# Patient Record
Sex: MALE | Race: WHITE | NOT HISPANIC OR LATINO | Employment: OTHER | URBAN - NONMETROPOLITAN AREA
[De-identification: names, ages, dates, MRNs, and addresses within clinical notes are randomized per-mention and may not be internally consistent; named-entity substitution may affect disease eponyms.]

---

## 2017-01-04 ENCOUNTER — HOSPITAL ENCOUNTER (OUTPATIENT)
Dept: PHYSICAL THERAPY | Facility: OTHER | Age: 82
Setting detail: THERAPIES SERIES
End: 2017-01-04

## 2017-03-14 ENCOUNTER — COMMUNICATION - GICH (OUTPATIENT)
Dept: INTERNAL MEDICINE | Facility: OTHER | Age: 82
End: 2017-03-14

## 2017-03-14 ENCOUNTER — AMBULATORY - GICH (OUTPATIENT)
Dept: SCHEDULING | Facility: OTHER | Age: 82
End: 2017-03-14

## 2017-04-20 ENCOUNTER — COMMUNICATION - GICH (OUTPATIENT)
Dept: INTERNAL MEDICINE | Facility: OTHER | Age: 82
End: 2017-04-20

## 2017-04-20 DIAGNOSIS — M62.838 OTHER MUSCLE SPASM: ICD-10-CM

## 2017-04-28 ENCOUNTER — COMMUNICATION - GICH (OUTPATIENT)
Dept: INTERNAL MEDICINE | Facility: OTHER | Age: 82
End: 2017-04-28

## 2017-04-28 DIAGNOSIS — E78.2 MIXED HYPERLIPIDEMIA: ICD-10-CM

## 2017-04-28 DIAGNOSIS — E11.9 TYPE 2 DIABETES MELLITUS WITHOUT COMPLICATIONS (H): ICD-10-CM

## 2017-05-11 ENCOUNTER — OFFICE VISIT - GICH (OUTPATIENT)
Dept: UROLOGY | Facility: OTHER | Age: 82
End: 2017-05-11

## 2017-05-11 ENCOUNTER — HISTORY (OUTPATIENT)
Dept: UROLOGY | Facility: OTHER | Age: 82
End: 2017-05-11

## 2017-05-11 DIAGNOSIS — R35.1 NOCTURIA: ICD-10-CM

## 2017-05-24 ENCOUNTER — COMMUNICATION - GICH (OUTPATIENT)
Dept: INTERNAL MEDICINE | Facility: OTHER | Age: 82
End: 2017-05-24

## 2017-05-24 DIAGNOSIS — M62.830 MUSCLE SPASM OF BACK: ICD-10-CM

## 2017-06-02 ENCOUNTER — OFFICE VISIT - GICH (OUTPATIENT)
Dept: INTERNAL MEDICINE | Facility: OTHER | Age: 82
End: 2017-06-02

## 2017-06-02 ENCOUNTER — HISTORY (OUTPATIENT)
Dept: INTERNAL MEDICINE | Facility: OTHER | Age: 82
End: 2017-06-02

## 2017-06-02 ENCOUNTER — HOSPITAL ENCOUNTER (OUTPATIENT)
Dept: RADIOLOGY | Facility: OTHER | Age: 82
End: 2017-06-02
Attending: INTERNAL MEDICINE

## 2017-06-02 DIAGNOSIS — E78.2 MIXED HYPERLIPIDEMIA: ICD-10-CM

## 2017-06-02 DIAGNOSIS — I73.00 RAYNAUD'S SYNDROME WITHOUT GANGRENE: ICD-10-CM

## 2017-06-02 DIAGNOSIS — M77.9 ENTHESOPATHY: ICD-10-CM

## 2017-06-02 DIAGNOSIS — E11.9 TYPE 2 DIABETES MELLITUS WITHOUT COMPLICATIONS (H): ICD-10-CM

## 2017-06-02 DIAGNOSIS — M62.830 MUSCLE SPASM OF BACK: ICD-10-CM

## 2017-06-02 DIAGNOSIS — I49.1 ATRIAL PREMATURE DEPOLARIZATION: ICD-10-CM

## 2017-06-02 LAB
A/G RATIO - HISTORICAL: 1.5 (ref 1–2)
ALBUMIN SERPL-MCNC: 4.1 G/DL (ref 3.5–5.7)
ALP SERPL-CCNC: 62 IU/L (ref 34–104)
ALT (SGPT) - HISTORICAL: 14 IU/L (ref 7–52)
ANION GAP - HISTORICAL: 8 (ref 5–18)
AST SERPL-CCNC: 14 IU/L (ref 13–39)
BILIRUB SERPL-MCNC: 1.1 MG/DL (ref 0.3–1)
BUN SERPL-MCNC: 22 MG/DL (ref 7–25)
BUN/CREAT RATIO - HISTORICAL: 22
CALCIUM SERPL-MCNC: 9.1 MG/DL (ref 8.6–10.3)
CHLORIDE SERPLBLD-SCNC: 103 MMOL/L (ref 98–107)
CHOL/HDL RATIO - HISTORICAL: 4.22
CHOLESTEROL TOTAL: 135 MG/DL
CO2 SERPL-SCNC: 22 MMOL/L (ref 21–31)
CREAT SERPL-MCNC: 0.98 MG/DL (ref 0.7–1.3)
ERYTHROCYTE [DISTWIDTH] IN BLOOD BY AUTOMATED COUNT: 14 % (ref 11.5–15.5)
ESTIMATED AVERAGE GLUCOSE: 140 MG/DL
GFR IF NOT AFRICAN AMERICAN - HISTORICAL: >60 ML/MIN/1.73M2
GLOBULIN - HISTORICAL: 2.7 G/DL (ref 2–3.7)
GLUCOSE SERPL-MCNC: 207 MG/DL (ref 70–105)
HCT VFR BLD AUTO: 46.7 % (ref 37–53)
HDLC SERPL-MCNC: 32 MG/DL (ref 23–92)
HEMOGLOBIN A1C MONITORING (POCT) - HISTORICAL: 6.5 % (ref 4–6.2)
HEMOGLOBIN: 15.6 G/DL (ref 13.5–17.5)
LDLC SERPL CALC-MCNC: 84 MG/DL
MCH RBC QN AUTO: 28.3 PG (ref 26–34)
MCHC RBC AUTO-ENTMCNC: 33.4 G/DL (ref 32–36)
MCV RBC AUTO: 85 FL (ref 80–100)
NON-HDL CHOLESTEROL - HISTORICAL: 103 MG/DL
PATIENT STATUS - HISTORICAL: NORMAL
PLATELET # BLD AUTO: 142 THOU/CU MM (ref 140–440)
PMV BLD: 9.6 FL (ref 6.5–11)
POTASSIUM SERPL-SCNC: 4.3 MMOL/L (ref 3.5–5.1)
PROT SERPL-MCNC: 6.8 G/DL (ref 6.4–8.9)
RED BLOOD COUNT - HISTORICAL: 5.51 MIL/CU MM (ref 4.3–5.9)
SODIUM SERPL-SCNC: 133 MMOL/L (ref 133–143)
TRIGL SERPL-MCNC: 93 MG/DL
WHITE BLOOD COUNT - HISTORICAL: 6.5 THOU/CU MM (ref 4.5–11)

## 2017-06-02 ASSESSMENT — PATIENT HEALTH QUESTIONNAIRE - PHQ9: SUM OF ALL RESPONSES TO PHQ QUESTIONS 1-9: 0

## 2017-06-07 ENCOUNTER — COMMUNICATION - GICH (OUTPATIENT)
Dept: INTERNAL MEDICINE | Facility: OTHER | Age: 82
End: 2017-06-07

## 2017-06-07 DIAGNOSIS — M51.379 OTHER INTERVERTEBRAL DISC DEGENERATION, LUMBOSACRAL REGION: ICD-10-CM

## 2017-06-08 ENCOUNTER — COMMUNICATION - GICH (OUTPATIENT)
Dept: INTERNAL MEDICINE | Facility: OTHER | Age: 82
End: 2017-06-08

## 2017-06-20 ENCOUNTER — HOSPITAL ENCOUNTER (OUTPATIENT)
Dept: RADIOLOGY | Facility: OTHER | Age: 82
End: 2017-06-20
Attending: INTERNAL MEDICINE

## 2017-06-20 DIAGNOSIS — M51.379 OTHER INTERVERTEBRAL DISC DEGENERATION, LUMBOSACRAL REGION: ICD-10-CM

## 2017-07-16 ENCOUNTER — COMMUNICATION - GICH (OUTPATIENT)
Dept: INTERNAL MEDICINE | Facility: OTHER | Age: 82
End: 2017-07-16

## 2017-07-16 DIAGNOSIS — M54.50 LOW BACK PAIN: ICD-10-CM

## 2017-09-07 ENCOUNTER — COMMUNICATION - GICH (OUTPATIENT)
Dept: INTERNAL MEDICINE | Facility: OTHER | Age: 82
End: 2017-09-07

## 2017-09-07 DIAGNOSIS — I73.00 RAYNAUD'S SYNDROME WITHOUT GANGRENE: ICD-10-CM

## 2017-09-08 ENCOUNTER — OFFICE VISIT - GICH (OUTPATIENT)
Dept: INTERNAL MEDICINE | Facility: OTHER | Age: 82
End: 2017-09-08

## 2017-09-08 ENCOUNTER — HISTORY (OUTPATIENT)
Dept: INTERNAL MEDICINE | Facility: OTHER | Age: 82
End: 2017-09-08

## 2017-09-08 DIAGNOSIS — E11.9 TYPE 2 DIABETES MELLITUS WITHOUT COMPLICATIONS (H): ICD-10-CM

## 2017-09-08 DIAGNOSIS — S22.32XA CLOSED FRACTURE OF RIB OF LEFT SIDE: ICD-10-CM

## 2017-09-08 DIAGNOSIS — M89.9 DISORDER OF BONE: ICD-10-CM

## 2017-09-08 DIAGNOSIS — L84 CORNS AND CALLOSITIES: ICD-10-CM

## 2017-09-08 DIAGNOSIS — I73.00 RAYNAUD'S SYNDROME WITHOUT GANGRENE: ICD-10-CM

## 2017-09-08 DIAGNOSIS — M21.961 ACQUIRED DEFORMITY OF RIGHT LOWER LEG: ICD-10-CM

## 2017-09-08 LAB
ESTIMATED AVERAGE GLUCOSE: 148 MG/DL
HEMOGLOBIN A1C MONITORING (POCT) - HISTORICAL: 6.8 % (ref 4–6.2)

## 2017-09-09 ENCOUNTER — COMMUNICATION - GICH (OUTPATIENT)
Dept: INTERNAL MEDICINE | Facility: OTHER | Age: 82
End: 2017-09-09

## 2017-09-09 DIAGNOSIS — E11.9 TYPE 2 DIABETES MELLITUS WITHOUT COMPLICATIONS (H): ICD-10-CM

## 2017-09-11 ENCOUNTER — COMMUNICATION - GICH (OUTPATIENT)
Dept: INTERNAL MEDICINE | Facility: OTHER | Age: 82
End: 2017-09-11

## 2017-09-11 DIAGNOSIS — K59.00 CONSTIPATION: ICD-10-CM

## 2017-09-13 ENCOUNTER — COMMUNICATION - GICH (OUTPATIENT)
Dept: INTERNAL MEDICINE | Facility: OTHER | Age: 82
End: 2017-09-13

## 2017-09-19 ENCOUNTER — HOSPITAL ENCOUNTER (OUTPATIENT)
Dept: RADIOLOGY | Facility: OTHER | Age: 82
End: 2017-09-19
Attending: INTERNAL MEDICINE

## 2017-09-19 DIAGNOSIS — M89.9 DISORDER OF BONE: ICD-10-CM

## 2017-09-19 DIAGNOSIS — M21.961 ACQUIRED DEFORMITY OF RIGHT LOWER LEG: ICD-10-CM

## 2017-09-20 ENCOUNTER — AMBULATORY - GICH (OUTPATIENT)
Dept: SCHEDULING | Facility: OTHER | Age: 82
End: 2017-09-20

## 2017-09-27 ENCOUNTER — AMBULATORY - GICH (OUTPATIENT)
Dept: INTERNAL MEDICINE | Facility: OTHER | Age: 82
End: 2017-09-27

## 2017-09-30 ENCOUNTER — AMBULATORY - GICH (OUTPATIENT)
Dept: SCHEDULING | Facility: OTHER | Age: 82
End: 2017-09-30

## 2017-10-04 ENCOUNTER — COMMUNICATION - GICH (OUTPATIENT)
Dept: INTERNAL MEDICINE | Facility: OTHER | Age: 82
End: 2017-10-04

## 2017-10-09 ENCOUNTER — COMMUNICATION - GICH (OUTPATIENT)
Dept: INTERNAL MEDICINE | Facility: OTHER | Age: 82
End: 2017-10-09

## 2017-10-13 ENCOUNTER — COMMUNICATION - GICH (OUTPATIENT)
Dept: INTERNAL MEDICINE | Facility: OTHER | Age: 82
End: 2017-10-13

## 2017-10-14 ENCOUNTER — AMBULATORY - GICH (OUTPATIENT)
Dept: SCHEDULING | Facility: OTHER | Age: 82
End: 2017-10-14

## 2017-11-02 ENCOUNTER — AMBULATORY - GICH (OUTPATIENT)
Dept: SCHEDULING | Facility: OTHER | Age: 82
End: 2017-11-02

## 2017-11-08 ENCOUNTER — COMMUNICATION - GICH (OUTPATIENT)
Dept: INTERNAL MEDICINE | Facility: OTHER | Age: 82
End: 2017-11-08

## 2017-12-08 ENCOUNTER — AMBULATORY - GICH (OUTPATIENT)
Dept: LAB | Facility: OTHER | Age: 82
End: 2017-12-08

## 2017-12-08 ENCOUNTER — HISTORY (OUTPATIENT)
Dept: INTERNAL MEDICINE | Facility: OTHER | Age: 82
End: 2017-12-08

## 2017-12-08 ENCOUNTER — OFFICE VISIT - GICH (OUTPATIENT)
Dept: INTERNAL MEDICINE | Facility: OTHER | Age: 82
End: 2017-12-08

## 2017-12-08 DIAGNOSIS — E78.2 MIXED HYPERLIPIDEMIA: ICD-10-CM

## 2017-12-08 DIAGNOSIS — E11.9 TYPE 2 DIABETES MELLITUS WITHOUT COMPLICATIONS (H): ICD-10-CM

## 2017-12-08 DIAGNOSIS — R00.1 BRADYCARDIA: ICD-10-CM

## 2017-12-08 DIAGNOSIS — I49.1 ATRIAL PREMATURE DEPOLARIZATION: ICD-10-CM

## 2017-12-08 LAB
A/G RATIO - HISTORICAL: 1.7 (ref 1–2)
ALBUMIN SERPL-MCNC: 4 G/DL (ref 3.5–5.7)
ALP SERPL-CCNC: 61 IU/L (ref 34–104)
ALT (SGPT) - HISTORICAL: 12 IU/L (ref 7–52)
ANION GAP - HISTORICAL: 9 (ref 5–18)
AST SERPL-CCNC: 14 IU/L (ref 13–39)
BILIRUB SERPL-MCNC: 1.2 MG/DL (ref 0.3–1)
BUN SERPL-MCNC: 22 MG/DL (ref 7–25)
BUN/CREAT RATIO - HISTORICAL: 24
CALCIUM SERPL-MCNC: 8.6 MG/DL (ref 8.6–10.3)
CHLORIDE SERPLBLD-SCNC: 104 MMOL/L (ref 98–107)
CHOL/HDL RATIO - HISTORICAL: 3.91
CHOLESTEROL TOTAL: 129 MG/DL
CO2 SERPL-SCNC: 25 MMOL/L (ref 21–31)
CREAT SERPL-MCNC: 0.93 MG/DL (ref 0.7–1.3)
ERYTHROCYTE [DISTWIDTH] IN BLOOD BY AUTOMATED COUNT: 13.7 % (ref 11.5–15.5)
ESTIMATED AVERAGE GLUCOSE: 148 MG/DL
GFR IF NOT AFRICAN AMERICAN - HISTORICAL: >60 ML/MIN/1.73M2
GLOBULIN - HISTORICAL: 2.4 G/DL (ref 2–3.7)
GLUCOSE SERPL-MCNC: 137 MG/DL (ref 70–105)
HCT VFR BLD AUTO: 46.2 % (ref 37–53)
HDLC SERPL-MCNC: 33 MG/DL (ref 23–92)
HEMOGLOBIN A1C MONITORING (POCT) - HISTORICAL: 6.8 % (ref 4–6.2)
HEMOGLOBIN: 16 G/DL (ref 13.5–17.5)
LDLC SERPL CALC-MCNC: 84 MG/DL
MCH RBC QN AUTO: 29.4 PG (ref 26–34)
MCHC RBC AUTO-ENTMCNC: 34.6 G/DL (ref 32–36)
MCV RBC AUTO: 85 FL (ref 80–100)
NON-HDL CHOLESTEROL - HISTORICAL: 96 MG/DL
PLATELET # BLD AUTO: 128 THOU/CU MM (ref 140–440)
PMV BLD: 10.2 FL (ref 6.5–11)
POTASSIUM SERPL-SCNC: 4.2 MMOL/L (ref 3.5–5.1)
PROT SERPL-MCNC: 6.4 G/DL (ref 6.4–8.9)
PROVIDER ORDERDED STATUS - HISTORICAL: NORMAL
RED BLOOD COUNT - HISTORICAL: 5.44 MIL/CU MM (ref 4.3–5.9)
SODIUM SERPL-SCNC: 138 MMOL/L (ref 133–143)
TRIGL SERPL-MCNC: 62 MG/DL
WHITE BLOOD COUNT - HISTORICAL: 6 THOU/CU MM (ref 4.5–11)

## 2017-12-26 ENCOUNTER — COMMUNICATION - GICH (OUTPATIENT)
Dept: INTERNAL MEDICINE | Facility: OTHER | Age: 82
End: 2017-12-26

## 2017-12-26 DIAGNOSIS — E11.9 TYPE 2 DIABETES MELLITUS WITHOUT COMPLICATIONS (H): ICD-10-CM

## 2017-12-27 NOTE — PROGRESS NOTES
Patient Information     Patient Name MRN Sex Steven Yao [id number] Male 1935      Progress Notes by Karen Sal at 2017  8:33 AM     Author:  Karen Sal Service:  (none) Author Type:  (none)     Filed:  2017  8:33 AM Date of Service:  2017  8:33 AM Status:  Signed     :  Karen Sal            [place] Protocol    A. Pre-procedure verification complete yes  1-relevant information / documentation available, reviewed and properly matched to the patient; 2-consent accurate and complete, 3-equipment and supplies available    B. Site marking complete Yes  Site marked if not in continuous attendance with patient    C. TIME OUT completed yes  Time Out was conducted just prior to starting procedure to verify the eight required elements: 1-patient identity, 2-consent accurate and complete, 3-position, 4-correct side/site marked (if applicable), 5-procedure, 6-relevant images / results properly labeled and displayed (if applicable), 7-antibiotics / irrigation fluids (if applicable), 8-safety precautions.

## 2017-12-27 NOTE — PROGRESS NOTES
Patient Information     Patient Name MRN Steven Serrano [id number] Male 1935      Progress Notes by Corrina Mitchell R.T. (ARRT) at 2017  4:00 PM     Author:  Corrina Mitchell R.T. (ARRT) Service:  (none) Author Type:  [employer] - Registered Radiologic Technologist     Filed:  2017  4:00 PM Date of Service:  2017  4:00 PM Status:  Signed     :  Corrina Mitchell R.T. (ARRT) ([facility] - Registered Radiologic Technologist)            RECOVERY TIME  You may experience numbness and/or relief of your pain for up to 4-6 hours after the injection.  Your usual symptoms may return the night of the procedure and may possible be more severe than usual a day or two following.  Please keep track of your pain over the next several days and report how long the relief lasts to the doctor who referred you for this procedure.    The beneficial effects of the steroids usually require 2 to 3 days to take effect, buy may take as long as 5 to 7 days.  If there is no change in the pain, then investigation can be focused on other possible sources of your pain.  In either case, the information is useful to the doctor who referred you for this procedure.    POSSIBLE SIDE EFFECTS  Facial flushing (redness), occasional low grade fevers of 99.5F or less, hiccups, insomnia, headaches, increased heart rate, abdominal cramping, and/or a bloating feeling are side effects of the steroid medications and will go away 3 to 4 days after the injection.    Diabetic Patients  The steroids you have received may significantly increase your blood sugar levels.  Monitor your blood sugar level closely (4-6 times per day) for a period of 4 days or until your blood sugar level normalizes.  If your blood sugar level elevates significantly or you experience confusion, dizziness, sweating, please notify our primary physician and make him/her aware that you have received steroids.

## 2017-12-27 NOTE — PROGRESS NOTES
Patient Information     Patient Name MRN Sex Steven Yao [id number] Male 1935      Progress Notes by Corrina Mitchell R.T. ([facility] at 2017  4:00 PM     Author:  Corrina Mitchell R.T. ([facility]) Service:  (none) Author Type:  [facility] - Registered Radiologic Technologist     Filed:  2017  4:00 PM Date of Service:  2017  4:00 PM Status:  Signed     :  Corrina Mitchell R.T. ([facility]) ([facility] - Registered Radiologic Technologist)            [place] Protocol    A. Pre-procedure verification complete yes  1-relevant information / documentation available, reviewed and properly matched to the patient; 2-consent accurate and complete, 3-equipment and supplies available    B. Site marking complete Yes  Site marked if not in continuous attendance with patient    C. TIME OUT completed yes  Time Out was conducted just prior to starting procedure to verify the eight required elements: 1-patient identity, 2-consent accurate and complete, 3-position, 4-correct side/site marked (if applicable), 5-procedure, 6-relevant images / results properly labeled and displayed (if applicable), 7-antibiotics / irrigation fluids (if applicable), 8-safety precautions.

## 2017-12-28 NOTE — PROGRESS NOTES
Patient Information     Patient Name MRN Sex Steven Yao [id number] Male 1935      Progress Notes by Nawaf Montanez MD at 2017  8:20 AM     Author:  Nawaf Montanez MD Service:  (none) Author Type:  Physician     Filed:  2017  9:06 AM Encounter Date:  2017 Status:  Signed     :  Nawaf Montanez MD (Physician)            Nursing Notes:   Lilian Renner  2017  8:40 AM  Signed  Previous A1C is at goal of <8  Urine microalbumin:creatine:   Foot exam never had one  Eye exam 17  Patient is not a current smoker  Patient is  on a daily aspirin  Blood pressure today of   128/70 at the goal of <140/90 for diabetics. Lilian Umairon LPN......................2017 8:22 AM    Steven Dennison presents to clinic today for:   Chief Complaint     Patient presents with       Diabetes      Diabetic check up     HPI: Mr. Dennison is a 82 y.o. male who presents today for evaluation of above.     (I73.00) Raynaud's phenomenon without gangrene  (primary encounter diagnosis)  (E11.9) Controlled type 2 diabetes mellitus without complication, without long-term current use of insulin (HC)  (L84) Pre-ulcerative corn or callous - right 1st metatarsal area  (M21.961) Foot deformity, acquired, right bunion  (M89.9) Disorder of bone   (S22.32XA) Closed fracture of one rib of left side, initial encounter     Raynaud's, mostly affecting his hands.  Has been doing quite well with amlodipine 5 mg daily.  Needs refills today.    Diabetes, currently controlled.  Taking, in addition of glipizide and metformin.  Needs refills of lancets today.  He is checking his blood sugars twice daily.  Recheck A1c today.    + Diabetic foot exam performed today.    Has very minimal sensation loss of the right foot especially over the first metatarsal where he has a preoperative callus.  Pulses are intact.  Has significant varicosities of the left foot.  + Right foot bunion noted.    Foot deformity, right foot has about  it.  Also has pre-ulcerative callus over the first metatarsal.  Left fifth toe has some deformity as well.    Patient was holding a board - using a dilcia/planer -- and states he heard a loud pop and then had left rib pain.  Reports some worsening pain if he coughs or sneezes.  Has not really had any shortness of breath issues with this.  Managing pain conservatively with over-the-counter treatments, conservative measures.  Limiting painful activity.  Wife states he has had a rib fracture in the past.  Concerned about bone density with a spontaneous fracture he did not have any trauma with this.    - DEXA scan ordered.    Mr. Dennison's Body mass index is 27.36 kg/(m^2). This is out of the normal range for a 82 y.o. Normal range for ages 18+ is between 18.5 and 24.9. To lose weight we reviewed risks and benefits of appropriate options such as diet, exercise, and medications. Patient's strategy will be  none; patient is not ready to act   BP Readings from Last 1 Encounters:09/08/17 : 128/70  Mr. Koehler blood pressure is out of the normal range for adults. Per JNC-8 guidelines normal adult blood pressure is < 120/80, pre-hypertensive is between 120/80 and 139/89, and hypertension is 140/90 or greater. Risks of hypertension were discussed. Patient's strategy will be reduced salt intake    Functional Capacity: > 4 METS.   Reports that he can climb a flight of stairs without any chest pain/heaviness or shortness of breath.   Patient reports no current symptoms of fevers, chills, nausea/vomiting.   No cough. No shortness of breath.   No change in bowel/bladder habits. No melena, hematochezia. No Hematuria.   No palpitations.  No orthopnea/paroxysmal nocturnal dyspnea   No vision or hearing issues.   No significant mood issues   No bruising.     LORENZO:  No flowsheet data found.    PHQ9:  PHQ Depression Screening 6/2/2017 9/8/2017   Date of PHQ exam (doc flow) 6/2/2017 9/8/2017   1. Lack of interest/pleasure 0 - Not at  all 0 - Not at all   2. Feeling down/depressed 0 - Not at all 0 - Not at all   PHQ-2 TOTAL SCORE 0 0   3. Trouble sleeping 0 - Not at all -   4. Decreased energy 0 - Not at all -   5. Appetite change 0 - Not at all -   6. Feelings of failure 0 - Not at all -   7. Trouble concentrating 0 - Not at all -   8. Activity level 0 - Not at all -   9. Hurting yourself 0 - Not at all -   PHQ-9 TOTAL SCORE 0 -   PHQ-9 Severity Level none -   Functional Impairment not difficult at all -   Some recent data might be hidden          I have personally reviewed the past medical history, past surgical history, medications, allergies, family and social history as listed below, on 9/8/2017.    Patient Active Problem List       Diagnosis  Date Noted     Pre-ulcerative corn or callous - right 1st metatarsal area  09/08/2017     Acquired deformity of right foot  09/08/2017     Rotator cuff tendinitis  12/29/2016     Eczema  12/14/2016     Trigger point with back pain  06/21/2016     Preop examination - Reverse Shoulder Replacement - Dr. Mayo 7/19/2016 06/21/2016     Deformity of right foot  03/31/2016     Bunion of right foot  03/31/2016     Biceps tendonitis on right  03/31/2016     Acute pain of right shoulder  03/31/2016     Sleep difficulties  03/31/2016     Chronic left shoulder pain  03/31/2016     Counseling regarding advanced directives and goals of care  08/07/2015     DNR (do not resuscitate) - Treat Acute Reversible illness  08/07/2015     DNI (do not intubate) - BIPAP okay  08/07/2015     Nocturia -- Was 3-4x nightly --> improved to 1-2x nightly as of 12/2016 05/01/2015     Bradycardia  05/01/2015     Intermittent lightheadedness  05/01/2015     Lumbar muscle pain  01/08/2015     Trigger point with back pain  08/01/2014     Pain in right acromioclavicular joint  07/16/2014     Subacromial bursitis - Bilateral Shoulders  07/16/2014     AAA (abdominal aortic aneurysm) - 2.6 cm - noted on 2/2014 CT scan  06/18/2014      Erectile dysfunction  05/27/2014     Raynaud's phenomenon  01/30/2014     Premature atrial contractions  01/30/2014     Impingement syndrome of right shoulder  01/30/2014     Diabetes mellitus type 2, controlled (HC)  11/19/2013     Skin lesion of cheek  11/19/2013     Hyperlipidemia  06/03/2013     EPISTAXIS  11/22/2011     DISC DISEASE, LUMBAR       recurring          RENAL CALCULUS       disease          DEGENERATIVE JOINT DISEASE, LEFT KNEE       Past Medical History:     Diagnosis  Date     Diabetes (HC)     Controlled with diet      Episode of dizziness     lightheaded with borderline troponin, possible coronary disease      Past Surgical History:      Procedure  Laterality Date     APPENDECTOMY      Appendectomy at age 6 due to appendicitis       ARTHROPLASTY  2003    left total knee       CERVICAL RIB RESECTION  1983     COLONOSCOPY SCREENING  2000     COLONOSCOPY SCREENING  2011    adenomatous polyps, one with high grade dysplasia , next due 2014       CYSTOSCOPY W/ URETEROSCOPY W/ LITHOTRIPSY  02/21/14    Dr. Gagnon - [facility]       HERNIA REPAIR      right inguinal       HERNIA REPAIR      x 4       KNEE ARTHROSCOPY      left knee       RIB RESECTION  1984    left first/rib resection       Current Outpatient Prescriptions       Medication  Sig Dispense Refill     amLODIPine (NORVASC) 5 mg tablet Take 1 tablet by mouth once daily. -- For Raynauds 180 tablet 3     Apple Cider Vinegar 300 mg tablet Take 3 tablets by mouth 2 times daily.       aspirin 325 mg tablet Take 1 tablet by mouth once daily.       blood-glucose meter Dispense item covered by pt ins. 250.02 NIDDM type II, uncontrolled - Test 2 times/day. Reason: Unstable diabetes       Cholecalciferol, Vitamin D3, (VITAMIN D-3) 5,000 unit Tab Take 1 tablet by mouth once weekly        Cinnamon Bark (CINNAMON) 500 mg capsule Take  by mouth once daily.       cyclobenzaprine (FLEXERIL) 10 mg tablet Take 1 tablet by mouth 3 times daily if needed. - no driving  after use 90 tablet 11     docosahexanoic acid 450 mg cap Take  by mouth. 900-450 mg tablet daily       Garlic 1,000 mg cap Take  by mouth once daily.       glipiZIDE (GLUCOTROL) 5 mg tablet Take 1 tablet by mouth 2 times daily before meals. 180 tablet 3     lecithin 1,200 mg cap Take  by mouth once daily.       medication order composer Flaxseed oil 1400 mg capsule daily       melatonin 10 mg tab Take  by mouth at bedtime.       metFORMIN (GLUCOPHAGE) 500 mg tablet Take 1 tablet by mouth 2 times daily with meals.       metoprolol tartrate (LOPRESSOR) 25 mg tablet Take 1 tablet by mouth 2 times daily. 180 tablet 3     ONETOUCH ULTRA TEST strip Use for testing two times daily E11.9 1 box 11     pravastatin (PRAVACHOL) 40 mg tablet TAKE 1 TABLET BY MOUTH AT BEDTIME FOR CHOLESTEROL OR DIABETES 90 tablet 3     PRODIGY TWIST TOP LANCET 28 gauge misc As directed. Use for testing 2 times daily E11.9 100 Each 4     tamsulosin (FLOMAX) 0.4 mg capsule Take 1 capsule by mouth once daily in the evening. 90 capsule 3     triamcinolone (ARISTOCORT; KENALOG) 0.1 % cream Apply twice daily as needed 454 g 0     Allergies      Allergen   Reactions     Amoxicillin  Rash     Cephalexin  *Unknown - Pt Doesn't Remember     Pt does not remember       Clindamycin  *Unknown - Pt Doesn't Remember     Pt does not remember      Hydromorphone Hcl  Bradycardia     Family History       Problem   Relation Age of Onset     Other  Father       at 74 of natural causes       Heart Disease  Mother 69      of MI       Diabetes  Mother      Stroke  Mother      Cancer  Brother      Good Health  Sister      Good Health  Sister      Family Status     Relation  Status     Father  at age 74     of natural causes      Mother  at age 69     of MI & diabetes      Brother      Sister Alive     Sister Alive     Social History     Social History        Marital status:       Spouse name: N/A     Number of children:  N/A  "    Years of education:  N/A     Social History Main Topics         Smoking status:  Former Smoker      Packs/day: 1.00      Years: 10.00      Types: Cigarettes      Quit date: 1/1/1975      Smokeless tobacco:  Never Used      Alcohol use  3.0 oz/week     5 Standard drinks or equivalent per week      Drug use:  No      Sexual activity:  Not on file      Other Topics  Concern     Not on file      Social History Narrative     , lives with wife who has parkinson's disease since 2007 and requires care. She is totally dependent. She needs assistance with all ADL's He has some respite care workers.              Pertinent ROS was performed and was negative as noted in HPI above.     EXAM:   Vitals:     09/08/17 0826   BP: 128/70   Pulse: 64   Temp: 97.4  F (36.3  C)   TempSrc: Tympanic   Weight: 84.6 kg (186 lb 9.6 oz)     BP Readings from Last 3 Encounters:    09/08/17 128/70   06/02/17 120/66   05/11/17 110/64     Wt Readings from Last 3 Encounters:    09/08/17 84.6 kg (186 lb 9.6 oz)   06/02/17 85.8 kg (189 lb 2 oz)   05/11/17 85.7 kg (189 lb)     Estimated body mass index is 27.36 kg/(m^2) as calculated from the following:    Height as of 6/2/17: 1.759 m (5' 9.25\").    Weight as of this encounter: 84.6 kg (186 lb 9.6 oz).     EXAM:  Constitutional: Pleasant, alert, appropriate appearance for age. No acute distress  ENT: Normocephalic, Atraumatic, Thyroid without nodules or tenderness   Nose/Mouth: Oral pharynx without erythema or exudates, Nose is patent bilaterally, no rhinorrhea and Dental hygeine adequate   Eyes:  Extraocular muscles intact, Sclera non-icteric, Conjunctiva without erythema  Lymphatic Exam: Non-palpable nodes in neck, clavicular regions  Pulmonary: Lungs are clear to auscultation bilaterally, without wheezes or crackles  Cardiovascular Exam: regular rate and rhythm, no pedal edema -- left foot/lower leg with varicosities.  Gastrointestinal Exam: Soft, non-tender, non-distended, positive bowel " sounds   Integument: + right foot -- pre-ulcerative callus noted of her first metatarsal.  Neurologic Exam: CN 3-12 grossly intact     + Diabetic foot exam performed today.    Has very minimal sensation loss of the right foot especially over the first metatarsal where he has a preoperative callus.  Pulses are intact.  Has significant varicosities of the left foot.  + Right foot bunion noted.    Musculoskeletal Exam: Left rib pain - possibly 5th and 6th - very tender to palpation. Bilateral feet - has foot deformity.  Gait and station appear grossly normal  Psychiatric Exam: Awake and Alert, Affect and mood appropriate  Speech is fluent, Thought process is normal    INVESTIGATIONS:  Results for orders placed or performed in visit on 06/02/17      CBC W PLT NO DIFF      Result  Value Ref Range    WHITE BLOOD COUNT         6.5 4.5 - 11.0 thou/cu mm    RED BLOOD COUNT           5.51 4.30 - 5.90 mil/cu mm    HEMOGLOBIN                15.6 13.5 - 17.5 g/dL    HEMATOCRIT                46.7 37.0 - 53.0 %    MCV                       85 80 - 100 fL    MCH                       28.3 26.0 - 34.0 pg    MCHC                      33.4 32.0 - 36.0 g/dL    RDW                       14.0 11.5 - 15.5 %    PLATELET COUNT            142 140 - 440 thou/cu mm    MPV                       9.6 6.5 - 11.0 fL   COMP METABOLIC PANEL      Result  Value Ref Range    SODIUM 133 133 - 143 mmol/L    POTASSIUM 4.3 3.5 - 5.1 mmol/L    CHLORIDE 103 98 - 107 mmol/L    CO2,TOTAL 22 21 - 31 mmol/L    ANION GAP 8 5 - 18                    GLUCOSE 207 (H) 70 - 105 mg/dL    CALCIUM 9.1 8.6 - 10.3 mg/dL    BUN 22 7 - 25 mg/dL    CREATININE 0.98 0.70 - 1.30 mg/dL    BUN/CREAT RATIO           22                    GFR if African American >60 >60 ml/min/1.73m2    GFR if not African American >60 >60 ml/min/1.73m2    ALBUMIN 4.1 3.5 - 5.7 g/dL    PROTEIN,TOTAL 6.8 6.4 - 8.9 g/dL    GLOBULIN                  2.7 2.0 - 3.7 g/dL    A/G RATIO 1.5 1.0 - 2.0                     BILIRUBIN,TOTAL 1.1 (H) 0.3 - 1.0 mg/dL    ALK PHOSPHATASE 62 34 - 104 IU/L    ALT (SGPT) 14 7 - 52 IU/L    AST (SGOT) 14 13 - 39 IU/L   HEMOGLOBIN A1C MONITORING (POCT)      Result  Value Ref Range    HEMOGLOBIN A1C MONITORING (POCT) 6.5 (H) 4.0 - 6.2 %    ESTIMATED AVERAGE GLUCOSE  140 mg/dL   LIPID PANEL      Result  Value Ref Range    CHOLESTEROL,TOTAL 135 <200 mg/dL    TRIGLYCERIDES 93 <150 mg/dL    HDL CHOLESTEROL 32 23 - 92 mg/dL    NON-HDL CHOLESTEROL 103 <145 mg/dl    CHOL/HDL RATIO            4.22 <4.50                    LDL CHOLESTEROL 84 <100 mg/dL    PATIENT STATUS            NOT GIVEN                       ASSESSMENT AND PLAN:  Steven was seen today for diabetes.    Diagnoses and all orders for this visit:    Raynaud's phenomenon without gangrene  -     amLODIPine (NORVASC) 5 mg tablet; Take 1 tablet by mouth once daily. -- For Raynauds    Controlled type 2 diabetes mellitus without complication, without long-termcurrent use of insulin (HC)  -     glipiZIDE (GLUCOTROL) 5 mg tablet; Take 1 tablet by mouth 2 times daily before meals.  -     PRODIGY TWIST TOP LANCET 28 gauge misc; As directed. Use for testing 2 times daily E11.9  -     HEMOGLOBIN A1C MONITORING (POCT)    Pre-ulcerative corn or callous - right 1st metatarsal area    Foot deformity, acquired, right bunion  -     XR DXA BONE DENSITY 2 SITES AXIAL; Future    Disorder of bone   -     XR DXA BONE DENSITY 2 SITES AXIAL; Future    Closed fracture of one rib of left side, initial encounter      lab results and schedule of future lab studies reviewed with patient, reviewed diet, exercise and weight control, recommended sodium restriction, cardiovascular risk and specific lipid/LDL goals reviewed, specific diabetic recommendations low cholesterol diet, weight control and daily exercise discussed, home glucose monitoring emphasized, foot care discussed and Podiatry visits discussed, annual eye examinations at Ophthalmology discussed,  glycohemoglobin and other lab monitoring discussed and long term diabetic complications discussed, use of aspirin to prevent MI and TIA's discussed    -- Expected clinical course discussed   -- Medications and their side effects discussed    Steven is also recommended to eat a heart-healthy diet, do regular aerobic exercises, maintain a desirable body weight, and avoid tobacco products. These recommendations are from the American Heart Association (AHA) which stresses the importance of lifestyle changes to lower cardiovascular disease risk.     Return in about 3 months (around 12/8/2017) for -- labs in 91+ days with Diabetes clinic appointment with Dr. Montanez 1-2 days later..    Patient Instructions     Blood pressures are looking very good.     Glad amlodipine has helped your raynauds hand symptoms.     -- Medications refilled.     Labs today.     Recommend use of Diabetic shoes.... Foot deformity and pre-ulcerative callous formation noted today.       Bone density scan ordered  - they will call with date/time of appointment.        Return for Diabetes labs and clinic follow-up appointment every 3 to 4 months.  --- (Go for about 91 to 100 days)    Schedule lab only appointment --- A few days AFTER: 12/07/17     Schedule clinic appointment with Dr. Montanez -- Same day as labs, or 1-2 days later.     Insurance companies are now grading you and I on your blood sugar control -- Goal is to get your A1c down to 7.9% or lower and NO Smoking!    -- Medicare and most insurance companies, will only cover Hemoglobin A1c labs to be rechecked every 91+ days.      HEMOGLOBIN A1C MONITORING (POCT)    Date Value   06/02/2017 6.5 % (H)   11/29/2012 7.7 % NGSP (H)        Next follow-up appointment with Dr. Montanez should be scheduled:  -- Approximately a few days AFTER: 12/07/17      Nawaf Montanez MD

## 2017-12-28 NOTE — TELEPHONE ENCOUNTER
Patient Information     Patient Name MRN Steven Serarno [id number] Male 1935      Telephone Encounter by Zari Lino RN at 2017 10:56 AM     Author:  Zari Lino RN Service:  (none) Author Type:  NURS- Registered Nurse     Filed:  2017 10:57 AM Encounter Date:  2017 Status:  Signed     :  Zari Lino RN (NURS- Registered Nurse)            Glipizide refilled on 17 #180 x 3 refills to [facility].  Unable to complete prescription refill per RN Medication Refill Policy.................... ZARI LINO RN ....................  2017   10:57 AM

## 2017-12-28 NOTE — TELEPHONE ENCOUNTER
Patient Information     Patient Name MRN Steven Serrano [id number] Male 1935      Telephone Encounter by Carmen Sevilla at 2017  8:20 AM     Author:  Carmen Sevilla Service:  (none) Author Type:  (none)     Filed:  2017  8:20 AM Encounter Date:  2017 Status:  Signed     :  Carmen Sevilla            [employer] questions were answered and sent to insurance company on 17.  Carmen Sevilla ....................  2017   8:20 AM

## 2017-12-28 NOTE — PROGRESS NOTES
Patient Information     Patient Name MRSteven Reyes [id number] Male 1935      Progress Notes by Nawaf Montanez MD at 2017 10:40 AM     Author:  Nawaf Montanez MD  Service:  (none) Author Type:  Physician     Filed:  2017 11:31 AM  Encounter Date:  2017 Status:  Addendum     :  Nawaf Montanez MD (Physician)        Related Notes: Original Note by Nawaf Montanez MD (Physician) filed at 2017  4:24 PM            Nursing Notes:   Keyla Last  2017 11:18 AM  Signed  Patient presents to the clinic for medication management, last eye exam was 2017.    Keyla Last LPN        2017 10:51 AM    Steven Dennison presents to clinic today for:   Chief Complaint    Patient presents with      Medication Management     HPI: Mr. Dennison is a 82 y.o. male who presents today for evaluation of above.     (E11.9) Controlled type 2 diabetes mellitus without complication, without long-term current use of insulin (HC)  (primary encounter diagnosis)  (I49.1) Premature atrial contractions  (E78.2) Mixed hyperlipidemia  (I73.00) Raynaud's phenomenon without gangrene  (M77.9) Enthesopathy, unspecified     Diabetes, currently well controlled.  Taking metformin.  Reports his blood sugars in the morning of been quite high however.  Turns out he has been waking up at night and having a snack in the middle of the night.  His wife is out of no where this.  Patient had reduced his metformin dosing from 3 times daily down to 2 times daily.  He needs a number of medication refills today.  2 for lab work today.    Premature atrial contractions, essentially resolved with his metoprolol.  Needs refills today.    Hyperlipidemia, tolerating pravastatin well.  Recheck labs.    Unspecified enthesopathy, also noted to have Trigger point with back pain and lumbar paraSpinal muscle spasms on exam -- some are quite severe at this time.    - There is an opening today with interventional  radiology for trigger point injections.  He was able to get scheduled after orders were placed.    Raynaud's phenomenon, improved with amlodipine/Norvasc.  Needs refills today.  Has been tolerating well.    Mr. Koehler Body mass index is 27.73 kg/(m^2). This is out of the normal range for a 82 y.o. Normal range for ages 18+ is between 18.5 and 24.9. To lose weight we reviewed risks and benefits of appropriate options such as diet, exercise, and medications. Patient's strategy will be  self-directed nutrition plan and self-directed exercise program   BP Readings from Last 1 Encounters:06/02/17 : 120/66  Mr. Koehler blood pressure is out of the normal range for adults. Per JNC-8 guidelines normal adult blood pressure is < 120/80, pre-hypertensive is between 120/80 and 139/89, and hypertension is 140/90 or greater. Risks of hypertension were discussed. Patient's strategy will be weight loss, increased activity and reduced salt intake    Functional Capacity: Normally > 4 METS. + limited due to back pain currently. Dropped him to floor a few times. Took flexeril this morning.  Reports back pain is still quite severe.  Reports that he can climb a flight of stairs without any chest pain/heaviness or shortness of breath.   Patient reports no current symptoms of fevers, chills, nausea/vomiting.   No cough. No shortness of breath.   No change in bowel/bladder habits. No melena, hematochezia. No Hematuria.   No rashes. No palpitations.  No orthopnea/paroxysmal nocturnal dyspnea   No vision or hearing issues.   No significant mood issues   No bruising.     LORENZO:  No flowsheet data found.    PHQ9:  PHQ Depression Screening 12/14/2016 6/2/2017   Date of PHQ exam (doc flow) 12/14/2016 6/2/2017   1. Lack of interest/pleasure 0 - Not at all 0 - Not at all   2. Feeling down/depressed 0 - Not at all 0 - Not at all   PHQ-2 TOTAL SCORE 0 0   3. Trouble sleeping 0 - Not at all 0 - Not at all   4. Decreased energy 0 - Not at all 0 -  Not at all   5. Appetite change 0 - Not at all 0 - Not at all   6. Feelings of failure 0 - Not at all 0 - Not at all   7. Trouble concentrating 0 - Not at all 0 - Not at all   8. Activity level 0 - Not at all 0 - Not at all   9. Hurting yourself 0 - Not at all 0 - Not at all   PHQ-9 TOTAL SCORE 0 0   PHQ-9 Severity Level none none   Functional Impairment not difficult at all not difficult at all        I have personally reviewed the past medical history, past surgical history, medications, allergies, family and social history as listed below, on 6/2/2017.    Patient Active Problem List       Diagnosis  Date Noted     Rotator cuff tendinitis  12/29/2016     Eczema  12/14/2016     Trigger point with back pain  06/21/2016     Preop examination - Reverse Shoulder Replacement - Dr. Mayo 7/19/2016 06/21/2016     Deformity of right foot  03/31/2016     Bunion of right foot  03/31/2016     Biceps tendonitis on right  03/31/2016     Acute pain of right shoulder  03/31/2016     Sleep difficulties  03/31/2016     Chronic left shoulder pain  03/31/2016     Counseling regarding advanced directives and goals of care  08/07/2015     DNR (do not resuscitate) - Treat Acute Reversible illness  08/07/2015     DNI (do not intubate) - BIPAP okay  08/07/2015     Nocturia -- Was 3-4x nightly --> improved to 1-2x nightly as of 12/2016 05/01/2015     Bradycardia  05/01/2015     Intermittent lightheadedness  05/01/2015     Lumbar muscle pain  01/08/2015     Trigger point with back pain  08/01/2014     Pain in right acromioclavicular joint  07/16/2014     Subacromial bursitis - Bilateral Shoulders  07/16/2014     AAA (abdominal aortic aneurysm) - 2.6 cm - noted on 2/2014 CT scan  06/18/2014     Erectile dysfunction  05/27/2014     Raynaud's phenomenon  01/30/2014     Premature atrial contractions  01/30/2014     Impingement syndrome of right shoulder  01/30/2014     Diabetes mellitus type 2, controlled (HC)  11/19/2013     Skin lesion  of cheek  11/19/2013     Hyperlipidemia  06/03/2013     EPISTAXIS  11/22/2011     DISC DISEASE, LUMBAR       recurring          RENAL CALCULUS       disease          DEGENERATIVE JOINT DISEASE, LEFT KNEE       Past Medical History:     Diagnosis  Date     Diabetes (HC)     Controlled with diet      Episode of dizziness     lightheaded with borderline troponin, possible coronary disease      Past Surgical History:      Procedure  Laterality Date     APPENDECTOMY      Appendectomy at age 6 due to appendicitis       ARTHROPLASTY  2003    left total knee       CERVICAL RIB RESECTION  1983     COLONOSCOPY SCREENING  2000     COLONOSCOPY SCREENING  2011    adenomatous polyps, one with high grade dysplasia , next due 2014       CYSTOSCOPY W/ URETEROSCOPY W/ LITHOTRIPSY  02/21/14    Dr. Gagnon - [facility]       HERNIA REPAIR      right inguinal       HERNIA REPAIR      x 4       KNEE ARTHROSCOPY      left knee       RIB RESECTION  1984    left first/rib resection       Current Outpatient Prescriptions       Medication  Sig Dispense Refill     amLODIPine (NORVASC) 5 mg tablet Take 1 tablet by mouth once daily. -- For Raynauds 180 tablet 3     Apple Cider Vinegar 300 mg tablet Take 3 tablets by mouth 2 times daily.       aspirin 325 mg tablet Take 1 tablet by mouth once daily.       blood-glucose meter Dispense item covered by pt ins. 250.02 NIDDM type II, uncontrolled - Test 2 times/day. Reason: Unstable diabetes       Cholecalciferol, Vitamin D3, (VITAMIN D-3) 5,000 unit Tab Take 1 tablet by mouth once weekly        Cinnamon Bark (CINNAMON) 500 mg capsule Take  by mouth once daily.       cyclobenzaprine (FLEXERIL) 10 mg tablet Take 1 tablet by mouth 3 times daily if needed. - no driving after use 90 tablet 11     docosahexanoic acid 450 mg cap Take  by mouth. 900-450 mg tablet daily       Garlic 1,000 mg cap Take  by mouth once daily.       glipiZIDE (GLUCOTROL) 5 mg tablet Take 1 tablet by mouth 2 times daily before meals. 180  tablet 3     lecithin 1,200 mg cap Take  by mouth once daily.       medication order composer Flaxseed oil 1400 mg capsule daily       melatonin 10 mg tab Take  by mouth at bedtime.       metFORMIN (GLUCOPHAGE) 500 mg tablet Take 1 tablet by mouth 2 times daily with meals.       metoprolol tartrate (LOPRESSOR) 25 mg tablet Take 1 tablet by mouth 2 times daily. 180 tablet 3     nitroglycerin (NITROSTAT) 0.4 mg sublingual tablet Place 0.4 mg under the tongue every 5 minutes if needed.       ONETOUCH ULTRA TEST strip Use for testing two times daily E11.9 1 box 11     pravastatin (PRAVACHOL) 40 mg tablet TAKE 1 TABLET BY MOUTH AT BEDTIME FOR CHOLESTEROL OR DIABETES 90 tablet 3     PRODIGY TWIST TOP LANCET 28 gauge misc As directed. Use for testing 2 times daily E11.9       tamsulosin (FLOMAX) 0.4 mg capsule Take 1 capsule by mouth once daily in the evening. 90 capsule 3     triamcinolone (ARISTOCORT; KENALOG) 0.1 % cream Apply twice daily as needed 454 g 0     Allergies      Allergen   Reactions     Amoxicillin  Rash     Cephalexin  *Unknown - Pt Doesn't Remember     Pt does not remember       Clindamycin  *Unknown - Pt Doesn't Remember     Pt does not remember      Hydromorphone Hcl  Bradycardia     Family History       Problem   Relation Age of Onset     Other  Father       at 74 of natural causes       Heart Disease  Mother 69      of MI       Diabetes  Mother      Stroke  Mother      Cancer  Brother      Good Health  Sister      Good Health  Sister      Family Status     Relation  Status     Father  at age 74     of natural causes      Mother  at age 69     of MI & diabetes      Brother      Sister Alive     Sister Alive     Social History     Social History        Marital status:       Spouse name: N/A     Number of children:  N/A     Years of education:  N/A     Social History Main Topics         Smoking status:  Former Smoker      Packs/day: 1.00      Years: 10.00   "    Types: Cigarettes      Quit date: 1/1/1975      Smokeless tobacco:  Never Used      Alcohol use  3.0 oz/week     5 Standard drinks or equivalent per week      Drug use:  No      Sexual activity:  Not on file      Other Topics  Concern     Not on file      Social History Narrative     , lives with wife who has parkinson's disease since 2007 and requires care. She is totally dependent. She needs assistance with all ADL's He has some respite care workers.                Pertinent ROS was performed and was negative as noted in HPI above.     EXAM:   Vitals:     06/02/17 1100   BP: 120/66   Pulse: 68   Temp: 97.3  F (36.3  C)   TempSrc: Tympanic   Weight: 85.8 kg (189 lb 2 oz)   Height: 1.759 m (5' 9.25\")     BP Readings from Last 3 Encounters:    06/02/17 120/66   05/11/17 110/64   12/29/16 118/60     Wt Readings from Last 3 Encounters:    06/02/17 85.8 kg (189 lb 2 oz)   05/11/17 85.7 kg (189 lb)   12/14/16 87.7 kg (193 lb 6 oz)     Estimated body mass index is 27.73 kg/(m^2) as calculated from the following:    Height as of this encounter: 1.759 m (5' 9.25\").    Weight as of this encounter: 85.8 kg (189 lb 2 oz).     EXAM:  Constitutional: uncomfortable with pain, well groomed / good hygiene, casual dress  Lymphatic Exam: Non-palpable nodes in neck, clavicular regions  Pulmonary: Lungs are clear to auscultation bilaterally, without wheezes or crackles  Cardiovascular Exam: regular rate and rhythm, no pedal edema, no murmur, click, rub or gallop appreciated  Gastrointestinal Exam: Soft, non-tender, non-distended, positive bowel sounds  Integument: No abnormal rashes, sores, or ulcerations noted  Neurologic Exam: CN 3-12 grossly intact   Musculoskeletal Exam: + Moves slowly, due to back pain. + trigger points to palpation.   Gait and station appear grossly normal  Psychiatric Exam: Awake and Alert, Affect and mood appropriate  Speech is fluent, Thought process is normal    INVESTIGATIONS:  Results for orders " placed or performed in visit on 06/02/17      CBC W PLT NO DIFF      Result  Value Ref Range    WHITE BLOOD COUNT         6.5 4.5 - 11.0 thou/cu mm    RED BLOOD COUNT           5.51 4.30 - 5.90 mil/cu mm    HEMOGLOBIN                15.6 13.5 - 17.5 g/dL    HEMATOCRIT                46.7 37.0 - 53.0 %    MCV                       85 80 - 100 fL    MCH                       28.3 26.0 - 34.0 pg    MCHC                      33.4 32.0 - 36.0 g/dL    RDW                       14.0 11.5 - 15.5 %    PLATELET COUNT            142 140 - 440 thou/cu mm    MPV                       9.6 6.5 - 11.0 fL   COMP METABOLIC PANEL      Result  Value Ref Range    SODIUM 133 133 - 143 mmol/L    POTASSIUM 4.3 3.5 - 5.1 mmol/L    CHLORIDE 103 98 - 107 mmol/L    CO2,TOTAL 22 21 - 31 mmol/L    ANION GAP 8 5 - 18                    GLUCOSE 207 (H) 70 - 105 mg/dL    CALCIUM 9.1 8.6 - 10.3 mg/dL    BUN 22 7 - 25 mg/dL    CREATININE 0.98 0.70 - 1.30 mg/dL    BUN/CREAT RATIO           22                    GFR if African American >60 >60 ml/min/1.73m2    GFR if not African American >60 >60 ml/min/1.73m2    ALBUMIN 4.1 3.5 - 5.7 g/dL    PROTEIN,TOTAL 6.8 6.4 - 8.9 g/dL    GLOBULIN                  2.7 2.0 - 3.7 g/dL    A/G RATIO 1.5 1.0 - 2.0                    BILIRUBIN,TOTAL 1.1 (H) 0.3 - 1.0 mg/dL    ALK PHOSPHATASE 62 34 - 104 IU/L    ALT (SGPT) 14 7 - 52 IU/L    AST (SGOT) 14 13 - 39 IU/L   HEMOGLOBIN A1C MONITORING (POCT)      Result  Value Ref Range    HEMOGLOBIN A1C MONITORING (POCT) 6.5 (H) 4.0 - 6.2 %    ESTIMATED AVERAGE GLUCOSE  140 mg/dL   LIPID PANEL      Result  Value Ref Range    CHOLESTEROL,TOTAL 135 <200 mg/dL    TRIGLYCERIDES 93 <150 mg/dL    HDL CHOLESTEROL 32 23 - 92 mg/dL    NON-HDL CHOLESTEROL 103 <145 mg/dl    CHOL/HDL RATIO            4.22 <4.50                    LDL CHOLESTEROL 84 <100 mg/dL    PATIENT STATUS            NOT GIVEN                       ASSESSMENT AND PLAN:  Steven was seen today for medication  management.    Diagnoses and all orders for this visit:    Controlled type 2 diabetes mellitus without complication, without long-term current use of insulin (HC)  -     pravastatin (PRAVACHOL) 40 mg tablet; TAKE 1 TABLET BY MOUTH AT BEDTIME FOR CHOLESTEROL OR DIABETES  -     CBC W PLT NO DIFF  -     COMP METABOLIC PANEL  -     HEMOGLOBIN A1C MONITORING (POCT)  -     ONETOUCH ULTRA TEST strip; Use for testing two times daily E11.9    Premature atrial contractions  -     metoprolol tartrate (LOPRESSOR) 25 mg tablet; Take 1 tablet by mouth 2 times daily.    Mixed hyperlipidemia  -     pravastatin (PRAVACHOL) 40 mg tablet; TAKE 1 TABLET BY MOUTH AT BEDTIME FOR CHOLESTEROL OR DIABETES  -     LIPID PANEL    Raynaud's phenomenon without gangrene  -     amLODIPine (NORVASC) 5 mg tablet; Take 1 tablet by mouth once daily. -- For Raynauds    Enthesopathy, unspecified  -     cyclobenzaprine (FLEXERIL) 10 mg tablet; Take 1 tablet by mouth 3 times daily if needed. - no driving after use  -     XR INJ TRIGGER POINTS MINIMUM 3; Future    lab results and schedule of future lab studies reviewed with patient, reviewed diet, exercise and weight control, recommended sodium restriction, cardiovascular risk and specific lipid/LDL goals reviewed, specific diabetic recommendations low cholesterol diet, weight control and daily exercise discussed, home glucose monitoring emphasized, foot care discussed and Podiatry visits discussed, annual eye examinations at Ophthalmology discussed, glycohemoglobin and other lab monitoring discussed and long term diabetic complications discussed, use of aspirin to prevent MI and TIA's discussed    -- Expected clinical course discussed   -- Medications and their side effects discussed    The ASCVD Risk score (Sheridan EVELIA Jr, et al., 2013) failed to calculate for the following reasons:    The 2013 ASCVD risk score is only valid for ages 40 to 79    Steven is also recommended to eat a heart-healthy diet, do  regular aerobic exercises, maintain a desirable body weight, and avoid tobacco products. These recommendations are from the American Heart Association (AHA) which stresses the importance of lifestyle changes to lower cardiovascular disease risk.     Return in about 3 months (around 9/2/2017) for -- labs in 91+ days with Diabetes clinic appointment with Dr. Montanez 1-2 days later..    Patient Instructions     Labs today.   Medications refilled.     Continue flexeril. Refill printed. No driving after use.     Return for Diabetes labs and clinic follow-up appointment every 3 to 4 months.  --- (Go for about 91 to 100 days)    Schedule lab only appointment --- A few days AFTER: 08/31/17     Schedule clinic appointment with Dr. Montanez -- Same day as labs, or 1-2 days later.     Insurance companies are now grading you and I on your blood sugar control -- Goal is to get your A1c down to 7.9% or lower and NO Smoking!    -- Medicare and most insurance companies, will only cover Hemoglobin A1c labs to be rechecked every 91+ days.      HEMOGLOBIN A1C MONITORING (POCT)    Date Value   12/14/2016 6.9 % (H)   11/29/2012 7.7 % NGSP (H)        Next follow-up appointment with Dr. Montanez should be scheduled:  -- Approximately a few days AFTER: 08/31/17      Nawaf Montanez MD

## 2017-12-28 NOTE — PROGRESS NOTES
Patient Information     Patient Name MRN Sex Steven Yao [id number] Male 1935      Progress Notes by Corrina Mitchell R.T. ([facility]) at 2017  4:00 PM     Author:  [facility] Service:  (none) Author Type:  [facility] - Registered Radiologic Technologist     Filed:  2017  4:00 PM Date of Service:  2017  4:00 PM Status:  Signed     :  Corrina Mitchell R.T. ([facility]) ([facility] - Registered Radiologic Technologist)            Falls Risk Criteria:    Age 65 and older or under age 4        Sensory deficits    Poor vision    Use of ambulatory aides    Impaired judgment    Unable to walk independently    Meets High Risk criteria for falls:  Yes             1.  Do you have dizziness or vertigo?    no                    2.  Do you need help standing or walking?   no                 3.  Have you fallen within the last 6 months?    no           4.  Has the patient been fasting?      no       If any risks are marked Yes, the following interventions are utilized:    Do not leave patient unattended     Assist patient in the dressing room and bathroom    Have ambulatory aides available throughout procedure    Involve patient s family if available

## 2017-12-28 NOTE — TELEPHONE ENCOUNTER
Patient Information     Patient Name MRN tSeven Serrano [id number] Male 1935      Telephone Encounter by Zari Lino RN at 2017  2:18 PM     Author:  Zari Lino RN Service:  (none) Author Type:  NURS- Registered Nurse     Filed:  2017  2:19 PM Encounter Date:  2017 Status:  Signed     :  Zari Lino RN (NURS- Registered Nurse)            Flexeril refilled on 17 #90 x 11 refills to [facility].  ZARI LINO, [place]NA ....................  2017   2:19 PM

## 2017-12-28 NOTE — PROGRESS NOTES
Patient Information     Patient Name MRN Sex Steven Yao [id number] Male 1935      Progress Notes by Karen Sal at 2017  8:33 AM     Author:  Karen Sal Service:  (none) Author Type:  (none)     Filed:  2017  8:33 AM Date of Service:  2017  8:33 AM Status:  Signed     :  Karen Sal            Falls Risk Criteria:    Age 65 and older or under age 4        Sensory deficits    Poor vision    Use of ambulatory aides    Impaired judgment    Unable to walk independently    Meets High Risk criteria for falls:  Yes             1.  Do you have dizziness or vertigo?    no                    2.  Do you need help standing or walking?   no                 3.  Have you fallen within the last 6 months?    no           4.  Has the patient been fasting?      no       If any risks are marked Yes, the following interventions are utilized:    Do not leave patient unattended     Assist patient in the dressing room and bathroom    Have ambulatory aides available throughout procedure    Involve patient s family if available

## 2017-12-28 NOTE — TELEPHONE ENCOUNTER
Patient Information     Patient Name MRN Sex Steven Yao [id number] Male 1935      Telephone Encounter by Keyla Last at 2017 10:42 AM     Author:  Keyla Last Service:  (none) Author Type:  (none)     Filed:  2017 10:43 AM Encounter Date:  2017 Status:  Signed     :  Keyla Last            New Diabetic form completed and awaiting MD signature at this time.      Keyla Last LPN        2017 10:43 AM

## 2017-12-28 NOTE — TELEPHONE ENCOUNTER
Patient Information     Patient Name MRN Sex Steven Yao [id number] Male 1935      Telephone Encounter by Keyla Last at 10/9/2017 10:04 AM     Author:  Keyla Last Service:  (none) Author Type:  (none)     Filed:  10/9/2017 10:05 AM Encounter Date:  10/9/2017 Status:  Signed     :  Keyla Last            Diabetic form completed and awaiting MD signature at this time.      Keyla Last LPN        10/9/2017 10:05 AM

## 2017-12-28 NOTE — PATIENT INSTRUCTIONS
Patient Information     Patient Name MRN Steven Serrano [id number] Male 1935      Patient Instructions by Nawaf Montanez MD at 2017  8:20 AM     Author:  Nawaf Montanez MD  Service:  (none) Author Type:  Physician     Filed:  2017  8:55 AM  Encounter Date:  2017 Status:  Addendum     :  Nawaf Montanez MD (Physician)        Related Notes: Original Note by Nawaf Montanez MD (Physician) filed at 2017  8:51 AM            Blood pressures are looking very good.     Glad amlodipine has helped your raynauds hand symptoms.     -- Medications refilled.     Labs today.     Recommend use of Diabetic shoes.... Foot deformity and pre-ulcerative callous formation noted today.       Bone density scan ordered  - they will call with date/time of appointment.        Return for Diabetes labs and clinic follow-up appointment every 3 to 4 months.  --- (Go for about 91 to 100 days)    Schedule lab only appointment --- A few days AFTER: 17     Schedule clinic appointment with Dr. Montanez -- Same day as labs, or 1-2 days later.     Insurance companies are now grading you and I on your blood sugar control -- Goal is to get your A1c down to 7.9% or lower and NO Smoking!    -- Medicare and most insurance companies, will only cover Hemoglobin A1c labs to be rechecked every 91+ days.      HEMOGLOBIN A1C MONITORING (POCT)    Date Value   2017 6.5 % (H)   2012 7.7 % NGSP (H)        Next follow-up appointment with Dr. Montanez should be scheduled:  -- Approximately a few days AFTER: 17

## 2017-12-28 NOTE — ADDENDUM NOTE
Patient Information     Patient Name MRN Steven Serrano [id number] Male 1935      Addendum Note by Nawaf Mooney MD at 2017 11:32 AM     Author:  Nawaf Mooney MD Service:  (none) Author Type:  Physician     Filed:  2017 11:32 AM Encounter Date:  2017 Status:  Signed     :  Nawaf Mooney MD (Physician)       Addended by: NAWAF MOONEY on: 2017 11:32 AM        Modules accepted: Orders

## 2017-12-28 NOTE — PROGRESS NOTES
Patient Information     Patient Name MRN Steven Serrano [id number] Male 1935      Progress Notes by Karen Sal at 2017  8:33 AM     Author:  Karen Sal Service:  (none) Author Type:  (none)     Filed:  2017  8:33 AM Date of Service:  2017  8:33 AM Status:  Signed     :  Karen Sal            RECOVERY TIME  Some numbness may be present 2-4 hours after the injection, which could impair your normal driving, reflexes.  You will need someone to drive you home from your exam due to the effects of certain medications.    You may experience numbness and/or relief of your pain for up to 4-6 hours after the injection.  Your usual symptoms may return the night of the procedure and may possible be more severe than usual a day or two following.  Please keep track of your pain over the next several days and report how long the relief lasts to the doctor who referred you for this procedure.    The beneficial effects of the steroids usually require 2 to 3 days to take effect, buy may take as long as 5 to 7 days.  If there is no change in the pain, then investigation can be focused on other possible sources of your pain.  In either case, the information is useful to the doctor who referred you for this procedure.    POSSIBLE SIDE EFFECTS  Facial flushing (redness), occasional low grade fevers of 99.5F or less, hiccups, insomnia, headaches, increased heart rate, abdominal cramping, and/or a bloating feeling are side effects of the steroid medications and will go away 3 to 4 days after the injection.    Diabetic Patients  The steroids you have received may significantly increase your blood sugar levels.  Monitor your blood sugar level closely (4-6 times per day) for a period of 4 days or until your blood sugar level normalizes.  If your blood sugar level elevates significantly or you experience confusion, dizziness, sweating, please notify our primary physician and make him/her aware  that you have received steroids.

## 2017-12-28 NOTE — TELEPHONE ENCOUNTER
"Patient Information     Patient Name MRN Steven Serrano [id number] Male 1935      Telephone Encounter by Connor Smith at 2017  3:36 PM     Author:  Connor Smith Service:  (none) Author Type:  (none)     Filed:  2017  3:43 PM Encounter Date:  2017 Status:  Signed     :  Connor Smith            Patient states that he had his back injection 2 days ago at [facility]. At that time his pain was above his belt line. Now since the injection it is below his belt line. Pain is a level:10.  At times\" it almost drops\" him to the ground.Sitting does make it some better but it's still a level 10 +. More painful with walking.  [facility] recommended contacting his provider to see if he should have another injection.  Connor Smith LPN ....................  2017   3:43 PM          "

## 2017-12-28 NOTE — TELEPHONE ENCOUNTER
Patient Information     Patient Name MRN Steven Serrano [id number] Male 1935      Telephone Encounter by Keyla Last at 10/13/2017  9:03 AM     Author:  Keyla Last Service:  (none) Author Type:  (none)     Filed:  10/13/2017  9:03 AM Encounter Date:  10/13/2017 Status:  Signed     :  Keyla Last            Completed diabetic form completed and re-faxed back to [facility].      Keyla Last LPN        10/13/2017 9:03 AM

## 2017-12-28 NOTE — TELEPHONE ENCOUNTER
Patient Information     Patient Name MRN Steven Serrano [id number] Male 1935      Telephone Encounter by Keyla Last at 6/15/2017 10:28 AM     Author:  Keyla Last Service:  (none) Author Type:  (none)     Filed:  6/15/2017 10:28 AM Encounter Date:  2017 Status:  Signed     :  Keyla Last            See previous telephone note.    Keyla Last LPN        6/15/2017 10:28 AM

## 2017-12-28 NOTE — TELEPHONE ENCOUNTER
Patient Information     Patient Name MRN Steven Serrano [id number] Male 1935      Telephone Encounter by Zari Lino RN at 2017  4:06 PM     Author:  Zari Lino RN Service:  (none) Author Type:  NURS- Registered Nurse     Filed:  2017  4:13 PM Encounter Date:  2017 Status:  Signed     :  Zari Lino RN (NURS- Registered Nurse)            SENNA-S 8.6-50 mg tablet  TAKE TWO TABLETS BY MOUTH TWICE DAILY       Disp: 40 tablet Refills: 0    Class: eRx Start: 2017    To be filled at: [facility] [address] - [facility] [address] - [phone]: [phone]     Senna not noted on current medication list.  Patient states has not started taking yet but that a stool softener was suggested at last appointment by Dr. Montanez.    Will route to Nawaf Montanez MD for review and consideration of refill.    Unable to complete prescription refill per RN Medication Refill Policy.................... ZARI LINO RN ....................  2017   4:12 PM

## 2017-12-28 NOTE — PROGRESS NOTES
Patient Information     Patient Name MRN Sex Steven Yao [id number] Male 1935      Progress Notes by Lu Kendrick R.T. (ARRT) at 2017 10:10 AM     Author:  Lu Kendrick R.T. (ARRT) Service:  (none) Author Type:  (none)     Filed:  2017 10:11 AM Date of Service:  2017 10:10 AM Status:  Signed     :  Lu Kendrick R.T. (CARLOST) ([facility] - Registered Radiologic Technologist)            Falls Risk Criteria:    Age 65 and older or under age 4        Sensory deficits    Poor vision    Use of ambulatory aides    Impaired judgment    Unable to walk independently    Meets High Risk criteria for falls:  Yes               1.  Do you have dizziness or vertigo?    no                    2.  Do you need help standing or walking?   no                 3.  Have you fallen within the last 6 months?    no           4.  Has the patient been fasting?      no       If any risks are marked Yes, the following interventions are utilized:    Do not leave patient unattended     Assist patient in the dressing room and bathroom    Have ambulatory aides available throughout procedure    Involve patient s family if available

## 2017-12-28 NOTE — TELEPHONE ENCOUNTER
Patient Information     Patient Name MRN Steven Serrano [id number] Male 1935      Telephone Encounter by Nawaf Montanez MD at 2017  4:55 PM     Author:  Nawaf Montanez MD Service:  (none) Author Type:  Physician     Filed:  2017  4:55 PM Encounter Date:  2017 Status:  Signed     :  Nawaf Montanez MD (Physician)            Different back injection was ordered.  Please have [facility] contact patient.    Nawaf Montanez MD

## 2017-12-28 NOTE — TELEPHONE ENCOUNTER
Patient Information     Patient Name MRN Steven Serrano [id number] Male 1935      Telephone Encounter by Keyla Last at 10/4/2017  9:35 AM     Author:  Keyla Last Service:  (none) Author Type:  (none)     Filed:  10/4/2017  9:35 AM Encounter Date:  10/4/2017 Status:  Signed     :  Keyla Last            Diabetic from completed and awaiting MD signature at this time.      Keyla Last LPN        10/4/2017 9:35 AM

## 2017-12-28 NOTE — TELEPHONE ENCOUNTER
Patient Information     Patient Name MRN Steven Serrano [id number] Male 1935      Telephone Encounter by Keyla Last at 2017 10:19 AM     Author:  Keyla Last Service:  (none) Author Type:  (none)     Filed:  2017 10:19 AM Encounter Date:  2017 Status:  Signed     :  Keyla Last            Patient notified of providers note.      Keyla Last LPN        2017 10:19 AM

## 2017-12-29 NOTE — PATIENT INSTRUCTIONS
Patient Information     Patient Name MRN Steven Serrano [id number] Male 1935      Patient Instructions by Nawaf Montanez MD at 2017 10:40 AM     Author:  Nawaf Montanez MD  Service:  (none) Author Type:  Physician     Filed:  2017 11:26 AM  Encounter Date:  2017 Status:  Addendum     :  Nawaf Montanez MD (Physician)        Related Notes: Original Note by Nawaf Montanez MD (Physician) filed at 2017 11:21 AM            Labs today.   Medications refilled.     Continue flexeril. Refill printed. No driving after use.     Return for Diabetes labs and clinic follow-up appointment every 3 to 4 months.  --- (Go for about 91 to 100 days)    Schedule lab only appointment --- A few days AFTER: 17     Schedule clinic appointment with Dr. Montanez -- Same day as labs, or 1-2 days later.     Insurance companies are now grading you and I on your blood sugar control -- Goal is to get your A1c down to 7.9% or lower and NO Smoking!    -- Medicare and most insurance companies, will only cover Hemoglobin A1c labs to be rechecked every 91+ days.      HEMOGLOBIN A1C MONITORING (POCT)    Date Value   2016 6.9 % (H)   2012 7.7 % NGSP (H)        Next follow-up appointment with Dr. Montanez should be scheduled:  -- Approximately a few days AFTER: 17

## 2017-12-30 NOTE — NURSING NOTE
Patient Information     Patient Name MRN Sex Steven Yao [id number] Male 1935      Nursing Note by Lilian Renner at 2017  8:20 AM     Author:  Lilian Renner Service:  (none) Author Type:  (none)     Filed:  2017  8:40 AM Encounter Date:  2017 Status:  Signed     :  Lilian Renner            Previous A1C is at goal of <8  Urine microalbumin:creatine:   Foot exam never had one  Eye exam 17  Patient is not a current smoker  Patient is  on a daily aspirin  Blood pressure today of   128/70 at the goal of <140/90 for diabetics. Lilian Renner LPN......................2017 8:22 AM

## 2017-12-30 NOTE — NURSING NOTE
Patient Information     Patient Name MRN Steven Serrano [id number] Male 1935      Nursing Note by Keyla Last at 2017 10:40 AM     Author:  Keyla Last Service:  (none) Author Type:  (none)     Filed:  2017 11:18 AM Encounter Date:  2017 Status:  Signed     :  Keyla Last            Patient presents to the clinic for medication management, last eye exam was 2017.    Keyla Last LPN        2017 10:51 AM

## 2018-01-02 NOTE — PROGRESS NOTES
Patient Information     Patient Name MRN Sex Steven Yao [id number] Male 1935      Progress Notes by Twin De Los Santos, PT at 2017  4:54 PM     Author:  Twin De Los Santos PT Service:  (none) Author Type:  PT- Physical Therapist     Filed:  2017  9:48 AM Date of Service:  2017  4:54 PM Status:  Signed     :  Twin De Los Santos PT (PT- Physical Therapist)            [facility]  Outpatient PT - Progress/D/C/Daily Note      Date of Service: 2017   Visit 3 of 10  PSFS completed: 2016, 2016, 16, , 17    Patient Name: Steven Dennison   YOB: 1935   Referring MD/Provider: Yobani  Diagnosis:   POST- OP SHOULDER LEFT RSA; DATE OF SURGERY 2016; S/P LT REVERSE TSA    Treatment Diagnosis: decreased ROM and strength s/p RTSA  Insurance:  Medicare: G Codes/C Modifiers at Initial Assessment:    and BCBS  Start of Care Date: 2016   Certification Dates: From: 2016   Re-Cert Due: 16  Re-Cert: 2016 through 17    Subjective      Pain Ratin = Mild Pain, (Bothersome, Annoying, Irritating, Nagging) and  2 = Mild Pain, (Bothersome, Annoying, Irritating, Nagging) / Location:  Left lateral shoulder   Other: Surgery date 16  Buttocks/hip still sore from fall a couple weeks ago.  Shoulder is doing well.  Surgeon happy with progress.      Patient Specific Functional and Pain Scales (PSFS):               Clinician Instructions: Complete after the history and before the exam.                Initial Assessment: We want to know what 3 activities in your life you are unable to perform, or are having the most difficulty performing, as a result of your chief problem. Please list and score at least 3 activities that you are unable to perform, or having the most difficulty performing, because of your chief problem.    Patient Specific Activity Scoring Scheme (score one number for each activity):    Activity     Score (0-10)  0= Unable to perform activity  10= Able to perform activity at same level as before injury or problem      1. Don/doff clothing    8/10      2. Wood working    10/10      3. sleep    10/10      4.     /10      5.     /10      Totals:     23/30 = 77 % ability which relates to 23% impairment                  Patient verbally states that they understand that the information they have provided above is current and complete to the best of their knowledge.                 G codes and Modifier taken from patient completing the PSFS:    Initial Primary G Code and Modifier:                Per the Patient's intake and/or assessment the Primary G Code is: Body Position .              The Patient's Impairment, Limitation or Restriction Modifier would be best described as: CJ - 20% - 40% Impairment.    Goal Primary G Code and Modifier:                The Patient's G Code Goal would be: Body Position               The Patient's Impairment, Limitation or Restriction Modifier goal would be best described as: CI - 1% - 20% Impairment.     The Patient's status upon Discharge is Body Position     The Patient's Impairment, Limitation or Restriction Modifier would be best described as CJ - 20% - 40% Impairment.       Objective    Today's Intervention:      NuStep 5 min.  scaption elevation with assist at fatigue  Review of HEP to maintain with Tband, DB's and supine/reclined elevation    Home Exercise Program:  Exercises     Standing Scapular Retraction - 10 reps - 3 hold - 1x daily      Standing Isometric Shoulder Internal Rotation at Doorway - 10 reps - 3 hold - 1x daily      Standing Isometric Shoulder External Rotation with Doorway - 10 reps - 2-3 sets - 3 hold - 1x daily - 3x weekly      Standing Isometric Shoulder Flexion with Doorway - 10 reps - 2-3 sets - 3 hold - 1x daily - 3x weekly      Standing Isometric Shoulder Abduction with Doorway - 10 reps - 2-3 sets - 3 hold - 1x daily - 3x weekly       Standing Isometric Shoulder Extension with Doorway - 10 reps - 2-3 sets - 3 hold - 1x daily - 3x weekly      Flexion-Extension Shoulder Pendulum with Table Support - 10 reps - 1x daily      Seated Shoulder Flexion Towel Slide at Table Top - 10 reps - 1x daily      Seated Shoulder Flexion AAROM with Pulley Behind - 10 reps - 3 hold - 1x daily    9/9/2016   Exercises  Supine Shoulder Flexion with Dowel - 10 reps - 1x daily  Supine Shoulder Press with Dowel - 10 reps  Supine Shoulder Alphabet - 1-2 reps - 1x daily  Standing Shoulder Abduction AAROM with Dowel - 10 reps - 1x daily  Standing Single Arm Low Shoulder Row with Anchored Resistance - 20 reps  Standing Bent Over Single Arm Scapular Row with Table Support - 20 reps - 1x daily  Standing Bicep Curls Supinated with Dumbbells - 20 reps - 1x daily  Standing Elbow Extension with Anchored Resistance at Wall - 20 reps - 1x daily    9/14/2016 supine flexion.  Rail slide flexion, supine chest press     Reclined position elevation, Tband IR/ER, Rows, scaption elevation    Assessment    Therapist Assessment:    Understand D/C plan and HEP to maintain              Goals:  Patient goal:  Hunt and do wood work shop activities with no limits in 12 weeks.    Functional Short Term Goals (4 weeks):      Patient will have flexion ROM to 125, 9/23 met PROM, not active 12/19/2016 - AROM 100 degrees   Patient will have no limits with don/doff clothing. 11/9/2016 still difficult with tuck in and long sleeve cuffs. 12/19/2016 - some difficulty reaching behind him to put arms in jacket/shirt sleeves   Patient is to have improved sleep tolerance to bed1/4/17, now able to do all sleep positions.  Patient will have improved strength to AAROM to 90 11/9/2016 met    Functional Long Term Goals (12 weeks):      Patient will be independent in their Home Exercise Program without exacerbation of symptoms.  Patient will have no pain with wood work tasks. 11/9/2016 near met, does nearly all he  needs.   Patient will tolerate holding rifle up to hunt 11/9/2016 was able to but difficult.  Patient will complete all house/yard chores with no limits. 12/19/2016 - no problems   Patient will tolerate dancing positions with wife. 1/4/17 does well but fatigues.  Patient is to self-manage symptoms and return to prior level of function in 8 weeks.      Response to Intervention:  tired    Plan    Treatment Plan / Targeted Outcomes:     Frequency:   16 visits, 16 additional visits for  2 months.- 11/9/2016     Planned Interventions:    Home Exercise Program development  Therapeutic Exercise (ROM & Strengthening)  Therapeutic Activities  Manual Therapy  Neuromuscular Re-education  Ultrasound  Electrical Stimulation  Phonophoresis with Ketoprofen  Iontophoresis with Dexamethasone  Warm/Cold Pack  Vasopneumatic Device    Plan: D/C    Student or PTA has been instructed in and demonstrates skills necessary to carry out above stated treatment plan: Yes    Thank you for your referral to [facility].  Please call with any questions, concerns or comments.  [phone]

## 2018-01-03 NOTE — TELEPHONE ENCOUNTER
Patient Information     Patient Name MRN Steven Serrano [id number] Male 1935      Telephone Encounter by Keyla Last at 3/14/2017 12:21 PM     Author:  Keyla Last Service:  (none) Author Type:  (none)     Filed:  3/14/2017 12:22 PM Encounter Date:  3/14/2017 Status:  Signed     :  Keyla Last            Diabetic form completed and awaiting MD signature at this time.    Keyla Last LPN        3/14/2017 12:21 PM

## 2018-01-03 NOTE — DISCHARGE SUMMARY
Patient Information     Patient Name MRN Steven Serrano [id number] Male 1935      Discharge Summaries by Twin De Los Santos, PT at 3/10/2017 12:19 PM     Author:  Twin De Los Santos PT Service:  (none) Author Type:  PT- Physical Therapist     Filed:  3/10/2017 12:19 PM Date of Service:  3/10/2017 12:19 PM Status:  Signed     :  Twin De Los Santos PT (PT- Physical Therapist)            PT D/C Note    Patient had last PT visit on 17, please refer to that date/note for more information.    Twin De Los Santos, PT ....................  3/10/2017

## 2018-01-04 NOTE — TELEPHONE ENCOUNTER
Patient Information     Patient Name MRN Steven Serrano [id number] Male 1935      Telephone Encounter by Keyla Last at 2017  3:05 PM     Author:  Keyla Last Service:  (none) Author Type:  (none)     Filed:  2017  3:06 PM Encounter Date:  2017 Status:  Signed     :  Keyla Last            Patient indicates that he would like to change prescriptions at this time.      Keyla Last LPN        2017 3:06 PM

## 2018-01-04 NOTE — TELEPHONE ENCOUNTER
Patient Information     Patient Name MRN Steven Serrano [id number] Male 1935      Telephone Encounter by Aracely Justin RN at 2017 10:44 AM     Author:  Aracely Justin RN Service:  (none) Author Type:  NURS- Registered Nurse     Filed:  2017 10:49 AM Encounter Date:  2017 Status:  Signed     :  Aracely Justin RN (NURS- Registered Nurse)            Statins    Office visit in the past 12 months.    Last visit with KATHERINE MOONEY was on: 2016 in [facility]  Next visit with KATHERINE MOONEY is on: No future appointment listed with this provider  Next visit with Internal Medicine is on: No future appointment listed in this department    Lab testing requirements:  Lipids annually.  Repeat lipids 6-8 weeks after dosage or drug change.    Last Lipids:  Chol: 162    2016  T    2016  HDL:   31    2016  LDL:  66    2016  LDL DIRECT:  No results found in past 5 years    .    Concommitant use of fibrates and statins-If it is an addition to the medication list, review note and/or discuss with provider.  If already on medication list, refill.  Patient is due for follow up, letter sent.  Max refills 12 months from last office visit.    Prescription refilled per RN Medication Refill Policy.................... ARACELY JUSTIN RN ....................  2017   10:44 AM

## 2018-01-04 NOTE — TELEPHONE ENCOUNTER
Patient Information     Patient Name MRN Steven Serrano [id number] Male 1935      Telephone Encounter by Keyla Last at 2017 11:45 AM     Author:  Keyla Last Service:  (none) Author Type:  (none)     Filed:  2017 11:50 AM Encounter Date:  2017 Status:  Signed     :  Keyla Last            PA for Cyclobenzaprine received from [facility].  Pharmacist recommends Tizanidine or Zanaflex as an alternative medication.    Keyla Last LPN        2017 11:50 AM

## 2018-01-04 NOTE — TELEPHONE ENCOUNTER
Patient Information     Patient Name MRN Steven Serrano [id number] Male 1935      Telephone Encounter by Nawaf Montanez MD at 2017 12:12 PM     Author:  Nawaf Montanez MD Service:  (none) Author Type:  Physician     Filed:  2017 12:13 PM Encounter Date:  2017 Status:  Signed     :  Nawaf Montanez MD (Physician)            Please advise patient, if he would like to continue with Flexeril / cyclobenzaprine, looks like he may have to pay for this out of pocket.    If he is okay switching, we can change the prescription.    Nawaf Montanez MD

## 2018-01-05 NOTE — NURSING NOTE
Patient Information     Patient Name MRN Sex Steven Yao [id number] Male 1935      Nursing Note by Ana Hodge at 2017 11:30 AM     Author:  Ana Hodge Service:  (none) Author Type:  (none)     Filed:  2017 11:36 AM Encounter Date:  2017 Status:  Signed     :  Ana Hodge            Patient presents to the clinic for a 1 year follow up with [facility].  Ana VALDEZ CMA 2017    Review of Systems:    Weight loss:    No     Recent fever/chills:  No   Night sweats:   No  Current skin rash:  No   Recent hair loss:  No  Heat intolerance:  No   Cold intolerance:  No  Chest pain:   No   Palpitations:   No  Shortness of breath:  No   Wheezing:   No  Constipation:    No   Diarrhea:   No   Nausea:   No   Vomiting:   No   Kidney/side pain:  No   Back pain:   No  Frequent headaches:  No   Dizziness:     No  Leg swelling:   No   Calf pain:    No    Ana VALDEZ CMA 2017    Post-Void Residual  Verbal order read back from Carlyle Gagnon MD to perform a post-void residual bladder scan.  A post-void residual was measured by ultrasonic bladder scanner.  86 mL  Ana VALDEZ CMA 2017

## 2018-01-05 NOTE — TELEPHONE ENCOUNTER
Patient Information     Patient Name MRN Steven Serrano [id number] Male 1935      Telephone Encounter by Ana Hodge at 2017 11:35 AM     Author:  Ana Hodge Service:  (none) Author Type:  (none)     Filed:  2017 11:39 AM Encounter Date:  2017 Status:  Signed     :  Ana Hodge            Patient is requesting referral for back injection. He states he got an injection a year ago here at [facility] and says he needs one again. He reports 2 muscle spasms today and fell because of one.   Ana VALDEZ, NAVIN.......2017..11:39 AM

## 2018-01-05 NOTE — PROGRESS NOTES
Patient Information     Patient Name MRN Sex Steven Yao [id number] Male 1935      Progress Notes by Carlyle Gagnon MD at 2017 11:30 AM     Author:  Carlyle Gagnon MD Service:  (none) Author Type:  Physician     Filed:  2017 11:40 AM Encounter Date:  2017 Status:  Signed     :  Carlyle Gagnon MD (Physician)            Type of Visit  Established    Chief Complaint  BPH  History of kidney stones    HPI  Mr. Dennison is a 81 y.o. male with h/o kidney stones and BPH.  He has been on Flomax for 2 years.  He is doing well with Flomax.  No side effects such as lightheadedness.  Nocturia previously 4x now 1-2x per night.    Also history of stones.  No flank pain in last year or hematuria.  He has had stones treated once 3 years ago.      Review of Systems  I reviewed the ROS the patient today.    Nursing Notes:   Ana Hodge  2017 11:36 AM  Signed  Patient presents to the clinic for a 1 year follow up with PVR.  Ana VALDEZ CMA 2017    Review of Systems:    Weight loss:    No     Recent fever/chills:  No   Night sweats:   No  Current skin rash:  No   Recent hair loss:  No  Heat intolerance:  No   Cold intolerance:  No  Chest pain:   No   Palpitations:   No  Shortness of breath:  No   Wheezing:   No  Constipation:    No   Diarrhea:   No   Nausea:   No   Vomiting:   No   Kidney/side pain:  No   Back pain:   No  Frequent headaches:  No   Dizziness:     No  Leg swelling:   No   Calf pain:    No    Ana VALDEZ CMA 2017    Post-Void Residual  Verbal order read back from Carlyle Gagnon MD to perform a post-void residual bladder scan.  A post-void residual was measured by ultrasonic bladder scanner.  86 mL  Ana VALDEZ CMA 2017            Family History  Family History       Problem   Relation Age of Onset     Heart Disease  Mother 69      of MI       Diabetes  Mother      Other  Father       at 74 of natural causes       Good Health  Brother      Good Health  Sister       Good Health  Sister      Physical Exam  Vitals:     05/11/17 1127   BP: 110/64   Pulse: 66   Weight: 85.7 kg (189 lb)   Constitutional: NAD, WDWN.  Cardiovascular: Regular rate.  Pulmonary/Chest: Respirations are even and non-labored bilaterally.  Abdominal: Soft. No distension, tenderness, masses or guarding. No CVA tenderness.  Extremities: CONNIE x 4, Warm. No clubbing.  No cyanosis.    Skin: Pink, warm and dry.  No rashes noted.  Genitourinary: nonpalpable bladder    Labs  Results for GERMANIA EDMONDSON (MRN [id number]) as of 5/19/2016 11:00   5/1/2015 10:37   PSA TOTAL (DIAGNOSTIC) 1.950     Radiographic Studies  7/22/2014  CT a/p  IMPRESSION: At the time of the prior exam 2/20/2014 there was an obstructing up to 7 mm in diameter right mid-ureteral calculus present. This calculus is no longer seen and there is no evidence for hydronephrosis involving either kidney. The 3 mm in diameter right mid-renal calculus has not significantly changed, remaining nonobstructive. No new abnormalities are identified.    Post-Void Residual  A post-void residual was measured by ultrasonic bladder scanner.  86 mL  45 mL (previously recorded)    Assessment & Plan  Mr. Edmondson is a 81 y.o. male with h/o kidney stones and BPH.  Empties well previously.  Continue Flomax.  Follow up in 1 year with a PVR.

## 2018-01-22 ENCOUNTER — COMMUNICATION - GICH (OUTPATIENT)
Dept: INTERNAL MEDICINE | Facility: OTHER | Age: 83
End: 2018-01-22

## 2018-01-22 DIAGNOSIS — E11.9 TYPE 2 DIABETES MELLITUS WITHOUT COMPLICATIONS (H): ICD-10-CM

## 2018-01-24 ENCOUNTER — DOCUMENTATION ONLY (OUTPATIENT)
Dept: FAMILY MEDICINE | Facility: OTHER | Age: 83
End: 2018-01-24

## 2018-01-24 PROBLEM — L84 PRE-ULCERATIVE CORN OR CALLOUS: Status: ACTIVE | Noted: 2017-09-08

## 2018-01-24 PROBLEM — M21.961 ACQUIRED DEFORMITY OF RIGHT FOOT: Status: ACTIVE | Noted: 2017-09-08

## 2018-01-24 PROBLEM — M51.379 DEGENERATION OF LUMBAR OR LUMBOSACRAL INTERVERTEBRAL DISC: Status: ACTIVE | Noted: 2018-01-24

## 2018-01-24 PROBLEM — N20.0 RENAL CALCULUS: Status: ACTIVE | Noted: 2018-01-24

## 2018-01-24 PROBLEM — E11.9 CONTROLLED TYPE 2 DIABETES MELLITUS WITHOUT COMPLICATION, WITHOUT LONG-TERM CURRENT USE OF INSULIN (H): Status: ACTIVE | Noted: 2017-12-08

## 2018-01-24 PROBLEM — M85.80 OSTEOPENIA: Status: ACTIVE | Noted: 2017-09-27

## 2018-01-24 RX ORDER — AMPICILLIN TRIHYDRATE 250 MG
CAPSULE ORAL DAILY
COMMUNITY
End: 2022-10-26

## 2018-01-24 RX ORDER — TAMSULOSIN HYDROCHLORIDE 0.4 MG/1
0.4 CAPSULE ORAL EVERY EVENING
COMMUNITY
Start: 2017-05-11 | End: 2018-05-11

## 2018-01-24 RX ORDER — ASPIRIN 325 MG
1 TABLET ORAL DAILY
COMMUNITY
End: 2019-05-04

## 2018-01-24 RX ORDER — MAGNESIUM OXIDE 420 MG/1
1 TABLET ORAL DAILY
COMMUNITY
End: 2022-10-26

## 2018-01-24 RX ORDER — VITAMIN B COMPLEX
1 TABLET ORAL
COMMUNITY
Start: 2017-12-08 | End: 2022-10-26

## 2018-01-24 RX ORDER — CYCLOBENZAPRINE HCL 10 MG
10 TABLET ORAL 3 TIMES DAILY PRN
COMMUNITY
Start: 2017-06-02 | End: 2018-06-13

## 2018-01-24 RX ORDER — INSULIN PUMP SYRINGE, 3 ML
EACH MISCELLANEOUS
COMMUNITY
End: 2023-05-24

## 2018-01-24 RX ORDER — PHENOL 1.4 %
AEROSOL, SPRAY (ML) MUCOUS MEMBRANE AT BEDTIME
COMMUNITY
End: 2022-03-15

## 2018-01-24 RX ORDER — CIDER VINEGAR 300 MG
900 TABLET ORAL 2 TIMES DAILY
COMMUNITY
End: 2022-10-26

## 2018-01-24 RX ORDER — DIAPER,BRIEF,ADULT, DISPOSABLE
EACH MISCELLANEOUS
COMMUNITY
End: 2022-03-15

## 2018-01-24 RX ORDER — AMLODIPINE BESYLATE 5 MG/1
5 TABLET ORAL DAILY
COMMUNITY
Start: 2017-09-08 | End: 2018-03-12

## 2018-01-24 RX ORDER — AMOXICILLIN 250 MG
2 CAPSULE ORAL 2 TIMES DAILY
COMMUNITY
Start: 2017-09-12 | End: 2020-07-09

## 2018-01-24 RX ORDER — TRIAMCINOLONE ACETONIDE 1 MG/G
CREAM TOPICAL
COMMUNITY
Start: 2016-12-14 | End: 2020-07-09

## 2018-01-24 RX ORDER — GLIPIZIDE 5 MG/1
5 TABLET ORAL
COMMUNITY
Start: 2017-09-08 | End: 2018-07-12

## 2018-01-24 RX ORDER — PRAVASTATIN SODIUM 40 MG
1 TABLET ORAL AT BEDTIME
COMMUNITY
Start: 2017-06-02 | End: 2018-07-12

## 2018-01-24 RX ORDER — METOPROLOL TARTRATE 25 MG/1
12.5 TABLET, FILM COATED ORAL 2 TIMES DAILY
COMMUNITY
Start: 2017-12-08 | End: 2018-07-17

## 2018-01-24 RX ORDER — LANCETS 28 GAUGE
EACH MISCELLANEOUS
COMMUNITY
Start: 2017-09-08 | End: 2018-06-14

## 2018-01-26 VITALS
HEART RATE: 66 BPM | BODY MASS INDEX: 27.9 KG/M2 | WEIGHT: 189 LBS | SYSTOLIC BLOOD PRESSURE: 110 MMHG | DIASTOLIC BLOOD PRESSURE: 64 MMHG

## 2018-01-26 VITALS
BODY MASS INDEX: 27.36 KG/M2 | HEART RATE: 64 BPM | SYSTOLIC BLOOD PRESSURE: 128 MMHG | WEIGHT: 186.6 LBS | DIASTOLIC BLOOD PRESSURE: 70 MMHG | TEMPERATURE: 97.4 F

## 2018-01-26 VITALS
BODY MASS INDEX: 28.01 KG/M2 | WEIGHT: 189.13 LBS | TEMPERATURE: 97.3 F | SYSTOLIC BLOOD PRESSURE: 120 MMHG | DIASTOLIC BLOOD PRESSURE: 66 MMHG | HEIGHT: 69 IN | HEART RATE: 68 BPM

## 2018-02-04 ASSESSMENT — PATIENT HEALTH QUESTIONNAIRE - PHQ9: SUM OF ALL RESPONSES TO PHQ QUESTIONS 1-9: 0

## 2018-02-09 VITALS
BODY MASS INDEX: 27.18 KG/M2 | TEMPERATURE: 96.2 F | HEART RATE: 56 BPM | DIASTOLIC BLOOD PRESSURE: 62 MMHG | WEIGHT: 185.4 LBS | SYSTOLIC BLOOD PRESSURE: 110 MMHG

## 2018-02-12 NOTE — PROGRESS NOTES
Patient Information     Patient Name MRSteven Reyes [id number] Male 1935      Progress Notes by Nawaf Montanez MD at 2017 10:00 AM     Author:  Nawaf Montanez MD Service:  (none) Author Type:  Physician     Filed:  2017 12:31 PM Encounter Date:  2017 Status:  Signed     :  Nawaf Montanez MD (Physician)            Nursing Notes:   Lilian Renner  2017 10:19 AM  Signed  Patient is here today for a Diabetes check up.  Previous A1C is at goal of <8  HEMOGLOBIN A1C MONITORING (POCT)    Date Value   2017 6.8 % (H)   2012 7.7 % NGSP (H)     Urine microalbumin:creatine:   Foot exam less than a year ago  Eye exam a few months ago    Patient is not a current smoker  Patient is on a daily aspirin  Patient is on a Statin.  Blood pressure today of  110/62 is at the goal of <139/89 for diabetics.    Lilian Renner LPN..............2017 9:58 AM    Steven Dennison presents to clinic today for:   Chief Complaint     Patient presents with       Diabetes      Diabetes check     HPI: Mr. Dennison is a 82 y.o. male who presents today for evaluation of above.     (I49.1) Premature atrial contractions  (primary encounter diagnosis)  (E11.9) Controlled type 2 diabetes mellitus without complication, without long-term current use of insulin (HC)  (E78.2) Mixed hyperlipidemia  (R00.1) Bradycardia     Premature atrial contractions, states does not notice a more.  He's been taking his metoprolol 1 tablet in the morning not in the evening.  We talked about trying to take half of a pill in the morning and half in the evening.  He's been a look at this.  Wife notes that he has a bit of fatigue during the day after his metoprolol.  Bradycardia noted once again today.  He denies any major side effects of this.  No presyncopal spells.  Advised that she likes to keep one full tablet the morning not at night that will be fine as well but her attempt to check on  this.    Diabetes, currently well controlled.  Labs collected today.  A1c is stable at 6.8%.  doing well with metformin and glipizide.  Advised to monitor his carbohydrate intake.  HEMOGLOBIN A1C MONITORING (POCT)    Date Value   2017 6.8 % (H)   2012 7.7 % NGSP (H)     hyperlipidemia, currently controlled pravastatin.  We discussed dose increase, he would like to continue with 40 mg daily.  Last Lipids:  Chol: 2017 129   62  HDL: 2017 33   LDL: 2017 84    bradycardia, currently asymptomatic.  Discussed metoprolol dose changes.    Mr. Dennison's Body mass index is 27.18 kg/(m^2). This is out of the normal range for a 82 y.o. Normal range for ages 18+ is between 18.5 and 24.9. To lose weight we reviewed risks and benefits of appropriate options such as diet, exercise, and medications. Patient's strategy will be  self-directed nutrition plan and self-directed exercise program   BP Readings from Last 1 Encounters:17 : 110/62  Mr. Diazs blood pressure is within the normal range for adults. Per JNC-8 guidelines normal adult blood pressure is < 120/80, pre-hypertensive is between 120/80 and 139/89, and hypertension is 140/90 or greater.    Functional Capacity: > 4 METS.   Reports that he can climb a flight of stairs without any chest pain/heaviness or shortness of breath.   Patient reports no current symptoms of fevers, chills, nausea/vomiting.   No cough. No shortness of breath.   No change in bowel/bladder habits. No melena, hematochezia. No Hematuria.   No rashes. Rarely noted palpitations.  No orthopnea/paroxysmal nocturnal dyspnea   No vision or hearing issues.   No significant mood issues   No bruising.     LORENZO:  No flowsheet data found.    PHQ9:  PHQ Depression Screening 2017   Date of PHQ exam (doc flow) 2017   1. Lack of interest/pleasure 0 - Not at all 0 - Not at all   2. Feeling down/depressed 0 - Not at all 0 - Not at all   PHQ-2  TOTAL SCORE 0 0   3. Trouble sleeping - -   4. Decreased energy - -   5. Appetite change - -   6. Feelings of failure - -   7. Trouble concentrating - -   8. Activity level - -   9. Hurting yourself - -   PHQ-9 TOTAL SCORE - -   PHQ-9 Severity Level - -   Functional Impairment - -   Some recent data might be hidden          I have personally reviewed the past medical history, past surgical history, medications, allergies, family and social history as listed below, on 12/8/2017.    Patient Active Problem List       Diagnosis  Date Noted     Controlled type 2 diabetes mellitus without complication, without long-term current use of insulin (HC)  12/08/2017     Osteopenia - DEXA scan 9/2017 09/27/2017     Pre-ulcerative corn or callous - right 1st metatarsal area  09/08/2017     Acquired deformity of right foot  09/08/2017     Rotator cuff tendinitis  12/29/2016     Eczema  12/14/2016     Trigger point with back pain  06/21/2016     Preop examination - Reverse Shoulder Replacement - Dr. Mayo 7/19/2016 06/21/2016     Deformity of right foot  03/31/2016     Bunion of right foot  03/31/2016     Biceps tendonitis on right  03/31/2016     Acute pain of right shoulder  03/31/2016     Sleep difficulties  03/31/2016     Chronic left shoulder pain  03/31/2016     Counseling regarding advanced directives and goals of care  08/07/2015     DNR (do not resuscitate) - Treat Acute Reversible illness  08/07/2015     DNI (do not intubate) - BIPAP okay  08/07/2015     Nocturia -- Was 3-4x nightly --> improved to 1-2x nightly as of 12/2016 05/01/2015     Bradycardia  05/01/2015     Intermittent lightheadedness  05/01/2015     Lumbar muscle pain  01/08/2015     Trigger point with back pain  08/01/2014     Pain in right acromioclavicular joint  07/16/2014     Subacromial bursitis - Bilateral Shoulders  07/16/2014     AAA (abdominal aortic aneurysm) - 2.6 cm - noted on 2/2014 CT scan  06/18/2014     Erectile dysfunction  05/27/2014      Raynaud's phenomenon  01/30/2014     Premature atrial contractions  01/30/2014     Impingement syndrome of right shoulder  01/30/2014     Skin lesion of cheek  11/19/2013     Hyperlipidemia  06/03/2013     EPISTAXIS  11/22/2011     DISC DISEASE, LUMBAR       recurring          RENAL CALCULUS       disease          DEGENERATIVE JOINT DISEASE, LEFT KNEE       Past Medical History:     Diagnosis  Date     Diabetes (HC)     Controlled with diet      Episode of dizziness     lightheaded with borderline troponin, possible coronary disease      Past Surgical History:      Procedure  Laterality Date     APPENDECTOMY      Appendectomy at age 6 due to appendicitis       ARTHROPLASTY  2003    left total knee       CERVICAL RIB RESECTION  1983     COLONOSCOPY SCREENING  2000     COLONOSCOPY SCREENING  2011    adenomatous polyps, one with high grade dysplasia , next due 2014       CYSTOSCOPY W/ URETEROSCOPY W/ LITHOTRIPSY  02/21/14    Dr. Gagnon - [facility]       HERNIA REPAIR      right inguinal       HERNIA REPAIR      x 4       KNEE ARTHROSCOPY      left knee       RIB RESECTION  1984    left first/rib resection       Current Outpatient Prescriptions       Medication  Sig Dispense Refill     amLODIPine (NORVASC) 5 mg tablet Take 1 tablet by mouth once daily. -- For Raynauds 180 tablet 3     Apple Cider Vinegar 300 mg tablet Take 3 tablets by mouth 2 times daily.       aspirin 325 mg tablet Take 1 tablet by mouth once daily.       blood-glucose meter Dispense item covered by pt ins. 250.02 NIDDM type II, uncontrolled - Test 2 times/day. Reason: Unstable diabetes       Cholecalciferol, Vitamin D3, (VITAMIN D-3) 5,000 unit Tab Take 1 tablet by mouth once weekly        Cinnamon Bark (CINNAMON) 500 mg capsule Take  by mouth once daily.       cyclobenzaprine (FLEXERIL) 10 mg tablet Take 1 tablet by mouth 3 times daily if needed. - no driving after use 90 tablet 11     docosahexanoic acid 450 mg cap Take  by mouth. 900-450 mg tablet  daily       Garlic 1,000 mg cap Take  by mouth once daily.       glipiZIDE (GLUCOTROL) 5 mg tablet Take 1 tablet by mouth 2 times daily before meals. 180 tablet 3     lecithin 1,200 mg cap Take  by mouth once daily.       Magnesium Oxide 420 mg tablet TAKE ONE TABLET BY MOUTH DAILY       medication order composer Flaxseed oil 1400 mg capsule daily       melatonin 10 mg tab Take  by mouth at bedtime.       metFORMIN (GLUCOPHAGE) 500 mg tablet Take 1 tablet by mouth 2 times daily with meals.       metoprolol tartrate (LOPRESSOR) 25 mg tablet Take 0.5 tablets by mouth 2 times daily. -- dose reduction 2017 90 tablet 3     ONETOUCH ULTRA TEST strip Use for testing two times daily E11.9 1 box 11     pravastatin (PRAVACHOL) 40 mg tablet TAKE 1 TABLET BY MOUTH AT BEDTIME FOR CHOLESTEROL OR DIABETES 90 tablet 3     PRODIGY TWIST TOP LANCET 28 gauge misc As directed. Use for testing 2 times daily E11.9 100 Each 4     sennosides-docusate, 8.6-50 mg, (SENNA-S) 8.6-50 mg tablet Take 2 tablet twice daily as needed for constipation prevention 60 tablet 11     tamsulosin (FLOMAX) 0.4 mg capsule Take 1 capsule by mouth once daily in the evening. 90 capsule 3     triamcinolone (ARISTOCORT; KENALOG) 0.1 % cream Apply twice daily as needed 454 g 0     vitamin B complex (B-COMPLEX) tablet Take 1 tablet by mouth once daily. -- for Low B12 / B-Vitamins -- about 3 days weekly 100 tablet 3     Allergies      Allergen   Reactions     Amoxicillin  Rash     Cephalexin  *Unknown - Pt Doesn't Remember     Pt does not remember       Clindamycin  *Unknown - Pt Doesn't Remember     Pt does not remember      Hydromorphone Hcl  Bradycardia     Family History       Problem   Relation Age of Onset     Other  Father       at 74 of natural causes       Heart Disease  Mother 69      of MI       Diabetes  Mother      Stroke  Mother      Cancer  Brother      Good Health  Sister      Good Health  Sister      Family Status     Relation  Status  "    Father  at age 74     of natural causes      Mother  at age 69     of MI & diabetes      Brother      Sister Alive     Sister Alive     Social History     Social History        Marital status:       Spouse name: N/A     Number of children:  N/A     Years of education:  N/A     Social History Main Topics         Smoking status:  Former Smoker      Packs/day: 1.00      Years: 10.00      Types: Cigarettes      Quit date: 1975      Smokeless tobacco:  Never Used      Alcohol use  3.0 oz/week     5 Standard drinks or equivalent per week      Drug use:  No      Sexual activity:  Not on file      Other Topics  Concern     Not on file      Social History Narrative     , lives with wife who has parkinson's disease since  and requires care. She is totally dependent. She needs assistance with all ADL's He has some respite care workers.              Pertinent ROS was performed and was negative as noted in HPI above.     EXAM:   Vitals:     17 1007   BP: 110/62   Pulse: 56   Temp: 96.2  F (35.7  C)   TempSrc: Tympanic   Weight: 84.1 kg (185 lb 6.4 oz)     BP Readings from Last 3 Encounters:    17 110/62   17 128/70   17 120/66     Wt Readings from Last 3 Encounters:    17 84.1 kg (185 lb 6.4 oz)   17 84.6 kg (186 lb 9.6 oz)   17 85.8 kg (189 lb 2 oz)     Estimated body mass index is 27.18 kg/(m^2) as calculated from the following:    Height as of 17: 1.759 m (5' 9.25\").    Weight as of this encounter: 84.1 kg (185 lb 6.4 oz).     EXAM:  Constitutional: well groomed / good hygiene, casual dress  ENT: Normocephalic, Atraumatic, Thyroid without nodules or tenderness   Nose/Mouth: Oral pharynx without erythema or exudates, Nose is patent bilaterally, no rhinorrhea and Dental hygeine adequate   Eyes:  Extraocular muscles intact, Sclera non-icteric, Conjunctiva without erythema  Lymphatic Exam: Non-palpable nodes in neck, clavicular " regions  Pulmonary: Lungs are clear to auscultation bilaterally, without wheezes or crackles  Cardiovascular Exam: regular rate and rhythm, trace pedal edema  Gastrointestinal Exam: Soft, non-tender, non-distended, positive bowel sounds  Integument: No abnormal rashes, sores, or ulcerations noted  Neurologic Exam: CN 3-12 grossly intact   Musculoskeletal Exam: Moves upper and lower extremities symmetrically, No focal weakness  Gait and station appear grossly normal  Psychiatric Exam: Awake and Alert, Affect and mood appropriate  Speech is fluent, Thought process is normal    INVESTIGATIONS:  -- see below.     ASSESSMENT AND PLAN:  Steven was seen today for diabetes.    Diagnoses and all orders for this visit:    Premature atrial contractions    Controlled type 2 diabetes mellitus without complication, without long-term current use of insulin (HC)    Mixed hyperlipidemia    Bradycardia      reviewed diet, exercise and weight control, recommended sodium restriction, cardiovascular risk and specific lipid/LDL goals reviewed, specific diabetic recommendations low cholesterol diet, weight control and daily exercise discussed, home glucose monitoring emphasized, foot care discussed and Podiatry visits discussed, annual eye examinations at Ophthalmology discussed, glycohemoglobin and other lab monitoring discussed and long term diabetic complications discussed, use of aspirin to prevent MI and TIA's discussed    -- Expected clinical course discussed   -- Medications and their side effects discussed    Steven is also recommended to eat a heart-healthy diet, do regular aerobic exercises, maintain a desirable body weight, and avoid tobacco products. These recommendations are from the American Heart Association (AHA) which stresses the importance of lifestyle changes to lower cardiovascular disease risk.    Return in about 3 months (around 3/8/2018) for -- labs in 91+ days with Diabetes clinic appointment with Dr. Montanez 1-2 days  later..    Patient Instructions     Labs are very good....     The current medical regimen appears effective;  continue present plan and medications.     Last Lipids:  Chol: 2017 135   93  HDL: 2017 32   LDL: 2017 84    Results for orders placed or performed in visit on 17      CBC W PLT NO DIFF      Result  Value Ref Range    WHITE BLOOD COUNT         6.0 4.5 - 11.0 thou/cu mm    RED BLOOD COUNT           5.44 4.30 - 5.90 mil/cu mm    HEMOGLOBIN                16.0 13.5 - 17.5 g/dL    HEMATOCRIT                46.2 37.0 - 53.0 %    MCV                       85 80 - 100 fL    MCH                       29.4 26.0 - 34.0 pg    MCHC                      34.6 32.0 - 36.0 g/dL    RDW                       13.7 11.5 - 15.5 %    PLATELET COUNT            128 (L) 140 - 440 thou/cu mm    MPV                       10.2 6.5 - 11.0 fL   COMP METABOLIC PANEL      Result  Value Ref Range    SODIUM 138 133 - 143 mmol/L    POTASSIUM 4.2 3.5 - 5.1 mmol/L    CHLORIDE 104 98 - 107 mmol/L    CO2,TOTAL 25 21 - 31 mmol/L    ANION GAP 9 5 - 18                    GLUCOSE 137 (H) 70 - 105 mg/dL    CALCIUM 8.6 8.6 - 10.3 mg/dL    BUN 22 7 - 25 mg/dL    CREATININE 0.93 0.70 - 1.30 mg/dL    BUN/CREAT RATIO           24                    GFR if African American >60 >60 ml/min/1.73m2    GFR if not African American >60 >60 ml/min/1.73m2    ALBUMIN 4.0 3.5 - 5.7 g/dL    PROTEIN,TOTAL 6.4 6.4 - 8.9 g/dL    GLOBULIN                  2.4 2.0 - 3.7 g/dL    A/G RATIO 1.7 1.0 - 2.0                    BILIRUBIN,TOTAL 1.2 (H) 0.3 - 1.0 mg/dL    ALK PHOSPHATASE 61 34 - 104 IU/L    ALT (SGPT) 12 7 - 52 IU/L    AST (SGOT) 14 13 - 39 IU/L   HEMOGLOBIN A1C MONITORING (POCT)      Result  Value Ref Range    HEMOGLOBIN A1C MONITORING (POCT) 6.8 (H) 4.0 - 6.2 %    ESTIMATED AVERAGE GLUCOSE  148 mg/dL        Return for Diabetes labs and clinic follow-up appointment every 3 to 4 months.  --- (Go for about 91 to 100 days)    Schedule lab  only appointment --- A few days AFTER: 03/08/18     Schedule clinic appointment with Dr. Montanez -- Same day as labs, or 1-2 days later.     Insurance companies are now grading you and I on your blood sugar control -- Goal is to get your A1c down to 7.9% or lower and NO Smoking!    -- Medicare and most insurance companies, will only cover Hemoglobin A1c labs to be rechecked every 91+ days.      HEMOGLOBIN A1C MONITORING (POCT)    Date Value   12/08/2017 6.8 % (H)   11/29/2012 7.7 % NGSP (H)        Next follow-up appointment with Dr. Montanez should be scheduled:  -- Approximately a few days AFTER: 03/08/18      1. Premature atrial contractions  - metoprolol tartrate (LOPRESSOR) 25 mg tablet; Take 0.5 tablets by mouth 2 times daily. -- dose reduction 12/8/2017  Dispense: 90 tablet; Refill: 3    2. Controlled type 2 diabetes mellitus without complication, without long-term current use of insulin (HC)  - vitamin B complex (B-COMPLEX) tablet; Take 1 tablet by mouth once daily. -- for Low B12 / B-Vitamins -- about 3 days weekly  Dispense: 100 tablet; Refill: 3    3. Mixed hyperlipidemia  4. Bradycardia  -- reduce your metoprolol dose to half of a tablet morning and evening.    Nawaf Montanez MD

## 2018-02-12 NOTE — TELEPHONE ENCOUNTER
Patient Information     Patient Name MRN Sex Steven Yao [id number] Male 1935      Telephone Encounter by Zari Arango RN at 2017  9:17 AM     Author:  Zari Arango RN Service:  (none) Author Type:  NURS- Registered Nurse     Filed:  2017  9:20 AM Encounter Date:  2017 Status:  Signed     :  Zari Arango RN (NURS- Registered Nurse)            metFORMIN (GLUCOPHAGE) 500 mg tablet  TAKE 1-2 TABLETS BY MOUTH TWICE DAILY WITH MEALS(DEPENDING ON SIZE OF MEALS)  Disp: 360 tablet Refills: 0    Class: eRx Start: 2017    For: Controlled type 2 diabetes mellitus without complication, without long-term current use of insulin (HC)  Originally ordered: 2 years ago by Katherine Montanez MD  Last refill:2017  To be filled at: [facility] [address] [place] [address] AT SEC of [facility] [address] [facility] [phone]    Will route to Dr. Montanez for review current medication list states one tablet twice daily and noted as historical     Last visit with KATHERINE MONTANEZ was on: 2017 in [facility]  PCP:  Katherine Montanez MD     Unable to complete prescription refill per RN Medication Refill Policy.................... ZARI ARANGO RN ....................  2017   9:19 AM

## 2018-02-12 NOTE — NURSING NOTE
Patient Information     Patient Name MRN Sex Steven Yao [id number] Male 1935      Nursing Note by Lilian Renner at 2017 10:00 AM     Author:  Lilian Renner Service:  (none) Author Type:  (none)     Filed:  2017 10:19 AM Encounter Date:  2017 Status:  Signed     :  Lilian Renner            Patient is here today for a Diabetes check up.  Previous A1C is at goal of <8  HEMOGLOBIN A1C MONITORING (POCT)    Date Value   2017 6.8 % (H)   2012 7.7 % NGSP (H)     Urine microalbumin:creatine:   Foot exam less than a year ago  Eye exam a few months ago    Patient is not a current smoker  Patient is on a daily aspirin  Patient is on a Statin.  Blood pressure today of  110/62 is at the goal of <139/89 for diabetics.    Lilian Renner LPN..............2017 9:58 AM

## 2018-02-13 NOTE — TELEPHONE ENCOUNTER
Patient Information     Patient Name MRN Sex Steven Yao [id number] Male 1935      Telephone Encounter by Sandra Mayfield RN at 2018  9:38 AM     Author:  Sandra Mayfield RN Service:  (none) Author Type:  NURS- Registered Nurse     Filed:  2018  9:41 AM Encounter Date:  2018 Status:  Signed     :  Sandra Mayfield RN (NURS- Registered Nurse)            [facility] pharmacy and pt request a refill Rx for Onetouch Ultra Test Strips.    Diabetic Supplies    Office visit in the past 12 months.    Last visit with KATHERINE MOONEY was on: 2017 in [facility]  Next visit with KATHERINE MOONEY is on: No future appointment listed with this provider  Next visit with Internal Medicine is on: No future appointment listed in this department    Max refill for 12 months from last office visit.  Always add ICD-9 code.    Needs not be listed on Med List to fill.  Need new RX every 12 months or if exceeds the limitations set by Medicare, then every 6 months.  Also when testing frequency is changed is there a need to obtain a new order.  A refill request does not need to be approved by the ordering physician-a beneficiary or their caregiver may request refills.  Physicians are not required to fill out additional forms such as home testing results for suppliers or provide additional documentation unless the supplier is audited and the  is requesting such documentation.      Prescription refilled per RN Medication Refill Policy.................... Sandra Mayfield RN ....................  2018   9:40 AM

## 2018-03-05 ENCOUNTER — TELEPHONE (OUTPATIENT)
Dept: LAB | Facility: OTHER | Age: 83
End: 2018-03-05

## 2018-03-05 NOTE — TELEPHONE ENCOUNTER
Contacted the patient and he reports they are in [place] and will not be attending this lab appointment.  KELIN GARCIA LPN 3/5/2018 2:23 PM

## 2018-03-12 ENCOUNTER — TELEPHONE (OUTPATIENT)
Dept: INTERNAL MEDICINE | Facility: OTHER | Age: 83
End: 2018-03-12

## 2018-03-12 DIAGNOSIS — I73.00 RAYNAUD'S PHENOMENON WITHOUT GANGRENE: Primary | ICD-10-CM

## 2018-03-12 RX ORDER — NIFEDIPINE 30 MG/1
30 TABLET, EXTENDED RELEASE ORAL DAILY
Qty: 90 TABLET | Refills: 3 | Status: SHIPPED | OUTPATIENT
Start: 2018-03-12 | End: 2018-04-12

## 2018-03-12 NOTE — TELEPHONE ENCOUNTER
"Received fax from [employer] in 2008 [facility] [address] [place], [place] phone [phone] Fax: [phone] for Drug change for Amlodipine Besylate 5 mg    Note from pharmacy\": Drug not covered by paitent plan. The preferred alternative is Nifedipine tab MG ER, Felodipine tab MG ER, Afeditab MG CR, Isradipine Cap, MG.    Please call/fax the pharmacy to change medication along with strength, directions, quantity, and refills.\"    Zari Lino RN on 3/12/2018 at 12:23 PM    "

## 2018-03-28 ENCOUNTER — TRANSFERRED RECORDS (OUTPATIENT)
Dept: HEALTH INFORMATION MANAGEMENT | Facility: OTHER | Age: 83
End: 2018-03-28

## 2018-04-12 ENCOUNTER — OFFICE VISIT (OUTPATIENT)
Dept: INTERNAL MEDICINE | Facility: OTHER | Age: 83
End: 2018-04-12
Attending: INTERNAL MEDICINE
Payer: MEDICARE

## 2018-04-12 VITALS
DIASTOLIC BLOOD PRESSURE: 70 MMHG | SYSTOLIC BLOOD PRESSURE: 116 MMHG | HEART RATE: 64 BPM | WEIGHT: 187.5 LBS | BODY MASS INDEX: 27.49 KG/M2

## 2018-04-12 DIAGNOSIS — M21.961 DEFORMITY OF RIGHT FOOT: ICD-10-CM

## 2018-04-12 DIAGNOSIS — E78.2 MIXED HYPERLIPIDEMIA: ICD-10-CM

## 2018-04-12 DIAGNOSIS — E53.8 VITAMIN B12 DEFICIENCY: ICD-10-CM

## 2018-04-12 DIAGNOSIS — M85.80 OSTEOPENIA, UNSPECIFIED LOCATION: ICD-10-CM

## 2018-04-12 DIAGNOSIS — E55.9 VITAMIN D DEFICIENCY: ICD-10-CM

## 2018-04-12 DIAGNOSIS — E11.9 CONTROLLED TYPE 2 DIABETES MELLITUS WITHOUT COMPLICATION, WITHOUT LONG-TERM CURRENT USE OF INSULIN (H): Primary | ICD-10-CM

## 2018-04-12 DIAGNOSIS — I73.00 RAYNAUD'S PHENOMENON WITHOUT GANGRENE: ICD-10-CM

## 2018-04-12 LAB
ALBUMIN SERPL-MCNC: 4.2 G/DL (ref 3.5–5.7)
ALBUMIN UR-MCNC: NEGATIVE MG/DL
ALP SERPL-CCNC: 58 U/L (ref 34–104)
ALT SERPL W P-5'-P-CCNC: 15 U/L (ref 7–52)
ANION GAP SERPL CALCULATED.3IONS-SCNC: 6 MMOL/L (ref 3–14)
APPEARANCE UR: CLEAR
AST SERPL W P-5'-P-CCNC: 14 U/L (ref 13–39)
BILIRUB SERPL-MCNC: 0.7 MG/DL (ref 0.3–1)
BILIRUB UR QL STRIP: NEGATIVE
BUN SERPL-MCNC: 26 MG/DL (ref 7–25)
CALCIUM SERPL-MCNC: 9.6 MG/DL (ref 8.6–10.3)
CHLORIDE SERPL-SCNC: 104 MMOL/L (ref 98–107)
CHOLEST SERPL-MCNC: 154 MG/DL
CO2 SERPL-SCNC: 29 MMOL/L (ref 21–31)
COLOR UR AUTO: YELLOW
CREAT SERPL-MCNC: 1.05 MG/DL (ref 0.7–1.3)
DEPRECATED CALCIDIOL+CALCIFEROL SERPL-MC: 19.6 NG/ML
ERYTHROCYTE [DISTWIDTH] IN BLOOD BY AUTOMATED COUNT: 13.7 % (ref 10–15)
GFR SERPL CREATININE-BSD FRML MDRD: 68 ML/MIN/1.7M2
GLUCOSE SERPL-MCNC: 92 MG/DL (ref 70–105)
GLUCOSE UR STRIP-MCNC: NEGATIVE MG/DL
HBA1C MFR BLD: 6.9 % (ref 4–6)
HCT VFR BLD AUTO: 46.7 % (ref 40–53)
HDLC SERPL-MCNC: 33 MG/DL (ref 23–92)
HGB BLD-MCNC: 15.9 G/DL (ref 13.3–17.7)
HGB UR QL STRIP: NEGATIVE
KETONES UR STRIP-MCNC: NEGATIVE MG/DL
LDLC SERPL CALC-MCNC: 84 MG/DL
LEUKOCYTE ESTERASE UR QL STRIP: NEGATIVE
MCH RBC QN AUTO: 29.1 PG (ref 26.5–33)
MCHC RBC AUTO-ENTMCNC: 34 G/DL (ref 31.5–36.5)
MCV RBC AUTO: 86 FL (ref 78–100)
NITRATE UR QL: NEGATIVE
NONHDLC SERPL-MCNC: 121 MG/DL
PH UR STRIP: 5.5 PH (ref 5–7)
PLATELET # BLD AUTO: 157 10E9/L (ref 150–450)
POTASSIUM SERPL-SCNC: 4.2 MMOL/L (ref 3.5–5.1)
PROT SERPL-MCNC: 7 G/DL (ref 6.4–8.9)
RBC # BLD AUTO: 5.46 10E12/L (ref 4.4–5.9)
SODIUM SERPL-SCNC: 139 MMOL/L (ref 134–144)
SOURCE: NORMAL
SP GR UR STRIP: >1.03 (ref 1–1.03)
TRIGL SERPL-MCNC: 185 MG/DL
UROBILINOGEN UR STRIP-ACNC: 0.2 EU/DL (ref 0.2–1)
VIT B12 SERPL-MCNC: 352 PG/ML (ref 180–914)
WBC # BLD AUTO: 6.3 10E9/L (ref 4–11)

## 2018-04-12 PROCEDURE — 99214 OFFICE O/P EST MOD 30 MIN: CPT | Performed by: INTERNAL MEDICINE

## 2018-04-12 PROCEDURE — G0463 HOSPITAL OUTPT CLINIC VISIT: HCPCS

## 2018-04-12 PROCEDURE — 82306 VITAMIN D 25 HYDROXY: CPT | Performed by: INTERNAL MEDICINE

## 2018-04-12 PROCEDURE — 82043 UR ALBUMIN QUANTITATIVE: CPT | Performed by: INTERNAL MEDICINE

## 2018-04-12 PROCEDURE — 25000128 H RX IP 250 OP 636: Performed by: INTERNAL MEDICINE

## 2018-04-12 PROCEDURE — 81003 URINALYSIS AUTO W/O SCOPE: CPT | Performed by: INTERNAL MEDICINE

## 2018-04-12 PROCEDURE — 96372 THER/PROPH/DIAG INJ SC/IM: CPT

## 2018-04-12 PROCEDURE — 80053 COMPREHEN METABOLIC PANEL: CPT | Performed by: INTERNAL MEDICINE

## 2018-04-12 PROCEDURE — 82607 VITAMIN B-12: CPT | Performed by: INTERNAL MEDICINE

## 2018-04-12 PROCEDURE — 83036 HEMOGLOBIN GLYCOSYLATED A1C: CPT | Performed by: INTERNAL MEDICINE

## 2018-04-12 PROCEDURE — 36415 COLL VENOUS BLD VENIPUNCTURE: CPT | Performed by: INTERNAL MEDICINE

## 2018-04-12 PROCEDURE — 80061 LIPID PANEL: CPT | Performed by: INTERNAL MEDICINE

## 2018-04-12 PROCEDURE — 85027 COMPLETE CBC AUTOMATED: CPT | Performed by: INTERNAL MEDICINE

## 2018-04-12 PROCEDURE — G0463 HOSPITAL OUTPT CLINIC VISIT: HCPCS | Mod: 25

## 2018-04-12 RX ORDER — CYANOCOBALAMIN 1000 UG/ML
1000 INJECTION, SOLUTION INTRAMUSCULAR; SUBCUTANEOUS ONCE
Status: COMPLETED | OUTPATIENT
Start: 2018-04-12 | End: 2018-04-12

## 2018-04-12 RX ORDER — NIFEDIPINE 30 MG/1
30 TABLET, EXTENDED RELEASE ORAL DAILY
Qty: 90 TABLET | Refills: 3 | Status: SHIPPED | OUTPATIENT
Start: 2018-04-12 | End: 2019-07-05

## 2018-04-12 RX ORDER — AMLODIPINE BESYLATE 5 MG/1
5 TABLET ORAL DAILY
Status: CANCELLED | OUTPATIENT
Start: 2018-04-12

## 2018-04-12 RX ADMIN — CYANOCOBALAMIN 1000 MCG: 1000 INJECTION, SOLUTION INTRAMUSCULAR at 15:05

## 2018-04-12 ASSESSMENT — ANXIETY QUESTIONNAIRES
1. FEELING NERVOUS, ANXIOUS, OR ON EDGE: NOT AT ALL
IF YOU CHECKED OFF ANY PROBLEMS ON THIS QUESTIONNAIRE, HOW DIFFICULT HAVE THESE PROBLEMS MADE IT FOR YOU TO DO YOUR WORK, TAKE CARE OF THINGS AT HOME, OR GET ALONG WITH OTHER PEOPLE: NOT DIFFICULT AT ALL
GAD7 TOTAL SCORE: 0
5. BEING SO RESTLESS THAT IT IS HARD TO SIT STILL: NOT AT ALL
6. BECOMING EASILY ANNOYED OR IRRITABLE: NOT AT ALL
7. FEELING AFRAID AS IF SOMETHING AWFUL MIGHT HAPPEN: NOT AT ALL
3. WORRYING TOO MUCH ABOUT DIFFERENT THINGS: NOT AT ALL
2. NOT BEING ABLE TO STOP OR CONTROL WORRYING: NOT AT ALL

## 2018-04-12 ASSESSMENT — ENCOUNTER SYMPTOMS
ABDOMINAL PAIN: 0
WOUND: 0
BRUISES/BLEEDS EASILY: 0
CHILLS: 0
DYSURIA: 0
SHORTNESS OF BREATH: 0
LIGHT-HEADEDNESS: 1
ADENOPATHY: 0
COUGH: 0
CONFUSION: 0
FATIGUE: 0

## 2018-04-12 ASSESSMENT — PATIENT HEALTH QUESTIONNAIRE - PHQ9: 5. POOR APPETITE OR OVEREATING: NOT AT ALL

## 2018-04-12 ASSESSMENT — PAIN SCALES - GENERAL: PAINLEVEL: NO PAIN (0)

## 2018-04-12 NOTE — LETTER
Steven Dennison  [address] [address] [address]    4/17/2018      Dear Steven Dennison,    The result of your recent tests are included below:    Your vitamin B12 and vitamin D levels are both very low.    Hemoglobin A1c/diabetes is well controlled.    Please schedule clinic follow-up appointment sometime in the near future so we can address your abnormal labs.    Results for orders placed or performed in visit on 04/12/18   Vitamin B12   Result Value Ref Range    Vitamin B12 352 180 - 914 pg/mL   Vitamin D Total GH   Result Value Ref Range    Vitamin D Total 19.6 ng/mL   Comprehensive metabolic panel   Result Value Ref Range    Sodium 139 134 - 144 mmol/L    Potassium 4.2 3.5 - 5.1 mmol/L    Chloride 104 98 - 107 mmol/L    Carbon Dioxide 29 21 - 31 mmol/L    Anion Gap 6 3 - 14 mmol/L    Glucose 92 70 - 105 mg/dL    Urea Nitrogen 26 (H) 7 - 25 mg/dL    Creatinine 1.05 0.70 - 1.30 mg/dL    GFR Estimate 68 >60 mL/min/1.7m2    GFR Estimate If Black 82 >60 mL/min/1.7m2    Calcium 9.6 8.6 - 10.3 mg/dL    Bilirubin Total 0.7 0.3 - 1.0 mg/dL    Albumin 4.2 3.5 - 5.7 g/dL    Protein Total 7.0 6.4 - 8.9 g/dL    Alkaline Phosphatase 58 34 - 104 U/L    ALT 15 7 - 52 U/L    AST 14 13 - 39 U/L   CBC with platelets   Result Value Ref Range    WBC 6.3 4.0 - 11.0 10e9/L    RBC Count 5.46 4.4 - 5.9 10e12/L    Hemoglobin 15.9 13.3 - 17.7 g/dL    Hematocrit 46.7 40.0 - 53.0 %    MCV 86 78 - 100 fl    MCH 29.1 26.5 - 33.0 pg    MCHC 34.0 31.5 - 36.5 g/dL    RDW 13.7 10.0 - 15.0 %    Platelet Count 157 150 - 450 10e9/L   Hemoglobin A1c   Result Value Ref Range    Hemoglobin A1C 6.9 (H) 4.0 - 6.0 %   Lipid Profile   Result Value Ref Range    Cholesterol 154 <200 mg/dL    Triglycerides 185 (H) <150 mg/dL    HDL Cholesterol 33 23 - 92 mg/dL    LDL Cholesterol Calculated 84 <100 mg/dL    Non HDL Cholesterol 121 <130 mg/dL   Albumin Random Urine Quantitative with Creat Ratio   Result Value Ref Range    Creatinine Urine 183  mg/dL    Albumin Urine mg/L 39 mg/L    Albumin Urine mg/g Cr 21.53 (H) 0 - 17 mg/g Cr   *UA reflex to Microscopic   Result Value Ref Range    Color Urine Yellow     Appearance Urine Clear     Glucose Urine Negative NEG^Negative mg/dL    Bilirubin Urine Negative NEG^Negative    Ketones Urine Negative NEG^Negative mg/dL    Specific Gravity Urine >1.030 1.003 - 1.035    Blood Urine Negative NEG^Negative    pH Urine 5.5 5.0 - 7.0 pH    Protein Albumin Urine Negative NEG^Negative mg/dL    Urobilinogen Urine 0.2 0.2 - 1.0 EU/dL    Nitrite Urine Negative NEG^Negative    Leukocyte Esterase Urine Negative NEG^Negative    Source Unspecified Urine        If you have any further questions or problems, please contact my office at [phone] and schedule an appointment.    Clinic : [phone]  Appointment line: [phone]     Thank you,    Nawaf Montanez MD    Internal Medicine  [facility]     Reviewed and electronically signed by provider.

## 2018-04-12 NOTE — MR AVS SNAPSHOT
After Visit Summary   4/12/2018    Steven Dennison    MRN: [id number]           Patient Information     Date Of Birth          1935        Visit Information        Provider Department      4/12/2018 2:20 PM Nawaf Montanez MD [facility]        Today's Diagnoses     Controlled type 2 diabetes mellitus without complication, without long-term current use of insulin (H)    -  1    Raynaud's phenomenon without gangrene        Deformity of right foot        Mixed hyperlipidemia        Osteopenia, unspecified location          Care Instructions    Labs today.   Medications refilled.     Return for Diabetes labs and clinic follow-up appointment every 3 to 4 months.  --- (Go for about 91 to 100 days)    Aspects of Diabetes we can improve:  Hemoglobin A1c No results found for: A1C Goal range is under 8. Best is 6.5 to 7   Blood Pressure 116/70 Goal to keep less than 140/90   Tobacco  reports that he quit smoking about 43 years ago. His smoking use included Cigarettes. He has a 10.00 pack-year smoking history. He has never used smokeless tobacco. Goal to abstain from tobacco   Aspirin or Plavix Aspirin Aspirin or Plavix reduces risk of heart disease and stroke   Cholesterol Pravastatin Statins reduce risk of heart disease and stroke   Eye Exam -- Do Yearly -- Annual diabetic eye exam   Healthy weight Body mass index is 27.49 kg/(m^2). Goal BMI under 30, best is under 25.      -- Trying to exercise daily (goal at least 20 min/day) with moderate aerobic activity   -- Eat healthy (resources from ADA at http://www.diabetes.org/)   -- Taking good care of my feet. Consider seeing the Podiatrist   -- Check blood sugars as directed, record in log book and bring to every appointment      Schedule lab only appointment --- A few days AFTER: 7/11/18   Schedule clinic appointment with Dr. Montanez -- Same day as labs, or 1-2 days later.     Insurance companies are now grading you and I on your blood  sugar control -- Goal is to get your A1c down to 7.9% or lower and NO Smoking!    -- Medicare and most insurance companies, will only cover Hemoglobin A1c labs to be rechecked every 91+ days.      No results found for: A1C    Next follow-up appointment with Dr. Montanez should be scheduled:  -- Approximately a few days AFTER: 7/11/18         Try dropping the Metoprolol to 1/2 tablet -- in the evening. Hopefully this will help with the lightheadedness with standing symptoms.       Vitamin B12 deficiency:    Vitamin B12 deficiency can cause numerous symptoms.  Fatigue and malaise, low energy levels, low blood counts or anemia, poor mood or depression, neuropathy or pins and needles/burning sensation of the hands and feet.    -- trial of B12 shot today.    -- If B12 shot improves your energy and your blood counts, we can do B12 shots every 3-4 weeks up to every 2 or 3 months if needed.    -- Omeprazole (Proton Pump Inhibitors in general) and metformin can lower B12 levels (inhibits absorption).    -- Consider B12 tablet or B-complex tablet -- once daily.     -- Some people are able to take and absorb oral B12 or B complex tablets.   -- Some people are NOT able to absorb oral B12 very well.    If you decide to proceed with oral B12 replacement, but your B12 levels do not improve, you likely will need to use B12 shots instead.      -- Call with update on this B12 shot in 2 to 4 weeks.     -- If it didn't help, we won't do anymore.    -- If it was helpful, we can order more.             Follow-ups after your visit        Follow-up notes from your care team     Return in about 3 months (around 7/12/2018).      Future tests that were ordered for you today     Open Standing Orders        Priority Remaining Interval Expires Ordered    Comprehensive metabolic panel Routine 3/4 Every 3 Months 4/12/2019 4/12/2018    CBC with platelets Routine 3/4 Every 3 Months 4/12/2019 4/12/2018    Albumin Random Urine Quantitative with Creat  Ratio Routine 3/4 Every 3 Months 4/12/2019 4/12/2018    Hemoglobin A1c Routine 3/4 Every 3 Months 4/12/2019 4/12/2018    Lipid Profile Routine 3/4 Every 3 Months 4/12/2019 4/12/2018    *UA reflex to Microscopic Routine 3/4 Every 3 Months 4/12/2019 4/12/2018            Who to contact     If you have questions or need follow up information about today's clinic visit or your schedule please contact [facility] directly at [phone].  Normal or non-critical lab and imaging results will be communicated to you by MyChart, letter or phone within 4 business days after the clinic has received the results. If you do not hear from us within 7 days, please contact the clinic through MyChart or phone. If you have a critical or abnormal lab result, we will notify you by phone as soon as possible.  Submit refill requests through [employer] or call your pharmacy and they will forward the refill request to us. Please allow 3 business days for your refill to be completed.          Additional Information About Your Visit        Care EveryWhere ID     This is your Care EveryWhere ID. This could be used by other organizations to access your [facility] medical records  [id number]        Your Vitals Were     Pulse BMI (Body Mass Index)                64 27.49 kg/m2           Blood Pressure from Last 3 Encounters:   04/12/18 116/70   12/08/17 110/62   09/08/17 128/70    Weight from Last 3 Encounters:   04/12/18 187 lb 8 oz (85 kg)   12/08/17 185 lb 6.4 oz (84.1 kg)   09/08/17 186 lb 9.6 oz (84.6 kg)              We Performed the Following     *UA reflex to Microscopic     Albumin Random Urine Quantitative with Creat Ratio     CBC with platelets     Comprehensive metabolic panel     Hemoglobin A1c     Lipid Profile     Vitamin B12     Vitamin D Total GH          Today's Medication Changes          These changes are accurate as of 4/12/18  3:24 PM.  If you have any questions, ask your nurse or doctor.               These  medicines have changed or have updated prescriptions.        Dose/Directions    NIFEdipine ER osmotic 30 MG 24 hr tablet   Commonly known as:  PROCARDIA XL   This may have changed:  additional instructions   Used for:  Raynaud's phenomenon without gangrene   Changed by:  Nawaf Montanez MD        Dose:  30 mg   Take 1 tablet (30 mg) by mouth daily - For Raynaud's - start when out of Norvasc/Amlodipine   Quantity:  90 tablet   Refills:  3            Where to get your medicines      These medications were sent to [id number] - [place], [place] - [address]  [address], [place] [address]     Phone:  [phone]     NIFEdipine ER osmotic 30 MG 24 hr tablet                Primary Care Provider Office Phone # Fax #    Nawaf Montanez [phone] [address] [facility] [address]        Equal Access to Services     [facility]: Hadii aad ku hadasho Soomaali, waaxda luqadaha, qaybta kaalmada adeegyada, waxay marissa haykatelyn olguin . So [facility] [phone].    ATENCIÓN: Si habla español, tiene a casey disposición servicios gratuitos de asistencia lingüística. Jenelle al [phone].    We comply with applicable federal civil rights laws and [place] laws. We do not discriminate on the basis of race, color, national origin, age, disability, sex, sexual orientation, or gender identity.            Thank you!     Thank you for choosing [facility]  for your care. Our goal is always to provide you with excellent care. Hearing back from our patients is one way we can continue to improve our services. Please take a few minutes to complete the written survey that you may receive in the mail after your visit with us. Thank you!             Your Updated Medication List - Protect others around you: Learn how to safely use, store and throw away your medicines at www.disposemymeds.org.          This list is accurate as  of 4/12/18  3:24 PM.  Always use your most recent med list.                   Brand Name Dispense Instructions for use Diagnosis    Apple Cider Vinegar 300 MG Tabs      Take 900 mg by mouth 2 times daily        blood glucose monitoring lancets      As directed. Use for testing 2 times daily E11.9        cinnamon 500 MG Caps      Take by mouth daily        cyclobenzaprine 10 MG tablet    FLEXERIL     Take 10 mg by mouth 3 times daily as needed No driving after use.        D 5000 5000 units Tabs   Generic drug:  Cholecalciferol      Take 1 tablet by mouth once a week        Docosahexaenoic Acid 450 MG Caps           FIFTY50 GLUCOSE METER 2.0 w/Device Kit      Dispense item covered by pt ins. 250.02 NIDDM type II, uncontrolled - Test 2 times/day. Reason: Unstable diabetes        Garlic 1000 MG Caps           glipiZIDE 5 MG tablet    GLUCOTROL     Take 5 mg by mouth 2 times daily (before meals)        GOODSENSE ASPIRIN 325 MG tablet   Generic drug:  aspirin      Take 1 tablet by mouth daily        lecithin 1200 MG Caps capsule    lecithin          Magnesium Oxide 420 MG Tabs      Take 1 tablet by mouth daily        Melatonin 10 MG Tabs tablet      Take by mouth At Bedtime        metFORMIN 500 MG tablet    GLUCOPHAGE     Take 1-2 tablets by mouth 2 times daily (with meals) (depending on size of meals)        metoprolol tartrate 25 MG tablet    LOPRESSOR     Take 12.5 mg by mouth 2 times daily Dose reduction 12/8/2017        NIFEdipine ER osmotic 30 MG 24 hr tablet    PROCARDIA XL    90 tablet    Take 1 tablet (30 mg) by mouth daily - For Raynaud's - start when out of Norvasc/Amlodipine    Raynaud's phenomenon without gangrene       ONETOUCH ULTRA test strip   Generic drug:  blood glucose monitoring      Use for testing two times daily E11.9        pravastatin 40 MG tablet    PRAVACHOL     Take 1 tablet by mouth At Bedtime For cholesterol or diabetes        senna-docusate 8.6-50 MG per tablet    SENOKOT-S;PERICOLACE      Take 2 tablets by mouth 2 times daily Constipation prevention        tamsulosin 0.4 MG capsule    FLOMAX     Take 0.4 mg by mouth every evening        triamcinolone 0.1 % cream    KENALOG     Apply twice daily as needed        Vitamin-B Complex Tabs      Take 1 tablet by mouth Take 1 tablet by mouth once daily. -- for Low B12 / B-Vitamins -- about 3 days weekly

## 2018-04-12 NOTE — PROGRESS NOTES
Nursing Notes:   Keyla Last LPN  4/12/2018  2:40 PM  Signed  Previous A1C is at goal of <8  No results found for: A1C  Urine microalbumin:creatine: n/a  Foot exam today  Eye exam 3-28-18    Tobacco User no  Patient is on a daily aspirin  Patient is on a Statin.  Blood pressure today of:     BP Readings from Last 1 Encounters:   12/08/17 110/62      is at the goal of <139/89 for diabetics.    Keyla Last LPN on 4/12/2018 at 2:21 PM      Nursing note reviewed with patient.  Accurracy and completeness verified.   Mr. Dennison is a 82 year old male who:  Patient presents with:  Diabetes    HPI     ICD-10-CM    1. Controlled type 2 diabetes mellitus without complication, without long-term current use of insulin (H) E11.9 Comprehensive metabolic panel     CBC with platelets     Albumin Random Urine Quantitative with Creat Ratio     Hemoglobin A1c     *UA reflex to Microscopic     Comprehensive metabolic panel     CBC with platelets     Hemoglobin A1c     Albumin Random Urine Quantitative with Creat Ratio     *UA reflex to Microscopic   2. Raynaud's phenomenon without gangrene I73.00 NIFEdipine ER osmotic (PROCARDIA XL) 30 MG 24 hr tablet   3. Deformity of right foot M21.961    4. Mixed hyperlipidemia E78.2 Lipid Profile     Lipid Profile   5. Osteopenia, unspecified location M85.80 Vitamin B12     Vitamin D Total GH     cyanocobalamin (VITAMIN B12) injection 1,000 mcg   6. Vitamin B12 deficiency -new diagnosis for 12/20/2018 E53.8 cyanocobalamin (VITAMIN B-12) 2500 MCG sublingual tablet   7. Vitamin D deficiency E55.9 Cholecalciferol (VITAMIN D) 2000 units tablet     Diabetes, currently well controlled.  Tolerating medication.  Needs labs today.  States his blood sugars have been running good at home.  Wife is with and helps provide additional history.    Raynaud's phenomenon, has been stable.  Still using amlodipine --insurance has asked that he change over to nifedipine instead due to cost.  He still has  probably 2 months of his amlodipine left.  He will change once he runs out of medications.    Right foot deformity, found to have bunions.  This is chronic.    Osteopenia, vitamin D level checked today this came back abnormal.  Start oral vitamin D replacement 2000 international units daily.    Vitamin B12 deficiency, new diagnosis today.  B12 shot was given due to fatigue.  Start oral B12 replacement.  If symptoms do not improve with oral B12 would recommend continue with B12 shots.    Functional Capacity: > 4 METS.   Reports that he can climb a flight of stairs without any chest pain/heaviness or shortness of breath.   No orthopnea/paroxysmal nocturnal dyspnea  Review of Systems   Constitutional: Negative for chills and fatigue.   HENT: Negative for congestion.    Respiratory: Negative for cough and shortness of breath.    Cardiovascular: Negative for chest pain and leg swelling.   Gastrointestinal: Negative for abdominal pain.   Genitourinary: Negative for dysuria.   Skin: Negative for wound.   Neurological: Positive for light-headedness.   Hematological: Negative for adenopathy. Does not bruise/bleed easily.   Psychiatric/Behavioral: Negative for confusion.        LORENZO:   LORENZO-7 SCORE 4/12/2018   Total Score 0     PHQ9:  PHQ-9 SCORE 12/14/2016 6/2/2017 4/12/2018   Total Score 0 0 0       I have personally reviewed the past medical history, past surgical history, medications, allergies, family and social history as listed below, on 4/12/2018.    Allergies   Allergen Reactions     Cephalexin      Other reaction(s): *Unknown - Pt Doesn't Remember  Pt does not remember      Clindamycin      Other reaction(s): *Unknown - Pt Doesn't Remember  Pt does not remember     Hydromorphone      Other reaction(s): Bradycardia     Amoxicillin Rash       Current Outpatient Prescriptions   Medication Sig Dispense Refill     Cholecalciferol (VITAMIN D) 2000 units tablet Take 2,000 Units by mouth daily 100 tablet 3     cyanocobalamin  (VITAMIN B-12) 2500 MCG sublingual tablet Place 2,500 mcg under the tongue every other day 90 tablet 3     NIFEdipine ER osmotic (PROCARDIA XL) 30 MG 24 hr tablet Take 1 tablet (30 mg) by mouth daily - For Raynaud's - start when out of Norvasc/Amlodipine 90 tablet 3     Apple Cider Vinegar 300 MG TABS Take 900 mg by mouth 2 times daily       aspirin (GOODSENSE ASPIRIN) 325 MG tablet Take 1 tablet by mouth daily       Blood Glucose Monitoring Suppl (FIFTY50 GLUCOSE METER 2.0) W/DEVICE KIT Dispense item covered by pt ins. 250.02 NIDDM type II, uncontrolled - Test 2 times/day. Reason: Unstable diabetes       Cholecalciferol (D 5000) 5000 UNITS TABS Take 1 tablet by mouth once a week       cinnamon 500 MG CAPS Take by mouth daily       cyclobenzaprine (FLEXERIL) 10 MG tablet Take 10 mg by mouth 3 times daily as needed No driving after use.       Docosahexaenoic Acid 450 MG CAPS        Garlic 1000 MG CAPS        glipiZIDE (GLUCOTROL) 5 MG tablet Take 5 mg by mouth 2 times daily (before meals)       lecithin (LECITHIN) 1200 MG CAPS capsule        Magnesium Oxide 420 MG TABS Take 1 tablet by mouth daily       Melatonin 10 MG TABS tablet Take by mouth At Bedtime       metFORMIN (GLUCOPHAGE) 500 MG tablet Take 1-2 tablets by mouth 2 times daily (with meals) (depending on size of meals)       metoprolol tartrate (LOPRESSOR) 25 MG tablet Take 12.5 mg by mouth 2 times daily Dose reduction 12/8/2017       blood glucose monitoring (ONETOUCH ULTRA) test strip Use for testing two times daily E11.9       pravastatin (PRAVACHOL) 40 MG tablet Take 1 tablet by mouth At Bedtime For cholesterol or diabetes       blood glucose monitoring (PRODIGY TWIST TOP 28G) lancets As directed. Use for testing 2 times daily E11.9       senna-docusate (SENOKOT-S;PERICOLACE) 8.6-50 MG per tablet Take 2 tablets by mouth 2 times daily Constipation prevention       tamsulosin (FLOMAX) 0.4 MG capsule Take 0.4 mg by mouth every evening       triamcinolone  (KENALOG) 0.1 % cream Apply twice daily as needed       B Complex Vitamins (VITAMIN-B COMPLEX) TABS Take 1 tablet by mouth Take 1 tablet by mouth once daily. -- for Low B12 / B-Vitamins -- about 3 days weekly          Patient Active Problem List    Diagnosis Date Noted     Vitamin B12 deficiency -new diagnosis for 12/20/2018 04/13/2018     Priority: Medium     Vitamin D deficiency 04/13/2018     Priority: Medium     Osteoarthrosis involving lower leg 01/24/2018     Priority: Medium     Overview:   IMO Update  Updated per 10/1/17 IMO import       Degeneration of lumbar or lumbosacral intervertebral disc 01/24/2018     Priority: Medium     Overview:   recurring       Renal calculus 01/24/2018     Priority: Medium     Overview:   disease       Controlled type 2 diabetes mellitus without complication, without long-term current use of insulin (H) 12/08/2017     Priority: Medium     Osteopenia 09/27/2017     Priority: Medium     Acquired deformity of right foot 09/08/2017     Priority: Medium     Pre-ulcerative corn or callous 09/08/2017     Priority: Medium     Rotator cuff tendinitis 12/29/2016     Priority: Medium     Eczema 12/14/2016     Priority: Medium     Chest pain 07/20/2016     Priority: Medium     Benign prostatic hyperplasia 07/19/2016     Priority: Medium     Overview:   Updated per 10/1/17 IMO import       Raynaud's disease without gangrene 07/19/2016     Priority: Medium     Status post total replacement of left shoulder 07/19/2016     Priority: Medium     Ventricular bigeminy 07/19/2016     Priority: Medium     Preop examination 06/21/2016     Priority: Medium     Complete tear of left rotator cuff 05/03/2016     Priority: Medium     Acute pain of right shoulder 03/31/2016     Priority: Medium     Biceps tendonitis on right 03/31/2016     Priority: Medium     Bunion of right foot 03/31/2016     Priority: Medium     Chronic left shoulder pain 03/31/2016     Priority: Medium     Deformity of right foot  03/31/2016     Priority: Medium     Sleep difficulties 03/31/2016     Priority: Medium     Counseling regarding advanced directives and goals of care 08/07/2015     Priority: Medium     DNI (do not intubate) 08/07/2015     Priority: Medium     DNR (do not resuscitate) 08/07/2015     Priority: Medium     Bradycardia 05/01/2015     Priority: Medium     Intermittent lightheadedness 05/01/2015     Priority: Medium     Nocturia 05/01/2015     Priority: Medium     Lumbar muscle pain 01/08/2015     Priority: Medium     Trigger point with back pain 08/01/2014     Priority: Medium     Pain in right acromioclavicular joint 07/16/2014     Priority: Medium     Subacromial bursitis 07/16/2014     Priority: Medium     AAA (abdominal aortic aneurysm) (H) 06/18/2014     Priority: Medium     Erectile dysfunction 05/27/2014     Priority: Medium     Impingement syndrome of right shoulder 01/30/2014     Priority: Medium     Premature atrial contractions 01/30/2014     Priority: Medium     Raynaud's phenomenon 01/30/2014     Priority: Medium     Skin lesion of cheek 11/19/2013     Priority: Medium     Mixed hyperlipidemia 06/03/2013     Priority: Medium     Epistaxis 11/22/2011     Priority: Medium     Past Medical History:   Diagnosis Date     Dizziness and giddiness     lightheaded with borderline troponin, possible coronary disease     Type 2 diabetes mellitus without complications (H)     Controlled with diet     Past Surgical History:   Procedure Laterality Date     APPENDECTOMY OPEN      Appendectomy at age 6 due to appendicitis     ARTHROSCOPY KNEE      left knee     COLONOSCOPY      2000     COLONOSCOPY      2011,adenomatous polyps, one with high grade dysplasia , next due 2014     OTHER SURGICAL HISTORY      ,HERNIA REPAIR,right inguinal     OTHER SURGICAL HISTORY      2003,[id number],ARTHROPLASTY,left total knee     OTHER SURGICAL HISTORY      1984,[id number],OTHER,left first/rib resection     OTHER SURGICAL HISTORY       ",[id number],OTHER     OTHER SURGICAL HISTORY      14,[id number],CYSTOSCOPY W/ URETEROSCOPY W/ LITHOTRIPSY,Dr. Chelsi GREEN     OTHER SURGICAL HISTORY      ,HERNIA REPAIR,x 4     Social History     Social History     Marital status:      Spouse name: N/A     Number of children: N/A     Years of education: N/A     Social History Main Topics     Smoking status: Former Smoker     Packs/day: 1.00     Years: 10.00     Types: Cigarettes     Quit date: 1975     Smokeless tobacco: Never Used     Alcohol use 3.0 oz/week     Drug use: No      Comment: Drug use: No     Sexual activity: Not Asked     Other Topics Concern     None     Social History Narrative    , lives with wife who has parkinson's disease since  and requires care. She is totally dependent. She needs assistance with all ADL's He has some respite care workers.     Family History   Problem Relation Age of Onset     Other - See Comments Father       at 74 of natural causes     HEART DISEASE Mother 69     Heart Disease, of MI     DIABETES Mother      Diabetes     Other - See Comments Mother      Stroke     Family History Negative Sister      Good Health     Family History Negative Sister      Good Health     CANCER Brother      Cancer       EXAM:   Vitals:    18 1425   BP: 116/70   BP Location: Right arm   Patient Position: Sitting   Cuff Size: Adult Regular   Pulse: 64   Weight: 187 lb 8 oz (85 kg)       Current Pain Score: No Pain (0)     BP Readings from Last 3 Encounters:   18 116/70   17 110/62   17 128/70    Wt Readings from Last 3 Encounters:   18 187 lb 8 oz (85 kg)   17 185 lb 6.4 oz (84.1 kg)   17 186 lb 9.6 oz (84.6 kg)      Estimated body mass index is 27.49 kg/(m^2) as calculated from the following:    Height as of 17: 5' 9.25\" (1.759 m).    Weight as of this encounter: 187 lb 8 oz (85 kg).     Physical Exam   Constitutional: He is oriented to person, place, and time. He " appears well-developed and well-nourished. No distress.   HENT:   Head: Normocephalic and atraumatic.   Eyes: Conjunctivae are normal. No scleral icterus.   Neck: No thyromegaly present.   Cardiovascular: Normal rate and regular rhythm.    Pulmonary/Chest: Effort normal. No respiratory distress. He has no wheezes.   Abdominal: Soft. There is no tenderness.   Musculoskeletal: He exhibits no tenderness or deformity.   Lymphadenopathy:     He has no cervical adenopathy.   Neurological: He is alert and oriented to person, place, and time. No cranial nerve deficit.   Skin: Skin is warm and dry.   DM Foot Exam -- Foot deformity / bunion noted. Good sensation reported with monofilament testing.    Psychiatric: He has a normal mood and affect.       INVESTIGATIONS:  Results for orders placed or performed in visit on 04/12/18   Vitamin B12   Result Value Ref Range    Vitamin B12 352 180 - 914 pg/mL   Vitamin D Total GH   Result Value Ref Range    Vitamin D Total 19.6 ng/mL   Comprehensive metabolic panel   Result Value Ref Range    Sodium 139 134 - 144 mmol/L    Potassium 4.2 3.5 - 5.1 mmol/L    Chloride 104 98 - 107 mmol/L    Carbon Dioxide 29 21 - 31 mmol/L    Anion Gap 6 3 - 14 mmol/L    Glucose 92 70 - 105 mg/dL    Urea Nitrogen 26 (H) 7 - 25 mg/dL    Creatinine 1.05 0.70 - 1.30 mg/dL    GFR Estimate 68 >60 mL/min/1.7m2    GFR Estimate If Black 82 >60 mL/min/1.7m2    Calcium 9.6 8.6 - 10.3 mg/dL    Bilirubin Total 0.7 0.3 - 1.0 mg/dL    Albumin 4.2 3.5 - 5.7 g/dL    Protein Total 7.0 6.4 - 8.9 g/dL    Alkaline Phosphatase 58 34 - 104 U/L    ALT 15 7 - 52 U/L    AST 14 13 - 39 U/L   CBC with platelets   Result Value Ref Range    WBC 6.3 4.0 - 11.0 10e9/L    RBC Count 5.46 4.4 - 5.9 10e12/L    Hemoglobin 15.9 13.3 - 17.7 g/dL    Hematocrit 46.7 40.0 - 53.0 %    MCV 86 78 - 100 fl    MCH 29.1 26.5 - 33.0 pg    MCHC 34.0 31.5 - 36.5 g/dL    RDW 13.7 10.0 - 15.0 %    Platelet Count 157 150 - 450 10e9/L   Hemoglobin A1c    Result Value Ref Range    Hemoglobin A1C 6.9 (H) 4.0 - 6.0 %   Lipid Profile   Result Value Ref Range    Cholesterol 154 <200 mg/dL    Triglycerides 185 (H) <150 mg/dL    HDL Cholesterol 33 23 - 92 mg/dL    LDL Cholesterol Calculated 84 <100 mg/dL    Non HDL Cholesterol 121 <130 mg/dL   *UA reflex to Microscopic   Result Value Ref Range    Color Urine Yellow     Appearance Urine Clear     Glucose Urine Negative NEG^Negative mg/dL    Bilirubin Urine Negative NEG^Negative    Ketones Urine Negative NEG^Negative mg/dL    Specific Gravity Urine >1.030 1.003 - 1.035    Blood Urine Negative NEG^Negative    pH Urine 5.5 5.0 - 7.0 pH    Protein Albumin Urine Negative NEG^Negative mg/dL    Urobilinogen Urine 0.2 0.2 - 1.0 EU/dL    Nitrite Urine Negative NEG^Negative    Leukocyte Esterase Urine Negative NEG^Negative    Source Unspecified Urine        ASSESSMENT AND PLAN:  Problem List Items Addressed This Visit        Digestive    Vitamin B12 deficiency -new diagnosis for 12/20/2018    Relevant Medications    cyanocobalamin (VITAMIN B-12) 2500 MCG sublingual tablet    Vitamin D deficiency    Relevant Medications    Cholecalciferol (VITAMIN D) 2000 units tablet       Endocrine    Controlled type 2 diabetes mellitus without complication, without long-term current use of insulin (H) - Primary    Relevant Orders    Comprehensive metabolic panel    CBC with platelets    Albumin Random Urine Quantitative with Creat Ratio    Hemoglobin A1c    *UA reflex to Microscopic    Mixed hyperlipidemia    Relevant Orders    Lipid Profile       Musculoskeletal and Integumentary    Deformity of right foot    Osteopenia    Relevant Medications    cyanocobalamin (VITAMIN B12) injection 1,000 mcg (Completed)    Cholecalciferol (VITAMIN D) 2000 units tablet    Other Relevant Orders    Vitamin B12 (Completed)    Vitamin D Total GH (Completed)    Raynaud's phenomenon    Relevant Medications    NIFEdipine ER osmotic (PROCARDIA XL) 30 MG 24 hr tablet         reviewed diet, exercise and weight control, recommended sodium restriction, cardiovascular risk and specific lipid/LDL goals reviewed, specific diabetic recommendations low cholesterol diet, weight control and daily exercise discussed and home glucose monitoring taught, technique demonstrated, use of aspirin to prevent MI and TIA's discussed  -- Expected clinical course discussed    -- Medications and their side effects discussed    Patient Instructions     Labs today.   Medications refilled.     Return for Diabetes labs and clinic follow-up appointment every 3 to 4 months.  --- (Go for about 91 to 100 days)    Aspects of Diabetes we can improve:  Hemoglobin A1c No results found for: A1C Goal range is under 8. Best is 6.5 to 7   Blood Pressure 116/70 Goal to keep less than 140/90   Tobacco  reports that he quit smoking about 43 years ago. His smoking use included Cigarettes. He has a 10.00 pack-year smoking history. He has never used smokeless tobacco. Goal to abstain from tobacco   Aspirin or Plavix Aspirin Aspirin or Plavix reduces risk of heart disease and stroke   Cholesterol Pravastatin Statins reduce risk of heart disease and stroke   Eye Exam -- Do Yearly -- Annual diabetic eye exam   Healthy weight Body mass index is 27.49 kg/(m^2). Goal BMI under 30, best is under 25.      -- Trying to exercise daily (goal at least 20 min/day) with moderate aerobic activity   -- Eat healthy (resources from ADA at http://www.diabetes.org/)   -- Taking good care of my feet. Consider seeing the Podiatrist   -- Check blood sugars as directed, record in log book and bring to every appointment      Schedule lab only appointment --- A few days AFTER: 7/11/18   Schedule clinic appointment with Dr. Montanez -- Same day as labs, or 1-2 days later.     Insurance companies are now grading you and I on your blood sugar control -- Goal is to get your A1c down to 7.9% or lower and NO Smoking!    -- Medicare and most insurance  companies, will only cover Hemoglobin A1c labs to be rechecked every 91+ days.      No results found for: A1C    Next follow-up appointment with Dr. Montanez should be scheduled:  -- Approximately a few days AFTER: 7/11/18         Try dropping the Metoprolol to 1/2 tablet -- in the evening. Hopefully this will help with the lightheadedness with standing symptoms.       Vitamin B12 deficiency:    Vitamin B12 deficiency can cause numerous symptoms.  Fatigue and malaise, low energy levels, low blood counts or anemia, poor mood or depression, neuropathy or pins and needles/burning sensation of the hands and feet.    -- trial of B12 shot today.    -- If B12 shot improves your energy and your blood counts, we can do B12 shots every 3-4 weeks up to every 2 or 3 months if needed.    -- Omeprazole (Proton Pump Inhibitors in general) and metformin can lower B12 levels (inhibits absorption).    -- Consider B12 tablet or B-complex tablet -- once daily.     -- Some people are able to take and absorb oral B12 or B complex tablets.   -- Some people are NOT able to absorb oral B12 very well.    If you decide to proceed with oral B12 replacement, but your B12 levels do not improve, you likely will need to use B12 shots instead.      -- Call with update on this B12 shot in 2 to 4 weeks.     -- If it didn't help, we won't do anymore.    -- If it was helpful, we can order more.       Nawaf Montanez MD  Internal Medicine  [facility]

## 2018-04-12 NOTE — PATIENT INSTRUCTIONS
Labs today.   Medications refilled.     Return for Diabetes labs and clinic follow-up appointment every 3 to 4 months.  --- (Go for about 91 to 100 days)    Aspects of Diabetes we can improve:  Hemoglobin A1c No results found for: A1C Goal range is under 8. Best is 6.5 to 7   Blood Pressure 116/70 Goal to keep less than 140/90   Tobacco  reports that he quit smoking about 43 years ago. His smoking use included Cigarettes. He has a 10.00 pack-year smoking history. He has never used smokeless tobacco. Goal to abstain from tobacco   Aspirin or Plavix Aspirin Aspirin or Plavix reduces risk of heart disease and stroke   Cholesterol Pravastatin Statins reduce risk of heart disease and stroke   Eye Exam -- Do Yearly -- Annual diabetic eye exam   Healthy weight Body mass index is 27.49 kg/(m^2). Goal BMI under 30, best is under 25.      -- Trying to exercise daily (goal at least 20 min/day) with moderate aerobic activity   -- Eat healthy (resources from ADA at http://www.diabetes.org/)   -- Taking good care of my feet. Consider seeing the Podiatrist   -- Check blood sugars as directed, record in log book and bring to every appointment      Schedule lab only appointment --- A few days AFTER: 7/11/18   Schedule clinic appointment with Dr. Montanez -- Same day as labs, or 1-2 days later.     Insurance companies are now grading you and I on your blood sugar control -- Goal is to get your A1c down to 7.9% or lower and NO Smoking!    -- Medicare and most insurance companies, will only cover Hemoglobin A1c labs to be rechecked every 91+ days.      No results found for: A1C    Next follow-up appointment with Dr. Montanez should be scheduled:  -- Approximately a few days AFTER: 7/11/18         Try dropping the Metoprolol to 1/2 tablet -- in the evening. Hopefully this will help with the lightheadedness with standing symptoms.       Vitamin B12 deficiency:    Vitamin B12 deficiency can cause numerous symptoms.  Fatigue and malaise, low  energy levels, low blood counts or anemia, poor mood or depression, neuropathy or pins and needles/burning sensation of the hands and feet.    -- trial of B12 shot today.    -- If B12 shot improves your energy and your blood counts, we can do B12 shots every 3-4 weeks up to every 2 or 3 months if needed.    -- Omeprazole (Proton Pump Inhibitors in general) and metformin can lower B12 levels (inhibits absorption).    -- Consider B12 tablet or B-complex tablet -- once daily.     -- Some people are able to take and absorb oral B12 or B complex tablets.   -- Some people are NOT able to absorb oral B12 very well.    If you decide to proceed with oral B12 replacement, but your B12 levels do not improve, you likely will need to use B12 shots instead.      -- Call with update on this B12 shot in 2 to 4 weeks.     -- If it didn't help, we won't do anymore.    -- If it was helpful, we can order more.

## 2018-04-12 NOTE — NURSING NOTE
Previous A1C is at goal of <8  No results found for: A1C  Urine microalbumin:creatine: n/a  Foot exam today  Eye exam 3-28-18    Tobacco User no  Patient is on a daily aspirin  Patient is on a Statin.  Blood pressure today of:     BP Readings from Last 1 Encounters:   12/08/17 110/62      is at the goal of <139/89 for diabetics.    Keyla Last LPN on 4/12/2018 at 2:21 PM

## 2018-04-13 PROBLEM — E55.9 VITAMIN D DEFICIENCY: Status: ACTIVE | Noted: 2018-04-13

## 2018-04-13 PROBLEM — E53.8 VITAMIN B12 DEFICIENCY: Status: ACTIVE | Noted: 2018-04-13

## 2018-04-13 LAB
CREAT UR-MCNC: 183 MG/DL
MICROALBUMIN UR-MCNC: 39 MG/L
MICROALBUMIN/CREAT UR: 21.53 MG/G CR (ref 0–17)

## 2018-04-13 RX ORDER — CYANOCOBALAMIN (VITAMIN B-12) 2500 MCG
2500 TABLET, SUBLINGUAL SUBLINGUAL EVERY OTHER DAY
Qty: 90 TABLET | Refills: 3 | Status: SHIPPED | OUTPATIENT
Start: 2018-04-13 | End: 2019-10-24

## 2018-04-13 RX ORDER — CHOLECALCIFEROL (VITAMIN D3) 50 MCG
2000 TABLET ORAL DAILY
Qty: 100 TABLET | Refills: 3 | Status: SHIPPED | OUTPATIENT
Start: 2018-04-13 | End: 2022-10-26

## 2018-04-13 ASSESSMENT — PATIENT HEALTH QUESTIONNAIRE - PHQ9: SUM OF ALL RESPONSES TO PHQ QUESTIONS 1-9: 0

## 2018-04-13 ASSESSMENT — ANXIETY QUESTIONNAIRES: GAD7 TOTAL SCORE: 0

## 2018-05-11 ENCOUNTER — OFFICE VISIT (OUTPATIENT)
Dept: UROLOGY | Facility: OTHER | Age: 83
End: 2018-05-11
Attending: UROLOGY
Payer: MEDICARE

## 2018-05-11 VITALS — HEART RATE: 72 BPM | RESPIRATION RATE: 16 BRPM | BODY MASS INDEX: 27.65 KG/M2 | WEIGHT: 188.6 LBS

## 2018-05-11 DIAGNOSIS — R35.1 NOCTURIA: Primary | ICD-10-CM

## 2018-05-11 PROCEDURE — 51798 US URINE CAPACITY MEASURE: CPT | Performed by: UROLOGY

## 2018-05-11 PROCEDURE — 99213 OFFICE O/P EST LOW 20 MIN: CPT | Performed by: UROLOGY

## 2018-05-11 PROCEDURE — G0463 HOSPITAL OUTPT CLINIC VISIT: HCPCS | Mod: 25

## 2018-05-11 RX ORDER — TAMSULOSIN HYDROCHLORIDE 0.4 MG/1
0.4 CAPSULE ORAL EVERY EVENING
Qty: 90 CAPSULE | Refills: 3 | Status: SHIPPED | OUTPATIENT
Start: 2018-05-11 | End: 2018-12-10

## 2018-05-11 ASSESSMENT — PAIN SCALES - GENERAL: PAINLEVEL: NO PAIN (0)

## 2018-05-11 NOTE — PROGRESS NOTES
Type of Visit  Established    Chief Complaint  BPH  History of kidney stones    HPI  Mr. Dennison is a 82 y.o. male with h/o kidney stones and BPH.  He has been on Flomax for 3 years.  He continues to do well with Flomax.  No side effects such as lightheadedness.  Nocturia previously 4x before Flomax.  He continues to report nocturia 1-2x per night.    Also history of stones.  Continues to deny flank pain since the last visit 1 year ago.  No gross hematuria or dysuria..  He has had stones treated once 4 years ago.      Review of Systems  I reviewed the ROS the patient today.    Nursing Notes:   Nicole Hinkle LPN  2018  9:53 AM  Signed  Pt presents to clinic for a follow up on nocturia  Review of Systems:    Weight loss:    No     Recent fever/chills:  No   Night sweats:   No  Current skin rash:  No   Recent hair loss:  No  Heat intolerance:  No   Cold intolerance:  No  Chest pain:   No   Palpitations:   No  Shortness of breath:  Yes   Wheezing:   No  Constipation:    No   Diarrhea:   No   Nausea:   No   Vomiting:   No   Kidney/side pain:  No   Back pain:   No  Frequent headaches:  No   Dizziness:     No  Leg swelling:   No   Calf pain:    No      Nicole Hinkle LPN  2018  9:53 AM  Signed  Post-Void Residual  A post-void residual was measured by ultrasonic bladder scanner.  39 mL      Family History  Family History   Problem Relation Age of Onset     Heart Disease Mother 69      of MI     Diabetes Mother      Other Father       at 74 of natural causes     Good Health Brother      Good Health Sister      Good Health Sister      Physical Exam  Pulse 72  Resp 16  Wt 85.5 kg (188 lb 9.6 oz)  BMI 27.65 kg/m2  Constitutional: NAD, WDWN.  Cardiovascular: Regular rate.  Pulmonary/Chest: Respirations are even and non-labored bilaterally.  Abdominal: Soft. No distension, tenderness, masses or guarding. No CVA tenderness.  Extremities: CONNIE x 4, Warm. No clubbing.  No cyanosis.    Skin: Pink,  warm and dry.  No rashes noted.  Genitourinary: nonpalpable bladder    Labs  Results for GERMANIA EDMONDSON (MRN [id number]) as of 5/19/2016 11:00   5/1/2015 10:37   PSA TOTAL (DIAGNOSTIC) 1.950     Radiographic Studies  7/22/2014  CT a/p  IMPRESSION: At the time of the prior exam 2/20/2014 there was an obstructing up to 7 mm in diameter right mid-ureteral calculus present. This calculus is no longer seen and there is no evidence for hydronephrosis involving either kidney. The 3 mm in diameter right mid-renal calculus has not significantly changed, remaining nonobstructive. No new abnormalities are identified.    Post-Void Residual  A post-void residual was measured by ultrasonic bladder scanner.  39 mL today  86 mL (previously recorded)  45 mL (previously recorded)    Assessment & Plan  Mr. Edmondson is a 82 y.o. male with h/o kidney stones and BPH.  Doing well.  Low PVR.  Continue Flomax 0.4mg daily  Follow up as needed.  Future refills per PCP and he can see me back with issues.

## 2018-05-11 NOTE — MR AVS SNAPSHOT
After Visit Summary   5/11/2018    Steven Dennison    MRN: [id number]           Patient Information     Date Of Birth          1935        Visit Information        Provider Department      5/11/2018 9:45 AM Carlyle Gagnon MD [facility]        Today's Diagnoses     Nocturia    -  1       Follow-ups after your visit        Your next 10 appointments already scheduled     May 23, 2018  2:40 PM CDT   PHYSICAL with Nawaf Montanez MD   [facility] ([facility])    [address]  [place] [address] [phone]              Who to contact     If you have questions or need follow up information about today's clinic visit or your schedule please contact [facility] directly at [phone].  Normal or non-critical lab and imaging results will be communicated to you by MyChart, letter or phone within 4 business days after the clinic has received the results. If you do not hear from us within 7 days, please contact the clinic through MyChart or phone. If you have a critical or abnormal lab result, we will notify you by phone as soon as possible.  Submit refill requests through [employer] or call your pharmacy and they will forward the refill request to us. Please allow 3 business days for your refill to be completed.          Additional Information About Your Visit        Care EveryWhere ID     This is your Care EveryWhere ID. This could be used by other organizations to access your [facility] medical records  [id number]        Your Vitals Were     Pulse Respirations BMI (Body Mass Index)             72 16 27.65 kg/m2          Blood Pressure from Last 3 Encounters:   04/12/18 116/70   12/08/17 110/62   09/08/17 128/70    Weight from Last 3 Encounters:   05/11/18 85.5 kg (188 lb 9.6 oz)   04/12/18 85 kg (187 lb 8 oz)   12/08/17 84.1 kg (185 lb 6.4 oz)              We Performed the Following     POST-VOID RESIDUAL  BLADDER SCAN          Where to get your medicines      These medications were sent to [id number] - [place], [place] - [address]  [address], [place] [address]     Phone:  [phone]     tamsulosin 0.4 MG capsule          Primary Care Provider Office Phone # Fax #    Nawaf Montanez [phone]       [address]  [place] [address]        Equal Access to Services     HONG COBB : Hadii aad ku hadasho Soomaali, waaxda luqadaha, qaybta kaalmada adeegyada, waxay idiin hayjorge luisn karen olguin . So [facility] [phone].    ATENCIÓN: Si habla español, tiene a casey disposición servicios gratuitos de asistencia lingüística. [facility] [phone].    We comply with applicable federal civil rights laws and [place] laws. We do not discriminate on the basis of race, color, national origin, age, disability, sex, sexual orientation, or gender identity.            Thank you!     Thank you for choosing [facility]  for your care. Our goal is always to provide you with excellent care. Hearing back from our patients is one way we can continue to improve our services. Please take a few minutes to complete the written survey that you may receive in the mail after your visit with us. Thank you!             Your Updated Medication List - Protect others around you: Learn how to safely use, store and throw away your medicines at www.disposemymeds.org.          This list is accurate as of 5/11/18 10:11 AM.  Always use your most recent med list.                   Brand Name Dispense Instructions for use Diagnosis    Apple Cider Vinegar 300 MG Tabs      Take 900 mg by mouth 2 times daily        blood glucose monitoring lancets      As directed. Use for testing 2 times daily E11.9        cinnamon 500 MG Caps      Take by mouth daily        cyanocobalamin 2500 MCG sublingual tablet    VITAMIN B-12    90 tablet    Place 2,500 mcg under  the tongue every other day    Vitamin B12 deficiency       cyclobenzaprine 10 MG tablet    FLEXERIL     Take 10 mg by mouth 3 times daily as needed No driving after use.        * D 5000 5000 units Tabs   Generic drug:  Cholecalciferol      Take 1 tablet by mouth once a week        * vitamin D 2000 units tablet     100 tablet    Take 2,000 Units by mouth daily    Vitamin D deficiency       Docosahexaenoic Acid 450 MG Caps           FIFTY50 GLUCOSE METER 2.0 w/Device Kit      Dispense item covered by pt ins. 250.02 NIDDM type II, uncontrolled - Test 2 times/day. Reason: Unstable diabetes        Garlic 1000 MG Caps           glipiZIDE 5 MG tablet    GLUCOTROL     Take 5 mg by mouth 2 times daily (before meals)        GOODSENSE ASPIRIN 325 MG tablet   Generic drug:  aspirin      Take 1 tablet by mouth daily        lecithin 1200 MG Caps capsule    lecithin          Magnesium Oxide 420 MG Tabs      Take 1 tablet by mouth daily        Melatonin 10 MG Tabs tablet      Take by mouth At Bedtime        metFORMIN 500 MG tablet    GLUCOPHAGE     Take 1-2 tablets by mouth 2 times daily (with meals) (depending on size of meals)        metoprolol tartrate 25 MG tablet    LOPRESSOR     Take 12.5 mg by mouth 2 times daily Dose reduction 12/8/2017        NIFEdipine ER osmotic 30 MG 24 hr tablet    PROCARDIA XL    90 tablet    Take 1 tablet (30 mg) by mouth daily - For Raynaud's - start when out of Norvasc/Amlodipine    Raynaud's phenomenon without gangrene       ONETOUCH ULTRA test strip   Generic drug:  blood glucose monitoring      Use for testing two times daily E11.9        pravastatin 40 MG tablet    PRAVACHOL     Take 1 tablet by mouth At Bedtime For cholesterol or diabetes        senna-docusate 8.6-50 MG per tablet    SENOKOT-S;PERICOLACE     Take 2 tablets by mouth 2 times daily Constipation prevention        tamsulosin 0.4 MG capsule    FLOMAX    90 capsule    Take 1 capsule (0.4 mg) by mouth every evening    Nocturia        triamcinolone 0.1 % cream    KENALOG     Apply twice daily as needed        Vitamin-B Complex Tabs      Take 1 tablet by mouth Take 1 tablet by mouth once daily. -- for Low B12 / B-Vitamins -- about 3 days weekly        * Notice:  This list has 2 medication(s) that are the same as other medications prescribed for you. Read the directions carefully, and ask your doctor or other care provider to review them with you.

## 2018-05-11 NOTE — NURSING NOTE
Pt presents to clinic for a follow up on nocturia  Review of Systems:    Weight loss:    No     Recent fever/chills:  No   Night sweats:   No  Current skin rash:  No   Recent hair loss:  No  Heat intolerance:  No   Cold intolerance:  No  Chest pain:   No   Palpitations:   No  Shortness of breath:  Yes   Wheezing:   No  Constipation:    No   Diarrhea:   No   Nausea:   No   Vomiting:   No   Kidney/side pain:  No   Back pain:   No  Frequent headaches:  No   Dizziness:     No  Leg swelling:   No   Calf pain:    No

## 2018-05-23 ENCOUNTER — OFFICE VISIT (OUTPATIENT)
Dept: INTERNAL MEDICINE | Facility: OTHER | Age: 83
End: 2018-05-23
Attending: INTERNAL MEDICINE
Payer: MEDICARE

## 2018-05-23 VITALS
OXYGEN SATURATION: 97 % | SYSTOLIC BLOOD PRESSURE: 118 MMHG | TEMPERATURE: 98.4 F | HEART RATE: 72 BPM | BODY MASS INDEX: 27.78 KG/M2 | WEIGHT: 189.5 LBS | DIASTOLIC BLOOD PRESSURE: 64 MMHG

## 2018-05-23 DIAGNOSIS — E78.2 MIXED HYPERLIPIDEMIA: ICD-10-CM

## 2018-05-23 DIAGNOSIS — Z01.818 PREOP GENERAL PHYSICAL EXAM: Primary | ICD-10-CM

## 2018-05-23 DIAGNOSIS — H25.9 SENILE CATARACT OF RIGHT EYE, UNSPECIFIED AGE-RELATED CATARACT TYPE: ICD-10-CM

## 2018-05-23 DIAGNOSIS — E53.8 VITAMIN B12 DEFICIENCY: ICD-10-CM

## 2018-05-23 DIAGNOSIS — I73.00 RAYNAUD'S DISEASE WITHOUT GANGRENE: ICD-10-CM

## 2018-05-23 DIAGNOSIS — E11.9 CONTROLLED TYPE 2 DIABETES MELLITUS WITHOUT COMPLICATION, WITHOUT LONG-TERM CURRENT USE OF INSULIN (H): ICD-10-CM

## 2018-05-23 PROCEDURE — G0463 HOSPITAL OUTPT CLINIC VISIT: HCPCS

## 2018-05-23 PROCEDURE — 99213 OFFICE O/P EST LOW 20 MIN: CPT | Performed by: INTERNAL MEDICINE

## 2018-05-23 RX ORDER — KRILL/OM-3/DHA/EPA/PHOSPHO/AST 500MG-86MG
1 CAPSULE ORAL DAILY
COMMUNITY
End: 2022-10-26

## 2018-05-23 ASSESSMENT — ANXIETY QUESTIONNAIRES
3. WORRYING TOO MUCH ABOUT DIFFERENT THINGS: NOT AT ALL
6. BECOMING EASILY ANNOYED OR IRRITABLE: NOT AT ALL
1. FEELING NERVOUS, ANXIOUS, OR ON EDGE: NOT AT ALL
GAD7 TOTAL SCORE: 0
5. BEING SO RESTLESS THAT IT IS HARD TO SIT STILL: NOT AT ALL
7. FEELING AFRAID AS IF SOMETHING AWFUL MIGHT HAPPEN: NOT AT ALL
IF YOU CHECKED OFF ANY PROBLEMS ON THIS QUESTIONNAIRE, HOW DIFFICULT HAVE THESE PROBLEMS MADE IT FOR YOU TO DO YOUR WORK, TAKE CARE OF THINGS AT HOME, OR GET ALONG WITH OTHER PEOPLE: NOT DIFFICULT AT ALL
2. NOT BEING ABLE TO STOP OR CONTROL WORRYING: NOT AT ALL

## 2018-05-23 ASSESSMENT — PATIENT HEALTH QUESTIONNAIRE - PHQ9: 5. POOR APPETITE OR OVEREATING: NOT AT ALL

## 2018-05-23 ASSESSMENT — PAIN SCALES - GENERAL: PAINLEVEL: NO PAIN (0)

## 2018-05-23 NOTE — PATIENT INSTRUCTIONS
Before Your Surgery      Call your surgeon if there is any change in your health. This includes signs of a cold or flu (such as a sore throat, runny nose, cough, rash or fever).    Do not smoke, drink alcohol or take over the counter medicine (unless your surgeon or primary care doctor tells you to) for the 24 hours before and after surgery.    If you take prescribed drugs: Follow your doctor s orders about which medicines to take and which to stop until after surgery.    Eating and drinking prior to surgery: follow the instructions from your surgeon    Take a shower or bath the night before surgery. Use the soap your surgeon gave you to gently clean your skin. If you do not have soap from your surgeon, use your regular soap. Do not shave or scrub the surgery site.  Wear clean pajamas and have clean sheets on your bed.     No Glipizide or Metformin in evening prior to surgery or morning of surgery.     Reduce Aspirin down to 81 mg daily.     -- Okay to hold Aspirin for 5 to 7 days prior to eye surgery.   Return as needed for follow-up with Dr. Montanez.    Clinic : [phone]  Appointment line: [phone]

## 2018-05-23 NOTE — PROGRESS NOTES
[facility] AND [facility]  [address]  [place] [address]  [phone]    PRE-OP EVALUATION:  Today's date: 2018    Steven Dennison (: 1935) presents for pre-operative evaluation assessment as requested by Dr. Santana.  He requires evaluation and anesthesia risk assessment prior to undergoing surgery/procedure for treatment of Right Cataract .    Proposed Surgery/ Procedure: Right Cataract  Date of Surgery/ Procedure: 18  Time of Surgery/ Procedure: unknown  Hospital/Surgical Facility: [facility]  Fax number for surgical facility: n/a  Primary Physician: Nawaf Montanez  Type of Anesthesia Anticipated: Local    Patient has a Health Care Directive or Living Will:  NO    1. NO - Do you have a history of heart attack, stroke, stent, bypass or surgery on an artery in the head, neck, heart or legs?  2. NO - Do you ever have any pain or discomfort in your chest?  3. NO - Do you have a history of  Heart Failure?  4. NO - Are you troubled by shortness of breath when: walking on the level, up a slight hill or at night?  5. NO - Do you currently have a cold, bronchitis or other respiratory infection?  6. YES - Do you have a cough, shortness of breath or wheezing?  7. NO - Do you sometimes get pains in the calves of your legs when you walk?  8. NO - Do you or anyone in your family have previous history of blood clots?  9. NO - Do you or does anyone in your family have a serious bleeding problem such as prolonged bleeding following surgeries or cuts?  10. NO - Have you ever had problems with anemia or been told to take iron pills?  11. NO - Have you had any abnormal blood loss such as black, tarry or bloody stools, or abnormal vaginal bleeding?  12. NO - Have you ever had a blood transfusion?  13. NO - Have you or any of your relatives ever had problems with anesthesia?  14. NO - Do you have sleep apnea, excessive snoring or daytime drowsiness?  15. NO - Do you have any prosthetic heart  valves?  16. YES - Do you have prosthetic joints?  17. NO - Is there any chance that you may be pregnant?    HPI:       ICD-10-CM    1. Preop general physical exam Z01.818    2. Senile cataract of right eye, unspecified age-related cataract type H25.9    3. Vitamin B12 deficiency -new diagnosis for 12/20/2018 E53.8    4. Controlled type 2 diabetes mellitus without complication, without long-term current use of insulin (H) E11.9    5. Mixed hyperlipidemia E78.2    6. Raynaud's disease without gangrene I73.00      HPI related to upcoming procedure: Patient has bilateral cataracts and is currently scheduled for right eye cataract extraction followed by left eye cataract extraction.  Has been having some decline in vision and is now scheduled for surgery.    Vitamin B12 deficiency, states his energy imbalance have improved since starting be 12 replacement.      DIABETES - Patient has a longstanding history of DiabetesType Type II . Patient is being treated with metformin and denies significant side effects. Control has been good. Complicating factors include but are not limited to: hypertension and hyperlipidemia.                                                                                                                              .  HYPERLIPIDEMIA - Patient has a long history of significant Hyperlipidemia requiring medication for treatment with recent good control. Patient reports no problems or side effects with the medication.                                                                                                                                                       .  HYPERTENSION - Patient has longstanding history of HTN , currently denies any symptoms referable to elevated blood pressure. Specifically denies chest pain, palpitations, dyspnea, orthopnea, PND or peripheral edema. Blood pressure readings have been in normal range. Current medication regimen is as listed below. Patient denies any side  effects of medication.                                                                                                                                                                                            .  Raynaud's - reports stable. Continue with Procardia.    MEDICAL HISTORY:     Patient Active Problem List    Diagnosis Date Noted     Vitamin B12 deficiency -new diagnosis for 12/20/2018 04/13/2018     Priority: Medium     Vitamin D deficiency 04/13/2018     Priority: Medium     Osteoarthrosis involving lower leg 01/24/2018     Priority: Medium     Overview:   IMO Update  Updated per 10/1/17 IMO import       Degeneration of lumbar or lumbosacral intervertebral disc 01/24/2018     Priority: Medium     Overview:   recurring       Renal calculus 01/24/2018     Priority: Medium     Overview:   disease       Controlled type 2 diabetes mellitus without complication, without long-term current use of insulin (H) 12/08/2017     Priority: Medium     Osteopenia 09/27/2017     Priority: Medium     Acquired deformity of right foot 09/08/2017     Priority: Medium     Pre-ulcerative corn or callous 09/08/2017     Priority: Medium     Rotator cuff tendinitis 12/29/2016     Priority: Medium     Eczema 12/14/2016     Priority: Medium     Benign prostatic hyperplasia 07/19/2016     Priority: Medium     Overview:   Updated per 10/1/17 IMO import       Raynaud's disease without gangrene 07/19/2016     Priority: Medium     Status post total replacement of left shoulder 07/19/2016     Priority: Medium     Ventricular bigeminy 07/19/2016     Priority: Medium     Preop examination 06/21/2016     Priority: Medium     Complete tear of left rotator cuff 05/03/2016     Priority: Medium     Biceps tendonitis on right 03/31/2016     Priority: Medium     Bunion of right foot 03/31/2016     Priority: Medium     Chronic left shoulder pain 03/31/2016     Priority: Medium     Deformity of right foot 03/31/2016     Priority: Medium     Sleep  difficulties 03/31/2016     Priority: Medium     Counseling regarding advanced directives and goals of care 08/07/2015     Priority: Medium     DNI (do not intubate) 08/07/2015     Priority: Medium     DNR (do not resuscitate) 08/07/2015     Priority: Medium     Intermittent lightheadedness 05/01/2015     Priority: Medium     Nocturia 05/01/2015     Priority: Medium     Subacromial bursitis 07/16/2014     Priority: Medium     AAA (abdominal aortic aneurysm) (H) 06/18/2014     Priority: Medium     Erectile dysfunction 05/27/2014     Priority: Medium     Impingement syndrome of right shoulder 01/30/2014     Priority: Medium     Premature atrial contractions 01/30/2014     Priority: Medium     Raynaud's phenomenon 01/30/2014     Priority: Medium     Skin lesion of cheek 11/19/2013     Priority: Medium     Mixed hyperlipidemia 06/03/2013     Priority: Medium      Past Medical History:   Diagnosis Date     Dizziness and giddiness     lightheaded with borderline troponin, possible coronary disease     Type 2 diabetes mellitus without complications (H)     Controlled with diet     Past Surgical History:   Procedure Laterality Date     APPENDECTOMY OPEN      Appendectomy at age 6 due to appendicitis     ARTHROSCOPY KNEE      left knee     COLONOSCOPY      2000     COLONOSCOPY      2011,adenomatous polyps, one with high grade dysplasia , next due 2014     OTHER SURGICAL HISTORY      ,HERNIA REPAIR,right inguinal     OTHER SURGICAL HISTORY      2003,[id number],ARTHROPLASTY,left total knee     OTHER SURGICAL HISTORY      1984,[id number],OTHER,left first/rib resection     OTHER SURGICAL HISTORY      1983,[id number],OTHER     OTHER SURGICAL HISTORY      02/21/14,[id number],CYSTOSCOPY W/ URETEROSCOPY W/ LITHOTRIPSY,Dr. Chelsi GREEN     OTHER SURGICAL HISTORY      ,HERNIA REPAIR,x 4     Current Outpatient Prescriptions   Medication Sig Dispense Refill     Apple Cider Vinegar 300 MG TABS Take 900 mg by mouth 2 times daily       aspirin  (GOODSENSE ASPIRIN) 325 MG tablet Take 1 tablet by mouth daily       B Complex Vitamins (VITAMIN-B COMPLEX) TABS Take 1 tablet by mouth Take 1 tablet by mouth once daily. -- for Low B12 / B-Vitamins -- about 3 days weekly       blood glucose monitoring (ONETOUCH ULTRA) test strip Use for testing two times daily E11.9       blood glucose monitoring (PRODIGY TWIST TOP 28G) lancets As directed. Use for testing 2 times daily E11.9       Blood Glucose Monitoring Suppl (FIFTY50 GLUCOSE METER 2.0) W/DEVICE KIT Dispense item covered by pt ins. 250.02 NIDDM type II, uncontrolled - Test 2 times/day. Reason: Unstable diabetes       Cholecalciferol (VITAMIN D) 2000 units tablet Take 2,000 Units by mouth daily 100 tablet 3     cinnamon 500 MG CAPS Take by mouth daily       cyanocobalamin (VITAMIN B-12) 2500 MCG sublingual tablet Place 2,500 mcg under the tongue every other day 90 tablet 3     cyclobenzaprine (FLEXERIL) 10 MG tablet Take 10 mg by mouth 3 times daily as needed No driving after use.       Garlic 1000 MG CAPS        glipiZIDE (GLUCOTROL) 5 MG tablet Take 5 mg by mouth 2 times daily (before meals)       Krill Oil 500 MG CAPS Take 1 capsule by mouth daily       lecithin (LECITHIN) 1200 MG CAPS capsule        Magnesium Oxide 420 MG TABS Take 1 tablet by mouth daily       Melatonin 10 MG TABS tablet Take by mouth At Bedtime       metFORMIN (GLUCOPHAGE) 500 MG tablet Take 1-2 tablets by mouth 2 times daily (with meals) (depending on size of meals)       metoprolol tartrate (LOPRESSOR) 25 MG tablet Take 12.5 mg by mouth 2 times daily Dose reduction 12/8/2017       NIFEdipine ER osmotic (PROCARDIA XL) 30 MG 24 hr tablet Take 1 tablet (30 mg) by mouth daily - For Raynaud's - start when out of Norvasc/Amlodipine 90 tablet 3     pravastatin (PRAVACHOL) 40 MG tablet Take 1 tablet by mouth At Bedtime For cholesterol or diabetes       senna-docusate (SENOKOT-S;PERICOLACE) 8.6-50 MG per tablet Take 2 tablets by mouth 2 times  daily Constipation prevention       tamsulosin (FLOMAX) 0.4 MG capsule Take 1 capsule (0.4 mg) by mouth every evening 90 capsule 3     triamcinolone (KENALOG) 0.1 % cream Apply twice daily as needed       OTC products: None, except as noted above    Allergies   Allergen Reactions     Cephalexin      Other reaction(s): *Unknown - Pt Doesn't Remember  Pt does not remember      Clindamycin      Other reaction(s): *Unknown - Pt Doesn't Remember  Pt does not remember     Hydromorphone      Other reaction(s): Bradycardia     Amoxicillin Rash      Latex Allergy: NO    Social History   Substance Use Topics     Smoking status: Former Smoker     Packs/day: 1.00     Years: 10.00     Types: Cigarettes     Quit date: 1/1/1975     Smokeless tobacco: Never Used     Alcohol use 3.0 oz/week     History   Drug Use No       REVIEW OF SYSTEMS:   CONSTITUTIONAL: NEGATIVE for fever, chills, change in weight  ENT/MOUTH: NEGATIVE for ear, mouth and throat problems  RESP: NEGATIVE for significant cough or SOB  CV: NEGATIVE for chest pain, palpitations or peripheral edema    EXAM:   /64 (BP Location: Right arm, Patient Position: Sitting, Cuff Size: Adult Regular)  Pulse 72  Temp 98.4  F (36.9  C) (Tympanic)  Wt 189 lb 8 oz (86 kg)  SpO2 97%  BMI 27.78 kg/m2  GENERAL APPEARANCE: healthy, alert and no distress  HENT: ear canals and TM's normal and nose and mouth without ulcers or lesions  RESP: lungs clear to auscultation - no rales, rhonchi or wheezes  CV: regular rate and rhythm, normal S1 S2, no S3 or S4 and no murmur, click or rub   ABDOMEN: soft, nontender, no HSM or masses and bowel sounds normal  NEURO: Normal strength and tone, sensory exam grossly normal, mentation intact and speech normal    DIAGNOSTICS:   EKG 7/29/2016 - sinus bradycardia. Rate 58. Unchanged.     Recent Labs   Lab Test  04/12/18   1512  12/08/17   1018  12/08/17   0953   02/18/14   1036   HGB  15.9   --   16.0   < >   --    PLT  157   --   128*   < >   --     INR   --    --    --    --   1.1   NA  139  138   --    < >   --    POTASSIUM  4.2  4.2   --    < >   --    CR  1.05  0.93   --    < >   --    A1C  6.9*   --    --    --    --     < > = values in this interval not displayed.        IMPRESSION:   Reason for surgery/procedure: Right eye cataract extraction --hopefully followed by left eye cataract extraction within the next 30 days  Diagnosis/reason for consult: Preoperative cardiopulmonary risk stratification    The proposed surgical procedure is considered LOW risk.    REVISED CARDIAC RISK INDEX  The patient has the following serious cardiovascular risks for perioperative complications such as (MI, PE, VFib and 3  AV Block):  No serious cardiac risks  INTERPRETATION: 0 risks: Class I (very low risk - 0.4% complication rate)    The patient has the following additional risks for perioperative complications:  No identified additional risks      ICD-10-CM    1. Preop general physical exam Z01.818    2. Senile cataract of right eye, unspecified age-related cataract type H25.9    3. Vitamin B12 deficiency -new diagnosis for 12/20/2018 E53.8    4. Controlled type 2 diabetes mellitus without complication, without long-term current use of insulin (H) E11.9    5. Mixed hyperlipidemia E78.2    6. Raynaud's disease without gangrene I73.00        RECOMMENDATIONS:     --Patient is to take all scheduled medications on the day of surgery EXCEPT for modifications listed below.    Diabetes Medication Use  -----Hold usual oral and non-insulin diabetic meds (e.g. Metformin, Actos, Glipizide) while NPO.     APPROVAL GIVEN to proceed with proposed procedure, without further diagnostic evaluation       Signed Electronically by: Nawaf Montanez MD    Copy of this evaluation report is provided to requesting physician.    [facility] Preop Guidelines    Revised Cardiac Risk Index

## 2018-05-23 NOTE — NURSING NOTE
Patient presents to the clinic for pre-op exam.      Previous A1C is at goal of <8  Lab Results   Component Value Date    A1C 6.9 04/12/2018     Urine microalbumin:creatine: n/a  Foot exam 04/12/18  Eye exam 0/328/18    Tobacco User no  Patient is on a daily aspirin  Patient is on a Statin.  Blood pressure today of:     BP Readings from Last 1 Encounters:   04/12/18 116/70      is at the goal of <139/89 for diabetics.    Keyla Last LPN on 5/23/2018 at 2:26 PM

## 2018-05-23 NOTE — MR AVS SNAPSHOT
After Visit Summary   5/23/2018    Steven Dennison    MRN: [id number]           Patient Information     Date Of Birth          1935        Visit Information        Provider Department      5/23/2018 3:00 PM Nawaf Montanez MD [facility]        Today's Diagnoses     Preop general physical exam    -  1    Senile cataract of right eye, unspecified age-related cataract type        Vitamin B12 deficiency -new diagnosis for 12/20/2018        Controlled type 2 diabetes mellitus without complication, without long-term current use of insulin (H)        Mixed hyperlipidemia        Raynaud's disease without gangrene          Care Instructions      Before Your Surgery      Call your surgeon if there is any change in your health. This includes signs of a cold or flu (such as a sore throat, runny nose, cough, rash or fever).    Do not smoke, drink alcohol or take over the counter medicine (unless your surgeon or primary care doctor tells you to) for the 24 hours before and after surgery.    If you take prescribed drugs: Follow your doctor s orders about which medicines to take and which to stop until after surgery.    Eating and drinking prior to surgery: follow the instructions from your surgeon    Take a shower or bath the night before surgery. Use the soap your surgeon gave you to gently clean your skin. If you do not have soap from your surgeon, use your regular soap. Do not shave or scrub the surgery site.  Wear clean pajamas and have clean sheets on your bed.     No Glipizide or Metformin in evening prior to surgery or morning of surgery.     Reduce Aspirin down to 81 mg daily.     -- Okay to hold Aspirin for 5 to 7 days prior to eye surgery.   Return as needed for follow-up with Dr. Montanez.    Clinic : [phone]  Appointment line: [phone]            Follow-ups after your visit        Follow-up notes from your care team     Return if symptoms worsen or fail to  improve.      Who to contact     If you have questions or need follow up information about today's clinic visit or your schedule please contact [facility] directly at [phone].  Normal or non-critical lab and imaging results will be communicated to you by MyChart, letter or phone within 4 business days after the clinic has received the results. If you do not hear from us within 7 days, please contact the clinic through MyChart or phone. If you have a critical or abnormal lab result, we will notify you by phone as soon as possible.  Submit refill requests through [employer] or call your pharmacy and they will forward the refill request to us. Please allow 3 business days for your refill to be completed.          Additional Information About Your Visit        Care EveryWhere ID     This is your Care EveryWhere ID. This could be used by other organizations to access your [facility] medical records  [id number]        Your Vitals Were     Pulse Temperature Pulse Oximetry BMI (Body Mass Index)          72 98.4  F (36.9  C) (Tympanic) 97% 27.78 kg/m2         Blood Pressure from Last 3 Encounters:   05/23/18 118/64   04/12/18 116/70   12/08/17 110/62    Weight from Last 3 Encounters:   05/23/18 189 lb 8 oz (86 kg)   05/11/18 188 lb 9.6 oz (85.5 kg)   04/12/18 187 lb 8 oz (85 kg)              Today, you had the following     No orders found for display       Primary Care Provider Office Phone # Fax #    Nawaf Montanez [phone]       [address] [facility] [address]        Equal Access to Services     [facility]: Hadii tati cordova Sojerson, waaxda luqadaha, qaybta kaalmaleana kearneyMerit Health River Oaksin hayaan adeeg kharash la'aan . So [facility] [phone].    ATENCIÓN: Si habla español, tiene a casey disposición servicios gratuitos de asistencia lingüística. Llame al [phone].    We comply with applicable federal civil rights laws and [place] laws. We do not discriminate  on the basis of race, color, national origin, age, disability, sex, sexual orientation, or gender identity.            Thank you!     Thank you for choosing [facility]  for your care. Our goal is always to provide you with excellent care. Hearing back from our patients is one way we can continue to improve our services. Please take a few minutes to complete the written survey that you may receive in the mail after your visit with us. Thank you!             Your Updated Medication List - Protect others around you: Learn how to safely use, store and throw away your medicines at www.disposemymeds.org.          This list is accurate as of 5/23/18  3:05 PM.  Always use your most recent med list.                   Brand Name Dispense Instructions for use Diagnosis    Apple Cider Vinegar 300 MG Tabs      Take 900 mg by mouth 2 times daily        blood glucose monitoring lancets      As directed. Use for testing 2 times daily E11.9        cinnamon 500 MG Caps      Take by mouth daily        cyanocobalamin 2500 MCG sublingual tablet    VITAMIN B-12    90 tablet    Place 2,500 mcg under the tongue every other day    Vitamin B12 deficiency       cyclobenzaprine 10 MG tablet    FLEXERIL     Take 10 mg by mouth 3 times daily as needed No driving after use.        FIFTY50 GLUCOSE METER 2.0 w/Device Kit      Dispense item covered by pt ins. 250.02 NIDDM type II, uncontrolled - Test 2 times/day. Reason: Unstable diabetes        Garlic 1000 MG Caps           glipiZIDE 5 MG tablet    GLUCOTROL     Take 5 mg by mouth 2 times daily (before meals)        GOODSENSE ASPIRIN 325 MG tablet   Generic drug:  aspirin      Take 1 tablet by mouth daily        Krill Oil 500 MG Caps      Take 1 capsule by mouth daily        lecithin 1200 MG Caps capsule    lecithin          Magnesium Oxide 420 MG Tabs      Take 1 tablet by mouth daily        Melatonin 10 MG Tabs tablet      Take by mouth At Bedtime        metFORMIN 500 MG  tablet    GLUCOPHAGE     Take 1-2 tablets by mouth 2 times daily (with meals) (depending on size of meals)        metoprolol tartrate 25 MG tablet    LOPRESSOR     Take 12.5 mg by mouth 2 times daily Dose reduction 12/8/2017        NIFEdipine ER osmotic 30 MG 24 hr tablet    PROCARDIA XL    90 tablet    Take 1 tablet (30 mg) by mouth daily - For Raynaud's - start when out of Norvasc/Amlodipine    Raynaud's phenomenon without gangrene       ONETOUCH ULTRA test strip   Generic drug:  blood glucose monitoring      Use for testing two times daily E11.9        pravastatin 40 MG tablet    PRAVACHOL     Take 1 tablet by mouth At Bedtime For cholesterol or diabetes        senna-docusate 8.6-50 MG per tablet    SENOKOT-S;PERICOLACE     Take 2 tablets by mouth 2 times daily Constipation prevention        tamsulosin 0.4 MG capsule    FLOMAX    90 capsule    Take 1 capsule (0.4 mg) by mouth every evening    Nocturia       triamcinolone 0.1 % cream    KENALOG     Apply twice daily as needed        vitamin D 2000 units tablet     100 tablet    Take 2,000 Units by mouth daily    Vitamin D deficiency       Vitamin-B Complex Tabs      Take 1 tablet by mouth Take 1 tablet by mouth once daily. -- for Low B12 / B-Vitamins -- about 3 days weekly

## 2018-05-24 ASSESSMENT — ANXIETY QUESTIONNAIRES: GAD7 TOTAL SCORE: 0

## 2018-05-24 ASSESSMENT — PATIENT HEALTH QUESTIONNAIRE - PHQ9: SUM OF ALL RESPONSES TO PHQ QUESTIONS 1-9: 0

## 2018-06-04 NOTE — TELEPHONE ENCOUNTER
Refill for flomax done on 5/11/18 and sent to [facility] in [facility].  [facility] in [facility] notified of this and sees the prescription on file.  Nery Escalona RN......June 4, 2018...11:29 AM

## 2018-06-10 DIAGNOSIS — E11.9 CONTROLLED TYPE 2 DIABETES MELLITUS WITHOUT COMPLICATION, WITHOUT LONG-TERM CURRENT USE OF INSULIN (H): Primary | ICD-10-CM

## 2018-06-11 DIAGNOSIS — M62.838 MUSCLE SPASM: Primary | ICD-10-CM

## 2018-06-12 NOTE — TELEPHONE ENCOUNTER
Prescription approved per [facility] Refill Protocol.  Sandra Bailey RN on 6/12/2018 at 11:34 AM

## 2018-06-13 RX ORDER — TIZANIDINE 2 MG/1
TABLET ORAL
Qty: 120 TABLET | Refills: 0 | Status: SHIPPED | OUTPATIENT
Start: 2018-06-13 | End: 2018-07-12

## 2018-06-13 NOTE — TELEPHONE ENCOUNTER
Routing refill request to provider for review/approval because:  Drug not active on patient's medication list  Flexeril is noted on current medication list.    Called patient and patient states he has been taking Tizanidine not Flexeril  Patient states Dr. Montanez had changed him to Tizanidine due to long term side effects from Flexeril.    Unable to locate a note on this.    Called Conor at [facility] and he states patient has been getting the Flexeril refilled not the Tizanidine.    Conor states Flexeril is not covered by his insurance and for safety reasons Tizanidine would be better for him and is covered by his insurance.    Dr. Montanez out of office until 6/26 will route to covering team let for review and consideration of refills.    LOV: 5/23/18    Zari Lino RN on 6/13/2018 at 12:08 PM

## 2018-06-14 DIAGNOSIS — E11.9 CONTROLLED TYPE 2 DIABETES MELLITUS WITHOUT COMPLICATION, WITHOUT LONG-TERM CURRENT USE OF INSULIN (H): ICD-10-CM

## 2018-06-15 RX ORDER — LANCETS 28 GAUGE
EACH MISCELLANEOUS
Qty: 200 EACH | Refills: 2 | Status: SHIPPED | OUTPATIENT
Start: 2018-06-15 | End: 2021-03-17

## 2018-06-15 NOTE — TELEPHONE ENCOUNTER
Prescription approved per [facility] Refill Protocol.  LOV: 5/23/18  Test strips refilled on 6/12/18 #200x 2 refills  Zari Lino RN on 6/15/2018 at 2:28 PM

## 2018-06-22 ENCOUNTER — TELEPHONE (OUTPATIENT)
Dept: INTERNAL MEDICINE | Facility: OTHER | Age: 83
End: 2018-06-22

## 2018-06-22 NOTE — TELEPHONE ENCOUNTER
Diabetic form completed and awaiting MD signature at this time.      Keyla Last LPN 6/22/2018 11:52 AM

## 2018-06-28 ENCOUNTER — TELEPHONE (OUTPATIENT)
Dept: INTERNAL MEDICINE | Facility: OTHER | Age: 83
End: 2018-06-28

## 2018-07-02 DIAGNOSIS — E11.9 CONTROLLED TYPE 2 DIABETES MELLITUS WITHOUT COMPLICATION, WITHOUT LONG-TERM CURRENT USE OF INSULIN (H): ICD-10-CM

## 2018-07-05 NOTE — TELEPHONE ENCOUNTER
Filled 6/12/18 #200 x 2. Pharmacy alerted. Unable to complete prescription refill per RNMedication Refill Policy.................... Sherine Hammer ....................  7/5/2018   3:22 PM      blood glucose monitoring (ONETOUCH ULTRA) test strip 200 strip 2 6/12/2018  No   Sig: Use for testing 2 times daily. Dx code E11.9. Dispense items as covered by patient's insurance.   Class: E-Prescribe   Order: [id number]   E-Prescribing Status: Receipt confirmed by pharmacy (6/12/2018 11:34 AM CDT)   Printout Tracking   External Result Report   Medication Administration Instructions   Use for testing 2 times daily. Dx code E11.9. Dispense items as covered by patient's insurance.   Pharmacy   [facility] [id number] - [address]

## 2018-07-09 ENCOUNTER — TELEPHONE (OUTPATIENT)
Dept: INTERNAL MEDICINE | Facility: OTHER | Age: 83
End: 2018-07-09

## 2018-07-09 NOTE — TELEPHONE ENCOUNTER
Per pharmacistJesus, they did not receive the refill sent on 6-12-18 for 200 strips X 2 refills. Erx receipt was confirmed by [facility]. Verbal order given. Sandra Bailey RN on 7/9/2018 at 9:41 AM

## 2018-07-12 ENCOUNTER — TELEPHONE (OUTPATIENT)
Dept: INTERNAL MEDICINE | Facility: OTHER | Age: 83
End: 2018-07-12

## 2018-07-12 DIAGNOSIS — E11.9 CONTROLLED TYPE 2 DIABETES MELLITUS WITHOUT COMPLICATION, WITHOUT LONG-TERM CURRENT USE OF INSULIN (H): Primary | ICD-10-CM

## 2018-07-12 DIAGNOSIS — M62.838 MUSCLE SPASM: ICD-10-CM

## 2018-07-12 DIAGNOSIS — I10 BENIGN ESSENTIAL HYPERTENSION: ICD-10-CM

## 2018-07-12 DIAGNOSIS — E78.2 MIXED HYPERLIPIDEMIA: ICD-10-CM

## 2018-07-12 RX ORDER — GLIPIZIDE 5 MG/1
5 TABLET ORAL
Qty: 180 TABLET | Refills: 3 | Status: SHIPPED | OUTPATIENT
Start: 2018-07-12 | End: 2019-07-05

## 2018-07-12 RX ORDER — TIZANIDINE 2 MG/1
2-4 TABLET ORAL EVERY 6 HOURS PRN
Qty: 120 TABLET | Refills: 3 | Status: SHIPPED | OUTPATIENT
Start: 2018-07-12 | End: 2019-03-18

## 2018-07-12 RX ORDER — PRAVASTATIN SODIUM 40 MG
40 TABLET ORAL AT BEDTIME
Qty: 90 TABLET | Refills: 3 | Status: SHIPPED | OUTPATIENT
Start: 2018-07-12 | End: 2019-07-05

## 2018-07-12 NOTE — TELEPHONE ENCOUNTER
Patient says he has to buy his test strips over the counter and on gets 2 weeks worth at a time.  Patient indicates that he needs some other medications refilled but is outside and can not help this writer figure out what he needs.     [facility] pharmacist Wai reports that they did receive the prescription for Test strips in June and is waiting for patient to pick them up.  Dispense is 200 with 2 refills, test 2 times daily.  Pharmacist indicates the following medications do not have refills: Zanaflex, Glipizide and Pravastatin.  Orders pending.      Keyla Last LPN 7/12/2018 11:59 AM

## 2018-07-12 NOTE — TELEPHONE ENCOUNTER
Patient notified of conversation that this writer had with the Pharmacist Wai.  Patient transferred to appointment line to scheduled an office visit for DM check and labs.  Orders pending.      Keyla Last LPN 7/12/2018 12:06 PM

## 2018-07-17 ENCOUNTER — OFFICE VISIT (OUTPATIENT)
Dept: INTERNAL MEDICINE | Facility: OTHER | Age: 83
End: 2018-07-17
Attending: INTERNAL MEDICINE
Payer: MEDICARE

## 2018-07-17 VITALS
BODY MASS INDEX: 27.62 KG/M2 | DIASTOLIC BLOOD PRESSURE: 76 MMHG | SYSTOLIC BLOOD PRESSURE: 132 MMHG | HEART RATE: 64 BPM | WEIGHT: 188.38 LBS

## 2018-07-17 DIAGNOSIS — E78.2 MIXED HYPERLIPIDEMIA: ICD-10-CM

## 2018-07-17 DIAGNOSIS — Z98.41 HISTORY OF BILATERAL CATARACT EXTRACTION: ICD-10-CM

## 2018-07-17 DIAGNOSIS — I73.00 RAYNAUD'S DISEASE WITHOUT GANGRENE: ICD-10-CM

## 2018-07-17 DIAGNOSIS — E53.8 VITAMIN B12 DEFICIENCY: ICD-10-CM

## 2018-07-17 DIAGNOSIS — E11.9 CONTROLLED TYPE 2 DIABETES MELLITUS WITHOUT COMPLICATION, WITHOUT LONG-TERM CURRENT USE OF INSULIN (H): ICD-10-CM

## 2018-07-17 DIAGNOSIS — E11.9 CONTROLLED TYPE 2 DIABETES MELLITUS WITHOUT COMPLICATION, WITHOUT LONG-TERM CURRENT USE OF INSULIN (H): Primary | ICD-10-CM

## 2018-07-17 DIAGNOSIS — N40.0 BENIGN PROSTATIC HYPERPLASIA, UNSPECIFIED WHETHER LOWER URINARY TRACT SYMPTOMS PRESENT: ICD-10-CM

## 2018-07-17 DIAGNOSIS — Z98.42 HISTORY OF BILATERAL CATARACT EXTRACTION: ICD-10-CM

## 2018-07-17 LAB
ALBUMIN SERPL-MCNC: 4.3 G/DL (ref 3.5–5.7)
ALP SERPL-CCNC: 54 U/L (ref 34–104)
ALT SERPL W P-5'-P-CCNC: 11 U/L (ref 7–52)
ANION GAP SERPL CALCULATED.3IONS-SCNC: 8 MMOL/L (ref 3–14)
AST SERPL W P-5'-P-CCNC: 13 U/L (ref 13–39)
BILIRUB SERPL-MCNC: 0.8 MG/DL (ref 0.3–1)
BUN SERPL-MCNC: 25 MG/DL (ref 7–25)
CALCIUM SERPL-MCNC: 9.1 MG/DL (ref 8.6–10.3)
CHLORIDE SERPL-SCNC: 105 MMOL/L (ref 98–107)
CHOLEST SERPL-MCNC: 145 MG/DL
CO2 SERPL-SCNC: 24 MMOL/L (ref 21–31)
CREAT SERPL-MCNC: 0.83 MG/DL (ref 0.7–1.3)
ERYTHROCYTE [DISTWIDTH] IN BLOOD BY AUTOMATED COUNT: 13.8 % (ref 10–15)
GFR SERPL CREATININE-BSD FRML MDRD: 89 ML/MIN/1.7M2
GLUCOSE SERPL-MCNC: 148 MG/DL (ref 70–105)
HBA1C MFR BLD: 6.7 % (ref 4–6)
HCT VFR BLD AUTO: 48 % (ref 40–53)
HDLC SERPL-MCNC: 36 MG/DL (ref 23–92)
HGB BLD-MCNC: 16.3 G/DL (ref 13.3–17.7)
LDLC SERPL CALC-MCNC: 88 MG/DL
MCH RBC QN AUTO: 29.4 PG (ref 26.5–33)
MCHC RBC AUTO-ENTMCNC: 34 G/DL (ref 31.5–36.5)
MCV RBC AUTO: 87 FL (ref 78–100)
NONHDLC SERPL-MCNC: 109 MG/DL
PLATELET # BLD AUTO: 132 10E9/L (ref 150–450)
POTASSIUM SERPL-SCNC: 4 MMOL/L (ref 3.5–5.1)
PROT SERPL-MCNC: 6.7 G/DL (ref 6.4–8.9)
RBC # BLD AUTO: 5.55 10E12/L (ref 4.4–5.9)
SODIUM SERPL-SCNC: 137 MMOL/L (ref 134–144)
TRIGL SERPL-MCNC: 104 MG/DL
WBC # BLD AUTO: 5.7 10E9/L (ref 4–11)

## 2018-07-17 PROCEDURE — G0463 HOSPITAL OUTPT CLINIC VISIT: HCPCS

## 2018-07-17 PROCEDURE — 83036 HEMOGLOBIN GLYCOSYLATED A1C: CPT | Performed by: INTERNAL MEDICINE

## 2018-07-17 PROCEDURE — 85027 COMPLETE CBC AUTOMATED: CPT | Performed by: INTERNAL MEDICINE

## 2018-07-17 PROCEDURE — 99214 OFFICE O/P EST MOD 30 MIN: CPT | Performed by: INTERNAL MEDICINE

## 2018-07-17 PROCEDURE — 80053 COMPREHEN METABOLIC PANEL: CPT | Performed by: INTERNAL MEDICINE

## 2018-07-17 PROCEDURE — 80061 LIPID PANEL: CPT | Performed by: INTERNAL MEDICINE

## 2018-07-17 PROCEDURE — 36415 COLL VENOUS BLD VENIPUNCTURE: CPT | Performed by: INTERNAL MEDICINE

## 2018-07-17 RX ORDER — METOPROLOL TARTRATE 25 MG/1
12.5 TABLET, FILM COATED ORAL EVERY EVENING
Qty: 60 TABLET | COMMUNITY
Start: 2018-07-17 | End: 2018-09-27

## 2018-07-17 ASSESSMENT — ENCOUNTER SYMPTOMS
WHEEZING: 0
COUGH: 0
PALPITATIONS: 0
FATIGUE: 0
ARTHRALGIAS: 0
EYE PAIN: 0
FEVER: 0
VOMITING: 0
AGITATION: 0
LIGHT-HEADEDNESS: 1
HEMATURIA: 0
NAUSEA: 0
CHILLS: 0
BRUISES/BLEEDS EASILY: 0
DYSURIA: 0
DIZZINESS: 0
SHORTNESS OF BREATH: 0
ABDOMINAL PAIN: 0
MYALGIAS: 0
CONFUSION: 0
DIARRHEA: 0

## 2018-07-17 ASSESSMENT — ANXIETY QUESTIONNAIRES
IF YOU CHECKED OFF ANY PROBLEMS ON THIS QUESTIONNAIRE, HOW DIFFICULT HAVE THESE PROBLEMS MADE IT FOR YOU TO DO YOUR WORK, TAKE CARE OF THINGS AT HOME, OR GET ALONG WITH OTHER PEOPLE: NOT DIFFICULT AT ALL
2. NOT BEING ABLE TO STOP OR CONTROL WORRYING: NOT AT ALL
5. BEING SO RESTLESS THAT IT IS HARD TO SIT STILL: NOT AT ALL
7. FEELING AFRAID AS IF SOMETHING AWFUL MIGHT HAPPEN: NOT AT ALL
1. FEELING NERVOUS, ANXIOUS, OR ON EDGE: NOT AT ALL
6. BECOMING EASILY ANNOYED OR IRRITABLE: NOT AT ALL
GAD7 TOTAL SCORE: 0
3. WORRYING TOO MUCH ABOUT DIFFERENT THINGS: NOT AT ALL

## 2018-07-17 ASSESSMENT — PATIENT HEALTH QUESTIONNAIRE - PHQ9: 5. POOR APPETITE OR OVEREATING: NOT AT ALL

## 2018-07-17 ASSESSMENT — PAIN SCALES - GENERAL: PAINLEVEL: MODERATE PAIN (5)

## 2018-07-17 NOTE — PROGRESS NOTES
Nursing Notes:   Keyla Last LPN  7/17/2018  8:37 AM  Signed  Previous A1C is at goal of <8  Lab Results   Component Value Date    A1C 6.7 07/17/2018    A1C 6.9 04/12/2018     Urine microalbumin:creatine: n/a  Foot exam 4-12-18  Eye exam 3-28-18    Tobacco User no  Patient is on a daily aspirin  Patient is on a Statin.  Blood pressure today of:     BP Readings from Last 1 Encounters:   05/23/18 118/64      is at the goal of <139/89 for diabetics.    Patient express concerns about Flomax and difficulty sleeping over the past year.      Keyla Last LPN 7/17/2018 8:28 AM          Nursing note reviewed with patient.  Accurracy and completeness verified.   Mr. Dennison is a 83 year old male who:  Patient presents with:  Diabetes    HPI     ICD-10-CM    1. Controlled type 2 diabetes mellitus without complication, without long-term current use of insulin (H) E11.9    2. Mixed hyperlipidemia E78.2    3. Vitamin B12 deficiency -new diagnosis for 12/20/2018 E53.8    4. Benign prostatic hyperplasia, unspecified whether lower urinary tract symptoms present N40.0    5. Raynaud's disease without gangrene I73.00    6. History of bilateral cataract extraction Z98.41     Z98.42      Diabetes, currently controlled.  Doing well with metformin.  Glipizide.  Labs collected today.  No side effects reported.  Continue current dose.    Hyperlipidemia, currently stable.  Taking pravastatin.  Collected labs today.  Seems to be doing well.    Vitamin D and B12 deficiency.  Taking oral replacement daily.  Feels like his energy imbalance have improved.    BPH with history of lower urinary tract symptoms.  Still having some nocturia intermittently.  He woke up at 2 AM this morning and was not able to go back to bed after having to get up to urinate.  States that he takes his Flomax red at bedtime.  We will try having him changes to either the evening around suppertime or in the morning.  He will make some changes on his own and let us know.   She does have some mild, intermittent lightheadedness with change of position.  States it is only mild and intermittent.  Might be due to his Flomax.  We will change time of administration and see if this makes a difference.    Raynauds disease, stable.  Taking nifedipine.  Thinks that being the nifedipine has helped.    Cataract extraction in June - doing well.     Functional Capacity: > 4 METS.   Reports that he can climb a flight of stairs without any chest pain/heaviness or shortness of breath.   No orthopnea/paroxysmal nocturnal dyspnea  Review of Systems   Constitutional: Negative for chills, fatigue and fever.   HENT: Negative for congestion and hearing loss.    Eyes: Negative for pain and visual disturbance.   Respiratory: Negative for cough, shortness of breath and wheezing.    Cardiovascular: Negative for chest pain and palpitations.   Gastrointestinal: Negative for abdominal pain, diarrhea, nausea and vomiting.   Endocrine: Negative for cold intolerance and heat intolerance.   Genitourinary: Negative for dysuria and hematuria.        + feels like emptying bladder well   Musculoskeletal: Negative for arthralgias and myalgias.        + recovering left ankle pain from injury   Skin: Negative for pallor.   Allergic/Immunologic: Negative for immunocompromised state.   Neurological: Positive for light-headedness. Negative for dizziness.        Occasional positional lightheadedness   Hematological: Does not bruise/bleed easily.   Psychiatric/Behavioral: Negative for agitation and confusion.        LORENZO:   LORENZO-7 SCORE 4/12/2018 5/23/2018 7/17/2018   Total Score 0 0 0     PHQ9:  PHQ-9 SCORE 4/12/2018 5/23/2018 7/17/2018   Total Score 0 0 0       I have personally reviewed the past medical history, past surgical history, medications, allergies, family and social history as listed below, on 7/17/2018.    Allergies   Allergen Reactions     Ace Inhibitors Other (See Comments)     -- Declines     Cephalexin      Other  reaction(s): *Unknown - Pt Doesn't Remember  Pt does not remember      Clindamycin      Other reaction(s): *Unknown - Pt Doesn't Remember  Pt does not remember     Hydromorphone      Other reaction(s): Bradycardia     Amoxicillin Rash       Current Outpatient Prescriptions   Medication Sig Dispense Refill     Apple Cider Vinegar 300 MG TABS Take 900 mg by mouth 2 times daily       aspirin (GOODSENSE ASPIRIN) 325 MG tablet Take 1 tablet by mouth daily       B Complex Vitamins (VITAMIN-B COMPLEX) TABS Take 1 tablet by mouth Take 1 tablet by mouth once daily. -- for Low B12 / B-Vitamins -- about 3 days weekly       blood glucose monitoring (NO BRAND SPECIFIED) meter device kit Use to test blood sugar 2 times daily. DX: E11.9 1 kit 0     blood glucose monitoring (ONETOUCH ULTRA) test strip Use for testing 2 times daily. Dx code E11.9. Dispense items as covered by patient's insurance. 200 strip 2     blood glucose monitoring (PRODIGY TWIST TOP 28G) lancets As directed. Use for testing 2 times daily DX: E11.9 200 each 2     Blood Glucose Monitoring Suppl (FIFTY50 GLUCOSE METER 2.0) W/DEVICE KIT Dispense item covered by pt ins. 250.02 NIDDM type II, uncontrolled - Test 2 times/day. Reason: Unstable diabetes       Cholecalciferol (VITAMIN D) 2000 units tablet Take 2,000 Units by mouth daily 100 tablet 3     cinnamon 500 MG CAPS Take by mouth daily       cyanocobalamin (VITAMIN B-12) 2500 MCG sublingual tablet Place 2,500 mcg under the tongue every other day 90 tablet 3     Garlic 1000 MG CAPS        glipiZIDE (GLUCOTROL) 5 MG tablet Take 1 tablet (5 mg) by mouth 2 times daily (before meals) 180 tablet 3     Krill Oil 500 MG CAPS Take 1 capsule by mouth daily       lecithin (LECITHIN) 1200 MG CAPS capsule        Magnesium Oxide 420 MG TABS Take 1 tablet by mouth daily       Melatonin 10 MG TABS tablet Take by mouth At Bedtime       metFORMIN (GLUCOPHAGE) 500 MG tablet Take 1-2 tablets by mouth 2 times daily (with meals)  (depending on size of meals)       metoprolol tartrate (LOPRESSOR) 25 MG tablet Take 0.5 tablets (12.5 mg) by mouth every evening Dose reduction 12/8/2017 60 tablet      NIFEdipine ER osmotic (PROCARDIA XL) 30 MG 24 hr tablet Take 1 tablet (30 mg) by mouth daily - For Raynaud's - start when out of Norvasc/Amlodipine 90 tablet 3     pravastatin (PRAVACHOL) 40 MG tablet Take 1 tablet (40 mg) by mouth At Bedtime For cholesterol or diabetes 90 tablet 3     senna-docusate (SENOKOT-S;PERICOLACE) 8.6-50 MG per tablet Take 2 tablets by mouth 2 times daily Constipation prevention       tamsulosin (FLOMAX) 0.4 MG capsule Take 1 capsule (0.4 mg) by mouth every evening 90 capsule 3     tiZANidine (ZANAFLEX) 2 MG tablet Take 1-2 tablets (2-4 mg) by mouth every 6 hours as needed for muscle spasms 120 tablet 3     triamcinolone (KENALOG) 0.1 % cream Apply twice daily as needed       [DISCONTINUED] metoprolol tartrate (LOPRESSOR) 25 MG tablet Take 12.5 mg by mouth 2 times daily Dose reduction 12/8/2017          Patient Active Problem List    Diagnosis Date Noted     History of bilateral cataract extraction 07/17/2018     Priority: Medium     Vitamin B12 deficiency -new diagnosis for 12/20/2018 04/13/2018     Priority: Medium     Vitamin D deficiency 04/13/2018     Priority: Medium     Osteoarthrosis involving lower leg 01/24/2018     Priority: Medium     Overview:   IMO Update  Updated per 10/1/17 IMO import       Degeneration of lumbar or lumbosacral intervertebral disc 01/24/2018     Priority: Medium     Overview:   recurring       Renal calculus 01/24/2018     Priority: Medium     Overview:   disease       Controlled type 2 diabetes mellitus without complication, without long-term current use of insulin (H) 12/08/2017     Priority: Medium     Osteopenia 09/27/2017     Priority: Medium     Acquired deformity of right foot 09/08/2017     Priority: Medium     Pre-ulcerative corn or callous 09/08/2017     Priority: Medium      Rotator cuff tendinitis 12/29/2016     Priority: Medium     Eczema 12/14/2016     Priority: Medium     Benign prostatic hyperplasia 07/19/2016     Priority: Medium     Overview:   Updated per 10/1/17 IMO import       Raynaud's disease without gangrene 07/19/2016     Priority: Medium     Status post total replacement of left shoulder 07/19/2016     Priority: Medium     Ventricular bigeminy 07/19/2016     Priority: Medium     Complete tear of left rotator cuff 05/03/2016     Priority: Medium     Biceps tendonitis on right 03/31/2016     Priority: Medium     Bunion of right foot 03/31/2016     Priority: Medium     Chronic left shoulder pain 03/31/2016     Priority: Medium     Deformity of right foot 03/31/2016     Priority: Medium     Sleep difficulties 03/31/2016     Priority: Medium     Counseling regarding advanced directives and goals of care 08/07/2015     Priority: Medium     DNI (do not intubate) 08/07/2015     Priority: Medium     DNR (do not resuscitate) 08/07/2015     Priority: Medium     Intermittent lightheadedness 05/01/2015     Priority: Medium     Nocturia 05/01/2015     Priority: Medium     Subacromial bursitis 07/16/2014     Priority: Medium     AAA (abdominal aortic aneurysm) (H) 06/18/2014     Priority: Medium     Erectile dysfunction 05/27/2014     Priority: Medium     Impingement syndrome of right shoulder 01/30/2014     Priority: Medium     Premature atrial contractions 01/30/2014     Priority: Medium     Skin lesion of cheek 11/19/2013     Priority: Medium     Mixed hyperlipidemia 06/03/2013     Priority: Medium     Past Medical History:   Diagnosis Date     Dizziness and giddiness     lightheaded with borderline troponin, possible coronary disease     Type 2 diabetes mellitus without complications (H)     Controlled with diet     Past Surgical History:   Procedure Laterality Date     APPENDECTOMY OPEN      Appendectomy at age 6 due to appendicitis     ARTHROSCOPY KNEE      left knee      COLONOSCOPY           COLONOSCOPY      ,adenomatous polyps, one with high grade dysplasia , next due      OTHER SURGICAL HISTORY      ,HERNIA REPAIR,right inguinal     OTHER SURGICAL HISTORY      ,[id number],ARTHROPLASTY,left total knee     OTHER SURGICAL HISTORY      ,[id number],OTHER,left first/rib resection     OTHER SURGICAL HISTORY      ,[id number],OTHER     OTHER SURGICAL HISTORY      15,[id number],CYSTOSCOPY W/ URETEROSCOPY W/ LITHOTRIPSY,Dr. Chelsi GREEN     OTHER SURGICAL HISTORY      ,HERNIA REPAIR,x 4     Social History     Social History     Marital status:      Spouse name: N/A     Number of children: N/A     Years of education: N/A     Social History Main Topics     Smoking status: Former Smoker     Packs/day: 1.00     Years: 10.00     Types: Cigarettes     Quit date: 1975     Smokeless tobacco: Never Used     Alcohol use 3.0 oz/week     Drug use: No     Sexual activity: Not Asked     Other Topics Concern     None     Social History Narrative    , lives with wife who has parkinson's disease since  and requires care. She is totally dependent. She needs assistance with all ADL's He has some respite care workers.     Family History   Problem Relation Age of Onset     Other - See Comments Father       at 74 of natural causes     HEART DISEASE Mother 69     Heart Disease, of MI     Diabetes Mother      Diabetes     Other - See Comments Mother      Stroke     Family History Negative Sister      Good Health     Family History Negative Sister      Good Health     Cancer Brother      Cancer       EXAM:   Vitals:    18 0829   BP: 132/76   BP Location: Right arm   Patient Position: Sitting   Cuff Size: Adult Regular   Pulse: 64   Weight: 188 lb 6 oz (85.4 kg)       Current Pain Score: Moderate Pain (5)     BP Readings from Last 3 Encounters:   18 132/76   18 118/64   18 116/70    Wt Readings from Last 3 Encounters:   18 188 lb 6  "oz (85.4 kg)   05/23/18 189 lb 8 oz (86 kg)   05/11/18 188 lb 9.6 oz (85.5 kg)      Estimated body mass index is 27.62 kg/(m^2) as calculated from the following:    Height as of 6/2/17: 5' 9.25\" (1.759 m).    Weight as of this encounter: 188 lb 6 oz (85.4 kg).     Physical Exam   Constitutional: He appears well-developed and well-nourished. No distress.   HENT:   Head: Normocephalic and atraumatic.   Eyes: Conjunctivae are normal. No scleral icterus.   Neck: No thyromegaly present.   Cardiovascular: Normal rate and regular rhythm.    Pulmonary/Chest: Effort normal. No respiratory distress. He has no wheezes.   Abdominal: Soft. There is no tenderness.   Musculoskeletal: He exhibits no tenderness or deformity.   Lymphadenopathy:     He has no cervical adenopathy.   Neurological: He is alert. No cranial nerve deficit.   Skin: Skin is warm and dry.   Psychiatric: He has a normal mood and affect.        INVESTIGATIONS:  Results for orders placed or performed in visit on 07/17/18   Comprehensive metabolic panel   Result Value Ref Range    Sodium 137 134 - 144 mmol/L    Potassium 4.0 3.5 - 5.1 mmol/L    Chloride 105 98 - 107 mmol/L    Carbon Dioxide 24 21 - 31 mmol/L    Anion Gap 8 3 - 14 mmol/L    Glucose 148 (H) 70 - 105 mg/dL    Urea Nitrogen 25 7 - 25 mg/dL    Creatinine 0.83 0.70 - 1.30 mg/dL    GFR Estimate 89 >60 mL/min/1.7m2    GFR Estimate If Black >90 >60 mL/min/1.7m2    Calcium 9.1 8.6 - 10.3 mg/dL    Bilirubin Total 0.8 0.3 - 1.0 mg/dL    Albumin 4.3 3.5 - 5.7 g/dL    Protein Total 6.7 6.4 - 8.9 g/dL    Alkaline Phosphatase 54 34 - 104 U/L    ALT 11 7 - 52 U/L    AST 13 13 - 39 U/L   CBC with platelets   Result Value Ref Range    WBC 5.7 4.0 - 11.0 10e9/L    RBC Count 5.55 4.4 - 5.9 10e12/L    Hemoglobin 16.3 13.3 - 17.7 g/dL    Hematocrit 48.0 40.0 - 53.0 %    MCV 87 78 - 100 fl    MCH 29.4 26.5 - 33.0 pg    MCHC 34.0 31.5 - 36.5 g/dL    RDW 13.8 10.0 - 15.0 %    Platelet Count 132 (L) 150 - 450 10e9/L "   Hemoglobin A1c   Result Value Ref Range    Hemoglobin A1C 6.7 (H) 4.0 - 6.0 %   Lipid Profile   Result Value Ref Range    Cholesterol 145 <200 mg/dL    Triglycerides 104 <150 mg/dL    HDL Cholesterol 36 23 - 92 mg/dL    LDL Cholesterol Calculated 88 <100 mg/dL    Non HDL Cholesterol 109 <130 mg/dL       ASSESSMENT AND PLAN:  Problem List Items Addressed This Visit        Digestive    Vitamin B12 deficiency -new diagnosis for 12/20/2018       Endocrine    Controlled type 2 diabetes mellitus without complication, without long-term current use of insulin (H) - Primary    Mixed hyperlipidemia       Circulatory    Raynaud's disease without gangrene       Urinary    Benign prostatic hyperplasia       Other    History of bilateral cataract extraction        reviewed diet, exercise and weight control, recommended sodium restriction, cardiovascular risk and specific lipid/LDL goals reviewed, specific diabetic recommendations low cholesterol diet, weight control and daily exercise discussed and home glucose monitoring taught, technique demonstrated, use of aspirin to prevent MI and TIA's discussed  -- Expected clinical course discussed    -- Medications and their side effects discussed    Patient Instructions     Labs today.     Diabetes is well controlled.     Try changing time of flomax -- to the morning or even suppertime, NOT bedtime.   -- See if that helps your lightheadedness.     To help with weight loss and improve blood sugar control....    -- Try to avoid Carbohydrates as much as possible -- breads, pasta, baked goods, cakes, oatmeal, cold cereal, potatoes.   These are turned to sugar in one metabolic conversion, cause insulin secretion and increased fat deposition / weight gain.      -- Eat more lean meats, proteins, eggs, nuts.       Results for orders placed or performed in visit on 07/17/18   Comprehensive metabolic panel   Result Value Ref Range    Sodium 137 134 - 144 mmol/L    Potassium 4.0 3.5 - 5.1 mmol/L     Chloride 105 98 - 107 mmol/L    Carbon Dioxide 24 21 - 31 mmol/L    Anion Gap 8 3 - 14 mmol/L    Glucose 148 (H) 70 - 105 mg/dL    Urea Nitrogen 25 7 - 25 mg/dL    Creatinine 0.83 0.70 - 1.30 mg/dL    GFR Estimate 89 >60 mL/min/1.7m2    GFR Estimate If Black >90 >60 mL/min/1.7m2    Calcium 9.1 8.6 - 10.3 mg/dL    Bilirubin Total 0.8 0.3 - 1.0 mg/dL    Albumin 4.3 3.5 - 5.7 g/dL    Protein Total 6.7 6.4 - 8.9 g/dL    Alkaline Phosphatase 54 34 - 104 U/L    ALT 11 7 - 52 U/L    AST 13 13 - 39 U/L   CBC with platelets   Result Value Ref Range    WBC 5.7 4.0 - 11.0 10e9/L    RBC Count 5.55 4.4 - 5.9 10e12/L    Hemoglobin 16.3 13.3 - 17.7 g/dL    Hematocrit 48.0 40.0 - 53.0 %    MCV 87 78 - 100 fl    MCH 29.4 26.5 - 33.0 pg    MCHC 34.0 31.5 - 36.5 g/dL    RDW 13.8 10.0 - 15.0 %    Platelet Count 132 (L) 150 - 450 10e9/L   Hemoglobin A1c   Result Value Ref Range    Hemoglobin A1C 6.7 (H) 4.0 - 6.0 %   Lipid Profile   Result Value Ref Range    Cholesterol 145 <200 mg/dL    Triglycerides 104 <150 mg/dL    HDL Cholesterol 36 23 - 92 mg/dL    LDL Cholesterol Calculated 88 <100 mg/dL    Non HDL Cholesterol 109 <130 mg/dL      Return for Diabetes labs and clinic follow-up appointment every 3 to 4 months.  --- (Go for about 91 to 100 days)    Aspects of Diabetes we can improve:  Hemoglobin A1c Lab Results   Component Value Date    A1C 6.7 07/17/2018    A1C 6.9 04/12/2018    Goal range is under 8. Best is 6.5 to 7   Blood Pressure 132/76 Goal to keep less than 140/90   Tobacco  reports that he quit smoking about 43 years ago. His smoking use included Cigarettes. He has a 10.00 pack-year smoking history. He has never used smokeless tobacco. Goal to abstain from tobacco   Aspirin or Plavix Aspirin Aspirin or Plavix reduces risk of heart disease and stroke   ACE/ARB -- Declined These medications reduce risk of kidney disease   Cholesterol Pravastatin Statins reduce risk of heart disease and stroke   Eye Exam -- Do Yearly --  Annual diabetic eye exam   Healthy weight Body mass index is 27.62 kg/(m^2). Goal BMI under 30, best is under 25.      -- Trying to exercise daily (goal at least 20 min/day) with moderate aerobic activity   -- Eat healthy (resources from ADA at http://www.diabetes.org/)   -- Taking good care of my feet. Consider seeing the Podiatrist   -- Check blood sugars as directed, record in log book and bring to every appointment      Schedule lab only appointment --- A few days AFTER: 10/15/18   Schedule clinic appointment with Dr. Montanez -- Same day as labs, or 1-2 days later.     Insurance companies are now grading you and I on your blood sugar control -- Goal is to get your A1c down to 7.9% or lower and NO Smoking!    -- Medicare and most insurance companies, will only cover Hemoglobin A1c labs to be rechecked every 91+ days.      Hemoglobin A1C   Date Value Ref Range Status   07/17/2018 6.7 (H) 4.0 - 6.0 % Final   04/12/2018 6.9 (H) 4.0 - 6.0 % Final       Next follow-up appointment with Dr. Montanez should be scheduled:  -- Approximately a few days AFTER: 10/15/18       Nawaf Montanez MD  Internal Medicine  [facility]

## 2018-07-17 NOTE — MR AVS SNAPSHOT
After Visit Summary   7/17/2018    Steven Dennison    MRN: [id number]           Patient Information     Date Of Birth          1935        Visit Information        Provider Department      7/17/2018 9:00 AM Nawaf Montanez MD [facility]        Today's Diagnoses     Controlled type 2 diabetes mellitus without complication, without long-term current use of insulin (H)    -  1    Mixed hyperlipidemia        Vitamin B12 deficiency -new diagnosis for 12/20/2018        Benign prostatic hyperplasia, unspecified whether lower urinary tract symptoms present        Raynaud's disease without gangrene        History of bilateral cataract extraction          Care Instructions    Labs today.     Diabetes is well controlled.     Try changing time of flomax -- to the morning or even suppertime, NOT bedtime.   -- See if that helps your lightheadedness.     To help with weight loss and improve blood sugar control....    -- Try to avoid Carbohydrates as much as possible -- breads, pasta, baked goods, cakes, oatmeal, cold cereal, potatoes.   These are turned to sugar in one metabolic conversion, cause insulin secretion and increased fat deposition / weight gain.      -- Eat more lean meats, proteins, eggs, nuts.       Results for orders placed or performed in visit on 07/17/18   Comprehensive metabolic panel   Result Value Ref Range    Sodium 137 134 - 144 mmol/L    Potassium 4.0 3.5 - 5.1 mmol/L    Chloride 105 98 - 107 mmol/L    Carbon Dioxide 24 21 - 31 mmol/L    Anion Gap 8 3 - 14 mmol/L    Glucose 148 (H) 70 - 105 mg/dL    Urea Nitrogen 25 7 - 25 mg/dL    Creatinine 0.83 0.70 - 1.30 mg/dL    GFR Estimate 89 >60 mL/min/1.7m2    GFR Estimate If Black >90 >60 mL/min/1.7m2    Calcium 9.1 8.6 - 10.3 mg/dL    Bilirubin Total 0.8 0.3 - 1.0 mg/dL    Albumin 4.3 3.5 - 5.7 g/dL    Protein Total 6.7 6.4 - 8.9 g/dL    Alkaline Phosphatase 54 34 - 104 U/L    ALT 11 7 - 52 U/L    AST 13 13 - 39 U/L   CBC  with platelets   Result Value Ref Range    WBC 5.7 4.0 - 11.0 10e9/L    RBC Count 5.55 4.4 - 5.9 10e12/L    Hemoglobin 16.3 13.3 - 17.7 g/dL    Hematocrit 48.0 40.0 - 53.0 %    MCV 87 78 - 100 fl    MCH 29.4 26.5 - 33.0 pg    MCHC 34.0 31.5 - 36.5 g/dL    RDW 13.8 10.0 - 15.0 %    Platelet Count 132 (L) 150 - 450 10e9/L   Hemoglobin A1c   Result Value Ref Range    Hemoglobin A1C 6.7 (H) 4.0 - 6.0 %   Lipid Profile   Result Value Ref Range    Cholesterol 145 <200 mg/dL    Triglycerides 104 <150 mg/dL    HDL Cholesterol 36 23 - 92 mg/dL    LDL Cholesterol Calculated 88 <100 mg/dL    Non HDL Cholesterol 109 <130 mg/dL      Return for Diabetes labs and clinic follow-up appointment every 3 to 4 months.  --- (Go for about 91 to 100 days)    Aspects of Diabetes we can improve:  Hemoglobin A1c Lab Results   Component Value Date    A1C 6.7 07/17/2018    A1C 6.9 04/12/2018    Goal range is under 8. Best is 6.5 to 7   Blood Pressure 132/76 Goal to keep less than 140/90   Tobacco  reports that he quit smoking about 43 years ago. His smoking use included Cigarettes. He has a 10.00 pack-year smoking history. He has never used smokeless tobacco. Goal to abstain from tobacco   Aspirin or Plavix Aspirin Aspirin or Plavix reduces risk of heart disease and stroke   ACE/ARB -- Declined These medications reduce risk of kidney disease   Cholesterol Pravastatin Statins reduce risk of heart disease and stroke   Eye Exam -- Do Yearly -- Annual diabetic eye exam   Healthy weight Body mass index is 27.62 kg/(m^2). Goal BMI under 30, best is under 25.      -- Trying to exercise daily (goal at least 20 min/day) with moderate aerobic activity   -- Eat healthy (resources from ADA at http://www.diabetes.org/)   -- Taking good care of my feet. Consider seeing the Podiatrist   -- Check blood sugars as directed, record in log book and bring to every appointment      Schedule lab only appointment --- A few days AFTER: 10/15/18   Schedule clinic  appointment with Dr. Montanez -- Same day as labs, or 1-2 days later.     Insurance companies are now grading you and I on your blood sugar control -- Goal is to get your A1c down to 7.9% or lower and NO Smoking!    -- Medicare and most insurance companies, will only cover Hemoglobin A1c labs to be rechecked every 91+ days.      Hemoglobin A1C   Date Value Ref Range Status   07/17/2018 6.7 (H) 4.0 - 6.0 % Final   04/12/2018 6.9 (H) 4.0 - 6.0 % Final       Next follow-up appointment with Dr. Montanez should be scheduled:  -- Approximately a few days AFTER: 10/15/18             Follow-ups after your visit        Your next 10 appointments already scheduled     Jul 17, 2018  9:00 AM CDT   SHORT with Nawaf Montanez MD   [facility]    [address]  [facility] [id number] [phone]              Who to contact     If you have questions or need follow up information about today's clinic visit or your schedule please contact [facility] directly at [phone].  Normal or non-critical lab and imaging results will be communicated to you by MyChart, letter or phone within 4 business days after the clinic has received the results. If you do not hear from us within 7 days, please contact the clinic through MyChart or phone. If you have a critical or abnormal lab result, we will notify you by phone as soon as possible.  Submit refill requests through [employer] or call your pharmacy and they will forward the refill request to us. Please allow 3 business days for your refill to be completed.          Additional Information About Your Visit        Care EveryWhere ID     This is your Care EveryWhere ID. This could be used by other organizations to access your [place] medical records  [id number]        Your Vitals Were     Pulse BMI (Body Mass Index)                64 27.62 kg/m2           Blood Pressure from Last 3 Encounters:   07/17/18  132/76   05/23/18 118/64   04/12/18 116/70    Weight from Last 3 Encounters:   07/17/18 188 lb 6 oz (85.4 kg)   05/23/18 189 lb 8 oz (86 kg)   05/11/18 188 lb 9.6 oz (85.5 kg)              Today, you had the following     No orders found for display         Today's Medication Changes          These changes are accurate as of 7/17/18  8:50 AM.  If you have any questions, ask your nurse or doctor.               These medicines have changed or have updated prescriptions.        Dose/Directions    metoprolol tartrate 25 MG tablet   Commonly known as:  LOPRESSOR   This may have changed:  when to take this   Changed by:  Nawaf Montanez MD        Dose:  12.5 mg   Take 0.5 tablets (12.5 mg) by mouth every evening Dose reduction 12/8/2017   Quantity:  60 tablet   Refills:  0                Primary Care Provider Office Phone # Fax #    Nawaf Montanez [phone] [address] [facility] [address]        Equal Access to Services     [facility] AH: Hadii tati turnero Sojerson, waaxda luqadaha, qaybta kaalmada antoinette, jessie olguin . So [facility] [phone].    ATENCIÓN: Si habla español, tiene a casey disposición servicios gratuitos de asistencia lingüística. Llame al [phone].    We comply with applicable federal civil rights laws and [place] laws. We do not discriminate on the basis of race, color, national origin, age, disability, sex, sexual orientation, or gender identity.            Thank you!     Thank you for choosing [facility]  for your care. Our goal is always to provide you with excellent care. Hearing back from our patients is one way we can continue to improve our services. Please take a few minutes to complete the written survey that you may receive in the mail after your visit with us. Thank you!             Your Updated Medication List - Protect others around you: Learn how to safely use, store and throw away your medicines  at www.disposemymeds.org.          This list is accurate as of 7/17/18  8:50 AM.  Always use your most recent med list.                   Brand Name Dispense Instructions for use Diagnosis    Apple Cider Vinegar 300 MG Tabs      Take 900 mg by mouth 2 times daily        blood glucose monitoring lancets     200 each    As directed. Use for testing 2 times daily DX: E11.9    Type 2 diabetes mellitus, uncontrolled (H), Controlled type 2 diabetes mellitus without complication, without long-term current use of insulin (H)       blood glucose monitoring test strip    ONETOUCH ULTRA    200 strip    Use for testing 2 times daily. Dx code E11.9. Dispense items as covered by patient's insurance.    Controlled type 2 diabetes mellitus without complication, without long-term current use of insulin (H)       cinnamon 500 MG Caps      Take by mouth daily        cyanocobalamin 2500 MCG sublingual tablet    VITAMIN B-12    90 tablet    Place 2,500 mcg under the tongue every other day    Vitamin B12 deficiency       * FIFTY50 GLUCOSE METER 2.0 w/Device Kit      Dispense item covered by pt ins. 250.02 NIDDM type II, uncontrolled - Test 2 times/day. Reason: Unstable diabetes        * blood glucose monitoring meter device kit    no brand specified    1 kit    Use to test blood sugar 2 times daily. DX: E11.9    Type 2 diabetes mellitus, uncontrolled (H)       Garlic 1000 MG Caps           glipiZIDE 5 MG tablet    GLUCOTROL    180 tablet    Take 1 tablet (5 mg) by mouth 2 times daily (before meals)    Controlled type 2 diabetes mellitus without complication, without long-term current use of insulin (H)       GOODSENSE ASPIRIN 325 MG tablet   Generic drug:  aspirin      Take 1 tablet by mouth daily        Krill Oil 500 MG Caps      Take 1 capsule by mouth daily        lecithin 1200 MG Caps capsule    lecithin          Magnesium Oxide 420 MG Tabs      Take 1 tablet by mouth daily        Melatonin 10 MG Tabs tablet      Take by mouth At  Bedtime        metFORMIN 500 MG tablet    GLUCOPHAGE     Take 1-2 tablets by mouth 2 times daily (with meals) (depending on size of meals)        metoprolol tartrate 25 MG tablet    LOPRESSOR    60 tablet    Take 0.5 tablets (12.5 mg) by mouth every evening Dose reduction 12/8/2017        NIFEdipine ER osmotic 30 MG 24 hr tablet    PROCARDIA XL    90 tablet    Take 1 tablet (30 mg) by mouth daily - For Raynaud's - start when out of Norvasc/Amlodipine    Raynaud's phenomenon without gangrene       pravastatin 40 MG tablet    PRAVACHOL    90 tablet    Take 1 tablet (40 mg) by mouth At Bedtime For cholesterol or diabetes    Mixed hyperlipidemia       senna-docusate 8.6-50 MG per tablet    SENOKOT-S;PERICOLACE     Take 2 tablets by mouth 2 times daily Constipation prevention        tamsulosin 0.4 MG capsule    FLOMAX    90 capsule    Take 1 capsule (0.4 mg) by mouth every evening    Nocturia       tiZANidine 2 MG tablet    ZANAFLEX    120 tablet    Take 1-2 tablets (2-4 mg) by mouth every 6 hours as needed for muscle spasms    Muscle spasm       triamcinolone 0.1 % cream    KENALOG     Apply twice daily as needed        vitamin D 2000 units tablet     100 tablet    Take 2,000 Units by mouth daily    Vitamin D deficiency       Vitamin-B Complex Tabs      Take 1 tablet by mouth Take 1 tablet by mouth once daily. -- for Low B12 / B-Vitamins -- about 3 days weekly        * Notice:  This list has 2 medication(s) that are the same as other medications prescribed for you. Read the directions carefully, and ask your doctor or other care provider to review them with you.

## 2018-07-17 NOTE — NURSING NOTE
Previous A1C is at goal of <8  Lab Results   Component Value Date    A1C 6.7 07/17/2018    A1C 6.9 04/12/2018     Urine microalbumin:creatine: n/a  Foot exam 4-12-18  Eye exam 3-28-18    Tobacco User no  Patient is on a daily aspirin  Patient is on a Statin.  Blood pressure today of:     BP Readings from Last 1 Encounters:   05/23/18 118/64      is at the goal of <139/89 for diabetics.    Patient express concerns about Flomax and difficulty sleeping over the past year.      Keyla Last LPN 7/17/2018 8:28 AM

## 2018-07-17 NOTE — PATIENT INSTRUCTIONS
Labs today.     Diabetes is well controlled.     Try changing time of flomax -- to the morning or even suppertime, NOT bedtime.   -- See if that helps your lightheadedness.     To help with weight loss and improve blood sugar control....    -- Try to avoid Carbohydrates as much as possible -- breads, pasta, baked goods, cakes, oatmeal, cold cereal, potatoes.   These are turned to sugar in one metabolic conversion, cause insulin secretion and increased fat deposition / weight gain.      -- Eat more lean meats, proteins, eggs, nuts.       Results for orders placed or performed in visit on 07/17/18   Comprehensive metabolic panel   Result Value Ref Range    Sodium 137 134 - 144 mmol/L    Potassium 4.0 3.5 - 5.1 mmol/L    Chloride 105 98 - 107 mmol/L    Carbon Dioxide 24 21 - 31 mmol/L    Anion Gap 8 3 - 14 mmol/L    Glucose 148 (H) 70 - 105 mg/dL    Urea Nitrogen 25 7 - 25 mg/dL    Creatinine 0.83 0.70 - 1.30 mg/dL    GFR Estimate 89 >60 mL/min/1.7m2    GFR Estimate If Black >90 >60 mL/min/1.7m2    Calcium 9.1 8.6 - 10.3 mg/dL    Bilirubin Total 0.8 0.3 - 1.0 mg/dL    Albumin 4.3 3.5 - 5.7 g/dL    Protein Total 6.7 6.4 - 8.9 g/dL    Alkaline Phosphatase 54 34 - 104 U/L    ALT 11 7 - 52 U/L    AST 13 13 - 39 U/L   CBC with platelets   Result Value Ref Range    WBC 5.7 4.0 - 11.0 10e9/L    RBC Count 5.55 4.4 - 5.9 10e12/L    Hemoglobin 16.3 13.3 - 17.7 g/dL    Hematocrit 48.0 40.0 - 53.0 %    MCV 87 78 - 100 fl    MCH 29.4 26.5 - 33.0 pg    MCHC 34.0 31.5 - 36.5 g/dL    RDW 13.8 10.0 - 15.0 %    Platelet Count 132 (L) 150 - 450 10e9/L   Hemoglobin A1c   Result Value Ref Range    Hemoglobin A1C 6.7 (H) 4.0 - 6.0 %   Lipid Profile   Result Value Ref Range    Cholesterol 145 <200 mg/dL    Triglycerides 104 <150 mg/dL    HDL Cholesterol 36 23 - 92 mg/dL    LDL Cholesterol Calculated 88 <100 mg/dL    Non HDL Cholesterol 109 <130 mg/dL      Return for Diabetes labs and clinic follow-up appointment every 3 to 4 months.  --- (Go  for about 91 to 100 days)    Aspects of Diabetes we can improve:  Hemoglobin A1c Lab Results   Component Value Date    A1C 6.7 07/17/2018    A1C 6.9 04/12/2018    Goal range is under 8. Best is 6.5 to 7   Blood Pressure 132/76 Goal to keep less than 140/90   Tobacco  reports that he quit smoking about 43 years ago. His smoking use included Cigarettes. He has a 10.00 pack-year smoking history. He has never used smokeless tobacco. Goal to abstain from tobacco   Aspirin or Plavix Aspirin Aspirin or Plavix reduces risk of heart disease and stroke   ACE/ARB -- Declined These medications reduce risk of kidney disease   Cholesterol Pravastatin Statins reduce risk of heart disease and stroke   Eye Exam -- Do Yearly -- Annual diabetic eye exam   Healthy weight Body mass index is 27.62 kg/(m^2). Goal BMI under 30, best is under 25.      -- Trying to exercise daily (goal at least 20 min/day) with moderate aerobic activity   -- Eat healthy (resources from ADA at http://www.diabetes.org/)   -- Taking good care of my feet. Consider seeing the Podiatrist   -- Check blood sugars as directed, record in log book and bring to every appointment      Schedule lab only appointment --- A few days AFTER: 10/15/18   Schedule clinic appointment with Dr. Montanez -- Same day as labs, or 1-2 days later.     Insurance companies are now grading you and I on your blood sugar control -- Goal is to get your A1c down to 7.9% or lower and NO Smoking!    -- Medicare and most insurance companies, will only cover Hemoglobin A1c labs to be rechecked every 91+ days.      Hemoglobin A1C   Date Value Ref Range Status   07/17/2018 6.7 (H) 4.0 - 6.0 % Final   04/12/2018 6.9 (H) 4.0 - 6.0 % Final       Next follow-up appointment with Dr. Montanez should be scheduled:  -- Approximately a few days AFTER: 10/15/18

## 2018-07-18 ASSESSMENT — ANXIETY QUESTIONNAIRES: GAD7 TOTAL SCORE: 0

## 2018-07-18 ASSESSMENT — PATIENT HEALTH QUESTIONNAIRE - PHQ9: SUM OF ALL RESPONSES TO PHQ QUESTIONS 1-9: 0

## 2018-07-23 NOTE — PROGRESS NOTES
Patient Information     Patient Name  Steven Dennison MRN  [id number] Sex  Male   1935      Letter by Nawaf Montanez MD at      Author:  Nawaf Montanez MD Service:  (none) Author Type:  (none)    Filed:   Encounter Date:  2017 Status:  (Other)           Steven Dennison  [id number] [address] [address]          2017    Dear Mr. Dennison:    A refill of    pravastatin (PRAVACHOL) 40 mg tablet has been called into your pharmacy.    Additional refills require an appointment with Nawaf Montanez MD  Please call the clinic at [phone] to schedule your appointment.    Thank you,    The Refill Nurse  [facility]

## 2018-07-23 NOTE — PROGRESS NOTES
Patient Information     Patient Name  Steven Dennison MRN  [id number] Sex  Male   1935      Letter by Nawaf Montanez MD at      Author:  Nawaf Montanez MD Service:  (none) Author Type:  (none)    Filed:   Date of Service:   Status:  (Other)           Steven Dennison  [address] [address] [address]          2017    Dear Mr. Dennison:    Following are the tests completed during your last clinic visit.  The results of these tests are included below.      2017 -- Procedure:  XR DXA BONE DENSITY 2 SITES AXIAL    Clinical History:  Disorder of bone.    Comparison:  None.    Interpretation:  Osteopenia    Lowest Category Site:  Right femur    Lowest Category T-Score:  -1.8    Lowest Category % Change from most recent:  Baseline   %    FRAX Score at hip is:  3.7   %    FRAX Score for major osteoporotic fracture is:  9.2   %        Only the lowest category is reported for each patient per guidelines of the International Society for Clinical Densitometry.  See attached DXA images and FRAX report for further details.    WHO DIAGNOSTIC GUIDELINES FOR BONE MASS MEASUREMENT:  Normal                        T-Score at or above -1.0  Osteopenia                T-Score between -1.0 and -2.5  Osteoporosis            T-Score at or below -2.5  Providers should consider medical therapies when 10 year probability of hip fracture is greater than or equal to 3%, or 10 year probability of major osteoporosis related fracture is greater than or equal to 20%.    ** Exam performed on GE Lunar Prodig[employer] Advance    Electronically Signed By: Daryl Pearson on 2017 2:18 PM    If you have any further questions or problems contact my office at  [phone]   --- or send Sennari message --- otherwise schedule an appointment.    Clinic : [phone]  Appointment line: [phone]     Thank you,    Nawaf Montanez MD    Internal Medicine  [facility]     Reviewed and  electronically signed by provider.

## 2018-07-24 NOTE — PROGRESS NOTES
Patient Information     Patient Name  Steven Dennison MRN  [id number] Sex  Male   1935      Letter by Nawaf Montanez MD at      Author:  Nawaf Montanez MD Service:  (none) Author Type:  (none)    Filed:   Encounter Date:  2017 Status:  (Other)           tSeven Dennison  [id number] [address] [address]          2017    Dear Mr. Dennison:    Following are the tests completed during your last clinic visit.  The results of these tests are included below.      Your diabetes is very well controlled.  Continue current medications.    Results for orders placed or performed in visit on 17      HEMOGLOBIN A1C MONITORING (POCT)      Result  Value Ref Range    HEMOGLOBIN A1C MONITORING (POCT) 6.8 (H) 4.0 - 6.2 %    ESTIMATED AVERAGE GLUCOSE  148 mg/dL         If you have any further questions or problems contact my office at  [phone]   --- or send [employer]Me[employer] message --- otherwise schedule an appointment.    Clinic : [phone]  Appointment line: [phone]     Thank you,    Nawaf Montanez MD    Internal Medicine  [facility]     Reviewed and electronically signed by provider.

## 2018-08-06 DIAGNOSIS — E11.9 CONTROLLED TYPE 2 DIABETES MELLITUS WITHOUT COMPLICATION, WITHOUT LONG-TERM CURRENT USE OF INSULIN (H): ICD-10-CM

## 2018-08-09 NOTE — TELEPHONE ENCOUNTER
Test strips filled on 6/12/18 #200 x 2 refills to [facility].  Zari Lino RN on 8/9/2018 at 9:11 AM

## 2018-09-11 DIAGNOSIS — E11.9 CONTROLLED TYPE 2 DIABETES MELLITUS WITHOUT COMPLICATION, WITHOUT LONG-TERM CURRENT USE OF INSULIN (H): ICD-10-CM

## 2018-09-13 RX ORDER — GLIPIZIDE 5 MG/1
TABLET ORAL
Qty: 180 TABLET | Refills: 0 | OUTPATIENT
Start: 2018-09-13

## 2018-09-13 NOTE — TELEPHONE ENCOUNTER
Glipizide refilled on 7/12/18 #180 x 3 refills to [facility].      Zari Lino RN on 9/13/2018 at 11:51 AM

## 2018-09-27 DIAGNOSIS — I49.1 PREMATURE ATRIAL CONTRACTIONS: Primary | ICD-10-CM

## 2018-10-02 RX ORDER — ASPIRIN 81 MG/1
81 TABLET ORAL
COMMUNITY
Start: 2018-08-01 | End: 2023-03-17

## 2018-10-02 RX ORDER — METOPROLOL TARTRATE 25 MG/1
12.5 TABLET, FILM COATED ORAL 2 TIMES DAILY
Qty: 90 TABLET | Refills: 3 | Status: SHIPPED | OUTPATIENT
Start: 2018-10-02 | End: 2019-10-12

## 2018-10-02 NOTE — TELEPHONE ENCOUNTER
PATIENT WANTS TO  TOMORROW.    [facility] sent Rx request for the following:     METOPROLOL TARTRATE 25MG TABLETS  TAKE 1 TABLET BY MOUTH TWICE DAILY    Metoprolol last filled with different sig:  metoprolol tartrate (LOPRESSOR) 25 mg tablet    Indications: Premature atrial contractions Take 0.5 tablets by mouth 2 times daily. -- dose reduction 12/8/2017 90 tablet   3 12/08/2017   Active     Routing refill request to provider for review/approval because:    Requested sig not active on patient's medication list    Correct sig listed as historical    Last Office Visit: 7/17/18  Future Office visit: None. Per LOV notes, Patient instructed to schedule lab only appointment --- A few days AFTER: 10/15/18  and schedule clinic appointment with Dr. Montanez -- Same day as labs, or 1-2 days later.     Called and spoke to Patient after verifying last name and date of birth. He states he is taking the reduced dose. Patient has appointment today with Michael Breen at [facility] in [place], but confirmed that he is still seeing Dr. Montanez.    Medication jimmy'd up with updated sig. Unable to complete prescription refill per RN Medication Refill Policy. Sandra Ch RN .............. 10/2/2018  10:33 AM

## 2018-12-10 ENCOUNTER — OFFICE VISIT (OUTPATIENT)
Dept: INTERNAL MEDICINE | Facility: OTHER | Age: 83
End: 2018-12-10
Attending: INTERNAL MEDICINE
Payer: MEDICARE

## 2018-12-10 VITALS
SYSTOLIC BLOOD PRESSURE: 120 MMHG | DIASTOLIC BLOOD PRESSURE: 68 MMHG | HEART RATE: 72 BPM | HEIGHT: 70 IN | BODY MASS INDEX: 26.81 KG/M2 | WEIGHT: 187.25 LBS

## 2018-12-10 DIAGNOSIS — R35.1 NOCTURIA: ICD-10-CM

## 2018-12-10 DIAGNOSIS — R82.71 BACTERIA IN URINE: ICD-10-CM

## 2018-12-10 DIAGNOSIS — E78.2 MIXED HYPERLIPIDEMIA: ICD-10-CM

## 2018-12-10 DIAGNOSIS — E53.8 VITAMIN B12 DEFICIENCY: ICD-10-CM

## 2018-12-10 DIAGNOSIS — I10 BENIGN ESSENTIAL HYPERTENSION: ICD-10-CM

## 2018-12-10 DIAGNOSIS — E55.9 VITAMIN D DEFICIENCY: ICD-10-CM

## 2018-12-10 DIAGNOSIS — E11.42 CONTROLLED TYPE 2 DIABETES MELLITUS WITH DIABETIC POLYNEUROPATHY, WITHOUT LONG-TERM CURRENT USE OF INSULIN (H): Primary | ICD-10-CM

## 2018-12-10 LAB
ALBUMIN SERPL-MCNC: 4.3 G/DL (ref 3.5–5.7)
ALBUMIN UR-MCNC: NEGATIVE MG/DL
ALP SERPL-CCNC: 56 U/L (ref 34–104)
ALT SERPL W P-5'-P-CCNC: 13 U/L (ref 7–52)
ANION GAP SERPL CALCULATED.3IONS-SCNC: 7 MMOL/L (ref 3–14)
APPEARANCE UR: CLEAR
AST SERPL W P-5'-P-CCNC: 12 U/L (ref 13–39)
BACTERIA #/AREA URNS HPF: ABNORMAL /HPF
BILIRUB SERPL-MCNC: 0.7 MG/DL (ref 0.3–1)
BILIRUB UR QL STRIP: NEGATIVE
BUN SERPL-MCNC: 30 MG/DL (ref 7–25)
CALCIUM SERPL-MCNC: 9.4 MG/DL (ref 8.6–10.3)
CAOX CRY #/AREA URNS HPF: ABNORMAL /HPF
CHLORIDE SERPL-SCNC: 106 MMOL/L (ref 98–107)
CHOLEST SERPL-MCNC: 176 MG/DL
CO2 SERPL-SCNC: 26 MMOL/L (ref 21–31)
COLOR UR AUTO: YELLOW
CREAT SERPL-MCNC: 1 MG/DL (ref 0.7–1.3)
CREAT UR-MCNC: 170 MG/DL
DEPRECATED CALCIDIOL+CALCIFEROL SERPL-MC: 32.5 NG/ML
ERYTHROCYTE [DISTWIDTH] IN BLOOD BY AUTOMATED COUNT: 13.9 % (ref 10–15)
GFR SERPL CREATININE-BSD FRML MDRD: 72 ML/MIN/1.7M2
GLUCOSE SERPL-MCNC: 159 MG/DL (ref 70–105)
GLUCOSE UR STRIP-MCNC: NEGATIVE MG/DL
HBA1C MFR BLD: 6.6 % (ref 4–6)
HCT VFR BLD AUTO: 49.9 % (ref 40–53)
HDLC SERPL-MCNC: 37 MG/DL (ref 23–92)
HGB BLD-MCNC: 16.5 G/DL (ref 13.3–17.7)
HGB UR QL STRIP: NEGATIVE
KETONES UR STRIP-MCNC: ABNORMAL MG/DL
LDLC SERPL CALC-MCNC: 116 MG/DL
LEUKOCYTE ESTERASE UR QL STRIP: ABNORMAL
MCH RBC QN AUTO: 28.7 PG (ref 26.5–33)
MCHC RBC AUTO-ENTMCNC: 33.1 G/DL (ref 31.5–36.5)
MCV RBC AUTO: 87 FL (ref 78–100)
MICROALBUMIN UR-MCNC: 60 MG/L
MICROALBUMIN/CREAT UR: 35.47 MG/G CR (ref 0–17)
MUCOUS THREADS #/AREA URNS LPF: PRESENT /LPF
NITRATE UR QL: NEGATIVE
NONHDLC SERPL-MCNC: 139 MG/DL
PH UR STRIP: 5.5 PH (ref 5–9)
PLATELET # BLD AUTO: 141 10E9/L (ref 150–450)
POTASSIUM SERPL-SCNC: 3.9 MMOL/L (ref 3.5–5.1)
PROT SERPL-MCNC: 7.3 G/DL (ref 6.4–8.9)
RBC # BLD AUTO: 5.75 10E12/L (ref 4.4–5.9)
RBC #/AREA URNS AUTO: ABNORMAL /HPF
SODIUM SERPL-SCNC: 139 MMOL/L (ref 134–144)
SOURCE: ABNORMAL
SP GR UR STRIP: 1.02 (ref 1–1.03)
TRIGL SERPL-MCNC: 114 MG/DL
UROBILINOGEN UR STRIP-ACNC: 0.2 EU/DL (ref 0.2–1)
VIT B12 SERPL-MCNC: >1500 PG/ML (ref 180–914)
WBC # BLD AUTO: 6.3 10E9/L (ref 4–11)
WBC #/AREA URNS AUTO: ABNORMAL /HPF

## 2018-12-10 PROCEDURE — 82607 VITAMIN B-12: CPT | Performed by: INTERNAL MEDICINE

## 2018-12-10 PROCEDURE — 85027 COMPLETE CBC AUTOMATED: CPT | Performed by: INTERNAL MEDICINE

## 2018-12-10 PROCEDURE — 82043 UR ALBUMIN QUANTITATIVE: CPT | Performed by: INTERNAL MEDICINE

## 2018-12-10 PROCEDURE — 81001 URINALYSIS AUTO W/SCOPE: CPT | Performed by: INTERNAL MEDICINE

## 2018-12-10 PROCEDURE — 82306 VITAMIN D 25 HYDROXY: CPT | Performed by: INTERNAL MEDICINE

## 2018-12-10 PROCEDURE — 99214 OFFICE O/P EST MOD 30 MIN: CPT | Performed by: INTERNAL MEDICINE

## 2018-12-10 PROCEDURE — 36415 COLL VENOUS BLD VENIPUNCTURE: CPT | Performed by: INTERNAL MEDICINE

## 2018-12-10 PROCEDURE — 80061 LIPID PANEL: CPT | Performed by: INTERNAL MEDICINE

## 2018-12-10 PROCEDURE — 87086 URINE CULTURE/COLONY COUNT: CPT | Performed by: INTERNAL MEDICINE

## 2018-12-10 PROCEDURE — G0463 HOSPITAL OUTPT CLINIC VISIT: HCPCS

## 2018-12-10 PROCEDURE — 80053 COMPREHEN METABOLIC PANEL: CPT | Performed by: INTERNAL MEDICINE

## 2018-12-10 PROCEDURE — 83036 HEMOGLOBIN GLYCOSYLATED A1C: CPT | Performed by: INTERNAL MEDICINE

## 2018-12-10 RX ORDER — GABAPENTIN 100 MG/1
100 CAPSULE ORAL DAILY
COMMUNITY
End: 2020-03-06

## 2018-12-10 ASSESSMENT — MIFFLIN-ST. JEOR: SCORE: 1551.64

## 2018-12-10 ASSESSMENT — ENCOUNTER SYMPTOMS
VOMITING: 0
DIZZINESS: 0
PALPITATIONS: 0
ARTHRALGIAS: 0
LIGHT-HEADEDNESS: 0
CONFUSION: 0
EYE PAIN: 0
MYALGIAS: 0
BRUISES/BLEEDS EASILY: 0
CHILLS: 0
DIARRHEA: 0
ABDOMINAL PAIN: 0
NAUSEA: 0
HEMATURIA: 0
DYSURIA: 0
FEVER: 0
COUGH: 0
FATIGUE: 0
WHEEZING: 0
SHORTNESS OF BREATH: 0
AGITATION: 0

## 2018-12-10 ASSESSMENT — ANXIETY QUESTIONNAIRES
5. BEING SO RESTLESS THAT IT IS HARD TO SIT STILL: NOT AT ALL
6. BECOMING EASILY ANNOYED OR IRRITABLE: NOT AT ALL
2. NOT BEING ABLE TO STOP OR CONTROL WORRYING: NOT AT ALL
GAD7 TOTAL SCORE: 0
3. WORRYING TOO MUCH ABOUT DIFFERENT THINGS: NOT AT ALL
IF YOU CHECKED OFF ANY PROBLEMS ON THIS QUESTIONNAIRE, HOW DIFFICULT HAVE THESE PROBLEMS MADE IT FOR YOU TO DO YOUR WORK, TAKE CARE OF THINGS AT HOME, OR GET ALONG WITH OTHER PEOPLE: NOT DIFFICULT AT ALL
1. FEELING NERVOUS, ANXIOUS, OR ON EDGE: NOT AT ALL
7. FEELING AFRAID AS IF SOMETHING AWFUL MIGHT HAPPEN: NOT AT ALL

## 2018-12-10 ASSESSMENT — PATIENT HEALTH QUESTIONNAIRE - PHQ9
5. POOR APPETITE OR OVEREATING: NOT AT ALL
SUM OF ALL RESPONSES TO PHQ QUESTIONS 1-9: 0

## 2018-12-10 ASSESSMENT — PAIN SCALES - GENERAL: PAINLEVEL: MILD PAIN (2)

## 2018-12-10 NOTE — NURSING NOTE
"Patient presents to the clinic for diabetes management.      Previous A1C is not at goal of <8  Lab Results   Component Value Date    A1C 6.7 07/17/2018    A1C 6.9 04/12/2018     Urine microalbumin:creatine: n/a  Foot exam today  Eye exam 11/2018    Tobacco User no  Patient is on a daily aspirin  Patient is not on a Statin.  Blood pressure today of:     BP Readings from Last 1 Encounters:   07/17/18 132/76      is at the goal of <139/89 for diabetics.    Chief Complaint   Patient presents with     Diabetes       Initial There were no vitals taken for this visit. Estimated body mass index is 27.62 kg/m  as calculated from the following:    Height as of 6/2/17: 1.759 m (5' 9.25\").    Weight as of 7/17/18: 85.4 kg (188 lb 6 oz).  Medication Reconciliation: complete    Keyla Last LPN        "

## 2018-12-10 NOTE — PATIENT INSTRUCTIONS
Medications updated.    Labs today.    Continue aspirin, pravastatin.  Continue metformin and glipizide.

## 2018-12-10 NOTE — PROGRESS NOTES
"Nursing Notes:   Keyla Last LPN  12/10/2018  8:47 AM  Signed  Patient presents to the clinic for diabetes management.      Previous A1C is not at goal of <8  Lab Results   Component Value Date    A1C 6.7 07/17/2018    A1C 6.9 04/12/2018     Urine microalbumin:creatine: n/a  Foot exam today  Eye exam 11/2018    Tobacco User no  Patient is on a daily aspirin  Patient is not on a Statin.  Blood pressure today of:     BP Readings from Last 1 Encounters:   07/17/18 132/76      is at the goal of <139/89 for diabetics.    Chief Complaint   Patient presents with     Diabetes       Initial There were no vitals taken for this visit. Estimated body mass index is 27.62 kg/m  as calculated from the following:    Height as of 6/2/17: 1.759 m (5' 9.25\").    Weight as of 7/17/18: 85.4 kg (188 lb 6 oz).  Medication Reconciliation: complete    Keyla Last LPN        Nursing note reviewed with patient.  Accuracy and completeness verified.   Mr. Dennison is a 83 year old male who:  Patient presents with:  Diabetes      ICD-10-CM    1. Controlled type 2 diabetes mellitus with diabetic polyneuropathy, without long-term current use of insulin (H) E11.42 Hemoglobin A1c     Urine Microscopic   2. Nocturia R35.1    3. Mixed hyperlipidemia E78.2 Lipid Profile   4. Vitamin B12 deficiency -new diagnosis for 12/20/2018 E53.8 Vitamin B12     Vitamin B12   5. Vitamin D deficiency E55.9 Vitamin D Total     Vitamin D Total   6. Benign essential hypertension I10 Comprehensive metabolic panel     CBC with platelets   7. Bacteria in urine R82.71 Urine Culture Aerobic Bacterial     HPI  Diabetes, currently well controlled.  Has some issues with neuropathy which sounds stable, unchanged.  Managing blood sugars with glipizide and metformin.  Due for labs today.    Nocturia, reports stable.  Check urinalysis.    Bacteria noted in urine, urine culture ordered and pending.    Mixed hyperlipidemia, check lipid panel.  Currently taking " pravastatin.    Vitamin D deficiency and vitamin B12 deficiency.  Taking oral replacement.  Check labs.    Hypertension, currently well controlled.  Tolerating medication.  Check labs.    Functional Capacity: > 4 METS.   Reports that he can climb a flight of stairs without any chest pain/heaviness or shortness of breath.   No orthopnea/paroxysmal nocturnal dyspnea  Review of Systems   Constitutional: Negative for chills, fatigue and fever.   HENT: Negative for congestion and hearing loss.    Eyes: Negative for pain and visual disturbance.   Respiratory: Negative for cough, shortness of breath and wheezing.    Cardiovascular: Negative for chest pain and palpitations.   Gastrointestinal: Negative for abdominal pain, diarrhea, nausea and vomiting.   Endocrine: Negative for cold intolerance and heat intolerance.   Genitourinary: Negative for dysuria and hematuria.        + feels like emptying bladder well   Musculoskeletal: Negative for arthralgias and myalgias.        + much less left ankle pain from injury   Skin: Negative for pallor.   Allergic/Immunologic: Negative for immunocompromised state.   Neurological: Negative for dizziness and light-headedness.        Now rare positional lightheadedness   Hematological: Does not bruise/bleed easily.   Psychiatric/Behavioral: Negative for agitation and confusion.      LORENZO:   LORENZO-7 SCORE 5/23/2018 7/17/2018 12/10/2018   Total Score 0 0 0     PHQ9:  PHQ-9 SCORE 5/23/2018 7/17/2018 12/10/2018   PHQ-9 Total Score 0 0 0       I have personally reviewed the past medical history, past surgical history, medications, allergies, family and social history as listed below, on 12/10/2018.    Allergies   Allergen Reactions     Ace Inhibitors Other (See Comments)     -- Declines     Cephalexin Other (See Comments)     Other reaction(s):Pt Doesn't Remember  Pt does not remember      Clindamycin Other (See Comments)     Other reaction(s): Pt Doesn't Remember  Pt does not remember      Hydromorphone Other (See Comments)     Other reaction(s): Bradycardia     Amoxicillin Rash       Current Outpatient Medications   Medication Sig Dispense Refill     Apple Cider Vinegar 300 MG TABS Take 900 mg by mouth 2 times daily       aspirin (GOODSENSE ASPIRIN) 325 MG tablet Take 1 tablet by mouth daily       aspirin 81 MG EC tablet Take 81 mg by mouth       B Complex Vitamins (VITAMIN-B COMPLEX) TABS Take 1 tablet by mouth Take 1 tablet by mouth once daily. -- for Low B12 / B-Vitamins -- about 3 days weekly       blood glucose monitoring (NO BRAND SPECIFIED) meter device kit Use to test blood sugar 2 times daily. DX: E11.9 1 kit 0     blood glucose monitoring (ONETOUCH ULTRA) test strip Use for testing 2 times daily. Dx code E11.9. Dispense items as covered by patient's insurance. 200 strip 2     blood glucose monitoring (PRODIGY TWIST TOP 28G) lancets As directed. Use for testing 2 times daily DX: E11.9 200 each 2     Blood Glucose Monitoring Suppl (FIFTY50 GLUCOSE METER 2.0) W/DEVICE KIT Dispense item covered by pt ins. 250.02 NIDDM type II, uncontrolled - Test 2 times/day. Reason: Unstable diabetes       Cholecalciferol (VITAMIN D) 2000 units tablet Take 2,000 Units by mouth daily 100 tablet 3     cinnamon 500 MG CAPS Take by mouth daily       cyanocobalamin (VITAMIN B-12) 2500 MCG sublingual tablet Place 2,500 mcg under the tongue every other day 90 tablet 3     gabapentin (NEURONTIN) 100 MG capsule Take 1 capsule (100 mg) by mouth daily       Garlic 1000 MG CAPS        glipiZIDE (GLUCOTROL) 5 MG tablet Take 1 tablet (5 mg) by mouth 2 times daily (before meals) 180 tablet 3     Krill Oil 500 MG CAPS Take 1 capsule by mouth daily       lecithin (LECITHIN) 1200 MG CAPS capsule        Magnesium Oxide 420 MG TABS Take 1 tablet by mouth daily       Melatonin 10 MG TABS tablet Take by mouth At Bedtime       metFORMIN (GLUCOPHAGE) 500 MG tablet Take 1-2 tablets by mouth 2 times daily (with meals) (depending on  size of meals)       metoprolol tartrate (LOPRESSOR) 25 MG tablet Take 0.5 tablets (12.5 mg) by mouth 2 times daily Dose reduction 12/8/2017 90 tablet 3     NIFEdipine ER osmotic (PROCARDIA XL) 30 MG 24 hr tablet Take 1 tablet (30 mg) by mouth daily - For Raynaud's - start when out of Norvasc/Amlodipine 90 tablet 3     pravastatin (PRAVACHOL) 40 MG tablet Take 1 tablet (40 mg) by mouth At Bedtime For cholesterol or diabetes 90 tablet 3     senna-docusate (SENOKOT-S;PERICOLACE) 8.6-50 MG per tablet Take 2 tablets by mouth 2 times daily Constipation prevention       tiZANidine (ZANAFLEX) 2 MG tablet Take 1-2 tablets (2-4 mg) by mouth every 6 hours as needed for muscle spasms 120 tablet 3     triamcinolone (KENALOG) 0.1 % cream Apply twice daily as needed          Patient Active Problem List    Diagnosis Date Noted     History of bilateral cataract extraction 07/17/2018     Priority: Medium     Vitamin B12 deficiency -new diagnosis for 12/20/2018 04/13/2018     Priority: Medium     Vitamin D deficiency 04/13/2018     Priority: Medium     Osteoarthrosis involving lower leg 01/24/2018     Priority: Medium     Overview:   IMO Update  Updated per 10/1/17 IMO import       Degeneration of lumbar or lumbosacral intervertebral disc 01/24/2018     Priority: Medium     Overview:   recurring       Renal calculus 01/24/2018     Priority: Medium     Overview:   disease       Controlled type 2 diabetes mellitus with diabetic polyneuropathy, without long-term current use of insulin (H) 12/08/2017     Priority: Medium     Osteopenia 09/27/2017     Priority: Medium     Acquired deformity of right foot 09/08/2017     Priority: Medium     Pre-ulcerative corn or callous 09/08/2017     Priority: Medium     Rotator cuff tendinitis 12/29/2016     Priority: Medium     Eczema 12/14/2016     Priority: Medium     Benign prostatic hyperplasia 07/19/2016     Priority: Medium     Overview:   Updated per 10/1/17 IMO import       Raynaud's disease  without gangrene 07/19/2016     Priority: Medium     Status post total replacement of left shoulder 07/19/2016     Priority: Medium     Ventricular bigeminy 07/19/2016     Priority: Medium     Complete tear of left rotator cuff 05/03/2016     Priority: Medium     Biceps tendonitis on right 03/31/2016     Priority: Medium     Bunion of right foot 03/31/2016     Priority: Medium     Chronic left shoulder pain 03/31/2016     Priority: Medium     Deformity of right foot 03/31/2016     Priority: Medium     Sleep difficulties 03/31/2016     Priority: Medium     Counseling regarding advanced directives and goals of care 08/07/2015     Priority: Medium     DNI (do not intubate) 08/07/2015     Priority: Medium     DNR (do not resuscitate) 08/07/2015     Priority: Medium     Nocturia 05/01/2015     Priority: Medium     Subacromial bursitis 07/16/2014     Priority: Medium     AAA (abdominal aortic aneurysm) (H) 06/18/2014     Priority: Medium     Erectile dysfunction 05/27/2014     Priority: Medium     Impingement syndrome of right shoulder 01/30/2014     Priority: Medium     Premature atrial contractions 01/30/2014     Priority: Medium     Skin lesion of cheek 11/19/2013     Priority: Medium     Mixed hyperlipidemia 06/03/2013     Priority: Medium     Past Medical History:   Diagnosis Date     Controlled type 2 diabetes mellitus with diabetic polyneuropathy, without long-term current use of insulin (H) 12/8/2017     Past Surgical History:   Procedure Laterality Date     APPENDECTOMY OPEN      Appendectomy at age 6 due to appendicitis     ARTHROSCOPY KNEE      left knee     COLONOSCOPY      2000     COLONOSCOPY      2011,adenomatous polyps, one with high grade dysplasia , next due 2014     OTHER SURGICAL HISTORY      ,HERNIA REPAIR,right inguinal     OTHER SURGICAL HISTORY      2003,[id number],ARTHROPLASTY,left total knee     OTHER SURGICAL HISTORY      1984,[id number],OTHER,left first/rib resection     OTHER SURGICAL HISTORY    "   ,[id number],OTHER     OTHER SURGICAL HISTORY      14,[id number],CYSTOSCOPY W/ URETEROSCOPY W/ LITHOTRIPSY,Dr. hCelsi GREEN     OTHER SURGICAL HISTORY      ,HERNIA REPAIR,x 4     Social History     Socioeconomic History     Marital status:      Spouse name: None     Number of children: None     Years of education: None     Highest education level: None   Social Needs     Financial resource strain: None     Food insecurity - worry: None     Food insecurity - inability: None     Transportation needs - medical: None     Transportation needs - non-medical: None   Occupational History     None   Tobacco Use     Smoking status: Former Smoker     Packs/day: 1.00     Years: 10.00     Pack years: 10.00     Types: Cigarettes     Last attempt to quit: 1975     Years since quittin.9     Smokeless tobacco: Never Used   Substance and Sexual Activity     Alcohol use: Yes     Alcohol/week: 3.0 oz     Drug use: No     Sexual activity: None   Other Topics Concern     Parent/sibling w/ CABG, MI or angioplasty before 65F 55M? Not Asked   Social History Narrative    , lives with wife who has parkinson's disease since  and requires care. She is totally dependent. She needs assistance with all ADL's He has some respite care workers.     Family History   Problem Relation Age of Onset     Other - See Comments Father          at 74 of natural causes     Heart Disease Mother 69        Heart Disease, of MI     Diabetes Mother         Diabetes     Other - See Comments Mother         Stroke     Family History Negative Sister         Good Health     Family History Negative Sister         Good Health     Cancer Brother         Cancer       EXAM:   Vitals:    12/10/18 0845   BP: 120/68   BP Location: Right arm   Patient Position: Sitting   Cuff Size: Adult Regular   Pulse: 72   Weight: 84.9 kg (187 lb 4 oz)   Height: 1.772 m (5' 9.75\")       Current Pain Score: Mild Pain (2)     BP Readings from Last 3 " "Encounters:   12/10/18 120/68   07/17/18 132/76   05/23/18 118/64      Wt Readings from Last 3 Encounters:   12/10/18 84.9 kg (187 lb 4 oz)   07/17/18 85.4 kg (188 lb 6 oz)   05/23/18 86 kg (189 lb 8 oz)      Estimated body mass index is 27.06 kg/m  as calculated from the following:    Height as of this encounter: 1.772 m (5' 9.75\").    Weight as of this encounter: 84.9 kg (187 lb 4 oz).     Physical Exam   Constitutional: He appears well-developed and well-nourished. No distress.   HENT:   Head: Normocephalic and atraumatic.   Eyes: Conjunctivae are normal. No scleral icterus.   Neck: Neck supple.   Cardiovascular: Normal rate, regular rhythm and intact distal pulses.   No murmur heard.  No carotid bruits   Pulmonary/Chest: Effort normal.   Abdominal: Soft. There is no tenderness.   Musculoskeletal: He exhibits no deformity.   Lymphadenopathy:     He has no cervical adenopathy.   Neurological: He is alert.   Skin: Skin is warm and dry. No rash noted. He is not diaphoretic.   Diabetic Foot Exam / Screen:  Any complaints of increased pain or numbness ?  YES -- tingling, neurontin at bedtime helps  Is there a foot ulcer now or a history of foot ulcer? No  Does the foot have an abnormal shape?  YES -- right foot bunion at great toe.   Are the nails thick, too long or ingrown?  YES -- hypertrophic and onychomycotic.   Are there any redness or open areas? No    Sensation Testing done at all points on the diagram with monofilament     Right Foot: Sensation Normal at all points  Left Foot: Sensation Normal at all points     Risk Category: 0- No loss of protective sensation  Performed by Nawaf Montanez MD   Psychiatric: He has a normal mood and affect.      Procedures   INVESTIGATIONS:  Results for orders placed or performed in visit on 12/10/18   Vitamin B12   Result Value Ref Range    Vitamin B12 >1,500 (H) 180 - 914 pg/mL   Vitamin D Total   Result Value Ref Range    Vitamin D Total 32.5 ng/mL   Lipid Profile   Result " Value Ref Range    Cholesterol 176 <200 mg/dL    Triglycerides 114 <150 mg/dL    HDL Cholesterol 37 23 - 92 mg/dL    LDL Cholesterol Calculated 116 (H) <100 mg/dL    Non HDL Cholesterol 139 (H) <130 mg/dL   Hemoglobin A1c   Result Value Ref Range    Hemoglobin A1C 6.6 (H) 4.0 - 6.0 %   Comprehensive metabolic panel   Result Value Ref Range    Sodium 139 134 - 144 mmol/L    Potassium 3.9 3.5 - 5.1 mmol/L    Chloride 106 98 - 107 mmol/L    Carbon Dioxide 26 21 - 31 mmol/L    Anion Gap 7 3 - 14 mmol/L    Glucose 159 (H) 70 - 105 mg/dL    Urea Nitrogen 30 (H) 7 - 25 mg/dL    Creatinine 1.00 0.70 - 1.30 mg/dL    GFR Estimate 72 >60 mL/min/1.7m2    GFR Estimate If Black 87 >60 mL/min/1.7m2    Calcium 9.4 8.6 - 10.3 mg/dL    Bilirubin Total 0.7 0.3 - 1.0 mg/dL    Albumin 4.3 3.5 - 5.7 g/dL    Protein Total 7.3 6.4 - 8.9 g/dL    Alkaline Phosphatase 56 34 - 104 U/L    ALT 13 7 - 52 U/L    AST 12 (L) 13 - 39 U/L   CBC with platelets   Result Value Ref Range    WBC 6.3 4.0 - 11.0 10e9/L    RBC Count 5.75 4.4 - 5.9 10e12/L    Hemoglobin 16.5 13.3 - 17.7 g/dL    Hematocrit 49.9 40.0 - 53.0 %    MCV 87 78 - 100 fl    MCH 28.7 26.5 - 33.0 pg    MCHC 33.1 31.5 - 36.5 g/dL    RDW 13.9 10.0 - 15.0 %    Platelet Count 141 (L) 150 - 450 10e9/L   *UA reflex to Microscopic   Result Value Ref Range    Color Urine Yellow     Appearance Urine Clear     Glucose Urine Negative NEG^Negative mg/dL    Bilirubin Urine Negative NEG^Negative    Ketones Urine Trace (A) NEG^Negative mg/dL    Specific Gravity Urine 1.025 1.000 - 1.030    Blood Urine Negative NEG^Negative    pH Urine 5.5 5.0 - 9.0 pH    Protein Albumin Urine Negative NEG^Negative mg/dL    Urobilinogen Urine 0.2 0.2 - 1.0 EU/dL    Nitrite Urine Negative NEG^Negative    Leukocyte Esterase Urine Trace (A) NEG^Negative    Source Midstream Urine    Urine Microscopic   Result Value Ref Range    WBC Urine 5-10 (A) OTO5^0 - 5 /HPF    RBC Urine O - 2 OTO2^O - 2 /HPF    Bacteria Urine Few (A)  NEG^Negative /HPF    Calcium Oxalate Few (A) NEG^Negative /HPF    Mucous Urine Present (A) NEG^Negative /LPF       ASSESSMENT AND PLAN:  Problem List Items Addressed This Visit        Nervous and Auditory    Controlled type 2 diabetes mellitus with diabetic polyneuropathy, without long-term current use of insulin (H) - Primary    Relevant Medications    gabapentin (NEURONTIN) 100 MG capsule    Other Relevant Orders    Urine Microscopic (Completed)       Digestive    Vitamin B12 deficiency -new diagnosis for 12/20/2018    Relevant Orders    Vitamin B12 (Completed)    Vitamin D deficiency    Relevant Orders    Vitamin D Total (Completed)       Endocrine    Mixed hyperlipidemia       Urinary    Nocturia      Other Visit Diagnoses     Benign essential hypertension        Bacteria in urine        Relevant Orders    Urine Culture Aerobic Bacterial (Completed)        reviewed diet, exercise and weight control, recommended sodium restriction, cardiovascular risk and specific lipid/LDL goals reviewed, specific diabetic recommendations low cholesterol diet, weight control and daily exercise discussed, use of aspirin to prevent MI and TIA's discussed  -- Expected clinical course discussed    -- Medications and their side effects discussed    Patient Instructions   Medications updated.    Labs today.    Continue aspirin, pravastatin.  Continue metformin and glipizide.    Nawaf Montanez MD  Internal Medicine  [facility]     Portions of this note were dictated using speech recognition software. The note has been proofread but errors in the text may have been overlooked. Please contact me if there are any concerns regarding the accuracy of the dictation.

## 2018-12-11 LAB
BACTERIA SPEC CULT: NORMAL
SPECIMEN SOURCE: NORMAL

## 2018-12-11 ASSESSMENT — ANXIETY QUESTIONNAIRES: GAD7 TOTAL SCORE: 0

## 2018-12-24 DIAGNOSIS — E11.42 CONTROLLED TYPE 2 DIABETES MELLITUS WITH DIABETIC POLYNEUROPATHY, WITHOUT LONG-TERM CURRENT USE OF INSULIN (H): Primary | ICD-10-CM

## 2019-01-07 ENCOUNTER — DOCUMENTATION ONLY (OUTPATIENT)
Dept: OTHER | Facility: CLINIC | Age: 84
End: 2019-01-07

## 2019-01-07 RX ORDER — AMLODIPINE BESYLATE 5 MG/1
TABLET ORAL
Qty: 180 TABLET | Refills: 0 | OUTPATIENT
Start: 2019-01-07

## 2019-01-07 NOTE — TELEPHONE ENCOUNTER
[facility] in [place] requesting refill on Amlodipine  Amlodipine not on current medication list  Per phone note on3/12/18 because of insurance coverage Rx changed.    And Rx was changed  Called patient and patient states he is in [place] right now but does not think he is taking Amlodipine any more, he will call back if he is taking.   Patient states they were in [place] last year not this year.    Zari Lino RN on 1/7/2019 at 4:11 PM

## 2019-01-22 ENCOUNTER — TELEPHONE (OUTPATIENT)
Dept: INTERNAL MEDICINE | Facility: OTHER | Age: 84
End: 2019-01-22

## 2019-01-22 NOTE — TELEPHONE ENCOUNTER
Received CMN form for diabetes testing supplies from [employer].  Form completed and faxed back.      Keyla Last LPN 1/22/2019 11:31 AM

## 2019-03-13 ENCOUNTER — TRANSFERRED RECORDS (OUTPATIENT)
Dept: HEALTH INFORMATION MANAGEMENT | Facility: OTHER | Age: 84
End: 2019-03-13

## 2019-03-18 DIAGNOSIS — M62.838 MUSCLE SPASM: ICD-10-CM

## 2019-03-20 RX ORDER — TIZANIDINE 2 MG/1
TABLET ORAL
Qty: 120 TABLET | Refills: 1 | Status: SHIPPED | OUTPATIENT
Start: 2019-03-20 | End: 2019-04-22

## 2019-03-20 NOTE — TELEPHONE ENCOUNTER
Routing refill request to provider for review/approval because:  Drug not on the [facility] refill protocol     LOV 12-10-18.  Filled on 7-12-19 for #120 X 3 refills. Sandra Bailey RN on 3/20/2019 at 10:31 AM

## 2019-03-27 ENCOUNTER — HOSPITAL ENCOUNTER (OUTPATIENT)
Dept: PHYSICAL THERAPY | Facility: OTHER | Age: 84
Setting detail: THERAPIES SERIES
End: 2019-03-27
Attending: FAMILY MEDICINE
Payer: COMMERCIAL

## 2019-03-27 DIAGNOSIS — M54.50 LOW BACK PAIN: ICD-10-CM

## 2019-03-27 PROCEDURE — 97161 PT EVAL LOW COMPLEX 20 MIN: CPT | Mod: GP

## 2019-03-27 PROCEDURE — 97110 THERAPEUTIC EXERCISES: CPT | Mod: GP

## 2019-03-27 NOTE — PROGRESS NOTES
03/27/19 1300   General Information   Type of Visit Initial OP Ortho PT Evaluation   Start of Care Date 03/27/19   Referring Physician Jeanne Calzada MD   Patient/Family Goals Statement decrease back pain   Orders Evaluate and Treat   Insurance Type Other  ([place])   Insurance Comments/Visits Authorized 14   Medical Diagnosis low back pain   Surgical/Medical history reviewed Yes  (left TKA '04)   Body Part(s)   Body Part(s) Lumbar Spine/SI   Presentation and Etiology   Pertinent history of current problem (include personal factors and/or comorbidities that impact the POC) Right sided low back pain for the past few months with no known injury. Constant pain that waxes and wanes. Does not radiate into the buttock or leg.    Impairments A. Pain;H. Impaired gait   Functional Limitations perform desired leisure / sports activities;perform activities of daily living   Symptom Location right side of low back   How/Where did it occur From insidious onset   Onset date of current episode/exacerbation 01/27/19   Chronicity New   Pain rating (0-10 point scale) Best (/10);Worst (/10)   Best (/10) 0/10   Worst (/10) 10/10   Pain quality A. Sharp;B. Dull;C. Aching   Frequency of pain/symptoms B. Intermittent   Pain/symptoms are: The same all the time   Pain/symptoms exacerbated by M. Other;A. Sitting  (straightening back; bowling)   Pain/symptoms eased by A. Sitting;E. Changing positions;K. Other  (walking)   Progression of symptoms since onset: Improved   Current / Previous Interventions   Diagnostic Tests: CT scan   CT Results Results   CT results spurring, left SI fusing   Prior Level of Function   Prior Level of Function-Mobility independent   Current Level of Function   Patient role/employment history F. Retired   Living environment House/townhome   Home/community accessibility no stairs regularly, 1 flight to basement   Fall Risk Screen   Fall screen completed by PT   Have you fallen 2 or more times in the past year? No    Have you fallen and had an injury in the past year? No   Is patient a fall risk? No   Abuse Screen (yes response referral indicated)   Feels Unsafe at Home or Work/School no   Feels Threatened by Someone no   Does Anyone Try to Keep You From Having Contact with Others or Doing Things Outside Your Home? no   Physical Signs of Abuse Present no   Lumbar Spine/SI Objective Findings   Flexion ROM to knees   Extension ROM VE 10 degrees   Pelvic Screen negative FFT and stork and hip drop   Hip Screen left leg long; L ASIS high; L PSIS low   Lumbar/Hip/Knee/Foot Strength Comments not tested due to time constraints   Hamstring Flexibility moderate impairment B   Hip Flexor Flexibility moderate impairment B L>R   Quadricep Flexibility moderate impairment B L>R   Piriformis Flexibility moderate impairment B   SLR negative   Crossover SLR negative   Spring Test negative   Palpation non tender to deep palpation of QL and piriformis   Planned Therapy Interventions   Planned Therapy Interventions bed mobility training;groups;gait training;joint mobilization;manual therapy;neuromuscular re-education;ROM;strengthening;stretching   Planned Modality Interventions   Planned Modality Interventions Biofeedback;Cryotherapy;Hot packs;TENS;Traction;Ultrasound;Electrical stimulation   Clinical Impression   Criteria for Skilled Therapeutic Interventions Met yes, treatment indicated   PT Diagnosis impaired joint and soft tissue mobility   Influenced by the following impairments as above   Functional limitations due to impairments sitting, bending   Clinical Presentation Stable/Uncomplicated   Clinical Decision Making (Complexity) Low complexity   Therapy Frequency 2 times/Week   Predicted Duration of Therapy Intervention (days/wks) 90 days   Risk & Benefits of therapy have been explained Yes   Patient, Family & other staff in agreement with plan of care Yes   Clinical Impression Comments Pt demonstrates impaired lumbopelvic and soft tissue  mobility leading to pain with daily activities and sitting. He would benefit from skilled services to address limitations and reduce pain.   Education Assessment   Preferred Learning Style Listening;Demonstration;Pictures/video   Barriers to Learning No barriers   ORTHO GOALS   PT Ortho Eval Goals 1;2;3   Ortho Goal 1   Goal Identifier mobility   Goal Description Pt will have improved joint mobility to allow bending to don socks with minimal back pain less than 3/10.   Target Date 05/22/19   Ortho Goal 2   Goal Identifier STM   Goal Description Pt will have improved soft tissue mobility to allow sitting with minimal pain increase.   Target Date 05/22/19   Ortho Goal 3   Goal Identifier HEP   Goal Description Pt will be independent and consistent with performance of a customized home exercise program for self management of symptoms.   Total Evaluation Time   PT Dulceal, Low Complexity Minutes (73711) 30

## 2019-03-31 DIAGNOSIS — E11.42 CONTROLLED TYPE 2 DIABETES MELLITUS WITH DIABETIC POLYNEUROPATHY, WITHOUT LONG-TERM CURRENT USE OF INSULIN (H): ICD-10-CM

## 2019-04-02 NOTE — TELEPHONE ENCOUNTER
Prescription approved per [facility] Refill Protocol.  Sandra Bailey RN on 4/2/2019 at 4:27 PM

## 2019-04-03 ENCOUNTER — HOSPITAL ENCOUNTER (OUTPATIENT)
Dept: PHYSICAL THERAPY | Facility: OTHER | Age: 84
Setting detail: THERAPIES SERIES
End: 2019-04-03
Attending: FAMILY MEDICINE
Payer: COMMERCIAL

## 2019-04-03 PROCEDURE — 97110 THERAPEUTIC EXERCISES: CPT | Mod: GP

## 2019-04-10 ENCOUNTER — HOSPITAL ENCOUNTER (OUTPATIENT)
Dept: PHYSICAL THERAPY | Facility: OTHER | Age: 84
Setting detail: THERAPIES SERIES
End: 2019-04-10
Attending: FAMILY MEDICINE
Payer: COMMERCIAL

## 2019-04-10 PROCEDURE — 97110 THERAPEUTIC EXERCISES: CPT | Mod: GP

## 2019-04-11 DIAGNOSIS — E11.9 CONTROLLED TYPE 2 DIABETES MELLITUS WITHOUT COMPLICATION, WITHOUT LONG-TERM CURRENT USE OF INSULIN (H): ICD-10-CM

## 2019-04-12 NOTE — TELEPHONE ENCOUNTER
"Requested Prescriptions   Pending Prescriptions Disp Refills     blood glucose (ONETOUCH ULTRA) test strip [Pharmacy Med Name: ONE TOUCH ULTRA BLUE TESTST(NEW)100] 200 strip 0     Sig: TEST TWICE DAILY       Diabetic Supplies Protocol Passed - 4/11/2019 11:53 AM        Passed - Medication is active on med list        Passed - Patient is 18 years of age or older        Passed - Recent (6 mo) or future (30 days) visit within the authorizing provider's specialty     Patient had office visit in the last 6 months or has a visit in the next 30 days with authorizing provider.  See \"Patient Info\" tab in inbasket, or \"Choose Columns\" in Meds & Orders section of the refill encounter.            Pt last seen DM 12/18 and meets criteria for supply refill at this time. .Prescription approved per [facility] Protocol.  Cassy Marquez RN on 4/12/2019 at 2:40 PM   "

## 2019-04-22 DIAGNOSIS — M62.838 MUSCLE SPASM: ICD-10-CM

## 2019-04-24 RX ORDER — TIZANIDINE 2 MG/1
TABLET ORAL
Qty: 120 TABLET | Refills: 3 | Status: SHIPPED | OUTPATIENT
Start: 2019-04-24 | End: 2020-04-03

## 2019-04-24 NOTE — TELEPHONE ENCOUNTER
Routing refill request to provider for review/approval because:  Drug not on the [facility] refill protocol     LOV: 12/10/18  Zari Lino RN on 4/24/2019 at 11:04 AM

## 2019-05-04 ENCOUNTER — HOSPITAL ENCOUNTER (EMERGENCY)
Facility: OTHER | Age: 84
Discharge: HOME | End: 2019-05-04
Attending: PHYSICIAN ASSISTANT | Admitting: PHYSICIAN ASSISTANT
Payer: MEDICARE

## 2019-05-04 VITALS
HEIGHT: 69 IN | WEIGHT: 183 LBS | SYSTOLIC BLOOD PRESSURE: 152 MMHG | BODY MASS INDEX: 27.11 KG/M2 | DIASTOLIC BLOOD PRESSURE: 55 MMHG | HEART RATE: 61 BPM | TEMPERATURE: 97.7 F | RESPIRATION RATE: 16 BRPM | OXYGEN SATURATION: 97 %

## 2019-05-04 DIAGNOSIS — M54.50 LUMBOSACRAL PAIN: ICD-10-CM

## 2019-05-04 DIAGNOSIS — M51.379 DEGENERATION OF LUMBAR OR LUMBOSACRAL INTERVERTEBRAL DISC: ICD-10-CM

## 2019-05-04 PROCEDURE — 99284 EMERGENCY DEPT VISIT MOD MDM: CPT | Mod: 25 | Performed by: PHYSICIAN ASSISTANT

## 2019-05-04 PROCEDURE — A9270 NON-COVERED ITEM OR SERVICE: HCPCS | Performed by: PHYSICIAN ASSISTANT

## 2019-05-04 PROCEDURE — 25000128 H RX IP 250 OP 636: Performed by: PHYSICIAN ASSISTANT

## 2019-05-04 PROCEDURE — 99283 EMERGENCY DEPT VISIT LOW MDM: CPT | Mod: Z6 | Performed by: PHYSICIAN ASSISTANT

## 2019-05-04 PROCEDURE — 96372 THER/PROPH/DIAG INJ SC/IM: CPT | Performed by: PHYSICIAN ASSISTANT

## 2019-05-04 PROCEDURE — 25000132 ZZH RX MED GY IP 250 OP 250 PS 637: Performed by: PHYSICIAN ASSISTANT

## 2019-05-04 RX ORDER — OXYCODONE AND ACETAMINOPHEN 5; 325 MG/1; MG/1
1 TABLET ORAL EVERY 6 HOURS PRN
Qty: 20 TABLET | Refills: 0 | Status: SHIPPED | OUTPATIENT
Start: 2019-05-04 | End: 2019-07-05

## 2019-05-04 RX ORDER — GABAPENTIN 300 MG/1
300 CAPSULE ORAL ONCE
Status: COMPLETED | OUTPATIENT
Start: 2019-05-04 | End: 2019-05-04

## 2019-05-04 RX ORDER — GABAPENTIN 300 MG/1
300 CAPSULE ORAL 3 TIMES DAILY
Qty: 30 CAPSULE | Refills: 0 | Status: SHIPPED | OUTPATIENT
Start: 2019-05-04 | End: 2019-07-05

## 2019-05-04 RX ORDER — DEXAMETHASONE SODIUM PHOSPHATE 10 MG/ML
10 INJECTION, SOLUTION INTRAMUSCULAR; INTRAVENOUS ONCE
Status: COMPLETED | OUTPATIENT
Start: 2019-05-04 | End: 2019-05-04

## 2019-05-04 RX ORDER — KETOROLAC TROMETHAMINE 30 MG/ML
30 INJECTION, SOLUTION INTRAMUSCULAR; INTRAVENOUS ONCE
Status: COMPLETED | OUTPATIENT
Start: 2019-05-04 | End: 2019-05-04

## 2019-05-04 RX ORDER — PREDNISONE 10 MG/1
TABLET ORAL
Qty: 30 TABLET | Refills: 0 | Status: SHIPPED | OUTPATIENT
Start: 2019-05-04 | End: 2019-07-05

## 2019-05-04 RX ADMIN — DEXAMETHASONE SODIUM PHOSPHATE 10 MG: 10 INJECTION, SOLUTION INTRAMUSCULAR; INTRAVENOUS at 12:58

## 2019-05-04 RX ADMIN — GABAPENTIN 300 MG: 300 CAPSULE ORAL at 12:58

## 2019-05-04 RX ADMIN — KETOROLAC TROMETHAMINE 30 MG: 30 INJECTION, SOLUTION INTRAMUSCULAR at 12:58

## 2019-05-04 ASSESSMENT — ENCOUNTER SYMPTOMS
ADENOPATHY: 0
WOUND: 0
DIFFICULTY URINATING: 0
BACK PAIN: 1
NECK STIFFNESS: 0
SHORTNESS OF BREATH: 0
CHILLS: 0
ABDOMINAL PAIN: 0
COLOR CHANGE: 0
FEVER: 0
HEMATURIA: 0
CONFUSION: 0
BRUISES/BLEEDS EASILY: 0
ARTHRALGIAS: 0
CHEST TIGHTNESS: 0
HEADACHES: 0
EYE REDNESS: 0

## 2019-05-04 ASSESSMENT — MIFFLIN-ST. JEOR: SCORE: 1520.46

## 2019-05-04 NOTE — ED AVS SNAPSHOT
[facility]  [address]  [place] [address]  Phone:  [phone]  Fax:  [phone]                                    Steven Dennison   MRN: [id number]    Department:  [facility]   Date of Visit:  5/4/2019           After Visit Summary Signature Page    I have received my discharge instructions, and my questions have been answered. I have discussed any challenges I see with this plan with the nurse or doctor.    ..........................................................................................................................................  Patient/Patient Representative Signature      ..........................................................................................................................................  Patient Representative Print Name and Relationship to Patient    ..................................................               ................................................  Date                                   Time    ..........................................................................................................................................  Reviewed by Signature/Title    ...................................................              ..............................................  Date                                               Time          22EPIC Rev 08/18

## 2019-05-04 NOTE — ED PROVIDER NOTES
History   No chief complaint on file.    This is a 83-year-old male who has had some lumbar degenerative disc disease as well as some right-sided sciatica for quite some time now.  He has undergone physical therapy for this and had been doing quite well however he needed to drive his RV trailer from [place] back to [place] over the past few days and he has had exacerbation of his lumbar issues.  He currently does take some Zanaflex for this as well as some gabapentin.  He did drive here today but he would like a shot to help with this pain.            Allergies:  Allergies   Allergen Reactions     Ace Inhibitors Other (See Comments)     -- Declines     Cephalexin Other (See Comments)     Other reaction(s):Pt Doesn't Remember  Pt does not remember      Clindamycin Other (See Comments)     Other reaction(s): Pt Doesn't Remember  Pt does not remember     Hydromorphone Other (See Comments)     Other reaction(s): Bradycardia     Amoxicillin Rash       Problem List:    Patient Active Problem List    Diagnosis Date Noted     History of bilateral cataract extraction 07/17/2018     Priority: Medium     Vitamin B12 deficiency -new diagnosis for 12/20/2018 04/13/2018     Priority: Medium     Vitamin D deficiency 04/13/2018     Priority: Medium     Osteoarthrosis involving lower leg 01/24/2018     Priority: Medium     Overview:   IMO Update  Updated per 10/1/17 IMO import       Degeneration of lumbar or lumbosacral intervertebral disc 01/24/2018     Priority: Medium     Overview:   recurring       Renal calculus 01/24/2018     Priority: Medium     Overview:   disease       Controlled type 2 diabetes mellitus with diabetic polyneuropathy, without long-term current use of insulin (H) 12/08/2017     Priority: Medium     Osteopenia 09/27/2017     Priority: Medium     Acquired deformity of right foot 09/08/2017     Priority: Medium     Pre-ulcerative corn or callous 09/08/2017     Priority: Medium     Rotator cuff tendinitis  12/29/2016     Priority: Medium     Eczema 12/14/2016     Priority: Medium     Benign prostatic hyperplasia 07/19/2016     Priority: Medium     Overview:   Updated per 10/1/17 IMO import       Raynaud's disease without gangrene 07/19/2016     Priority: Medium     Status post total replacement of left shoulder 07/19/2016     Priority: Medium     Ventricular bigeminy 07/19/2016     Priority: Medium     Complete tear of left rotator cuff 05/03/2016     Priority: Medium     Biceps tendonitis on right 03/31/2016     Priority: Medium     Bunion of right foot 03/31/2016     Priority: Medium     Chronic left shoulder pain 03/31/2016     Priority: Medium     Deformity of right foot 03/31/2016     Priority: Medium     Sleep difficulties 03/31/2016     Priority: Medium     Counseling regarding advanced directives and goals of care 08/07/2015     Priority: Medium     Nocturia 05/01/2015     Priority: Medium     Subacromial bursitis 07/16/2014     Priority: Medium     AAA (abdominal aortic aneurysm) (H) 06/18/2014     Priority: Medium     Erectile dysfunction 05/27/2014     Priority: Medium     Impingement syndrome of right shoulder 01/30/2014     Priority: Medium     Premature atrial contractions 01/30/2014     Priority: Medium     Skin lesion of cheek 11/19/2013     Priority: Medium     Mixed hyperlipidemia 06/03/2013     Priority: Medium        Past Medical History:    Past Medical History:   Diagnosis Date     Controlled type 2 diabetes mellitus with diabetic polyneuropathy, without long-term current use of insulin (H) 12/8/2017       Past Surgical History:    Past Surgical History:   Procedure Laterality Date     APPENDECTOMY OPEN      Appendectomy at age 6 due to appendicitis     ARTHROSCOPY KNEE      left knee     COLONOSCOPY      2000     COLONOSCOPY      2011,adenomatous polyps, one with high grade dysplasia , next due 2014     OTHER SURGICAL HISTORY      ,HERNIA REPAIR,right inguinal     OTHER SURGICAL HISTORY       2003,[id number],ARTHROPLASTY,left total knee     OTHER SURGICAL HISTORY      ,381011,OTHER,left first/rib resection     OTHER SURGICAL HISTORY      ,[id number],OTHER     OTHER SURGICAL HISTORY      14,CUM756,CYSTOSCOPY W/ URETEROSCOPY W/ LITHOTRIPSY,Dr. Chelsi GREEN     OTHER SURGICAL HISTORY      ,HERNIA REPAIR,x 4       Family History:    Family History   Problem Relation Age of Onset     Other - See Comments Father          at 74 of natural causes     Heart Disease Mother 69        Heart Disease, of MI     Diabetes Mother         Diabetes     Other - See Comments Mother         Stroke     Family History Negative Sister         Good Health     Family History Negative Sister         Good Health     Cancer Brother         Cancer       Social History:  Marital Status:   [5]  Social History     Tobacco Use     Smoking status: Former Smoker     Packs/day: 1.00     Years: 10.00     Pack years: 10.00     Types: Cigarettes     Last attempt to quit: 1975     Years since quittin.3     Smokeless tobacco: Never Used   Substance Use Topics     Alcohol use: Yes     Alcohol/week: 3.0 oz     Drug use: No        Medications:      Apple Cider Vinegar 300 MG TABS   aspirin 81 MG EC tablet   B Complex Vitamins (VITAMIN-B COMPLEX) TABS   blood glucose (ONETOUCH ULTRA) test strip   blood glucose monitoring (NO BRAND SPECIFIED) meter device kit   blood glucose monitoring (PRODIGY TWIST TOP 28G) lancets   Blood Glucose Monitoring Suppl (FIFTY50 GLUCOSE METER 2.0) W/DEVICE KIT   Cholecalciferol (VITAMIN D) 2000 units tablet   cinnamon 500 MG CAPS   cyanocobalamin (VITAMIN B-12) 2500 MCG sublingual tablet   gabapentin (NEURONTIN) 100 MG capsule   gabapentin (NEURONTIN) 300 MG capsule   Garlic 1000 MG CAPS   glipiZIDE (GLUCOTROL) 5 MG tablet   Krill Oil 500 MG CAPS   lecithin (LECITHIN) 1200 MG CAPS capsule   Magnesium Oxide 420 MG TABS   Melatonin 10 MG TABS tablet   metFORMIN (GLUCOPHAGE) 500 MG  "tablet   metoprolol tartrate (LOPRESSOR) 25 MG tablet   NIFEdipine ER osmotic (PROCARDIA XL) 30 MG 24 hr tablet   oxyCODONE-acetaminophen (PERCOCET) 5-325 MG tablet   pravastatin (PRAVACHOL) 40 MG tablet   predniSONE (DELTASONE) 10 MG tablet   tiZANidine (ZANAFLEX) 2 MG tablet   senna-docusate (SENOKOT-S;PERICOLACE) 8.6-50 MG per tablet   triamcinolone (KENALOG) 0.1 % cream         Review of Systems   Constitutional: Negative for chills and fever.   HENT: Negative for congestion.    Eyes: Negative for redness and visual disturbance.   Respiratory: Negative for chest tightness and shortness of breath.    Cardiovascular: Negative for chest pain.   Gastrointestinal: Negative for abdominal pain.   Genitourinary: Negative for difficulty urinating and hematuria.   Musculoskeletal: Positive for back pain. Negative for arthralgias and neck stiffness.        Lumbar and right buttocks pain   Skin: Negative for color change, rash and wound.   Neurological: Negative for syncope and headaches.   Hematological: Negative for adenopathy. Does not bruise/bleed easily.   Psychiatric/Behavioral: Negative for confusion.       Physical Exam   BP: 152/55  Pulse: 61  Temp: 97.7  F (36.5  C)  Resp: 16  Height: 175.3 cm (5' 9\")  Weight: 83 kg (183 lb)  SpO2: 97 %      Physical Exam   Constitutional: He appears well-developed and well-nourished. No distress.   HENT:   Head: Normocephalic and atraumatic.   Mouth/Throat: Oropharynx is clear and moist.   Eyes: Pupils are equal, round, and reactive to light. Conjunctivae are normal. No scleral icterus.   Neck: Neck supple.   Cardiovascular: Normal rate, regular rhythm, normal heart sounds and intact distal pulses.   Pulmonary/Chest: Effort normal and breath sounds normal. No respiratory distress.   Lung sounds clear.  SaO2 is 97% on room air.   Abdominal: Soft. Bowel sounds are normal. There is no tenderness.   Musculoskeletal: Normal range of motion. He exhibits tenderness. He exhibits no edema " or deformity.   Tenderness to palpation L4-S1 with some radiation over the right SI joint.   Lymphadenopathy:     He has no cervical adenopathy.   Neurological: He is alert.   Skin: Skin is warm and dry. No rash noted. He is not diaphoretic.   Psychiatric: He has a normal mood and affect.       ED Course        Procedures         Procedure: XR SPINE LUMBAR 3 VIEWS     HISTORY:  Degeneration of lumbar or lumbosacral intervertebral disc.     TECHNIQUE:    .     COMPARISON: None.     FINDINGS:     Vertebral bodies are well aligned and well maintained. There is mild multilevel disc space narrowing of most noted L5-S1. There is mild/moderate diffuse facet arthrosis with moderate narrowing of the L5-S1 neural foramen. No acute fractures or   displacement.     IMPRESSION:        1. Mild/moderate multilevel disc disease most noted at L5-S1 where there is moderate narrowing of the neural foramen.  2. Facet arthrosis of the lower facets. Correlation recommended.  3. No acute fractures or displacement.     Electronically Signed By: Betito Kumar M.D. on 5/1/2015 10:45 AM         No results found for this or any previous visit (from the past 24 hour(s)).    Medications   ketorolac (TORADOL) injection 30 mg (has no administration in time range)   gabapentin (NEURONTIN) capsule 300 mg (has no administration in time range)   dexamethasone PF (DECADRON) injection 10 mg (has no administration in time range)       Assessments & Plan (with Medical Decision Making)     I have reviewed the nursing notes.    I have reviewed the findings, diagnosis, plan and need for follow up with the patient.         Medication List      Started    oxyCODONE-acetaminophen 5-325 MG tablet  Commonly known as:  PERCOCET  1 tablet, Oral, EVERY 6 HOURS PRN     predniSONE 10 MG tablet  Commonly known as:  DELTASONE  Take 4 tablets daily for 3 days,  take 3 tablets daily for 3 days, take 2 tablet daily for 3 days, take 1 tablet daily  for 3 days.         Modified    * gabapentin 100 MG capsule  Commonly known as:  NEURONTIN  What changed:  Another medication with the same name was added. Make sure you understand how and when to take each.     * gabapentin 300 MG capsule  Commonly known as:  NEURONTIN  300 mg, Oral, 3 TIMES DAILY  What changed:  You were already taking a medication with the same name, and this prescription was added. Make sure you understand how and when to take each.         * This list has 2 medication(s) that are the same as other medications prescribed for you. Read the directions carefully, and ask your doctor or other care provider to review them with you.                Final diagnoses:   Lumbosacral pain   Degeneration of lumbar or lumbosacral intervertebral disc     Afebrile.  Vital signs stable.  Acute on chronic lumbosacral pain.  He drove here today.  He was given Toradol, Decadron and gabapentin 300 mg orally in the ER.  He will arrange follow-up with physical therapy to include iontophoresis and phonophoresis.  Rx for short course of increasing his gabapentin to 300 mg 3 times daily as needed, prednisone taper, and Percocet.  Follow-up sooner if there is any other concerns problems or questions  5/4/2019   [facility] \[facility]\""     Indra Sullivan PA-C  05/04/19 [id number]

## 2019-05-10 ENCOUNTER — HOSPITAL ENCOUNTER (OUTPATIENT)
Dept: PHYSICAL THERAPY | Facility: OTHER | Age: 84
Setting detail: THERAPIES SERIES
End: 2019-05-10
Attending: FAMILY MEDICINE
Payer: COMMERCIAL

## 2019-05-10 PROCEDURE — 97110 THERAPEUTIC EXERCISES: CPT | Mod: GP

## 2019-05-10 PROCEDURE — 97140 MANUAL THERAPY 1/> REGIONS: CPT | Mod: GP

## 2019-05-16 ENCOUNTER — HOSPITAL ENCOUNTER (OUTPATIENT)
Dept: PHYSICAL THERAPY | Facility: OTHER | Age: 84
Setting detail: THERAPIES SERIES
End: 2019-05-16
Attending: FAMILY MEDICINE
Payer: COMMERCIAL

## 2019-05-16 PROCEDURE — 97140 MANUAL THERAPY 1/> REGIONS: CPT | Mod: GP

## 2019-05-16 PROCEDURE — 97110 THERAPEUTIC EXERCISES: CPT | Mod: GP

## 2019-05-21 ENCOUNTER — HOSPITAL ENCOUNTER (OUTPATIENT)
Dept: PHYSICAL THERAPY | Facility: OTHER | Age: 84
Setting detail: THERAPIES SERIES
End: 2019-05-21
Attending: FAMILY MEDICINE
Payer: COMMERCIAL

## 2019-05-21 PROCEDURE — 97110 THERAPEUTIC EXERCISES: CPT | Mod: GP

## 2019-05-21 PROCEDURE — 97140 MANUAL THERAPY 1/> REGIONS: CPT | Mod: GP

## 2019-05-31 ENCOUNTER — HOSPITAL ENCOUNTER (OUTPATIENT)
Dept: PHYSICAL THERAPY | Facility: OTHER | Age: 84
Setting detail: THERAPIES SERIES
End: 2019-05-31
Attending: FAMILY MEDICINE
Payer: COMMERCIAL

## 2019-05-31 PROCEDURE — 97140 MANUAL THERAPY 1/> REGIONS: CPT | Mod: GP

## 2019-05-31 PROCEDURE — 97110 THERAPEUTIC EXERCISES: CPT | Mod: GP

## 2019-06-20 ENCOUNTER — HOSPITAL ENCOUNTER (OUTPATIENT)
Dept: PHYSICAL THERAPY | Facility: OTHER | Age: 84
Setting detail: THERAPIES SERIES
End: 2019-06-20
Attending: FAMILY MEDICINE
Payer: COMMERCIAL

## 2019-06-20 PROCEDURE — 97140 MANUAL THERAPY 1/> REGIONS: CPT | Mod: GP

## 2019-06-20 PROCEDURE — 97110 THERAPEUTIC EXERCISES: CPT | Mod: GP

## 2019-06-20 NOTE — PROGRESS NOTES
Outpatient Physical Therapy Progress Note     Patient: Steven Dennison  : 1935    Beginning/End Dates of Reporting Period:  3/27/19 to 2019    Referring Provider: Dr. Calzada    Therapy Diagnosis: impaired lumbosacral and soft tissue mobility     Client Self Report: Steven reports he can do everything without too much back pain except crawling backwards off the bed after laying on his stomach. Did ok sitting in the car on his trip to [place]. Denies much pain but he knows it's there. Has to be careful to avoid it spasming and can't sit in his lounge chair due to back pain, but able to sit in others.     Objective Measurements:  Objective Measure: pelvic symmetry  Details: equal; right QL fires with Right arm abd rather than the left QL  Objective Measure: segmental mobility  Details: B CAIO                              Outcome Measures (most recent score):  Maura STarT    Maura STarT    Oswestry Score (%): 4.44 %    Goals:  Goal Identifier mobility   Goal Description Pt will have improved joint mobility to allow bending to don socks with minimal back pain less than 3/10.   Target Date 19   Date Met  19   Progress:     Goal Identifier STM   Goal Description Pt will have improved soft tissue mobility to allow sitting with minimal pain increase.   Target Date 19   Date Met  19   Progress:     Goal Identifier HEP   Goal Description Pt will be independent and consistent with performance of a customized home exercise program for self management of symptoms.   Target Date     Date Met  (progressing as able)   Progress:       Progress Toward Goals:   Progress this reporting period: Pt demonstrates improved lumbopelvic mobility but persisting muscle pain and spasm in the right quadratus lumborum due to altered firing pattern with upper extremity movement.           Plan:  Continue therapy per current plan of care.    Discharge:  No

## 2019-06-28 ENCOUNTER — HOSPITAL ENCOUNTER (OUTPATIENT)
Dept: PHYSICAL THERAPY | Facility: OTHER | Age: 84
Setting detail: THERAPIES SERIES
End: 2019-06-28
Attending: FAMILY MEDICINE
Payer: COMMERCIAL

## 2019-06-28 PROCEDURE — 97140 MANUAL THERAPY 1/> REGIONS: CPT | Mod: GP

## 2019-06-28 PROCEDURE — 97110 THERAPEUTIC EXERCISES: CPT | Mod: GP

## 2019-07-04 NOTE — PROGRESS NOTES
"Nursing Notes:   Keyla Last LPN  7/5/2019 10:20 AM  Signed  Patient presents to the clinic for diabetes management.    Previous A1C is not at goal of <8  Lab Results   Component Value Date    A1C 6.6 12/10/2018    A1C 6.7 07/17/2018    A1C 6.9 04/12/2018     Urine microalbumin:creatine: n/a  Foot exam 4-12-18, declines today  Eye exam 11/2018    Tobacco User no  Patient is on a daily aspirin  Patient is on a Statin.  Blood pressure today of:     BP Readings from Last 1 Encounters:   05/04/19 152/55      is not at the goal of <139/89 for diabetics.    Chief Complaint   Patient presents with     Recheck Medication       Initial /70 (BP Location: Right arm, Patient Position: Sitting, Cuff Size: Adult Regular)   Pulse 64   Temp 97.5  F (36.4  C) (Tympanic)   Resp 16   Ht 1.772 m (5' 9.75\")   Wt 82.8 kg (182 lb 8 oz)   BMI 26.37 kg/m    Estimated body mass index is 26.37 kg/m  as calculated from the following:    Height as of this encounter: 1.772 m (5' 9.75\").    Weight as of this encounter: 82.8 kg (182 lb 8 oz).  Medication Reconciliation: complete    Keyla Last LPN          Nursing note reviewed with patient.  Accuracy and completeness verified.   Mr. Dennison is a 84 year old male who:  Patient presents with:  Recheck Medication      ICD-10-CM    1. Controlled type 2 diabetes mellitus with diabetic polyneuropathy, without long-term current use of insulin (H) E11.42 Albumin Random Urine Quantitative with Creat Ratio     Hemoglobin A1c     *UA reflex to Microscopic     glipiZIDE (GLUCOTROL) 5 MG tablet     Hemoglobin A1c     *UA reflex to Microscopic     Albumin Random Urine Quantitative with Creat Ratio     Urine Microscopic   2. Mixed hyperlipidemia E78.2 Lipid Profile     pravastatin (PRAVACHOL) 40 MG tablet     Lipid Profile   3. Vitamin B12 deficiency -new diagnosis for 12/20/2018 E53.8    4. Benign essential hypertension I10 Comprehensive metabolic panel     CBC with platelets     CBC with " platelets     Comprehensive metabolic panel   5. Raynaud's phenomenon without gangrene I73.00 NIFEdipine ER OSMOTIC (PROCARDIA XL) 30 MG 24 hr tablet   6. Intermittent low back pain M54.5 oxyCODONE-acetaminophen (PERCOCET) 5-325 MG tablet     HPI  Controlled type 2 diabetes.  Seems to be doing well with metformin and glipizide.  No side effects reported.  No hypoglycemia.  Labs just collected.  Hemoglobin A1c is stable.  Continue current dosing.    Mixed hyperlipidemia, doing well with pravastatin.  No side effects reported.  Continue current dosing.    Vitamin B12 deficiency, reports energy levels have improved since starting B12 replacement.  Continue current dosing.    Hypertension, currently well controlled.  Tolerating medication regimen.  No side effects reported.  No longer taking amlodipine.  Now taking nifedipine, doing well.  No recent or not symptoms.  Needs refills.  Creatinine is stable.    Raynaud's -currently stable.  Has problems in the wintertime.    Intermittent low back pain, occasionally uses Percocet 5 mg tablet.  He would like a few extra tablets.  He got 20 tablets back in the fall and still has a few left.  States that occasionally his back flares up.    Functional Capacity: > or about 4 METS.   Review of Systems   Constitutional: Negative for chills, fatigue and fever.   HENT: Negative for congestion and hearing loss.    Eyes: Negative for pain and visual disturbance.   Respiratory: Negative for cough, shortness of breath and wheezing.    Cardiovascular: Negative for chest pain and palpitations.   Gastrointestinal: Negative for abdominal pain, diarrhea, nausea and vomiting.   Endocrine: Negative for cold intolerance and heat intolerance.   Genitourinary: Negative for dysuria and hematuria.        + feels like emptying bladder okay, occasionally will have to go back to bathroom a 2nd time.    Musculoskeletal: Negative for arthralgias and myalgias.   Skin: Negative for pallor.    Allergic/Immunologic: Negative for immunocompromised state.   Neurological: Negative for dizziness and light-headedness.        Now rare positional lightheadedness   Hematological: Does not bruise/bleed easily.   Psychiatric/Behavioral: Negative for agitation and confusion.      LORENZO:   LORENZO-7 SCORE 7/17/2018 12/10/2018 7/5/2019   Total Score 0 0 0     PHQ9:  PHQ-9 SCORE 7/17/2018 12/10/2018 7/5/2019   PHQ-9 Total Score 0 0 0       I have personally reviewed the past medical history, past surgical history, medications, allergies, family and social history as listed below.     Allergies   Allergen Reactions     Ace Inhibitors Other (See Comments)     -- Declines     Cephalexin Other (See Comments)     Other reaction(s):Pt Doesn't Remember  Pt does not remember      Clindamycin Other (See Comments)     Other reaction(s): Pt Doesn't Remember  Pt does not remember     Hydromorphone Other (See Comments)     Other reaction(s): Bradycardia     Amoxicillin Rash       Current Outpatient Medications   Medication Sig Dispense Refill     Apple Cider Vinegar 300 MG TABS Take 900 mg by mouth 2 times daily       aspirin 81 MG EC tablet Take 81 mg by mouth       B Complex Vitamins (VITAMIN-B COMPLEX) TABS Take 1 tablet by mouth Take 1 tablet by mouth once daily. -- for Low B12 / B-Vitamins -- about 3 days weekly       blood glucose (ONETOUCH ULTRA) test strip TEST TWICE DAILY 200 strip 3     blood glucose monitoring (NO BRAND SPECIFIED) meter device kit Use to test blood sugar 2 times daily. DX: E11.9 1 kit 0     blood glucose monitoring (PRODIGY TWIST TOP 28G) lancets As directed. Use for testing 2 times daily DX: E11.9 200 each 2     Blood Glucose Monitoring Suppl (FIFTY50 GLUCOSE METER 2.0) W/DEVICE KIT Dispense item covered by pt ins. 250.02 NIDDM type II, uncontrolled - Test 2 times/day. Reason: Unstable diabetes       Cholecalciferol (VITAMIN D) 2000 units tablet Take 2,000 Units by mouth daily 100 tablet 3     cinnamon 500  MG CAPS Take by mouth daily       cyanocobalamin (VITAMIN B-12) 2500 MCG sublingual tablet Place 2,500 mcg under the tongue every other day 90 tablet 3     gabapentin (NEURONTIN) 100 MG capsule Take 1 capsule (100 mg) by mouth daily       Garlic 1000 MG CAPS        glipiZIDE (GLUCOTROL) 5 MG tablet Take 1 tablet (5 mg) by mouth 2 times daily (before meals) 180 tablet 3     Krill Oil 500 MG CAPS Take 1 capsule by mouth daily       lecithin (LECITHIN) 1200 MG CAPS capsule        Magnesium Oxide 420 MG TABS Take 1 tablet by mouth daily       Melatonin 10 MG TABS tablet Take by mouth At Bedtime       metFORMIN (GLUCOPHAGE) 500 MG tablet TAKE 1-2 TABLET BY MOUTH TWICE DAILY WITH MEALS(DEPENDING ON SIZE OF MEALS) 360 tablet 2     metoprolol tartrate (LOPRESSOR) 25 MG tablet Take 0.5 tablets (12.5 mg) by mouth 2 times daily Dose reduction 12/8/2017 90 tablet 3     NIFEdipine ER OSMOTIC (PROCARDIA XL) 30 MG 24 hr tablet Take 1 tablet (30 mg) by mouth daily - For Raynaud's 90 tablet 3     oxyCODONE-acetaminophen (PERCOCET) 5-325 MG tablet Take 1 tablet by mouth every 6 hours as needed for severe pain 20 tablet 0     pravastatin (PRAVACHOL) 40 MG tablet Take 1 tablet (40 mg) by mouth At Bedtime For cholesterol or diabetes 90 tablet 3     senna-docusate (SENOKOT-S;PERICOLACE) 8.6-50 MG per tablet Take 2 tablets by mouth 2 times daily Constipation prevention       tiZANidine (ZANAFLEX) 2 MG tablet TAKE 1 TO 2 TABLETS(2 TO 4 MG) BY MOUTH EVERY 6 HOURS AS NEEDED FOR MUSCLE SPASMS 120 tablet 3     triamcinolone (KENALOG) 0.1 % cream Apply twice daily as needed          Patient Active Problem List    Diagnosis Date Noted     History of bilateral cataract extraction 07/17/2018     Priority: Medium     Vitamin B12 deficiency -new diagnosis for 12/20/2018 04/13/2018     Priority: Medium     Vitamin D deficiency 04/13/2018     Priority: Medium     Osteoarthrosis involving lower leg 01/24/2018     Priority: Medium     Overview:   IMO  Update  Updated per 10/1/17 IMO import       Degeneration of lumbar or lumbosacral intervertebral disc 01/24/2018     Priority: Medium     Overview:   recurring       Renal calculus 01/24/2018     Priority: Medium     Overview:   disease       Controlled type 2 diabetes mellitus with diabetic polyneuropathy, without long-term current use of insulin (H) 12/08/2017     Priority: Medium     Osteopenia 09/27/2017     Priority: Medium     Acquired deformity of right foot 09/08/2017     Priority: Medium     Pre-ulcerative corn or callous 09/08/2017     Priority: Medium     Rotator cuff tendinitis 12/29/2016     Priority: Medium     Eczema 12/14/2016     Priority: Medium     Benign prostatic hyperplasia 07/19/2016     Priority: Medium     Overview:   Updated per 10/1/17 IMO import       Raynaud's disease without gangrene 07/19/2016     Priority: Medium     Status post total replacement of left shoulder 07/19/2016     Priority: Medium     Ventricular bigeminy 07/19/2016     Priority: Medium     Complete tear of left rotator cuff 05/03/2016     Priority: Medium     Biceps tendonitis on right 03/31/2016     Priority: Medium     Bunion of right foot 03/31/2016     Priority: Medium     Chronic left shoulder pain 03/31/2016     Priority: Medium     Deformity of right foot 03/31/2016     Priority: Medium     Sleep difficulties 03/31/2016     Priority: Medium     Nocturia 05/01/2015     Priority: Medium     Subacromial bursitis 07/16/2014     Priority: Medium     AAA (abdominal aortic aneurysm) (H) 06/18/2014     Priority: Medium     Erectile dysfunction 05/27/2014     Priority: Medium     Impingement syndrome of right shoulder 01/30/2014     Priority: Medium     Premature atrial contractions 01/30/2014     Priority: Medium     Raynaud's phenomenon without gangrene 01/30/2014     Priority: Medium     Skin lesion of cheek 11/19/2013     Priority: Medium     Mixed hyperlipidemia 06/03/2013     Priority: Medium     Past Medical  History:   Diagnosis Date     Controlled type 2 diabetes mellitus with diabetic polyneuropathy, without long-term current use of insulin (H) 2017     Past Surgical History:   Procedure Laterality Date     APPENDECTOMY OPEN      Appendectomy at age 6 due to appendicitis     ARTHROSCOPY KNEE      left knee     COLONOSCOPY           COLONOSCOPY      ,adenomatous polyps, one with high grade dysplasia , next due      OTHER SURGICAL HISTORY      ,HERNIA REPAIR,right inguinal     OTHER SURGICAL HISTORY      ,[id number],ARTHROPLASTY,left total knee     OTHER SURGICAL HISTORY      ,[id number],OTHER,left first/rib resection     OTHER SURGICAL HISTORY      ,[id number],OTHER     OTHER SURGICAL HISTORY      15,[id number],CYSTOSCOPY W/ URETEROSCOPY W/ LITHOTRIPSY,Dr. Chelsi GREEN     OTHER SURGICAL HISTORY      ,HERNIA REPAIR,x 4     Social History     Socioeconomic History     Marital status:      Spouse name: None     Number of children: None     Years of education: None     Highest education level: None   Occupational History     None   Social Needs     Financial resource strain: None     Food insecurity:     Worry: None     Inability: None     Transportation needs:     Medical: None     Non-medical: None   Tobacco Use     Smoking status: Former Smoker     Packs/day: 1.00     Years: 10.00     Pack years: 10.00     Types: Cigarettes     Last attempt to quit: 1975     Years since quittin.5     Smokeless tobacco: Never Used   Substance and Sexual Activity     Alcohol use: Yes     Alcohol/week: 3.0 oz     Drug use: No     Sexual activity: Not Currently   Lifestyle     Physical activity:     Days per week: None     Minutes per session: None     Stress: None   Relationships     Social connections:     Talks on phone: None     Gets together: None     Attends Zoroastrianism service: None     Active member of club or organization: None     Attends meetings of clubs or organizations: None      "Relationship status: None     Intimate partner violence:     Fear of current or ex partner: None     Emotionally abused: None     Physically abused: None     Forced sexual activity: None   Other Topics Concern     Parent/sibling w/ CABG, MI or angioplasty before 65F 55M? Not Asked   Social History Narrative    , lives with wife who has parkinson's disease since  and requires care. She is totally dependent. She needs assistance with all ADL's He has some respite care workers.     Family History   Problem Relation Age of Onset     Other - See Comments Father          at 74 of natural causes     Heart Disease Mother 69        Heart Disease, of MI     Diabetes Mother         Diabetes     Other - See Comments Mother         Stroke     Family History Negative Sister         Good Health     Family History Negative Sister         Good Health     Cancer Brother         Cancer       EXAM:   Vitals:    19 1003   BP: 114/70   BP Location: Right arm   Patient Position: Sitting   Cuff Size: Adult Regular   Pulse: 64   Resp: 16   Temp: 97.5  F (36.4  C)   TempSrc: Tympanic   Weight: 82.8 kg (182 lb 8 oz)   Height: 1.772 m (5' 9.75\")       Current Pain Score: No Pain (0)     BP Readings from Last 3 Encounters:   19 114/70   19 152/55   12/10/18 120/68      Wt Readings from Last 3 Encounters:   19 82.8 kg (182 lb 8 oz)   19 83 kg (183 lb)   12/10/18 84.9 kg (187 lb 4 oz)      Estimated body mass index is 26.37 kg/m  as calculated from the following:    Height as of this encounter: 1.772 m (5' 9.75\").    Weight as of this encounter: 82.8 kg (182 lb 8 oz).     Physical Exam   Constitutional: He appears well-developed and well-nourished. No distress.   HENT:   Head: Normocephalic and atraumatic.   Eyes: Conjunctivae are normal. No scleral icterus.   Neck: Neck supple.   Cardiovascular: Normal rate and regular rhythm.   Pulmonary/Chest: Effort normal and breath sounds normal.   Abdominal: " Soft. There is no tenderness.   Musculoskeletal: He exhibits no deformity.   Lymphadenopathy:     He has no cervical adenopathy.   Neurological: He is alert.   Skin: Skin is warm and dry. No rash noted. He is not diaphoretic.   Psychiatric: He has a normal mood and affect.        Procedures   INVESTIGATIONS:  Results for orders placed or performed in visit on 07/05/19   Hemoglobin A1c   Result Value Ref Range    Hemoglobin A1C 7.1 (H) 4.0 - 6.0 %   Lipid Profile   Result Value Ref Range    Cholesterol 155 <200 mg/dL    Triglycerides 179 (H) <150 mg/dL    HDL Cholesterol 34 23 - 92 mg/dL    LDL Cholesterol Calculated 85 <100 mg/dL    Non HDL Cholesterol 121 <130 mg/dL   CBC with platelets   Result Value Ref Range    WBC 6.3 4.0 - 11.0 10e9/L    RBC Count 5.58 4.4 - 5.9 10e12/L    Hemoglobin 16.7 13.3 - 17.7 g/dL    Hematocrit 49.2 40.0 - 53.0 %    MCV 88 78 - 100 fl    MCH 29.9 26.5 - 33.0 pg    MCHC 33.9 31.5 - 36.5 g/dL    RDW 13.9 10.0 - 15.0 %    Platelet Count 146 (L) 150 - 450 10e9/L   Comprehensive metabolic panel   Result Value Ref Range    Sodium 144 134 - 144 mmol/L    Potassium 4.3 3.5 - 5.1 mmol/L    Chloride 108 (H) 98 - 107 mmol/L    Carbon Dioxide 28 21 - 31 mmol/L    Anion Gap 8 3 - 14 mmol/L    Glucose 93 70 - 105 mg/dL    Urea Nitrogen 30 (H) 7 - 25 mg/dL    Creatinine 1.02 0.70 - 1.30 mg/dL    GFR Estimate 70 >60 mL/min/[1.73_m2]    GFR Estimate If Black 84 >60 mL/min/[1.73_m2]    Calcium 9.6 8.6 - 10.3 mg/dL    Bilirubin Total 1.3 (H) 0.3 - 1.0 mg/dL    Albumin 4.4 3.5 - 5.7 g/dL    Protein Total 6.8 6.4 - 8.9 g/dL    Alkaline Phosphatase 59 34 - 104 U/L    ALT 11 7 - 52 U/L    AST 12 (L) 13 - 39 U/L   *UA reflex to Microscopic   Result Value Ref Range    Color Urine Yellow     Appearance Urine Clear     Glucose Urine Negative NEG^Negative mg/dL    Bilirubin Urine Negative NEG^Negative    Ketones Urine Negative NEG^Negative mg/dL    Specific Gravity Urine 1.025 1.000 - 1.030    Blood Urine  Negative NEG^Negative    pH Urine 5.5 5.0 - 9.0 pH    Protein Albumin Urine Negative NEG^Negative mg/dL    Urobilinogen Urine 1.0 0.2 - 1.0 EU/dL    Nitrite Urine Positive (A) NEG^Negative    Leukocyte Esterase Urine Trace (A) NEG^Negative    Source Midstream Urine    Urine Microscopic   Result Value Ref Range    WBC Urine 0 - 5 OTO5^0 - 5 /HPF    RBC Urine O - 2 OTO2^O - 2 /HPF    Bacteria Urine Moderate (A) NEG^Negative /HPF       ASSESSMENT AND PLAN:  Problem List Items Addressed This Visit        Nervous and Auditory    Controlled type 2 diabetes mellitus with diabetic polyneuropathy, without long-term current use of insulin (H) - Primary    Relevant Medications    glipiZIDE (GLUCOTROL) 5 MG tablet    Other Relevant Orders    Albumin Random Urine Quantitative with Creat Ratio    Hemoglobin A1c    *UA reflex to Microscopic    Urine Microscopic (Completed)       Digestive    Vitamin B12 deficiency -new diagnosis for 12/20/2018       Endocrine    Mixed hyperlipidemia    Relevant Medications    pravastatin (PRAVACHOL) 40 MG tablet    Other Relevant Orders    Lipid Profile       Musculoskeletal and Integumentary    Raynaud's phenomenon without gangrene    Relevant Medications    NIFEdipine ER OSMOTIC (PROCARDIA XL) 30 MG 24 hr tablet      Other Visit Diagnoses     Benign essential hypertension        Relevant Orders    Comprehensive metabolic panel    CBC with platelets    Intermittent low back pain        Relevant Medications    oxyCODONE-acetaminophen (PERCOCET) 5-325 MG tablet        reviewed diet, exercise and weight control, recommended sodium restriction, cardiovascular risk and specific lipid/LDL goals reviewed, specific diabetic recommendations low cholesterol diet, weight control and daily exercise discussed, use of aspirin to prevent MI and TIA's discussed  -- Expected clinical course discussed    -- Medications and their side effects discussed    Patient Instructions     Medications refilled.   Labs today.      Blood pressure is well controlled.     Return for Diabetes labs and clinic follow-up appointment every 3 to 4 months.  --- (Go for about 91 to 100 days)    Aspects of Diabetes we can improve:  Hemoglobin A1c Lab Results   Component Value Date    A1C 6.6 12/10/2018    A1C 6.7 07/17/2018    A1C 6.9 04/12/2018    Goal range is under 8. Best is 6.5 to 7   Blood Pressure 114/70 Goal to keep less than 140/90   Tobacco  reports that he quit smoking about 44 years ago. His smoking use included cigarettes. He has a 10.00 pack-year smoking history. He has never used smokeless tobacco. Goal to abstain from tobacco   Eye Exam -- Do Yearly -- Annual diabetic eye exam   Healthy weight Body mass index is 26.37 kg/m . Goal BMI under 30, best is under 25.      -- Trying to exercise daily (goal at least 20 min/day) with moderate aerobic activity   -- Eat healthy (resources from ADA at http://www.diabetes.org/)   -- Taking good care of my feet. Consider seeing the Podiatrist   -- Check blood sugars as directed, record in log book and bring to every appointment      Schedule lab only appointment --- A few days AFTER: 10/3/19   Schedule clinic appointment with Dr. Montanez -- Same day as labs, or 1-2 days later.     Insurance companies are now grading you and I on your blood sugar control -- Goal is to get your A1c down to 7.9% or lower and NO Smoking!    -- Medicare and most insurance companies, will only cover Hemoglobin A1c labs to be rechecked every 91+ days.      Hemoglobin A1C   Date Value Ref Range Status   12/10/2018 6.6 (H) 4.0 - 6.0 % Final   07/17/2018 6.7 (H) 4.0 - 6.0 % Final       Next follow-up appointment with Dr. Montanez should be scheduled:  -- Approximately a few days AFTER: 10/3/19       Nawaf Montanez MD  Internal Medicine  [facility]     Portions of this note were dictated using speech recognition software. The note has been proofread but errors in the text may have been overlooked. Please  contact me if there are any concerns regarding the accuracy of the dictation.

## 2019-07-05 ENCOUNTER — DOCUMENTATION ONLY (OUTPATIENT)
Dept: OTHER | Facility: CLINIC | Age: 84
End: 2019-07-05

## 2019-07-05 ENCOUNTER — OFFICE VISIT (OUTPATIENT)
Dept: INTERNAL MEDICINE | Facility: OTHER | Age: 84
End: 2019-07-05
Attending: INTERNAL MEDICINE
Payer: MEDICARE

## 2019-07-05 VITALS
HEIGHT: 70 IN | TEMPERATURE: 97.5 F | DIASTOLIC BLOOD PRESSURE: 70 MMHG | BODY MASS INDEX: 26.13 KG/M2 | SYSTOLIC BLOOD PRESSURE: 114 MMHG | HEART RATE: 64 BPM | RESPIRATION RATE: 16 BRPM | WEIGHT: 182.5 LBS

## 2019-07-05 DIAGNOSIS — R82.71 BACTERIA IN URINE: Primary | ICD-10-CM

## 2019-07-05 DIAGNOSIS — E53.8 VITAMIN B12 DEFICIENCY: ICD-10-CM

## 2019-07-05 DIAGNOSIS — E11.42 CONTROLLED TYPE 2 DIABETES MELLITUS WITH DIABETIC POLYNEUROPATHY, WITHOUT LONG-TERM CURRENT USE OF INSULIN (H): Primary | ICD-10-CM

## 2019-07-05 DIAGNOSIS — M54.50 INTERMITTENT LOW BACK PAIN: ICD-10-CM

## 2019-07-05 DIAGNOSIS — I73.00 RAYNAUD'S PHENOMENON WITHOUT GANGRENE: ICD-10-CM

## 2019-07-05 DIAGNOSIS — I10 BENIGN ESSENTIAL HYPERTENSION: ICD-10-CM

## 2019-07-05 DIAGNOSIS — E78.2 MIXED HYPERLIPIDEMIA: ICD-10-CM

## 2019-07-05 LAB
ALBUMIN SERPL-MCNC: 4.4 G/DL (ref 3.5–5.7)
ALBUMIN UR-MCNC: NEGATIVE MG/DL
ALP SERPL-CCNC: 59 U/L (ref 34–104)
ALT SERPL W P-5'-P-CCNC: 11 U/L (ref 7–52)
ANION GAP SERPL CALCULATED.3IONS-SCNC: 8 MMOL/L (ref 3–14)
APPEARANCE UR: CLEAR
AST SERPL W P-5'-P-CCNC: 12 U/L (ref 13–39)
BACTERIA #/AREA URNS HPF: ABNORMAL /HPF
BILIRUB SERPL-MCNC: 1.3 MG/DL (ref 0.3–1)
BILIRUB UR QL STRIP: NEGATIVE
BUN SERPL-MCNC: 30 MG/DL (ref 7–25)
CALCIUM SERPL-MCNC: 9.6 MG/DL (ref 8.6–10.3)
CHLORIDE SERPL-SCNC: 108 MMOL/L (ref 98–107)
CHOLEST SERPL-MCNC: 155 MG/DL
CO2 SERPL-SCNC: 28 MMOL/L (ref 21–31)
COLOR UR AUTO: YELLOW
CREAT SERPL-MCNC: 1.02 MG/DL (ref 0.7–1.3)
CREAT UR-MCNC: 184 MG/DL
ERYTHROCYTE [DISTWIDTH] IN BLOOD BY AUTOMATED COUNT: 13.9 % (ref 10–15)
GFR SERPL CREATININE-BSD FRML MDRD: 70 ML/MIN/{1.73_M2}
GLUCOSE SERPL-MCNC: 93 MG/DL (ref 70–105)
GLUCOSE UR STRIP-MCNC: NEGATIVE MG/DL
HBA1C MFR BLD: 7.1 % (ref 4–6)
HCT VFR BLD AUTO: 49.2 % (ref 40–53)
HDLC SERPL-MCNC: 34 MG/DL (ref 23–92)
HGB BLD-MCNC: 16.7 G/DL (ref 13.3–17.7)
HGB UR QL STRIP: NEGATIVE
KETONES UR STRIP-MCNC: NEGATIVE MG/DL
LDLC SERPL CALC-MCNC: 85 MG/DL
LEUKOCYTE ESTERASE UR QL STRIP: ABNORMAL
MCH RBC QN AUTO: 29.9 PG (ref 26.5–33)
MCHC RBC AUTO-ENTMCNC: 33.9 G/DL (ref 31.5–36.5)
MCV RBC AUTO: 88 FL (ref 78–100)
MICROALBUMIN UR-MCNC: 69 MG/L
MICROALBUMIN/CREAT UR: 37.5 MG/G CR (ref 0–17)
NITRATE UR QL: POSITIVE
NONHDLC SERPL-MCNC: 121 MG/DL
PH UR STRIP: 5.5 PH (ref 5–9)
PLATELET # BLD AUTO: 146 10E9/L (ref 150–450)
POTASSIUM SERPL-SCNC: 4.3 MMOL/L (ref 3.5–5.1)
PROT SERPL-MCNC: 6.8 G/DL (ref 6.4–8.9)
RBC # BLD AUTO: 5.58 10E12/L (ref 4.4–5.9)
RBC #/AREA URNS AUTO: ABNORMAL /HPF
SODIUM SERPL-SCNC: 144 MMOL/L (ref 134–144)
SOURCE: ABNORMAL
SP GR UR STRIP: 1.02 (ref 1–1.03)
TRIGL SERPL-MCNC: 179 MG/DL
UROBILINOGEN UR STRIP-ACNC: 1 EU/DL (ref 0.2–1)
WBC # BLD AUTO: 6.3 10E9/L (ref 4–11)
WBC #/AREA URNS AUTO: ABNORMAL /HPF

## 2019-07-05 PROCEDURE — 87086 URINE CULTURE/COLONY COUNT: CPT | Mod: ZL | Performed by: INTERNAL MEDICINE

## 2019-07-05 PROCEDURE — 99214 OFFICE O/P EST MOD 30 MIN: CPT | Performed by: INTERNAL MEDICINE

## 2019-07-05 PROCEDURE — 87077 CULTURE AEROBIC IDENTIFY: CPT | Mod: ZL | Performed by: INTERNAL MEDICINE

## 2019-07-05 PROCEDURE — 80053 COMPREHEN METABOLIC PANEL: CPT | Mod: ZL | Performed by: INTERNAL MEDICINE

## 2019-07-05 PROCEDURE — 85027 COMPLETE CBC AUTOMATED: CPT | Mod: ZL | Performed by: INTERNAL MEDICINE

## 2019-07-05 PROCEDURE — 81001 URINALYSIS AUTO W/SCOPE: CPT | Mod: ZL | Performed by: INTERNAL MEDICINE

## 2019-07-05 PROCEDURE — 83036 HEMOGLOBIN GLYCOSYLATED A1C: CPT | Mod: ZL | Performed by: INTERNAL MEDICINE

## 2019-07-05 PROCEDURE — 36415 COLL VENOUS BLD VENIPUNCTURE: CPT | Mod: ZL | Performed by: INTERNAL MEDICINE

## 2019-07-05 PROCEDURE — 82043 UR ALBUMIN QUANTITATIVE: CPT | Mod: ZL | Performed by: INTERNAL MEDICINE

## 2019-07-05 PROCEDURE — 80061 LIPID PANEL: CPT | Mod: ZL | Performed by: INTERNAL MEDICINE

## 2019-07-05 PROCEDURE — G0463 HOSPITAL OUTPT CLINIC VISIT: HCPCS

## 2019-07-05 RX ORDER — GLIPIZIDE 5 MG/1
5 TABLET ORAL
Qty: 180 TABLET | Refills: 3 | Status: SHIPPED | OUTPATIENT
Start: 2019-07-05 | End: 2020-04-07

## 2019-07-05 RX ORDER — PRAVASTATIN SODIUM 40 MG
40 TABLET ORAL AT BEDTIME
Qty: 90 TABLET | Refills: 3 | Status: SHIPPED | OUTPATIENT
Start: 2019-07-05 | End: 2020-04-07

## 2019-07-05 RX ORDER — NIFEDIPINE 30 MG/1
30 TABLET, EXTENDED RELEASE ORAL DAILY
Qty: 90 TABLET | Refills: 3 | Status: SHIPPED | OUTPATIENT
Start: 2019-07-05 | End: 2020-04-07

## 2019-07-05 RX ORDER — OXYCODONE AND ACETAMINOPHEN 5; 325 MG/1; MG/1
1 TABLET ORAL EVERY 6 HOURS PRN
Qty: 20 TABLET | Refills: 0 | Status: SHIPPED | OUTPATIENT
Start: 2019-07-05 | End: 2019-10-24

## 2019-07-05 ASSESSMENT — PATIENT HEALTH QUESTIONNAIRE - PHQ9
5. POOR APPETITE OR OVEREATING: NOT AT ALL
SUM OF ALL RESPONSES TO PHQ QUESTIONS 1-9: 0

## 2019-07-05 ASSESSMENT — ANXIETY QUESTIONNAIRES
1. FEELING NERVOUS, ANXIOUS, OR ON EDGE: NOT AT ALL
IF YOU CHECKED OFF ANY PROBLEMS ON THIS QUESTIONNAIRE, HOW DIFFICULT HAVE THESE PROBLEMS MADE IT FOR YOU TO DO YOUR WORK, TAKE CARE OF THINGS AT HOME, OR GET ALONG WITH OTHER PEOPLE: NOT DIFFICULT AT ALL
6. BECOMING EASILY ANNOYED OR IRRITABLE: NOT AT ALL
7. FEELING AFRAID AS IF SOMETHING AWFUL MIGHT HAPPEN: NOT AT ALL
5. BEING SO RESTLESS THAT IT IS HARD TO SIT STILL: NOT AT ALL
2. NOT BEING ABLE TO STOP OR CONTROL WORRYING: NOT AT ALL
GAD7 TOTAL SCORE: 0
3. WORRYING TOO MUCH ABOUT DIFFERENT THINGS: NOT AT ALL

## 2019-07-05 ASSESSMENT — MIFFLIN-ST. JEOR: SCORE: 1525.09

## 2019-07-05 ASSESSMENT — ENCOUNTER SYMPTOMS
LIGHT-HEADEDNESS: 0
CONFUSION: 0
SHORTNESS OF BREATH: 0
FATIGUE: 0
HEMATURIA: 0
MYALGIAS: 0
AGITATION: 0
CHILLS: 0
NAUSEA: 0
DIZZINESS: 0
DIARRHEA: 0
PALPITATIONS: 0
ABDOMINAL PAIN: 0
DYSURIA: 0
FEVER: 0
COUGH: 0
WHEEZING: 0
BRUISES/BLEEDS EASILY: 0
ARTHRALGIAS: 0
VOMITING: 0
EYE PAIN: 0

## 2019-07-05 ASSESSMENT — PAIN SCALES - GENERAL: PAINLEVEL: NO PAIN (0)

## 2019-07-05 NOTE — LETTER
July 5, 2019      Steven Dennison  [address] [address] [address]        Dear ,    We are writing to inform you of your test results.    Diabetes is controlled.      Urinalysis is abnormal showing moderate bacteria.  Urine culture ordered and pending.    Resulted Orders   Hemoglobin A1c   Result Value Ref Range    Hemoglobin A1C 7.1 (H) 4.0 - 6.0 %   Lipid Profile   Result Value Ref Range    Cholesterol 155 <200 mg/dL    Triglycerides 179 (H) <150 mg/dL      Comment:      Borderline high:  150-199 mg/dl  High:             200-499 mg/dl  Very high:       >499 mg/dl      HDL Cholesterol 34 23 - 92 mg/dL    LDL Cholesterol Calculated 85 <100 mg/dL      Comment:      Desirable:       <100 mg/dl    Non HDL Cholesterol 121 <130 mg/dL   CBC with platelets   Result Value Ref Range    WBC 6.3 4.0 - 11.0 10e9/L    RBC Count 5.58 4.4 - 5.9 10e12/L    Hemoglobin 16.7 13.3 - 17.7 g/dL    Hematocrit 49.2 40.0 - 53.0 %    MCV 88 78 - 100 fl    MCH 29.9 26.5 - 33.0 pg    MCHC 33.9 31.5 - 36.5 g/dL    RDW 13.9 10.0 - 15.0 %    Platelet Count 146 (L) 150 - 450 10e9/L   Comprehensive metabolic panel   Result Value Ref Range    Sodium 144 134 - 144 mmol/L    Potassium 4.3 3.5 - 5.1 mmol/L    Chloride 108 (H) 98 - 107 mmol/L    Carbon Dioxide 28 21 - 31 mmol/L    Anion Gap 8 3 - 14 mmol/L    Glucose 93 70 - 105 mg/dL    Urea Nitrogen 30 (H) 7 - 25 mg/dL    Creatinine 1.02 0.70 - 1.30 mg/dL    GFR Estimate 70 >60 mL/min/[1.73_m2]    GFR Estimate If Black 84 >60 mL/min/[1.73_m2]    Calcium 9.6 8.6 - 10.3 mg/dL    Bilirubin Total 1.3 (H) 0.3 - 1.0 mg/dL    Albumin 4.4 3.5 - 5.7 g/dL    Protein Total 6.8 6.4 - 8.9 g/dL    Alkaline Phosphatase 59 34 - 104 U/L    ALT 11 7 - 52 U/L    AST 12 (L) 13 - 39 U/L   *UA reflex to Microscopic   Result Value Ref Range    Color Urine Yellow     Appearance Urine Clear     Glucose Urine Negative NEG^Negative mg/dL    Bilirubin Urine Negative NEG^Negative    Ketones Urine  Negative NEG^Negative mg/dL    Specific Gravity Urine 1.025 1.000 - 1.030    Blood Urine Negative NEG^Negative    pH Urine 5.5 5.0 - 9.0 pH    Protein Albumin Urine Negative NEG^Negative mg/dL    Urobilinogen Urine 1.0 0.2 - 1.0 EU/dL    Nitrite Urine Positive (A) NEG^Negative    Leukocyte Esterase Urine Trace (A) NEG^Negative    Source Midstream Urine    Urine Microscopic   Result Value Ref Range    WBC Urine 0 - 5 OTO5^0 - 5 /HPF    RBC Urine O - 2 OTO2^O - 2 /HPF    Bacteria Urine Moderate (A) NEG^Negative /HPF       If you have any questions or concerns, please call the clinic at the number listed above.       Sincerely,        Nawaf Montanez MD

## 2019-07-05 NOTE — PATIENT INSTRUCTIONS
Medications refilled.   Labs today.     Blood pressure is well controlled.     Return for Diabetes labs and clinic follow-up appointment every 3 to 4 months.  --- (Go for about 91 to 100 days)    Aspects of Diabetes we can improve:  Hemoglobin A1c Lab Results   Component Value Date    A1C 6.6 12/10/2018    A1C 6.7 07/17/2018    A1C 6.9 04/12/2018    Goal range is under 8. Best is 6.5 to 7   Blood Pressure 114/70 Goal to keep less than 140/90   Tobacco  reports that he quit smoking about 44 years ago. His smoking use included cigarettes. He has a 10.00 pack-year smoking history. He has never used smokeless tobacco. Goal to abstain from tobacco   Eye Exam -- Do Yearly -- Annual diabetic eye exam   Healthy weight Body mass index is 26.37 kg/m . Goal BMI under 30, best is under 25.      -- Trying to exercise daily (goal at least 20 min/day) with moderate aerobic activity   -- Eat healthy (resources from ADA at http://www.diabetes.org/)   -- Taking good care of my feet. Consider seeing the Podiatrist   -- Check blood sugars as directed, record in log book and bring to every appointment      Schedule lab only appointment --- A few days AFTER: 10/3/19   Schedule clinic appointment with Dr. Montanez -- Same day as labs, or 1-2 days later.     Insurance companies are now grading you and I on your blood sugar control -- Goal is to get your A1c down to 7.9% or lower and NO Smoking!    -- Medicare and most insurance companies, will only cover Hemoglobin A1c labs to be rechecked every 91+ days.      Hemoglobin A1C   Date Value Ref Range Status   12/10/2018 6.6 (H) 4.0 - 6.0 % Final   07/17/2018 6.7 (H) 4.0 - 6.0 % Final       Next follow-up appointment with Dr. Montanez should be scheduled:  -- Approximately a few days AFTER: 10/3/19

## 2019-07-05 NOTE — NURSING NOTE
"Patient presents to the clinic for diabetes management.    Previous A1C is not at goal of <8  Lab Results   Component Value Date    A1C 6.6 12/10/2018    A1C 6.7 07/17/2018    A1C 6.9 04/12/2018     Urine microalbumin:creatine: n/a  Foot exam 4-12-18, declines today  Eye exam 11/2018    Tobacco User no  Patient is on a daily aspirin  Patient is on a Statin.  Blood pressure today of:     BP Readings from Last 1 Encounters:   05/04/19 152/55      is not at the goal of <139/89 for diabetics.    Chief Complaint   Patient presents with     Recheck Medication       Initial /70 (BP Location: Right arm, Patient Position: Sitting, Cuff Size: Adult Regular)   Pulse 64   Temp 97.5  F (36.4  C) (Tympanic)   Resp 16   Ht 1.772 m (5' 9.75\")   Wt 82.8 kg (182 lb 8 oz)   BMI 26.37 kg/m   Estimated body mass index is 26.37 kg/m  as calculated from the following:    Height as of this encounter: 1.772 m (5' 9.75\").    Weight as of this encounter: 82.8 kg (182 lb 8 oz).  Medication Reconciliation: complete    Keyla Last LPN          "

## 2019-07-06 ASSESSMENT — ANXIETY QUESTIONNAIRES: GAD7 TOTAL SCORE: 0

## 2019-07-08 DIAGNOSIS — N30.00 ACUTE CYSTITIS WITHOUT HEMATURIA: Primary | ICD-10-CM

## 2019-07-08 LAB
BACTERIA SPEC CULT: NORMAL
SPECIMEN SOURCE: NORMAL

## 2019-07-08 RX ORDER — NITROFURANTOIN MACROCRYSTAL 100 MG
100 CAPSULE ORAL 4 TIMES DAILY
Qty: 40 CAPSULE | Refills: 0 | Status: SHIPPED | OUTPATIENT
Start: 2019-07-08 | End: 2019-10-19

## 2019-08-29 DIAGNOSIS — E11.9 CONTROLLED TYPE 2 DIABETES MELLITUS WITHOUT COMPLICATION, WITHOUT LONG-TERM CURRENT USE OF INSULIN (H): ICD-10-CM

## 2019-08-29 NOTE — TELEPHONE ENCOUNTER
Refill Request for: blood glucose (ONETOUCH ULTRA) test strip   Received From: [facility]   Last Written Prescription Date: 4/12/19 for 200 strips and 3 refills. Sent to [facility]. E-Prescribing Status: Receipt confirmed by pharmacy (4/12/2019  2:41 PM CDT).   LOV: 7/5/19 with PCP  Next Appointment: No upcoming office visit on file during initial refill review with PCP  Protocol: Diabetic Supplies Protocol Passed - 8/29 1:53 PM    Refill request denied; too soon, last written prescription should provide adequate medication supply.       Lawanda STILESN, RN on 8/29/2019 at 1:57 PM

## 2019-09-04 NOTE — PROGRESS NOTES
Outpatient Physical Therapy Discharge Note     Patient: Steven Dennison  : 1935    Beginning/End Dates of Reporting Period:  19 to 2019    Referring Provider: Dr. Calzada    Therapy Diagnosis: impaired lumbosacral and soft tissue mobility     Client Self Report: Steven reports the new exercise stretches good, but still has the feeling of something there. Denies pain currently. Hasn't had any bad pain in a long time but still has to be careful of how he moves.    Objective Measurements:  Objective Measure: pelvic symmetry  Details: equal;   Objective Measure: segmental mobility  Details: B CAIO       Outcome Measures (most recent score):  NT    Goals:  Goal Identifier mobility   Goal Description Pt will have improved joint mobility to allow bending to don socks with minimal back pain less than 3/10.   Target Date 19   Date Met  19   Progress:     Goal Identifier STM   Goal Description Pt will have improved soft tissue mobility to allow sitting with minimal pain increase.   Target Date 19   Date Met  19   Progress:     Goal Identifier HEP   Goal Description Pt will be independent and consistent with performance of a customized home exercise program for self management of symptoms.   Target Date     Date Met  (progressing as able)   Progress:       Progress Toward Goals:   Not assessed this period.          Plan:  Discharge from therapy.    Discharge:    Reason for Discharge: Patient chooses to discontinue therapy.  Medicare G-code: Patient did not attend a final scheduled session prior to discharge. Unable to determine discharge functional status.  No show for last 2 scheduled visits.    Equipment Issued: N/a    Discharge Plan: Patient to continue home program.

## 2019-09-09 ENCOUNTER — TELEPHONE (OUTPATIENT)
Dept: INTERNAL MEDICINE | Facility: OTHER | Age: 84
End: 2019-09-09

## 2019-09-09 NOTE — TELEPHONE ENCOUNTER
[facility]  Form completed and awaiting MD signature at this time.      Keyla Last LPN 9/9/2019 11:36 AM

## 2019-09-13 ENCOUNTER — TELEPHONE (OUTPATIENT)
Dept: INTERNAL MEDICINE | Facility: OTHER | Age: 84
End: 2019-09-13

## 2019-09-13 NOTE — TELEPHONE ENCOUNTER
Received CMN for diabetes management faxed from [facility].  Form completed and faxed back at this time.      Keyla Last LPN 9/13/2019 10:20 AM

## 2019-10-12 DIAGNOSIS — I49.1 PREMATURE ATRIAL CONTRACTIONS: ICD-10-CM

## 2019-10-14 RX ORDER — METOPROLOL TARTRATE 25 MG/1
TABLET, FILM COATED ORAL
Qty: 90 TABLET | Refills: 3 | Status: SHIPPED | OUTPATIENT
Start: 2019-10-14 | End: 2020-07-09

## 2019-10-14 NOTE — TELEPHONE ENCOUNTER
Prescription approved per [facility] Refill Protocol.  Sandra Bailey RN on 10/14/2019 at 10:30 AM

## 2019-10-19 RX ORDER — FLUTICASONE PROPIONATE 50 MCG
SPRAY, SUSPENSION (ML) NASAL PRN
Refills: 3 | COMMUNITY
Start: 2019-09-23

## 2019-10-19 RX ORDER — BIMATOPROST 0.1 MG/ML
SOLUTION/ DROPS OPHTHALMIC
Refills: 3 | COMMUNITY
Start: 2019-09-24 | End: 2022-10-26

## 2019-10-24 ENCOUNTER — OFFICE VISIT (OUTPATIENT)
Dept: INTERNAL MEDICINE | Facility: OTHER | Age: 84
End: 2019-10-24
Attending: INTERNAL MEDICINE
Payer: MEDICARE

## 2019-10-24 VITALS
RESPIRATION RATE: 16 BRPM | WEIGHT: 187.8 LBS | TEMPERATURE: 96.4 F | BODY MASS INDEX: 27.14 KG/M2 | OXYGEN SATURATION: 94 % | SYSTOLIC BLOOD PRESSURE: 138 MMHG | HEART RATE: 55 BPM | DIASTOLIC BLOOD PRESSURE: 68 MMHG

## 2019-10-24 DIAGNOSIS — J20.9 ACUTE PURULENT BRONCHITIS: ICD-10-CM

## 2019-10-24 DIAGNOSIS — M54.50 INTERMITTENT LOW BACK PAIN: ICD-10-CM

## 2019-10-24 DIAGNOSIS — E11.42 CONTROLLED TYPE 2 DIABETES MELLITUS WITH DIABETIC POLYNEUROPATHY, WITHOUT LONG-TERM CURRENT USE OF INSULIN (H): Primary | ICD-10-CM

## 2019-10-24 DIAGNOSIS — N40.0 BENIGN PROSTATIC HYPERPLASIA, UNSPECIFIED WHETHER LOWER URINARY TRACT SYMPTOMS PRESENT: ICD-10-CM

## 2019-10-24 DIAGNOSIS — E78.2 MIXED HYPERLIPIDEMIA: ICD-10-CM

## 2019-10-24 DIAGNOSIS — Z12.5 SCREENING PSA (PROSTATE SPECIFIC ANTIGEN): ICD-10-CM

## 2019-10-24 DIAGNOSIS — I10 BENIGN ESSENTIAL HYPERTENSION: ICD-10-CM

## 2019-10-24 LAB
ALBUMIN SERPL-MCNC: 4.6 G/DL (ref 3.5–5.7)
ALBUMIN UR-MCNC: NEGATIVE MG/DL
ALP SERPL-CCNC: 63 U/L (ref 34–104)
ALT SERPL W P-5'-P-CCNC: 9 U/L (ref 7–52)
ANION GAP SERPL CALCULATED.3IONS-SCNC: 8 MMOL/L (ref 3–14)
APPEARANCE UR: CLEAR
AST SERPL W P-5'-P-CCNC: 11 U/L (ref 13–39)
BILIRUB SERPL-MCNC: 0.8 MG/DL (ref 0.3–1)
BILIRUB UR QL STRIP: NEGATIVE
BUN SERPL-MCNC: 26 MG/DL (ref 7–25)
CALCIUM SERPL-MCNC: 9.5 MG/DL (ref 8.6–10.3)
CHLORIDE SERPL-SCNC: 103 MMOL/L (ref 98–107)
CHOLEST SERPL-MCNC: 132 MG/DL
CO2 SERPL-SCNC: 28 MMOL/L (ref 21–31)
COLOR UR AUTO: YELLOW
CREAT SERPL-MCNC: 0.97 MG/DL (ref 0.7–1.3)
CREAT UR-MCNC: 74 MG/DL
ERYTHROCYTE [DISTWIDTH] IN BLOOD BY AUTOMATED COUNT: 13.3 % (ref 10–15)
GFR SERPL CREATININE-BSD FRML MDRD: 74 ML/MIN/{1.73_M2}
GLUCOSE SERPL-MCNC: 130 MG/DL (ref 70–105)
GLUCOSE UR STRIP-MCNC: NEGATIVE MG/DL
HBA1C MFR BLD: 6.6 % (ref 4–6)
HCT VFR BLD AUTO: 48.8 % (ref 40–53)
HDLC SERPL-MCNC: 32 MG/DL (ref 23–92)
HGB BLD-MCNC: 16.2 G/DL (ref 13.3–17.7)
HGB UR QL STRIP: NEGATIVE
KETONES UR STRIP-MCNC: NEGATIVE MG/DL
LDLC SERPL CALC-MCNC: 78 MG/DL
LEUKOCYTE ESTERASE UR QL STRIP: NEGATIVE
MCH RBC QN AUTO: 29.3 PG (ref 26.5–33)
MCHC RBC AUTO-ENTMCNC: 33.2 G/DL (ref 31.5–36.5)
MCV RBC AUTO: 88 FL (ref 78–100)
MICROALBUMIN UR-MCNC: 33 MG/L
MICROALBUMIN/CREAT UR: 45.17 MG/G CR (ref 0–17)
NITRATE UR QL: NEGATIVE
NONHDLC SERPL-MCNC: 100 MG/DL
PH UR STRIP: 5 PH (ref 5–9)
PLATELET # BLD AUTO: 149 10E9/L (ref 150–450)
POTASSIUM SERPL-SCNC: 4.1 MMOL/L (ref 3.5–5.1)
PROT SERPL-MCNC: 7.7 G/DL (ref 6.4–8.9)
PSA SERPL-ACNC: 3.07 NG/ML
RBC # BLD AUTO: 5.53 10E12/L (ref 4.4–5.9)
SODIUM SERPL-SCNC: 139 MMOL/L (ref 134–144)
SOURCE: NORMAL
SP GR UR STRIP: 1.02 (ref 1–1.03)
TRIGL SERPL-MCNC: 112 MG/DL
UROBILINOGEN UR STRIP-ACNC: 0.2 EU/DL (ref 0.2–1)
WBC # BLD AUTO: 8.7 10E9/L (ref 4–11)

## 2019-10-24 PROCEDURE — 36415 COLL VENOUS BLD VENIPUNCTURE: CPT | Mod: ZL | Performed by: INTERNAL MEDICINE

## 2019-10-24 PROCEDURE — 81003 URINALYSIS AUTO W/O SCOPE: CPT | Mod: ZL | Performed by: INTERNAL MEDICINE

## 2019-10-24 PROCEDURE — 99214 OFFICE O/P EST MOD 30 MIN: CPT | Performed by: INTERNAL MEDICINE

## 2019-10-24 PROCEDURE — 85027 COMPLETE CBC AUTOMATED: CPT | Mod: ZL | Performed by: INTERNAL MEDICINE

## 2019-10-24 PROCEDURE — 82043 UR ALBUMIN QUANTITATIVE: CPT | Mod: ZL | Performed by: INTERNAL MEDICINE

## 2019-10-24 PROCEDURE — 80061 LIPID PANEL: CPT | Mod: ZL | Performed by: INTERNAL MEDICINE

## 2019-10-24 PROCEDURE — 83036 HEMOGLOBIN GLYCOSYLATED A1C: CPT | Mod: ZL | Performed by: INTERNAL MEDICINE

## 2019-10-24 PROCEDURE — 80053 COMPREHEN METABOLIC PANEL: CPT | Mod: ZL | Performed by: INTERNAL MEDICINE

## 2019-10-24 PROCEDURE — G0103 PSA SCREENING: HCPCS | Mod: ZL | Performed by: INTERNAL MEDICINE

## 2019-10-24 PROCEDURE — G0463 HOSPITAL OUTPT CLINIC VISIT: HCPCS

## 2019-10-24 RX ORDER — AZITHROMYCIN 250 MG/1
TABLET, FILM COATED ORAL
Qty: 6 TABLET | Refills: 0 | Status: SHIPPED | OUTPATIENT
Start: 2019-10-24 | End: 2020-04-03

## 2019-10-24 RX ORDER — LANCETS 28 GAUGE
EACH MISCELLANEOUS
Qty: 200 EACH | Refills: 2 | Status: CANCELLED | OUTPATIENT
Start: 2019-10-24

## 2019-10-24 RX ORDER — GUAIFENESIN AND DEXTROMETHORPHAN HYDROBROMIDE 1200; 60 MG/1; MG/1
1 TABLET, EXTENDED RELEASE ORAL 2 TIMES DAILY PRN
Qty: 60 TABLET | Refills: 1 | Status: SHIPPED | OUTPATIENT
Start: 2019-10-24 | End: 2020-07-09

## 2019-10-24 RX ORDER — OXYCODONE AND ACETAMINOPHEN 5; 325 MG/1; MG/1
1 TABLET ORAL EVERY 6 HOURS PRN
Qty: 20 TABLET | Refills: 0 | Status: CANCELLED | OUTPATIENT
Start: 2019-10-24

## 2019-10-24 RX ORDER — OXYCODONE AND ACETAMINOPHEN 5; 325 MG/1; MG/1
1 TABLET ORAL EVERY 6 HOURS PRN
Qty: 20 TABLET | Refills: 0 | Status: SHIPPED | OUTPATIENT
Start: 2019-10-24 | End: 2020-04-03

## 2019-10-24 ASSESSMENT — ENCOUNTER SYMPTOMS
AGITATION: 0
VOMITING: 0
WHEEZING: 1
SHORTNESS OF BREATH: 1
HEMATURIA: 0
CONFUSION: 0
DIARRHEA: 0
LIGHT-HEADEDNESS: 0
CHILLS: 0
BACK PAIN: 1
DIZZINESS: 0
NAUSEA: 0
MYALGIAS: 0
BRUISES/BLEEDS EASILY: 0
EYE PAIN: 0
FEVER: 0
PALPITATIONS: 0
FATIGUE: 0
ABDOMINAL PAIN: 0
ARTHRALGIAS: 0
COUGH: 1
DYSURIA: 0

## 2019-10-24 ASSESSMENT — PAIN SCALES - GENERAL: PAINLEVEL: MILD PAIN (3)

## 2019-10-24 NOTE — NURSING NOTE
Chief Complaint   Patient presents with     Medication Therapy Management       Medication Reconciliation: complete   Patient here for medication check. Patient gets occasional pains in chest. Patient has a cough productive light green/yellow.  Patient states that feet feel swollen at night.   Lorrie Farfan LPN  ..................10/24/2019   10:13 AM

## 2019-10-24 NOTE — LETTER
October 24, 2019      Steven Dennison  [id number] [address] [id number]        Dear ,    We are writing to inform you of your test results.    PSA/prostate cancer lab is normal.    Diabetes is well controlled.    Kidney function/creatinine is stable.    Cholesterol levels look good.    Continue current medications.    Resulted Orders   PSA Screen GH   Result Value Ref Range    PSA Screen 3.073 <3.100 ng/mL      Comment:      The DXI Access PSAS WHO assay is a two site immunoenzymatic assay. Assay   values obtained with different assay methods cannot be used interchangeably   due to differences in assay methods and reagent specificity.     *UA reflex to Microscopic   Result Value Ref Range    Color Urine Yellow     Appearance Urine Clear     Glucose Urine Negative NEG^Negative mg/dL    Bilirubin Urine Negative NEG^Negative    Ketones Urine Negative NEG^Negative mg/dL    Specific Gravity Urine 1.025 1.000 - 1.030    Blood Urine Negative NEG^Negative    pH Urine 5.0 5.0 - 9.0 pH    Protein Albumin Urine Negative NEG^Negative mg/dL    Urobilinogen Urine 0.2 0.2 - 1.0 EU/dL    Nitrite Urine Negative NEG^Negative    Leukocyte Esterase Urine Negative NEG^Negative    Source Midstream Urine    Lipid Profile   Result Value Ref Range    Cholesterol 132 <200 mg/dL    Triglycerides 112 <150 mg/dL    HDL Cholesterol 32 23 - 92 mg/dL    LDL Cholesterol Calculated 78 <100 mg/dL      Comment:      Desirable:       <100 mg/dl    Non HDL Cholesterol 100 <130 mg/dL   Hemoglobin A1c   Result Value Ref Range    Hemoglobin A1C 6.6 (H) 4.0 - 6.0 %   Albumin Random Urine Quantitative with Creat Ratio   Result Value Ref Range    Creatinine Urine 74 mg/dL    Albumin Urine mg/L 33 mg/L    Albumin Urine mg/g Cr 45.17 (H) 0 - 17 mg/g Cr   CBC with platelets   Result Value Ref Range    WBC 8.7 4.0 - 11.0 10e9/L    RBC Count 5.53 4.4 - 5.9 10e12/L    Hemoglobin 16.2 13.3 - 17.7 g/dL    Hematocrit 48.8 40.0 - 53.0 %    MCV  88 78 - 100 fl    MCH 29.3 26.5 - 33.0 pg    MCHC 33.2 31.5 - 36.5 g/dL    RDW 13.3 10.0 - 15.0 %    Platelet Count 149 (L) 150 - 450 10e9/L   Comprehensive metabolic panel   Result Value Ref Range    Sodium 139 134 - 144 mmol/L    Potassium 4.1 3.5 - 5.1 mmol/L    Chloride 103 98 - 107 mmol/L    Carbon Dioxide 28 21 - 31 mmol/L    Anion Gap 8 3 - 14 mmol/L    Glucose 130 (H) 70 - 105 mg/dL    Urea Nitrogen 26 (H) 7 - 25 mg/dL    Creatinine 0.97 0.70 - 1.30 mg/dL    GFR Estimate 74 >60 mL/min/[1.73_m2]    GFR Estimate If Black 89 >60 mL/min/[1.73_m2]    Calcium 9.5 8.6 - 10.3 mg/dL    Bilirubin Total 0.8 0.3 - 1.0 mg/dL    Albumin 4.6 3.5 - 5.7 g/dL    Protein Total 7.7 6.4 - 8.9 g/dL    Alkaline Phosphatase 63 34 - 104 U/L    ALT 9 7 - 52 U/L    AST 11 (L) 13 - 39 U/L       If you have any questions or concerns, please call the clinic at the number listed above.       Sincerely,        Nawaf Montanez MD

## 2019-10-24 NOTE — PATIENT INSTRUCTIONS
Labs today.     Medications refilled.     Diabetic Education referral was sent  - they will call with date/time of appointment.       Return for Diabetes labs and clinic follow-up appointment every 3 to 4 months.  --- (Go for about 91 to 100 days)    Aspects of Diabetes we can improve:  Hemoglobin A1c Lab Results   Component Value Date    A1C 7.1 07/05/2019    A1C 6.6 12/10/2018    A1C 6.7 07/17/2018    A1C 6.9 04/12/2018    Goal range is under 8. Best is 6.5 to 7   Blood Pressure 138/68 Goal to keep less than 140/90   Tobacco  reports that he quit smoking about 44 years ago. His smoking use included cigarettes. He has a 10.00 pack-year smoking history. He has never used smokeless tobacco. Goal to abstain from tobacco   Eye Exam -- Do Yearly -- Annual diabetic eye exam   Healthy weight Body mass index is 27.14 kg/m . Goal BMI under 30, best is under 25.      -- Trying to exercise daily (goal at least 20 min/day) with moderate aerobic activity   -- Eat healthy (resources from ADA at http://www.diabetes.org/)   -- Taking good care of my feet. Consider seeing the Podiatrist   -- Check blood sugars as directed, record in log book and bring to every appointment      Schedule lab only appointment --- A few days AFTER: 1/22/20   Schedule clinic appointment with Dr. Montanez -- Same day as labs, or 1-2 days later.     Insurance companies are now grading you and I on your blood sugar control -- Goal is to get your A1c down to 7.9% or lower and NO Smoking!    -- Medicare and most insurance companies, will only cover Hemoglobin A1c labs to be rechecked every 91+ days.      Hemoglobin A1C   Date Value Ref Range Status   07/05/2019 7.1 (H) 4.0 - 6.0 % Final   12/10/2018 6.6 (H) 4.0 - 6.0 % Final       Next follow-up appointment with Dr. Montanez should be scheduled:  -- Approximately a few days AFTER: 1/22/20

## 2019-10-24 NOTE — PROGRESS NOTES
Nursing Notes:   Lorrie Farfna LPN  10/24/2019 10:45 AM  Signed  Chief Complaint   Patient presents with     Medication Therapy Management       Medication Reconciliation: complete   Patient here for medication check. Patient gets occasional pains in chest. Patient has a cough productive light green/yellow.  Patient states that feet feel swollen at night.   Lorrie Farfan LPN  ..................10/24/2019   10:13 AM   Nursing note reviewed with patient.  Accuracy and completeness verified.   Mr. Dennison is a 84 year old male who:  Patient presents with:  Medication Therapy Management      ICD-10-CM    1. Controlled type 2 diabetes mellitus with diabetic polyneuropathy, without long-term current use of insulin (H) E11.42 *UA reflex to Microscopic     Hemoglobin A1c     Albumin Random Urine Quantitative with Creat Ratio   2. Benign prostatic hyperplasia, unspecified whether lower urinary tract symptoms present N40.0 PSA Screen GH     PSA Screen GH   3. Screening PSA (prostate specific antigen) Z12.5 PSA Screen GH     PSA Screen GH   4. Intermittent low back pain M54.5 oxyCODONE-acetaminophen (PERCOCET) 5-325 MG tablet   5. Acute purulent bronchitis J20.8 azithromycin (ZITHROMAX) 250 MG tablet     Dextromethorphan-guaiFENesin (MUCINEX DM MAXIMUM STRENGTH)  MG TB12   6. Mixed hyperlipidemia E78.2 Lipid Profile   7. Benign essential hypertension I10 CBC with platelets     Comprehensive metabolic panel     HPI  Patient presents for routine follow-up.  He is also dealing with acute respiratory illness.    Type 2 diabetes, has been well controlled.  Doing well with metformin.  No side effects reported.  States that he does feel like he gets a low blood sugar at times especially when he is very active.  Due for lab work.  Wants to try getting a Different type of blood sugar sensor, possibly one that he can swipe his arm and get his blood sugar reading.  He bowls regularly and dances regularly and at  times feels like his sugar blood sugar gets low.  He will eat a piece of candy and sugar will come up and he will feel better.  He would like diabetic education referral to talk about other methods to check his blood sugar.    Screening PSA, labs today.    Intermittent low back pain, very rarely uses a tablet of Percocet.  He would like a another 20 tablet refill.  He still has a few left from his prescription months ago.    Purulent bronchitis, has been having respiratory symptoms for the last week or so.  Yellow-green phlegm.  Some wheezing coughing and shortness of breath noted.  He would like to start something to help with his symptoms.  Start Zithromax and Mucinex DM.    Hypertension, currently well controlled.  Tolerating medication regimen.  No side effects reported.  Continue current dosing.  Check labs.    Mixed hyperlipidemia, doing well with pravastatin.  Tolerating well.  No side effects noted.  Labs ordered.    Functional Capacity: > 4 METS. + dances weekly. Bowling regularly.   Review of Systems   Constitutional: Negative for chills, fatigue and fever.   HENT: Negative for congestion and hearing loss.    Eyes: Negative for pain and visual disturbance.   Respiratory: Positive for cough, shortness of breath and wheezing.         + chest symptoms for past week or so... yellow/green phlegm   Cardiovascular: Negative for chest pain and palpitations.   Gastrointestinal: Negative for abdominal pain, diarrhea, nausea and vomiting.   Endocrine: Negative for cold intolerance and heat intolerance.   Genitourinary: Negative for dysuria and hematuria.        + feels like emptying bladder okay, occasionally will have to go back to bathroom a 2nd time.    Musculoskeletal: Positive for back pain (  + intermittent low back pain). Negative for arthralgias and myalgias.   Skin: Negative for pallor.   Allergic/Immunologic: Negative for immunocompromised state.   Neurological: Negative for dizziness and light-headedness.  "       Now rare positional lightheadedness   Hematological: Does not bruise/bleed easily.   Psychiatric/Behavioral: Negative for agitation and confusion.      Problem List/PMH: reviewed in EMR, and made relevant updates today.  Medications: reviewed in EMR, and made relevant updates today.  Allergies: reviewed in EMR, and made relevant updates today.  I reviewed family and social history and made relevant updates today.  Social History     Tobacco Use     Smoking status: Former Smoker     Packs/day: 1.00     Years: 10.00     Pack years: 10.00     Types: Cigarettes     Last attempt to quit: 1975     Years since quittin.8     Smokeless tobacco: Never Used   Substance Use Topics     Alcohol use: Yes     Alcohol/week: 5.0 standard drinks     Drug use: No      Family History   Problem Relation Age of Onset     Other - See Comments Father          at 74 of natural causes     Heart Disease Mother 69        Heart Disease, of MI     Diabetes Mother         Diabetes     Other - See Comments Mother         Stroke     Family History Negative Sister         Good Health     Family History Negative Sister         Good Health     Cancer Brother         Cancer       EXAM:   Vitals:    10/24/19 1014   BP: 138/68   BP Location: Right arm   Patient Position: Sitting   Cuff Size: Adult Regular   Pulse: 55   Resp: 16   Temp: 96.4  F (35.8  C)   TempSrc: Tympanic   SpO2: 94%   Weight: 85.2 kg (187 lb 12.8 oz)       Current Pain Score: Mild Pain (3)     BP Readings from Last 3 Encounters:   10/24/19 138/68   19 114/70   19 152/55      Wt Readings from Last 3 Encounters:   10/24/19 85.2 kg (187 lb 12.8 oz)   19 82.8 kg (182 lb 8 oz)   19 83 kg (183 lb)      Estimated body mass index is 27.14 kg/m  as calculated from the following:    Height as of 19: 1.772 m (5' 9.75\").    Weight as of this encounter: 85.2 kg (187 lb 12.8 oz).     Physical Exam  Constitutional:       General: He is not in acute " distress.     Appearance: He is well-developed. He is not diaphoretic.   HENT:      Head: Normocephalic and atraumatic.      Nose: Congestion present.      Mouth/Throat:      Pharynx: No oropharyngeal exudate or posterior oropharyngeal erythema.   Eyes:      General: No scleral icterus.     Conjunctiva/sclera: Conjunctivae normal.   Neck:      Musculoskeletal: Neck supple.      Vascular: No carotid bruit.   Cardiovascular:      Rate and Rhythm: Normal rate and regular rhythm.      Heart sounds: No murmur.   Pulmonary:      Effort: Pulmonary effort is normal.      Breath sounds: Rales present.      Comments: Few bilateral basilar crackles  Abdominal:      Palpations: Abdomen is soft.      Tenderness: There is no tenderness.   Musculoskeletal:         General: No deformity.      Right lower leg: No edema.      Left lower leg: No edema.   Lymphadenopathy:      Cervical: No cervical adenopathy.   Skin:     General: Skin is warm and dry.      Findings: No rash.      Comments: Diabetic Foot Exam / Screen:  Any complaints of increased pain or numbness ?  YES -- tingling, neurontin at bedtime helps  Is there a foot ulcer now or a history of foot ulcer? No  Does the foot have an abnormal shape?  YES -- right foot bunion at great toe.   Are the nails thick, too long or ingrown?  YES -- hypertrophic and onychomycotic.   Are there any redness or open areas? No    Right Foot: Sensation Normal at all points  Left Foot: Sensation Normal at all points     Risk Category: 0- No loss of protective sensation   Neurological:      General: No focal deficit present.      Mental Status: He is alert.   Psychiatric:         Mood and Affect: Mood normal.         Behavior: Behavior normal.        Procedures   INVESTIGATIONS:  Results for orders placed or performed in visit on 10/24/19   PSA Screen GH   Result Value Ref Range    PSA Screen 3.073 <3.100 ng/mL   *UA reflex to Microscopic   Result Value Ref Range    Color Urine Yellow      Appearance Urine Clear     Glucose Urine Negative NEG^Negative mg/dL    Bilirubin Urine Negative NEG^Negative    Ketones Urine Negative NEG^Negative mg/dL    Specific Gravity Urine 1.025 1.000 - 1.030    Blood Urine Negative NEG^Negative    pH Urine 5.0 5.0 - 9.0 pH    Protein Albumin Urine Negative NEG^Negative mg/dL    Urobilinogen Urine 0.2 0.2 - 1.0 EU/dL    Nitrite Urine Negative NEG^Negative    Leukocyte Esterase Urine Negative NEG^Negative    Source Midstream Urine    Lipid Profile   Result Value Ref Range    Cholesterol 132 <200 mg/dL    Triglycerides 112 <150 mg/dL    HDL Cholesterol 32 23 - 92 mg/dL    LDL Cholesterol Calculated 78 <100 mg/dL    Non HDL Cholesterol 100 <130 mg/dL   Hemoglobin A1c   Result Value Ref Range    Hemoglobin A1C 6.6 (H) 4.0 - 6.0 %   CBC with platelets   Result Value Ref Range    WBC 8.7 4.0 - 11.0 10e9/L    RBC Count 5.53 4.4 - 5.9 10e12/L    Hemoglobin 16.2 13.3 - 17.7 g/dL    Hematocrit 48.8 40.0 - 53.0 %    MCV 88 78 - 100 fl    MCH 29.3 26.5 - 33.0 pg    MCHC 33.2 31.5 - 36.5 g/dL    RDW 13.3 10.0 - 15.0 %    Platelet Count 149 (L) 150 - 450 10e9/L   Comprehensive metabolic panel   Result Value Ref Range    Sodium 139 134 - 144 mmol/L    Potassium 4.1 3.5 - 5.1 mmol/L    Chloride 103 98 - 107 mmol/L    Carbon Dioxide 28 21 - 31 mmol/L    Anion Gap 8 3 - 14 mmol/L    Glucose 130 (H) 70 - 105 mg/dL    Urea Nitrogen 26 (H) 7 - 25 mg/dL    Creatinine 0.97 0.70 - 1.30 mg/dL    GFR Estimate 74 >60 mL/min/[1.73_m2]    GFR Estimate If Black 89 >60 mL/min/[1.73_m2]    Calcium 9.5 8.6 - 10.3 mg/dL    Bilirubin Total 0.8 0.3 - 1.0 mg/dL    Albumin 4.6 3.5 - 5.7 g/dL    Protein Total 7.7 6.4 - 8.9 g/dL    Alkaline Phosphatase 63 34 - 104 U/L    ALT 9 7 - 52 U/L    AST 11 (L) 13 - 39 U/L       ASSESSMENT AND PLAN:  Problem List Items Addressed This Visit        Nervous and Auditory    Controlled type 2 diabetes mellitus with diabetic polyneuropathy, without long-term current use of  insulin (H) - Primary       Endocrine    Mixed hyperlipidemia       Urinary    Benign prostatic hyperplasia    Relevant Orders    PSA Screen GH (Completed)      Other Visit Diagnoses     Screening PSA (prostate specific antigen)        Relevant Orders    PSA Screen GH (Completed)    Intermittent low back pain        Relevant Medications    oxyCODONE-acetaminophen (PERCOCET) 5-325 MG tablet    Acute purulent bronchitis        Relevant Medications    azithromycin (ZITHROMAX) 250 MG tablet    Dextromethorphan-guaiFENesin (MUCINEX DM MAXIMUM STRENGTH)  MG TB12    Benign essential hypertension            reviewed diet, exercise and weight control, recommended sodium restriction, cardiovascular risk and specific lipid/LDL goals reviewed, specific diabetic recommendations low cholesterol diet, weight control and daily exercise discussed, use of aspirin to prevent MI and TIA's discussed  -- Expected clinical course discussed    -- Medications and their side effects discussed    Patient Instructions     Labs today.     Medications refilled.     Diabetic Education referral was sent  - they will call with date/time of appointment.       Return for Diabetes labs and clinic follow-up appointment every 3 to 4 months.  --- (Go for about 91 to 100 days)    Aspects of Diabetes we can improve:  Hemoglobin A1c Lab Results   Component Value Date    A1C 7.1 07/05/2019    A1C 6.6 12/10/2018    A1C 6.7 07/17/2018    A1C 6.9 04/12/2018    Goal range is under 8. Best is 6.5 to 7   Blood Pressure 138/68 Goal to keep less than 140/90   Tobacco  reports that he quit smoking about 44 years ago. His smoking use included cigarettes. He has a 10.00 pack-year smoking history. He has never used smokeless tobacco. Goal to abstain from tobacco   Eye Exam -- Do Yearly -- Annual diabetic eye exam   Healthy weight Body mass index is 27.14 kg/m . Goal BMI under 30, best is under 25.      -- Trying to exercise daily (goal at least 20 min/day) with  moderate aerobic activity   -- Eat healthy (resources from ADA at http://www.diabetes.org/)   -- Taking good care of my feet. Consider seeing the Podiatrist   -- Check blood sugars as directed, record in log book and bring to every appointment      Schedule lab only appointment --- A few days AFTER: 1/22/20   Schedule clinic appointment with Dr. Montanez -- Same day as labs, or 1-2 days later.     Insurance companies are now grading you and I on your blood sugar control -- Goal is to get your A1c down to 7.9% or lower and NO Smoking!    -- Medicare and most insurance companies, will only cover Hemoglobin A1c labs to be rechecked every 91+ days.      Hemoglobin A1C   Date Value Ref Range Status   07/05/2019 7.1 (H) 4.0 - 6.0 % Final   12/10/2018 6.6 (H) 4.0 - 6.0 % Final       Next follow-up appointment with Dr. Montanez should be scheduled:  -- Approximately a few days AFTER: 1/22/20      Nawaf Montanez MD  Internal Medicine  [facility]     Portions of this note were dictated using speech recognition software. The note has been proofread but errors in the text may have been overlooked. Please contact me if there are any concerns regarding the accuracy of the dictation.

## 2019-11-04 ENCOUNTER — ALLIED HEALTH/NURSE VISIT (OUTPATIENT)
Dept: EDUCATION SERVICES | Facility: OTHER | Age: 84
End: 2019-11-04
Attending: INTERNAL MEDICINE
Payer: MEDICARE

## 2019-11-04 DIAGNOSIS — E11.42 CONTROLLED TYPE 2 DIABETES MELLITUS WITH DIABETIC POLYNEUROPATHY, WITHOUT LONG-TERM CURRENT USE OF INSULIN (H): Primary | ICD-10-CM

## 2019-11-04 PROCEDURE — 99211 OFF/OP EST MAY X REQ PHY/QHP: CPT | Performed by: REGISTERED NURSE

## 2019-11-04 NOTE — PATIENT INSTRUCTIONS
Continuous Glucose Monitoring     Congratulations! You have decided to utilize the DEXCOM G4 PLATINUM Continuous Glucose Monitoring System. We strongly believe in the use of the new technologies to better assist you in managing your diabetes. These technologies give both you and your provider a better understanding of your diabetes and current trends, which will enable you to improve control of your blood glucose levels.     Remember the DEXCOM G4 PLATINUM CGM is contraindicated with acetaminophen.  Low and high alerts are set 80 mg/dL and 200 mg/dL. Sensor inserted today at 11:30 am. The information from  will be interpreted at CGM follow-up visit.     PLAN:     1. Calibration - Check BG and enter values into  twice at initial start up and once every 12 hours until sensor stop (7 days).     2. Events - You are encouraged to enter events such as Carbohydrates (grams), Insulin (units), Exercise (intensity and duration), Health (illness, stress, high/low symptoms, and alcohol).     3. Battery - Charge  with  (included in box) 1 - 3 hours every 3 days or as  battery depletes.     4. [facility] Wednesday, November 13th.  Items to be returned in DexCom box: , , Transmitter/Sensor in envelope, User Manual/Quick Start Guide.     5. Return for Follow-up with Diabetes Educator when results available.       Keri Sal RN, BSN, CDE 11/4/2019 11:56 AM

## 2019-11-04 NOTE — PROGRESS NOTES
"Diabetes Education Progress Note  Continuous Glucose Monitoring System(CGMS)    SUBJECTIVE: Steven Dennison is a 84 year old male who has type 2 diabetes and is here for his Continuous Glucose Monitoring System (CGMS) application.     Patient states his FBG 57 - 195 with average 126 mg/dL, preSupper BG 57 - 195 with average 125 mg/dL.  He shares, \"When I am bowling, I make sure my sugar is at least 160 or I will go low and have to drink juice.\"  Patient states hypoglycemia episodes happen about once weekly.  Discussed Glipizide and risk for hypoglycemia.  Patient would like to wear CGM to find glucose trends before adjusting his medications.     OBJECTIVE:  FBG today: 126 mg/dl  Testing frequency:  2 - 3 times daily    Current Diabetes Medications: Metformin and Glipizide     ASSESSMENT: Steven was instructed in features of DEX[employer] G4 PLATINUM CGMS. He was instructed in programming a BG reading, events for meds, CHO, exercise and health such as stress, illness, high/low symptoms,. He will wear the sensor for 7 days and return to the office for download of stored data. The data will be evaluatedand medication recommendations given to PCP.     Time spent with patient was 60 minutes.     PLAN:  1. Wear CGMS for 7 days, patient toremove sensor with transmitter.  2. Return all parts of CGMS including , Transmitter, , and [facility].  3. Return for Follow up with Diabetes Educator after results available.    Keri Sal RN, BSN, CDE  11/4/2019 11:48 AM        "

## 2019-11-20 ENCOUNTER — ALLIED HEALTH/NURSE VISIT (OUTPATIENT)
Dept: EDUCATION SERVICES | Facility: OTHER | Age: 84
End: 2019-11-20
Attending: INTERNAL MEDICINE
Payer: MEDICARE

## 2019-11-20 DIAGNOSIS — E11.42 CONTROLLED TYPE 2 DIABETES MELLITUS WITH DIABETIC POLYNEUROPATHY, WITHOUT LONG-TERM CURRENT USE OF INSULIN (H): Primary | ICD-10-CM

## 2019-11-20 PROCEDURE — G0108 DIAB MANAGE TRN  PER INDIV: HCPCS

## 2019-11-25 NOTE — PROGRESS NOTES
Diabetes EducationProgress Note  Continuous Glucose Monitoring System (CGMS) Results    SUBJECTIVE: Steven Dennison is a 84 year old male who has Type 2 diabetes and is here for his Continuous Glucose Monitoring System (CGMS) results.     OBJECTIVE:  Testing frequency: 2 - 3 times daily    Current Diabetes Medications: Metformin and Glipizide   CGM Report:      CGM download glucose results:    Report shows 142 mg/dl average sensor glucose over the past two weeks.    Standard deviation (SD) is +- 33 mg/dl. Goal for SD is +-50 mg/dl or lower.    Patient calibrates the CGM an average of 3 times daily.      Glucose 86% in target (70 - 180), 14% above target (181 - 400), and 1% below target (39 - 69).      No significant patterns of highs or lows found.    Plan:    1.  Due to glucose trending low after lunch and before evening meal when patient is more active, recommend decreasing Glipizide from 5 mg one tab with breakfast, to 1/2 tab with breakfast on active days.       Time spent with patient was 60 minutes.     Krei Sal RN, BSN, CDE  11/20/2019 2:44 PM

## 2019-11-27 NOTE — PATIENT INSTRUCTIONS
CGM Report and Recommendations    CGM Report:    Your sensor glucose results measure a total of 1,555 readings over the past week.  Report shows all day totals:  86% in target (70 - 180), 14% High (181 - 401), and 1% below (39 - 69).  Total average glucose during CGM use:  142 mg/dL.    No significant patternw of high or low blood sugar found.      Recommendations:     Due to glucose trending low after lunch and before evening meal when you have an active day like dancing, bowling or working in your shop, recommend decreasing Glipizide from 5 mg one tab with breakfast, to 1/2 tab with breakfast on those active days to help prevent low blood sugar.       Keri Sal RN, BSN, CDE11/20/2019 2:53 PM

## 2019-12-23 ENCOUNTER — OFFICE VISIT (OUTPATIENT)
Dept: FAMILY MEDICINE | Facility: OTHER | Age: 84
End: 2019-12-23
Attending: NURSE PRACTITIONER
Payer: MEDICARE

## 2019-12-23 VITALS
RESPIRATION RATE: 18 BRPM | DIASTOLIC BLOOD PRESSURE: 62 MMHG | SYSTOLIC BLOOD PRESSURE: 116 MMHG | HEIGHT: 70 IN | OXYGEN SATURATION: 98 % | BODY MASS INDEX: 27.2 KG/M2 | WEIGHT: 190 LBS | HEART RATE: 64 BPM | TEMPERATURE: 96.7 F

## 2019-12-23 DIAGNOSIS — M79.671 ARCH PAIN OF RIGHT FOOT: ICD-10-CM

## 2019-12-23 DIAGNOSIS — M72.2 PLANTAR FASCIITIS OF RIGHT FOOT: Primary | ICD-10-CM

## 2019-12-23 PROCEDURE — 99213 OFFICE O/P EST LOW 20 MIN: CPT | Performed by: NURSE PRACTITIONER

## 2019-12-23 PROCEDURE — G0463 HOSPITAL OUTPT CLINIC VISIT: HCPCS

## 2019-12-23 ASSESSMENT — PAIN SCALES - GENERAL: PAINLEVEL: EXTREME PAIN (8)

## 2019-12-23 ASSESSMENT — MIFFLIN-ST. JEOR: SCORE: 1559.11

## 2019-12-23 NOTE — PATIENT INSTRUCTIONS
Patient Education     Treating Plantar Fasciitis  First, your healthcare provider tries to find out the cause of your problem. He or she can then suggest ways to ease pain. If your pain is due to poor foot mechanics, occasionally custom-made shoe inserts (orthoses) may help.    Ease symptoms    To relieve mild symptoms, try aspirin, ibuprofen, or other medicines as directed. Rubbing ice on the area may also help.    To lessen severe pain and swelling, your healthcare provider may give you pills or injections. In some cases, you may need a walking cast. Physical therapy, such as ultrasound or a daily stretching program, may also be recommended. Surgery is rarely needed.    To ease symptoms caused by poor foot mechanics, your foot may be taped. This supports the arch and temporarily controls movement. Night splints may also help by stretching the fascia.  Control movement  If taping helps, your healthcare provider may prescribe orthoses. These are inserts built from plaster casts of your feet. They control the way your foot moves. As a result, your symptoms may go away.  Reduce overuse  Every time your foot strikes the ground, the plantar fascia is stretched. You can lessen the strain on the plantar fascia and the chance of overuse by:    Losing any excess weight    Not running on hard or uneven ground    Using orthoses, if recommended, in your shoes and house slippers  If surgery is needed  Your healthcare provider may consider surgery if other types of treatment don't control your pain. During surgery, the plantar fascia is partially cut to release tension. As you heal, fibrous tissue fills the space between the heel bone and the plantar fascia.   Date Last Reviewed: 1/1/2018 2000-2018 [employer]. [address] [place] [address], [place] [address]. All rights reserved. This information is not intended as a substitute for professional medical care. Always follow your healthcare professional's  instructions.           Patient Education     Understanding Plantar Fasciitis    Plantar fasciitis is a condition that causes foot and heel pain. The plantar fascia is a tough band of tissue that runs across the bottom of the foot from the heel to the toes. This tissue pulls on the heel bone. It supports the arch of the foot as it pushes off the ground. If the tissue becomes irritated or red and swollen (inflamed), it is called plantar fasciitis.  How to say it  PLAN-tuhr fa-see-IY-tis   What causes plantar fasciitis?  Plantar fasciitis most often occurs from overusing the plantar fascia. The tissue may become damaged from activities that put repeated stress on the heel and foot. Or it may wear down over time with age and ankle stiffness. You are more likely to have plantar fasciitis if you:    Do activities that require a lot of running, jumping, or dancing    Have a job that requires being on your feet for long periods    Are overweight or obese    Have certain foot problems, such as a tight Achilles tendon, flat feet, or high arches    Often wear poorly fitting shoes  Symptoms of plantar fasciitis  The condition most often causes pain in the heel and the bottom of the foot. The pain may occur when you take your first steps in the morning. It may get better as you walk throughout the day. But as you continue to put weight on the foot, the pain often returns. Pain may also occur after standing or sitting for long periods.  Treating plantar fasciitis  Treatments for plantar fasciitis include:    Resting the foot. This involves limiting movements that make your foot hurt. You may also need to avoid certain sports and types of work for a time.    Using cold packs. Put an ice pack on the heel and foot to help reduce pain and swelling.    Taking pain medicines. Prescription and over-the-counter pain medicines can help relieve pain and swelling.    Using heel cups or foot inserts (orthotics). These are placed in the  shoes to help support the heel or arch and cushion the heel. You may also be told to buy proper-fitting shoes with good arch support and cushioned soles.    Taping the foot. This supports the arch and limits the movement of the plantar fascia to help relieve symptoms.    Wearing a night splint. This stretches the plantar fascia and leg muscles while you sleep. This may help relieve pain.    Doing exercises and physical therapy. These stretch and strengthen the plantar fascia and the muscles in the leg that support the heel and foot.    Getting shots of medicine into the foot. These may help relieve symptoms for a time.    Having surgery. This may be needed if other treatments fail to relieve symptoms. During surgery, the surgeon may partially cut the plantar fascia to release tension.  Possible complications of plantar fasciitis  Without proper care and treatment, healing may take longer than normal. Also, symptoms may continue or get worse. Over time, the plantar fascia may be damaged. This can make it hard to walk or even stand without pain.  When to call your healthcare provider  Call your healthcare provider right away if you have any of these:    Fever of 100.4 F (38 C) or higher, or as directed    Symptoms that don t get better with treatment, or get worse    New symptoms, such as numbness, tingling, or weakness in the foot   Date Last Reviewed: 3/10/2016    0212-8260 [employer]. [address] [place] [address], [place] [address]. All rights reserved. This information is not intended as a substitute for professional medical care. Always follow your healthcare professional's instructions.

## 2019-12-23 NOTE — NURSING NOTE
Patient presents to clinic experiencing right ankle swelling and pain rated at 8.  He states he twisted it last week while walking on treadmill.    Medication Reconciliation: complete    Sandra Rodriguez LPN

## 2019-12-23 NOTE — PROGRESS NOTES
HPI:    Steven Dennison is a 84 year old male  who presents to clinic today for foot and ankle.    States he was walking on a treadmill about a week ago and he felt immediate pain in his right mid foot.  Felt like his foot just gave out.  Denies twisting or hurting his ankle.  Denies falling.  Denies any numbness or tingling.  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  States he wears diabetic shoes.  States the pain is worse when walking barefoot.   Pain is all in the bottom of the mid foot.  No heel pain.  No toe pain.  No ankle pain.    No icing.  Not taking anything for pain.        Past Medical History:   Diagnosis Date     Controlled type 2 diabetes mellitus with diabetic polyneuropathy, without long-term current use of insulin (H) 2017     Past Surgical History:   Procedure Laterality Date     APPENDECTOMY OPEN      Appendectomy at age 6 due to appendicitis     ARTHROSCOPY KNEE      left knee     COLONOSCOPY  2000     COLONOSCOPY  2011,adenomatous polyps, one with high grade dysplasia , next due      OTHER SURGICAL HISTORY      ,HERNIA REPAIR,right inguinal     OTHER SURGICAL HISTORY      ,[id number],ARTHROPLASTY,left total knee     OTHER SURGICAL HISTORY      ,[id number],OTHER,left first/rib resection     OTHER SURGICAL HISTORY      ,[id number],OTHER     OTHER SURGICAL HISTORY      14,[id number],CYSTOSCOPY W/ URETEROSCOPY W/ LITHOTRIPSY,Dr. Chelsi GREEN     OTHER SURGICAL HISTORY      ,HERNIA REPAIR,x 4     Social History     Tobacco Use     Smoking status: Former Smoker     Packs/day: 1.00     Years: 10.00     Pack years: 10.00     Types: Cigarettes     Last attempt to quit: 1975     Years since quittin.0     Smokeless tobacco: Never Used   Substance Use Topics     Alcohol use: Yes     Alcohol/week: 5.0 standard drinks     Current Outpatient Medications   Medication Sig Dispense Refill     Apple Cider  Vinegar 300 MG TABS Take 900 mg by mouth 2 times daily       aspirin 81 MG EC tablet Take 81 mg by mouth       B Complex Vitamins (VITAMIN-B COMPLEX) TABS Take 1 tablet by mouth Take 1 tablet by mouth once daily. -- for Low B12 / B-Vitamins -- about 3 days weekly       blood glucose (ONETOUCH ULTRA) test strip TEST TWICE DAILY 200 strip 3     blood glucose monitoring (NO BRAND SPECIFIED) meter device kit Use to test blood sugar 2 times daily. DX: E11.9 1 kit 0     blood glucose monitoring (PRODIGY TWIST TOP 28G) lancets As directed. Use for testing 2 times daily DX: E11.9 200 each 2     Blood Glucose Monitoring Suppl (FIFTY50 GLUCOSE METER 2.0) W/DEVICE KIT Dispense item covered by pt ins. 250.02 NIDDM type II, uncontrolled - Test 2 times/day. Reason: Unstable diabetes       Cholecalciferol (VITAMIN D) 2000 units tablet Take 2,000 Units by mouth daily 100 tablet 3     cinnamon 500 MG CAPS Take by mouth daily       Dextromethorphan-guaiFENesin (MUCINEX DM MAXIMUM STRENGTH)  MG TB12 Take 1 tablet by mouth 2 times daily as needed - for cough and phlegm - OTC Okay, use generic 60 tablet 1     fluticasone (FLONASE) 50 MCG/ACT nasal spray INSTILL 2 SPRAYS IN EACH NOSTRIL Q NIGHT HS  3     gabapentin (NEURONTIN) 100 MG capsule Take 1 capsule (100 mg) by mouth daily       Garlic 1000 MG CAPS        glipiZIDE (GLUCOTROL) 5 MG tablet Take 1 tablet (5 mg) by mouth 2 times daily (before meals) 180 tablet 3     Krill Oil 500 MG CAPS Take 1 capsule by mouth daily       lecithin (LECITHIN) 1200 MG CAPS capsule        LUMIGAN 0.01 % SOLN ophthalmic solution INT 1 GTT IN OU HS  3     Magnesium Oxide 420 MG TABS Take 1 tablet by mouth daily       Melatonin 10 MG TABS tablet Take by mouth At Bedtime       metFORMIN (GLUCOPHAGE) 500 MG tablet TAKE 1-2 TABLET BY MOUTH TWICE DAILY WITH MEALS(DEPENDING ON SIZE OF MEALS) 360 tablet 2     metoprolol tartrate (LOPRESSOR) 25 MG tablet TAKE 1/2 TABLET BY MOUTH TWICE DAILY 90 tablet 3  "    NIFEdipine ER OSMOTIC (PROCARDIA XL) 30 MG 24 hr tablet Take 1 tablet (30 mg) by mouth daily - For Raynaud's 90 tablet 3     oxyCODONE-acetaminophen (PERCOCET) 5-325 MG tablet Take 1 tablet by mouth every 6 hours as needed for severe pain 20 tablet 0     pravastatin (PRAVACHOL) 40 MG tablet Take 1 tablet (40 mg) by mouth At Bedtime For cholesterol or diabetes 90 tablet 3     senna-docusate (SENOKOT-S;PERICOLACE) 8.6-50 MG per tablet Take 2 tablets by mouth 2 times daily Constipation prevention       tiZANidine (ZANAFLEX) 2 MG tablet TAKE 1 TO 2 TABLETS(2 TO 4 MG) BY MOUTH EVERY 6 HOURS AS NEEDED FOR MUSCLE SPASMS 120 tablet 3     triamcinolone (KENALOG) 0.1 % cream Apply twice daily as needed       Allergies   Allergen Reactions     Ace Inhibitors Other (See Comments)     -- Declines     Cephalexin Other (See Comments)     Other reaction(s):Pt Doesn't Remember  Pt does not remember      Clindamycin Other (See Comments)     Other reaction(s): Pt Doesn't Remember  Pt does not remember     Hydromorphone Other (See Comments)     Other reaction(s): Bradycardia     Amoxicillin Rash         Past medical history, past surgical history, current medications and allergies reviewed and accurate to the best of my knowledge.        ROS:  Refer to HPI    /62 (BP Location: Right arm, Patient Position: Sitting, Cuff Size: Adult Large)   Pulse 64   Temp 96.7  F (35.9  C) (Tympanic)   Resp 18   Ht 1.772 m (5' 9.75\")   Wt 86.2 kg (190 lb)   SpO2 98%   BMI 27.46 kg/m      EXAM:  General Appearance: Well appearing elderly male, appropriate appearance for age. No acute distress  Respiratory: normal chest wall and respirations.  Normal effort.  No cough appreciated, oxygen saturation 98%  Cardiovascular:  CMS intact to right lower extremity, palpable pedal pulse, trace edema  Musculoskeletal:  Right foot with flat appearance.  Right plantar foot with localized tenderness over the mid plantar fascia to deep palpation.  " Right toes without swelling or tenderness.  Right dorsal foot without swelling or tenderness.  Right ankle without tenderness to palpation.  Equal movement of bilateral upper extremities.  Equal movement of bilateral lower extremities.  Normal gait.    Dermatological: Skin intact to right toes, foot, and ankle without erythema, bruising, abrasions, ulcers, or rash.  Psychological: normal affect, alert and pleasant          ASSESSMENT/PLAN:    ICD-10-CM    1. Plantar fasciitis of right foot M72.2 diclofenac (VOLTAREN) 1 % topical gel   2. Arch pain of right foot M79.671        Discussed symptomatic treatment of plantar fascitis including ice, stretching, massage, avoiding walking barefoot, topical pain cream, etc    Diclofenac topical gel TID PRN  ICE TID     Discussed warning signs/symptoms indicative of need to f/u    Follow up if symptoms persist or worsen or concerns      I explained my diagnostic considerations and recommendations to the patient, who voiced understanding and agreement with the treatment plan. All questions were answered. We discussed potential side effects of any prescribed or recommended therapies, as well as expectations for response to treatments.    Disclaimer:  This note consists of words and symbols derived from keyboarding, dictation, or using voice recognition software. As a result, there may be errors in the script that have gone undetected. Please consider this when interpreting information found in this note.

## 2020-01-04 DIAGNOSIS — E11.42 CONTROLLED TYPE 2 DIABETES MELLITUS WITH DIABETIC POLYNEUROPATHY, WITHOUT LONG-TERM CURRENT USE OF INSULIN (H): ICD-10-CM

## 2020-01-04 NOTE — LETTER
January 6, 2020      Steven Dennison  [address] [address] [address]        Dear Steven,     A refill request was received from your pharmacy for Metformin.    Additional refills require an office visit with Dr. Montanez for diabetic follow up around 1/22/2020.    Please call [phone] to schedule appointment.      Sincerely,      Refill Nurse

## 2020-01-06 NOTE — TELEPHONE ENCOUNTER
Prescription approved per [facility] Refill Protocol.  LOV 1024/19 patient due for diabetic follow up   Letter sent.  Zari Lino RN on 1/6/2020 at 3:25 PM

## 2020-02-22 DIAGNOSIS — E11.9 CONTROLLED TYPE 2 DIABETES MELLITUS WITHOUT COMPLICATION, WITHOUT LONG-TERM CURRENT USE OF INSULIN (H): ICD-10-CM

## 2020-02-25 NOTE — TELEPHONE ENCOUNTER
Prescription approved per [facility] Refill Protocol.  LOV: 10/24/2019  Patient has appointment scheduled for 4/7/2020    Zari Lino RN on 2/25/2020 at 8:12 AM

## 2020-03-03 DIAGNOSIS — E11.42 CONTROLLED TYPE 2 DIABETES MELLITUS WITH DIABETIC POLYNEUROPATHY, WITHOUT LONG-TERM CURRENT USE OF INSULIN (H): Primary | ICD-10-CM

## 2020-03-04 ENCOUNTER — TELEPHONE (OUTPATIENT)
Dept: INTERNAL MEDICINE | Facility: OTHER | Age: 85
End: 2020-03-04

## 2020-03-04 NOTE — TELEPHONE ENCOUNTER
Received incoming fax from [email or web address] for CMN Diabetes form.  It has been completed and awaiting MD signature.      Keyla Last LPN 3/4/2020 2:25 PM

## 2020-03-06 RX ORDER — GABAPENTIN 100 MG/1
CAPSULE ORAL
Qty: 90 CAPSULE | Refills: 0 | Status: SHIPPED | OUTPATIENT
Start: 2020-03-06 | End: 2020-04-03

## 2020-03-06 NOTE — TELEPHONE ENCOUNTER
Routing refill request to provider for review/approval because:  Drug not on the [facility] refill protocol   Medication is reported/historical    LOV 10/24/2019  Dr. Montanez out of office will route to covering team let for review and consideration.    Zari Lino RN on 3/6/2020 at 10:48 AM

## 2020-03-13 ENCOUNTER — TELEPHONE (OUTPATIENT)
Dept: INTERNAL MEDICINE | Facility: OTHER | Age: 85
End: 2020-03-13

## 2020-03-13 NOTE — TELEPHONE ENCOUNTER
New CMN form completed and faxed back to [facility] about diabetes supplies.'      Keyla Vo LPN 3/13/2020 11:15 AM

## 2020-03-18 ENCOUNTER — TRANSFERRED RECORDS (OUTPATIENT)
Dept: HEALTH INFORMATION MANAGEMENT | Facility: OTHER | Age: 85
End: 2020-03-18

## 2020-03-18 LAB — RETINOPATHY: NEGATIVE

## 2020-04-03 RX ORDER — AMOXICILLIN 250 MG
2 CAPSULE ORAL 2 TIMES DAILY
Status: CANCELLED | OUTPATIENT
Start: 2020-04-03

## 2020-04-04 DIAGNOSIS — E11.42 CONTROLLED TYPE 2 DIABETES MELLITUS WITH DIABETIC POLYNEUROPATHY, WITHOUT LONG-TERM CURRENT USE OF INSULIN (H): ICD-10-CM

## 2020-04-06 NOTE — TELEPHONE ENCOUNTER
Prescription approved per [facility] Refill Protocol.  LOV:10/24/19  Zari Lino RN on 4/6/2020 at 9:32 AM

## 2020-04-07 ENCOUNTER — VIRTUAL VISIT (OUTPATIENT)
Dept: INTERNAL MEDICINE | Facility: OTHER | Age: 85
End: 2020-04-07
Attending: INTERNAL MEDICINE
Payer: MEDICARE

## 2020-04-07 DIAGNOSIS — M62.838 MUSCLE SPASM: ICD-10-CM

## 2020-04-07 DIAGNOSIS — E11.42 CONTROLLED TYPE 2 DIABETES MELLITUS WITH DIABETIC POLYNEUROPATHY, WITHOUT LONG-TERM CURRENT USE OF INSULIN (H): ICD-10-CM

## 2020-04-07 DIAGNOSIS — I10 BENIGN ESSENTIAL HYPERTENSION: ICD-10-CM

## 2020-04-07 DIAGNOSIS — E78.2 MIXED HYPERLIPIDEMIA: ICD-10-CM

## 2020-04-07 DIAGNOSIS — M54.50 INTERMITTENT LOW BACK PAIN: ICD-10-CM

## 2020-04-07 DIAGNOSIS — I73.00 RAYNAUD'S PHENOMENON WITHOUT GANGRENE: ICD-10-CM

## 2020-04-07 LAB
ALBUMIN SERPL-MCNC: 4.3 G/DL (ref 3.5–5.7)
ALBUMIN UR-MCNC: NEGATIVE MG/DL
ALP SERPL-CCNC: 56 U/L (ref 34–104)
ALT SERPL W P-5'-P-CCNC: 11 U/L (ref 7–52)
ANION GAP SERPL CALCULATED.3IONS-SCNC: 7 MMOL/L (ref 3–14)
APPEARANCE UR: CLEAR
AST SERPL W P-5'-P-CCNC: 13 U/L (ref 13–39)
BILIRUB SERPL-MCNC: 1 MG/DL (ref 0.3–1)
BILIRUB UR QL STRIP: NEGATIVE
BUN SERPL-MCNC: 20 MG/DL (ref 7–25)
CALCIUM SERPL-MCNC: 9.9 MG/DL (ref 8.6–10.3)
CHLORIDE SERPL-SCNC: 104 MMOL/L (ref 98–107)
CHOLEST SERPL-MCNC: 135 MG/DL
CO2 SERPL-SCNC: 30 MMOL/L (ref 21–31)
COLOR UR AUTO: NORMAL
CREAT SERPL-MCNC: 1.22 MG/DL (ref 0.7–1.3)
CREAT UR-MCNC: 128 MG/DL
ERYTHROCYTE [DISTWIDTH] IN BLOOD BY AUTOMATED COUNT: 13.6 % (ref 10–15)
GFR SERPL CREATININE-BSD FRML MDRD: 57 ML/MIN/{1.73_M2}
GLUCOSE SERPL-MCNC: 128 MG/DL (ref 70–105)
GLUCOSE UR STRIP-MCNC: NEGATIVE MG/DL
HBA1C MFR BLD: 6.8 % (ref 4–6)
HCT VFR BLD AUTO: 49.8 % (ref 40–53)
HDLC SERPL-MCNC: 32 MG/DL (ref 23–92)
HGB BLD-MCNC: 16.4 G/DL (ref 13.3–17.7)
HGB UR QL STRIP: NEGATIVE
KETONES UR STRIP-MCNC: NEGATIVE MG/DL
LDLC SERPL CALC-MCNC: 74 MG/DL
LEUKOCYTE ESTERASE UR QL STRIP: NEGATIVE
MCH RBC QN AUTO: 28.9 PG (ref 26.5–33)
MCHC RBC AUTO-ENTMCNC: 32.9 G/DL (ref 31.5–36.5)
MCV RBC AUTO: 88 FL (ref 78–100)
MICROALBUMIN UR-MCNC: 29 MG/L
MICROALBUMIN/CREAT UR: 22.89 MG/G CR (ref 0–17)
NITRATE UR QL: NEGATIVE
NONHDLC SERPL-MCNC: 103 MG/DL
PH UR STRIP: 6 PH (ref 5–7)
PLATELET # BLD AUTO: 146 10E9/L (ref 150–450)
POTASSIUM SERPL-SCNC: 4.3 MMOL/L (ref 3.5–5.1)
PROT SERPL-MCNC: 6.7 G/DL (ref 6.4–8.9)
RBC # BLD AUTO: 5.67 10E12/L (ref 4.4–5.9)
SODIUM SERPL-SCNC: 141 MMOL/L (ref 134–144)
SOURCE: NORMAL
SP GR UR STRIP: 1.02 (ref 1–1.03)
TRIGL SERPL-MCNC: 143 MG/DL
UROBILINOGEN UR STRIP-MCNC: NORMAL MG/DL (ref 0–2)
WBC # BLD AUTO: 5.5 10E9/L (ref 4–11)

## 2020-04-07 PROCEDURE — 99442 ZZC PHYSICIAN TELEPHONE EVALUATION 11-20 MIN: CPT | Performed by: INTERNAL MEDICINE

## 2020-04-07 PROCEDURE — 36415 COLL VENOUS BLD VENIPUNCTURE: CPT | Mod: ZL | Performed by: INTERNAL MEDICINE

## 2020-04-07 PROCEDURE — 80061 LIPID PANEL: CPT | Mod: ZL | Performed by: INTERNAL MEDICINE

## 2020-04-07 PROCEDURE — 80053 COMPREHEN METABOLIC PANEL: CPT | Mod: ZL | Performed by: INTERNAL MEDICINE

## 2020-04-07 PROCEDURE — 81003 URINALYSIS AUTO W/O SCOPE: CPT | Mod: ZL | Performed by: INTERNAL MEDICINE

## 2020-04-07 PROCEDURE — 85027 COMPLETE CBC AUTOMATED: CPT | Mod: ZL | Performed by: INTERNAL MEDICINE

## 2020-04-07 PROCEDURE — 83036 HEMOGLOBIN GLYCOSYLATED A1C: CPT | Mod: ZL | Performed by: INTERNAL MEDICINE

## 2020-04-07 PROCEDURE — 82043 UR ALBUMIN QUANTITATIVE: CPT | Mod: ZL | Performed by: INTERNAL MEDICINE

## 2020-04-07 RX ORDER — GABAPENTIN 100 MG/1
CAPSULE ORAL
Qty: 90 CAPSULE | Refills: 11 | Status: SHIPPED | OUTPATIENT
Start: 2020-04-07 | End: 2021-03-17

## 2020-04-07 RX ORDER — GLIPIZIDE 5 MG/1
5 TABLET ORAL
Qty: 180 TABLET | Refills: 3 | Status: SHIPPED | OUTPATIENT
Start: 2020-04-07 | End: 2021-03-17

## 2020-04-07 RX ORDER — TIZANIDINE 2 MG/1
2-4 TABLET ORAL EVERY 6 HOURS PRN
Qty: 120 TABLET | Refills: 3 | Status: SHIPPED | OUTPATIENT
Start: 2020-04-07 | End: 2020-10-30

## 2020-04-07 RX ORDER — OXYCODONE AND ACETAMINOPHEN 5; 325 MG/1; MG/1
1 TABLET ORAL EVERY 6 HOURS PRN
Qty: 20 TABLET | Refills: 0 | Status: SHIPPED | OUTPATIENT
Start: 2020-04-07 | End: 2020-11-03

## 2020-04-07 RX ORDER — NIFEDIPINE 30 MG/1
30 TABLET, EXTENDED RELEASE ORAL DAILY
Qty: 90 TABLET | Refills: 3 | Status: SHIPPED | OUTPATIENT
Start: 2020-04-07 | End: 2021-03-17

## 2020-04-07 RX ORDER — PRAVASTATIN SODIUM 40 MG
40 TABLET ORAL AT BEDTIME
Qty: 90 TABLET | Refills: 3 | Status: SHIPPED | OUTPATIENT
Start: 2020-04-07 | End: 2021-03-17

## 2020-04-07 ASSESSMENT — ENCOUNTER SYMPTOMS
NUMBNESS: 1
HEMATURIA: 0
CHILLS: 0
BACK PAIN: 1
FEVER: 0
PARESTHESIAS: 1
SHORTNESS OF BREATH: 0
ARTHRALGIAS: 1
ABDOMINAL PAIN: 0
BRUISES/BLEEDS EASILY: 0
CONFUSION: 0
MYALGIAS: 1
JOINT SWELLING: 0

## 2020-04-07 NOTE — NURSING NOTE
Called patient he could not talk right now as he is currently in lab  Stephanie Kumar LPN ....................  4/7/2020   10:45 AM

## 2020-04-07 NOTE — PROGRESS NOTES
"Subjective     Steven Dennison is a 84 year old male who is being evaluated via a billable telephone visit.      The patient has been notified of following:     \"This telephone visit will be conducted via a call between you and your physician/provider. We have found that certain health care needs can be provided without the need for a physical exam.  This service lets us provide the care you need with a short phone conversation.  If a prescription is necessary we can send it directly to your pharmacy.  If lab work is needed we can place an order for that and you can then stop by our lab to have the test done at a later time.    Telephone visits are billed at different rates depending on your insurance coverage. During this emergency period, for some insurers they may be billed the same as an in-person visit.  Please reach out to your insurance provider with any questions.    If during the course of the call the physician/provider feels a telephone visit is not appropriate, you will not be charged for this service.\"    Patient has given verbal consent for Telephone visit?  Yes    Steven Dennison complains of   Chief Complaint   Patient presents with     Diabetes     Diabetic follow up       ALLERGIES  Ace inhibitors; Cephalexin; Clindamycin; Hydromorphone; and Amoxicillin    Reviewed and updated as needed this visit by Provider  Tobacco  Allergies  Meds  Problems  Med Hx  Surg Hx  Fam Hx         Review of Systems   Constitutional: Negative for chills and fever.   HENT: Negative for congestion.    Respiratory: Negative for shortness of breath.    Cardiovascular: Negative for chest pain.        Raynaud's - currently stable.   Gastrointestinal: Negative for abdominal pain.   Genitourinary: Negative for hematuria.   Musculoskeletal: Positive for arthralgias, back pain and myalgias (muscle spasms intermittently). Negative for joint swelling.   Skin: Negative for rash.   Allergic/Immunologic: Negative for " immunocompromised state.   Neurological: Positive for numbness and paresthesias.   Hematological: Does not bruise/bleed easily.   Psychiatric/Behavioral: Negative for confusion.           Objective   Reported vitals:  There were no vitals taken for this visit.   healthy, alert and no distress  Psych: Alert and oriented times 3; coherent speech, normal   rate and volume, able to articulate logical thoughts, able   to abstract reason, no tangential thoughts, no hallucinations   or delusions  His affect is good.      Diagnostic Test Results:  Labs reviewed in Epic        Assessment/Plan:    ICD-10-CM    1. Controlled type 2 diabetes mellitus with diabetic polyneuropathy, without long-term current use of insulin (H)  E11.42 blood glucose (NO BRAND SPECIFIED) lancets standard     blood glucose (ONETOUCH ULTRA) test strip     gabapentin (NEURONTIN) 100 MG capsule     glipiZIDE (GLUCOTROL) 5 MG tablet     metFORMIN (GLUCOPHAGE) 500 MG tablet   2. Raynaud's phenomenon without gangrene  I73.00 NIFEdipine ER OSMOTIC (PROCARDIA XL) 30 MG 24 hr tablet   3. Intermittent low back pain  M54.5 oxyCODONE-acetaminophen (PERCOCET) 5-325 MG tablet   4. Mixed hyperlipidemia  E78.2 pravastatin (PRAVACHOL) 40 MG tablet   5. Muscle spasm  M62.838 tiZANidine (ZANAFLEX) 2 MG tablet      Type 2 diabetes, currently well controlled.  Utilizing oral medications, no insulin.  Needs updated testing supplies.    Has diabetic neuropathy, gabapentin has been helpful.  Uses at bedtime mostly to help with neuropathy pain.    Raynaud's phenomenon, no gangrene.  Continues with nifedipine, doing well with this.  Tries to keep his hands warm and avoid cold exposure.     Intermittent low back pain, flares up intermittently.  Rarely uses Percocet but he would like a small refill.  Prescription sent to pharmacy.  No benzodiazepine use.  Limits use only for severe pain exacerbation.    Mixed hyperlipidemia, doing well with pravastatin.  Currently 40 mg daily.   No side effects reported.  Continue current dosing.    Muscle spasms, intermittent.  Mostly in the low back.  Uses Zanaflex as needed.  Needs refills.    Return for - Labs every 91+ days, DM Follow-up 1-2 days later, with Dr. Montanez.      Phone call duration:  15:46 minutes    Nawaf Montanez MD

## 2020-05-28 DIAGNOSIS — M62.838 MUSCLE SPASM: ICD-10-CM

## 2020-05-28 RX ORDER — TIZANIDINE 2 MG/1
TABLET ORAL
Qty: 120 TABLET | Refills: 3 | OUTPATIENT
Start: 2020-05-28

## 2020-06-07 DIAGNOSIS — I73.00 RAYNAUD'S PHENOMENON WITHOUT GANGRENE: ICD-10-CM

## 2020-06-08 RX ORDER — NIFEDIPINE 30 MG/1
TABLET, EXTENDED RELEASE ORAL
Qty: 90 TABLET | Refills: 3 | OUTPATIENT
Start: 2020-06-08

## 2020-07-09 ENCOUNTER — OFFICE VISIT (OUTPATIENT)
Dept: INTERNAL MEDICINE | Facility: OTHER | Age: 85
End: 2020-07-09
Attending: INTERNAL MEDICINE
Payer: MEDICARE

## 2020-07-09 VITALS
RESPIRATION RATE: 16 BRPM | DIASTOLIC BLOOD PRESSURE: 66 MMHG | HEIGHT: 69 IN | SYSTOLIC BLOOD PRESSURE: 112 MMHG | TEMPERATURE: 97.3 F | WEIGHT: 185 LBS | HEART RATE: 76 BPM | BODY MASS INDEX: 27.4 KG/M2

## 2020-07-09 DIAGNOSIS — E78.2 MIXED HYPERLIPIDEMIA: ICD-10-CM

## 2020-07-09 DIAGNOSIS — Z00.00 ENCOUNTER FOR MEDICARE ANNUAL WELLNESS EXAM: ICD-10-CM

## 2020-07-09 DIAGNOSIS — I49.1 PREMATURE ATRIAL CONTRACTIONS: ICD-10-CM

## 2020-07-09 DIAGNOSIS — G47.9 SLEEP DIFFICULTIES: ICD-10-CM

## 2020-07-09 DIAGNOSIS — I77.811 ABDOMINAL AORTIC ECTASIA (H): ICD-10-CM

## 2020-07-09 DIAGNOSIS — Z87.891 HISTORY OF TOBACCO USE: ICD-10-CM

## 2020-07-09 DIAGNOSIS — E11.42 CONTROLLED TYPE 2 DIABETES MELLITUS WITH DIABETIC POLYNEUROPATHY, WITHOUT LONG-TERM CURRENT USE OF INSULIN (H): Primary | ICD-10-CM

## 2020-07-09 DIAGNOSIS — I10 BENIGN ESSENTIAL HYPERTENSION: ICD-10-CM

## 2020-07-09 DIAGNOSIS — I73.00 RAYNAUD'S PHENOMENON WITHOUT GANGRENE: ICD-10-CM

## 2020-07-09 DIAGNOSIS — E11.42 CONTROLLED TYPE 2 DIABETES MELLITUS WITH DIABETIC POLYNEUROPATHY, WITHOUT LONG-TERM CURRENT USE OF INSULIN (H): ICD-10-CM

## 2020-07-09 PROBLEM — N20.0 RENAL CALCULUS: Status: RESOLVED | Noted: 2018-01-24 | Resolved: 2020-07-09

## 2020-07-09 LAB
ALBUMIN SERPL-MCNC: 4.1 G/DL (ref 3.5–5.7)
ALBUMIN UR-MCNC: NEGATIVE MG/DL
ALP SERPL-CCNC: 50 U/L (ref 34–104)
ALT SERPL W P-5'-P-CCNC: 10 U/L (ref 7–52)
ANION GAP SERPL CALCULATED.3IONS-SCNC: 5 MMOL/L (ref 3–14)
APPEARANCE UR: CLEAR
AST SERPL W P-5'-P-CCNC: 12 U/L (ref 13–39)
BILIRUB SERPL-MCNC: 0.8 MG/DL (ref 0.3–1)
BILIRUB UR QL STRIP: NEGATIVE
BUN SERPL-MCNC: 21 MG/DL (ref 7–25)
CALCIUM SERPL-MCNC: 9.2 MG/DL (ref 8.6–10.3)
CHLORIDE SERPL-SCNC: 104 MMOL/L (ref 98–107)
CHOLEST SERPL-MCNC: 132 MG/DL
CO2 SERPL-SCNC: 29 MMOL/L (ref 21–31)
COLOR UR AUTO: ABNORMAL
CREAT SERPL-MCNC: 0.93 MG/DL (ref 0.7–1.3)
CREAT UR-MCNC: 98 MG/DL
ERYTHROCYTE [DISTWIDTH] IN BLOOD BY AUTOMATED COUNT: 13.4 % (ref 10–15)
GFR SERPL CREATININE-BSD FRML MDRD: 77 ML/MIN/{1.73_M2}
GLUCOSE SERPL-MCNC: 176 MG/DL (ref 70–105)
GLUCOSE UR STRIP-MCNC: 150 MG/DL
HBA1C MFR BLD: 6.9 % (ref 4–6)
HCT VFR BLD AUTO: 47 % (ref 40–53)
HDLC SERPL-MCNC: 31 MG/DL (ref 23–92)
HGB BLD-MCNC: 15.7 G/DL (ref 13.3–17.7)
HGB UR QL STRIP: NEGATIVE
KETONES UR STRIP-MCNC: 5 MG/DL
LDLC SERPL CALC-MCNC: 70 MG/DL
LEUKOCYTE ESTERASE UR QL STRIP: NEGATIVE
MCH RBC QN AUTO: 29.2 PG (ref 26.5–33)
MCHC RBC AUTO-ENTMCNC: 33.4 G/DL (ref 31.5–36.5)
MCV RBC AUTO: 87 FL (ref 78–100)
MICROALBUMIN UR-MCNC: 24 MG/L
MICROALBUMIN/CREAT UR: 24.64 MG/G CR (ref 0–17)
NITRATE UR QL: NEGATIVE
NONHDLC SERPL-MCNC: 101 MG/DL
PH UR STRIP: 6 PH (ref 5–7)
PLATELET # BLD AUTO: 134 10E9/L (ref 150–450)
POTASSIUM SERPL-SCNC: 4.3 MMOL/L (ref 3.5–5.1)
PROT SERPL-MCNC: 6.8 G/DL (ref 6.4–8.9)
RBC # BLD AUTO: 5.38 10E12/L (ref 4.4–5.9)
SODIUM SERPL-SCNC: 138 MMOL/L (ref 134–144)
SOURCE: ABNORMAL
SP GR UR STRIP: 1.02 (ref 1–1.03)
TRIGL SERPL-MCNC: 155 MG/DL
UROBILINOGEN UR STRIP-ACNC: 2 EU/DL (ref 0.2–1)
WBC # BLD AUTO: 5.8 10E9/L (ref 4–11)

## 2020-07-09 PROCEDURE — 81003 URINALYSIS AUTO W/O SCOPE: CPT | Mod: ZL | Performed by: INTERNAL MEDICINE

## 2020-07-09 PROCEDURE — 83036 HEMOGLOBIN GLYCOSYLATED A1C: CPT | Mod: ZL | Performed by: INTERNAL MEDICINE

## 2020-07-09 PROCEDURE — 82043 UR ALBUMIN QUANTITATIVE: CPT | Mod: ZL | Performed by: INTERNAL MEDICINE

## 2020-07-09 PROCEDURE — 80061 LIPID PANEL: CPT | Mod: ZL | Performed by: INTERNAL MEDICINE

## 2020-07-09 PROCEDURE — G0463 HOSPITAL OUTPT CLINIC VISIT: HCPCS

## 2020-07-09 PROCEDURE — 80053 COMPREHEN METABOLIC PANEL: CPT | Mod: ZL | Performed by: INTERNAL MEDICINE

## 2020-07-09 PROCEDURE — 36415 COLL VENOUS BLD VENIPUNCTURE: CPT | Mod: ZL | Performed by: INTERNAL MEDICINE

## 2020-07-09 PROCEDURE — 85027 COMPLETE CBC AUTOMATED: CPT | Mod: ZL | Performed by: INTERNAL MEDICINE

## 2020-07-09 PROCEDURE — G0439 PPPS, SUBSEQ VISIT: HCPCS | Performed by: INTERNAL MEDICINE

## 2020-07-09 RX ORDER — GUAIFENESIN AND DEXTROMETHORPHAN HYDROBROMIDE 1200; 60 MG/1; MG/1
1 TABLET, EXTENDED RELEASE ORAL 2 TIMES DAILY PRN
Qty: 60 TABLET | Refills: 11 | Status: SHIPPED | OUTPATIENT
Start: 2020-07-09 | End: 2021-09-07

## 2020-07-09 RX ORDER — METOPROLOL TARTRATE 25 MG/1
12.5 TABLET, FILM COATED ORAL 2 TIMES DAILY
Qty: 90 TABLET | Refills: 3 | Status: SHIPPED | OUTPATIENT
Start: 2020-07-09 | End: 2021-03-17

## 2020-07-09 ASSESSMENT — ANXIETY QUESTIONNAIRES
7. FEELING AFRAID AS IF SOMETHING AWFUL MIGHT HAPPEN: NOT AT ALL
IF YOU CHECKED OFF ANY PROBLEMS ON THIS QUESTIONNAIRE, HOW DIFFICULT HAVE THESE PROBLEMS MADE IT FOR YOU TO DO YOUR WORK, TAKE CARE OF THINGS AT HOME, OR GET ALONG WITH OTHER PEOPLE: NOT DIFFICULT AT ALL
3. WORRYING TOO MUCH ABOUT DIFFERENT THINGS: NOT AT ALL
GAD7 TOTAL SCORE: 0
5. BEING SO RESTLESS THAT IT IS HARD TO SIT STILL: NOT AT ALL
2. NOT BEING ABLE TO STOP OR CONTROL WORRYING: NOT AT ALL
6. BECOMING EASILY ANNOYED OR IRRITABLE: NOT AT ALL
1. FEELING NERVOUS, ANXIOUS, OR ON EDGE: NOT AT ALL

## 2020-07-09 ASSESSMENT — ENCOUNTER SYMPTOMS
AGITATION: 0
EYE PAIN: 0
DIARRHEA: 0
VOMITING: 0
FATIGUE: 0
ABDOMINAL PAIN: 0
LIGHT-HEADEDNESS: 0
BRUISES/BLEEDS EASILY: 0
CHILLS: 0
NAUSEA: 0
BACK PAIN: 1
WHEEZING: 0
PALPITATIONS: 1
SHORTNESS OF BREATH: 1
ARTHRALGIAS: 0
MYALGIAS: 0
CONFUSION: 0
DIZZINESS: 0
FEVER: 0
HEMATURIA: 0
SLEEP DISTURBANCE: 1
COUGH: 0
DYSURIA: 0

## 2020-07-09 ASSESSMENT — MIFFLIN-ST. JEOR: SCORE: 1523.49

## 2020-07-09 ASSESSMENT — PAIN SCALES - GENERAL: PAINLEVEL: NO PAIN (0)

## 2020-07-09 ASSESSMENT — PATIENT HEALTH QUESTIONNAIRE - PHQ9
SUM OF ALL RESPONSES TO PHQ QUESTIONS 1-9: 0
5. POOR APPETITE OR OVEREATING: NOT AT ALL

## 2020-07-09 NOTE — PATIENT INSTRUCTIONS
Blood pressures are well controlled.   Diabetes is well controlled.     Medications refilled.     Results for orders placed or performed in visit on 07/09/20   Lipid Profile     Status: Abnormal   Result Value Ref Range    Cholesterol 132 <200 mg/dL    Triglycerides 155 (H) <150 mg/dL    HDL Cholesterol 31 23 - 92 mg/dL    LDL Cholesterol Calculated 70 <100 mg/dL    Non HDL Cholesterol 101 <130 mg/dL   Hemoglobin A1c     Status: Abnormal   Result Value Ref Range    Hemoglobin A1C 6.9 (H) 4.0 - 6.0 %   CBC with platelets     Status: Abnormal   Result Value Ref Range    WBC 5.8 4.0 - 11.0 10e9/L    RBC Count 5.38 4.4 - 5.9 10e12/L    Hemoglobin 15.7 13.3 - 17.7 g/dL    Hematocrit 47.0 40.0 - 53.0 %    MCV 87 78 - 100 fl    MCH 29.2 26.5 - 33.0 pg    MCHC 33.4 31.5 - 36.5 g/dL    RDW 13.4 10.0 - 15.0 %    Platelet Count 134 (L) 150 - 450 10e9/L   Comprehensive metabolic panel     Status: Abnormal   Result Value Ref Range    Sodium 138 134 - 144 mmol/L    Potassium 4.3 3.5 - 5.1 mmol/L    Chloride 104 98 - 107 mmol/L    Carbon Dioxide 29 21 - 31 mmol/L    Anion Gap 5 3 - 14 mmol/L    Glucose 176 (H) 70 - 105 mg/dL    Urea Nitrogen 21 7 - 25 mg/dL    Creatinine 0.93 0.70 - 1.30 mg/dL    GFR Estimate 77 >60 mL/min/[1.73_m2]    GFR Estimate If Black >90 >60 mL/min/[1.73_m2]    Calcium 9.2 8.6 - 10.3 mg/dL    Bilirubin Total 0.8 0.3 - 1.0 mg/dL    Albumin 4.1 3.5 - 5.7 g/dL    Protein Total 6.8 6.4 - 8.9 g/dL    Alkaline Phosphatase 50 34 - 104 U/L    ALT 10 7 - 52 U/L    AST 12 (L) 13 - 39 U/L   *UA reflex to Microscopic     Status: Abnormal   Result Value Ref Range    Color Urine Light Yellow     Appearance Urine Clear     Glucose Urine 150 (A) NEG^Negative mg/dL    Bilirubin Urine Negative NEG^Negative    Ketones Urine 5 (A) NEG^Negative mg/dL    Specific Gravity Urine 1.020 1.003 - 1.035    Blood Urine Negative NEG^Negative    pH Urine 6.0 5.0 - 7.0 pH    Protein Albumin Urine Negative NEG^Negative mg/dL    Urobilinogen  Urine 2.0 (H) 0.2 - 1.0 EU/dL    Nitrite Urine Negative NEG^Negative    Leukocyte Esterase Urine Negative NEG^Negative    Source Midstream Urine         Return for Diabetes labs and clinic follow-up appointment every 3 to 4 months.  --- (Go for about 91 to 100 days)    Aspects of Diabetes we can improve:  Hemoglobin A1c Lab Results   Component Value Date    A1C 6.9 07/09/2020    A1C 6.8 04/07/2020    A1C 6.6 10/24/2019    A1C 7.1 07/05/2019    A1C 6.6 12/10/2018    Goal range is under 8. Best is 6.5 to 7   Blood Pressure 112/66 Goal to keep less than 140/90   Tobacco  reports that he quit smoking about 45 years ago. His smoking use included cigarettes. He has a 10.00 pack-year smoking history. He has never used smokeless tobacco. Goal to abstain from tobacco   Eye Exam -- Do Yearly -- Annual diabetic eye exam   Healthy weight Body mass index is 27.12 kg/m . Goal BMI under 30, best is under 25.      -- Trying to exercise daily (goal at least 20 min/day) with moderate aerobic activity   -- Eat healthy (resources from ADA at http://www.diabetes.org/)   -- Taking good care of my feet. Consider seeing the Podiatrist   -- Check blood sugars as directed, record in log book and bring to every appointment      Schedule lab only appointment --- A few days AFTER: 10/7/20   Schedule clinic appointment with Dr. Montanez -- Same day as labs, or 1-2 days later.     Insurance companies are now grading you and I on your blood sugar control -- Goal is to get your A1c down to 7.9% or lower and NO Smoking!    -- Medicare and most insurance companies, will only cover Hemoglobin A1c labs to be rechecked every 91+ days.      Hemoglobin A1C   Date Value Ref Range Status   07/09/2020 6.9 (H) 4.0 - 6.0 % Final   04/07/2020 6.8 (H) 4.0 - 6.0 % Final       Next follow-up appointment with Dr. Montanez should be scheduled:  -- Approximately a few days AFTER: 10/7/20        Patient Education   Personalized Prevention Plan  You are due for the  preventive services outlined below.  Your care team is available to assist you in scheduling these services.  If you have already completed any of these items, please share that information with your care team to update in your medical record.  Health Maintenance Due   Topic Date Due     URINE DRUG SCREEN  07/05/2020     Preventive Health Recommendations  See your health care provider every year to    Review health changes.     Discuss preventive care.      Review your medicines if your doctor has prescribed any.    Talk with your health care provider about whether you should have a test to screen for prostate cancer (PSA).    Every 3 years, have a diabetes test (fasting glucose). If you are at risk for diabetes, you should have this test more often.    Every 5 years, have a cholesterol test. Have this test more often if you are at risk for high cholesterol or heart disease.     Every 10 years, have a colonoscopy. Or, have a yearly FIT test (stool test). These exams will check for colon cancer.    Talk to with your health care provider about screening for Abdominal Aortic Aneurysm if you have a family history of AAA or have a history of smoking.    Shots:     Get a flu shot each year.     Get a tetanus shot every 10 years.     Talk to your doctor about your pneumonia vaccines. There are now two you should receive - Pneumovax (PPSV 23) and Prevnar (PCV 13).    Talk to your pharmacist about a shingles vaccine.     Talk to your doctor about the hepatitis B vaccine.    Nutrition:     Eat at least 5 servings of fruits and vegetables each day.     Eat whole-grain bread, whole-wheat pasta and brown rice instead of white grains and rice.     Get adequate Calcium and Vitamin D.     Lifestyle    Exercise for at least 150 minutes a week (30 minutes a day, 5 days a week). This will help you control your weight and prevent disease.     Limit alcohol to one drink per day.     No smoking.     Wear sunscreen to prevent skin cancer.      See your dentist every six months for an exam and cleaning.     See your eye doctor every 1 to 2 years to screen for conditions such as glaucoma, macular degeneration and cataracts.    Personalized Prevention Plan  You are due for the preventive services outlined below.  Your care team is available to assist you in scheduling these services.  If you have already completed any of these items, please share that information with your care team to update in your medical record.  Health Maintenance Due   Topic Date Due     URINE DRUG SCREEN  07/05/2020

## 2020-07-09 NOTE — PROGRESS NOTES
"SUBJECTIVE:   Steven Dennison is a 85 year old male who presents for Preventive Visit.  Are you in the first 12 months of your Medicare Part B coverage?  No    Physical Health:    In general, how would you rate your overall physical health? good    Outside of work, how many days during the week do you exercise? 6-7 days/week    Outside of work, approximately how many minutes a day do you exercise?greater than 60 minutes    If you drink alcohol do you typically have >3 drinks per day or >7 drinks per week? No    Do you usually eat at least 4 servings of fruit and vegetables a day, include whole grains & fiber and avoid regularly eating high fat or \"junk\" foods? NO and YES    Do you have any problems taking medications regularly?  No    Do you have any side effects from medications? none    Needs assistance for the following daily activities: telephone use and housework    Which of the following safety concerns are present in your home?  lack of grab bars in the bathroom     Hearing impairment: Yes, Difficulty following a conversation in a noisy restaurant or crowded room.    Need to ask people to speak up or repeat themselves.    In the past 6 months, have you been bothered by leaking of urine? no    Mental Health:    In general, how would you rate your overall mental or emotional health? good  PHQ-2 Score:      Do you feel safe in your environment? Yes    Have you ever done Advance Care Planning? (For example, a Health Directive, POLST, or a discussion with a medical provider or your loved ones about your wishes): Yes, advance care planning is on file.    Additional concerns to address?  YES    Fall risk:  Fallen 2 or more times in the past year?: No  Any fall with injury in the past year?: No    Cognitive Screenin) Repeat 3 items (Leader, Season, Table)    2) Clock draw: NORMAL  3) 3 item recall: Recalls 1 object   Results: NORMAL clock, 1-2 items recalled: COGNITIVE IMPAIRMENT LESS LIKELY    Mini-CogTM " Copyright ROCÍO Leiva. Licensed by the author for use in [facility]; reprinted with permission (chuckie@[facility]). All rights reserved.      Do you have sleep apnea, excessive snoring or daytime drowsiness?: no    Reviewed and updated as needed this visit by clinical staff  Tobacco  Allergies  Meds  Problems  Med Hx  Surg Hx  Fam Hx  Soc Hx          Reviewed and updated as needed this visit by Provider  Tobacco  Allergies  Meds  Problems  Med Hx  Surg Hx  Fam Hx        Social History     Tobacco Use     Smoking status: Former Smoker     Packs/day: 1.00     Years: 10.00     Pack years: 10.00     Types: Cigarettes     Last attempt to quit: 1975     Years since quittin.5     Smokeless tobacco: Never Used   Substance Use Topics     Alcohol use: Yes     Alcohol/week: 5.0 standard drinks                           Current providers sharing in care for this patient include:   Patient Care Team:  Nawaf Montanez MD as PCP - General (Internal Medicine)  Nawaf Montanez MD as Assigned PCP  Keri Sal RN as Diabetes Educator (Diabetes Education)    The following health maintenance items are reviewed in Epic and correct as of today:  Health Maintenance   Topic Date Due     URINE DRUG SCREEN  2020     ZOSTER IMMUNIZATION (2 of 3) 07/10/2020 (Originally 2010)     FALL RISK ASSESSMENT  2020     A1C  2021     EYE EXAM  2021     MEDICARE ANNUAL WELLNESS VISIT  2021     BMP  2021     LIPID  2021     MICROALBUMIN  2021     DIABETIC FOOT EXAM  2021     ADVANCE CARE PLANNING  2025     DTAP/TDAP/TD IMMUNIZATION (4 - Td) 2030     PHQ-2  Completed     PNEUMOCOCCAL IMMUNIZATION 65+ LOW/MEDIUM RISK  Completed     IPV IMMUNIZATION  Aged Out     MENINGITIS IMMUNIZATION  Aged Out     INFLUENZA VACCINE  Discontinued     Review of Systems   Constitutional: Negative for chills, fatigue and fever.   HENT: Negative for congestion and hearing  "loss.    Eyes: Negative for pain and visual disturbance.   Respiratory: Positive for shortness of breath (mild, stable.). Negative for cough and wheezing.    Cardiovascular: Positive for palpitations. Negative for chest pain.        Raynaud's syndrome, no ulcerations   Gastrointestinal: Negative for abdominal pain, diarrhea, nausea and vomiting.   Endocrine: Negative for cold intolerance and heat intolerance.   Genitourinary: Negative for dysuria and hematuria.        + feels like emptying bladder okay, occasionally will have to go back to bathroom a 2nd time.    Musculoskeletal: Positive for back pain (+ intermittent low back pain). Negative for arthralgias and myalgias.   Skin: Negative for pallor.   Allergic/Immunologic: Negative for immunocompromised state.   Neurological: Negative for dizziness and light-headedness.        Rare positional lightheadedness   Hematological: Does not bruise/bleed easily.   Psychiatric/Behavioral: Positive for sleep disturbance. Negative for agitation and confusion.        OBJECTIVE:   /66 (BP Location: Right arm, Patient Position: Sitting, Cuff Size: Adult Regular)   Pulse 76   Temp 97.3  F (36.3  C) (Tympanic)   Resp 16   Ht 1.759 m (5' 9.25\")   Wt 83.9 kg (185 lb)   BMI 27.12 kg/m   Estimated body mass index is 27.12 kg/m  as calculated from the following:    Height as of this encounter: 1.759 m (5' 9.25\").    Weight as of this encounter: 83.9 kg (185 lb).  Physical Exam  Constitutional:       General: He is not in acute distress.     Appearance: He is well-developed. He is not diaphoretic.   HENT:      Head: Normocephalic and atraumatic.   Eyes:      General: No scleral icterus.     Conjunctiva/sclera: Conjunctivae normal.   Neck:      Musculoskeletal: Neck supple.      Vascular: No carotid bruit.   Cardiovascular:      Rate and Rhythm: Normal rate and regular rhythm.      Pulses: Normal pulses.      Heart sounds: Normal heart sounds.   Pulmonary:      Effort: " Pulmonary effort is normal.      Breath sounds: Normal breath sounds.   Abdominal:      Palpations: Abdomen is soft.      Tenderness: There is no abdominal tenderness.   Musculoskeletal:         General: No deformity.      Right lower leg: No edema.      Left lower leg: No edema.   Lymphadenopathy:      Cervical: No cervical adenopathy.   Skin:     General: Skin is warm and dry.      Findings: No rash.      Comments: Diabetic Foot Exam:  Findings:   LEFT: normal DP and PT pulses, no trophic changes or ulcerative lesions and normal sensory exam    + deformity of left 5th toe ? Hammertoe    RIGHT: normal DP and PT pulses, no trophic changes or ulcerative lesions and normal sensory exam   + Bunion of right 1st toe    No ulcers.    Neurological:      Mental Status: He is alert and oriented to person, place, and time. Mental status is at baseline.   Psychiatric:         Mood and Affect: Mood normal.         Behavior: Behavior normal.          Diagnostic Test Results:  Labs reviewed in Epic    ASSESSMENT / PLAN:       ICD-10-CM    1. Controlled type 2 diabetes mellitus with diabetic polyneuropathy, without long-term current use of insulin (H)  E11.42 Albumin Random Urine Quantitative with Creat Ratio     Hemoglobin A1c     CBC with platelets     Comprehensive metabolic panel     UA reflex to Microscopic     TSH Reflex GH   2. Premature atrial contractions  I49.1 metoprolol tartrate (LOPRESSOR) 25 MG tablet   3. Mixed hyperlipidemia  E78.2 Lipid Profile   4. Raynaud's phenomenon without gangrene  I73.00    5. Sleep difficulties  G47.9    6. Encounter for Medicare annual wellness exam  Z00.00    7. History of tobacco use  Z87.891 Dextromethorphan-guaiFENesin (MUCINEX DM MAXIMUM STRENGTH)  MG TB12   8. Abdominal aortic ectasia (H)  I77.811 US Abdominal Aorta Imaging     Patient presents for annual Medicare wellness visit as well as follow-up of multiple issues.    Type 2 diabetes, currently well controlled.  He does  "have diabetic neuropathy.  Currently managing blood sugar with glipizide and metformin.  Seems to be tolerating well.  No hypoglycemia.  No medication intolerance issues identified.  Continue current dosing.  Standing orders updated today.    Premature atrial contractions, these have improved with use of metoprolol and nifedipine.  Continue with current dosing.  Needs refills.    Mixed hyperlipidemia, currently utilizing pravastatin.  Seems to be tolerating well.  No myalgias reported.  Check labs.  Needs refills.    Raynaud, phenomenon currently stable.  No gangrene.  Use of nifedipine has been quite helpful.  He also tries to avoid cold weather and cold exposure.  Refill sent to pharmacy.    Sleep difficulties, ongoing.  Currently utilizing melatonin.  This is been effective.  Plans to continue with current dosing.    History of tobacco use, he quit years ago when he was in the service.  He was found to have small abdominal aortic aneurysm.  Last ultrasound was checked 5 years ago.  Has not had any claudication symptoms in the legs.  Recommend repeat abdominal aortic ultrasound.  Orders placed.    COUNSELING:  Reviewed preventive health counseling, as reflected in patient instructions  Special attention given to:       Consider AAA screening for ages 65-75 and smoking history       Regular exercise       Healthy diet/nutrition       Vision screening       Hearing screening       Dental care       Bladder control       Fall risk prevention       Immunizations    In addition to the preventive visit, 15 minutes spent in face-to-face interaction with patient (separate from separately billed procedures) with greater than 50% spent in counseling and care coordination for his additional concern(s).     Estimated body mass index is 27.12 kg/m  as calculated from the following:    Height as of this encounter: 1.759 m (5' 9.25\").    Weight as of this encounter: 83.9 kg (185 lb).         reports that he quit smoking about 45 " years ago. His smoking use included cigarettes. He has a 10.00 pack-year smoking history. He has never used smokeless tobacco.      Appropriate preventive services were discussed with this patient, including applicable screening as appropriate for cardiovascular disease, diabetes, osteopenia/osteoporosis, and glaucoma.  As appropriate for age/gender, discussed screening for colorectal cancer, prostate cancer, breast cancer, and cervical cancer. Checklist reviewing preventive services available has been given to the patient.    Reviewed patients plan of care and provided an AVS. The Complex Care Plan (for patients with higher acuity and needing more deliberate coordination of services) for Steven meets the Care Plan requirement. This Care Plan has been established and reviewed with the Patient.    Counseling Resources:  ATP IV Guidelines  Pooled Cohorts Equation Calculator  Breast Cancer Risk Calculator  FRAX Risk Assessment  ICSI Preventive Guidelines  Dietary Guidelines for Americans, 2010  USDA's MyPlate  ASA Prophylaxis  Lung CA Screening    Nawaf Montanez MD  [facility]

## 2020-07-09 NOTE — NURSING NOTE
"Patient presents to the clinic for diabetes management and wellness exam.  Patient would like to discuss left ankle swelling for the past 1-2 months.  Patient would like to discuss his AAA.    Previous A1C is at goal of <8  Lab Results   Component Value Date    A1C 6.9 07/09/2020    A1C 6.8 04/07/2020    A1C 6.6 10/24/2019    A1C 7.1 07/05/2019    A1C 6.6 12/10/2018     Urine microalbumin:creatine: n/a  Foot exam 4-12-18, declines today  Eye exam 3-18-20    Tobacco User no  Patient is on a daily aspirin  Patient is on a Statin.  Blood pressure today of:     BP Readings from Last 1 Encounters:   12/23/19 116/62      is at the goal of <139/89 for diabetics.    Chief Complaint   Patient presents with     Wellness Visit     Diabetes       Initial /66 (BP Location: Right arm, Patient Position: Sitting, Cuff Size: Adult Regular)   Pulse 76   Temp 97.3  F (36.3  C) (Tympanic)   Resp 16   Ht 1.759 m (5' 9.25\")   Wt 83.9 kg (185 lb)   BMI 27.12 kg/m   Estimated body mass index is 27.12 kg/m  as calculated from the following:    Height as of this encounter: 1.759 m (5' 9.25\").    Weight as of this encounter: 83.9 kg (185 lb).  Medication Reconciliation: complete    Keyla Vo LPN        "

## 2020-07-10 ASSESSMENT — ANXIETY QUESTIONNAIRES: GAD7 TOTAL SCORE: 0

## 2020-07-17 ENCOUNTER — HOSPITAL ENCOUNTER (OUTPATIENT)
Dept: ULTRASOUND IMAGING | Facility: OTHER | Age: 85
Discharge: HOME | End: 2020-07-17
Attending: INTERNAL MEDICINE | Admitting: INTERNAL MEDICINE
Payer: MEDICARE

## 2020-07-17 DIAGNOSIS — I77.811 ABDOMINAL AORTIC ECTASIA (H): ICD-10-CM

## 2020-07-17 PROCEDURE — 76775 US EXAM ABDO BACK WALL LIM: CPT

## 2020-08-11 DIAGNOSIS — E11.42 CONTROLLED TYPE 2 DIABETES MELLITUS WITH DIABETIC POLYNEUROPATHY, WITHOUT LONG-TERM CURRENT USE OF INSULIN (H): ICD-10-CM

## 2020-08-11 NOTE — TELEPHONE ENCOUNTER
metFORMIN (GLUCOPHAGE) 500 MG tablet  360 tablet  3  4/7/2020   No    Sig - Route: Take 1-2 tablets (500-1,000 mg) by mouth 2 times daily (with meals) - Oral     Refill request already responded to.      Grey Carrillo RN on 8/11/2020 at 12:17 PM

## 2020-08-23 DIAGNOSIS — E78.2 MIXED HYPERLIPIDEMIA: ICD-10-CM

## 2020-08-24 RX ORDER — PRAVASTATIN SODIUM 40 MG
TABLET ORAL
Qty: 90 TABLET | Refills: 3 | OUTPATIENT
Start: 2020-08-24

## 2020-08-24 NOTE — TELEPHONE ENCOUNTER
Pravastatin reffilled on 4/7/2020 #90 x 3 reffills to [facility].  Zari Lino RN on 8/24/2020 at 11:04 AM

## 2020-09-15 ENCOUNTER — TELEPHONE (OUTPATIENT)
Dept: INTERNAL MEDICINE | Facility: OTHER | Age: 85
End: 2020-09-15

## 2020-09-15 NOTE — TELEPHONE ENCOUNTER
Received CMN form about diabetes from [facility].  Form completed, signed and faxed back at this time.        Keyla Vo LPN 9/15/2020 12:35 PM

## 2020-10-30 DIAGNOSIS — M62.838 MUSCLE SPASM: ICD-10-CM

## 2020-10-30 RX ORDER — TIZANIDINE 2 MG/1
TABLET ORAL
Qty: 120 TABLET | Refills: 3 | Status: SHIPPED | OUTPATIENT
Start: 2020-10-30 | End: 2022-07-21

## 2020-10-30 NOTE — TELEPHONE ENCOUNTER
Disp Refills Start End AUGUSTIN   tiZANidine (ZANAFLEX) 2 MG tablet 120 tablet 3 4/7/2020  No   Sig - Route: Take 1-2 tablets (2-4 mg) by mouth every 6 hours as needed for muscle spasms - Oral       LOV: 7/9/2020  Future Office visit:    Next 5 appointments (look out 90 days)    Nov 03, 2020 10:00 AM  SHORT with Nawaf Montanez MD  [facility] ([facility]) [address]  [place] [address]  [phone]        Routing refill request to provider for review/approval because:  Drug not on the [facility], [facility] or [facility] refill protocol or controlled substance    Requested Prescriptions   Pending Prescriptions Disp Refills     tiZANidine (ZANAFLEX) 2 MG tablet [Pharmacy Med Name: TIZANIDINE 2MG TABLETS] 120 tablet 3     Sig: TAKE 1 TO 2 TABLETS(2 TO 4 MG) BY MOUTH EVERY 6 HOURS AS NEEDED FOR MUSCLE SPASMS       There is no refill protocol information for this order      Unable to complete prescription refill per RN Medication Refill Policy.................... Ingris Maldonado RN ....................  10/30/2020   1:04 PM

## 2020-11-01 DIAGNOSIS — I49.1 PREMATURE ATRIAL CONTRACTIONS: ICD-10-CM

## 2020-11-02 RX ORDER — METOPROLOL TARTRATE 25 MG/1
TABLET, FILM COATED ORAL
Qty: 90 TABLET | Refills: 3 | OUTPATIENT
Start: 2020-11-02

## 2020-11-02 NOTE — TELEPHONE ENCOUNTER
Refills available. Unable to complete prescription refill per RNMedication Refill Policy.................... Aruna Bejarano RN ....................  11/2/2020   9:40 AM

## 2020-11-03 ENCOUNTER — OFFICE VISIT (OUTPATIENT)
Dept: INTERNAL MEDICINE | Facility: OTHER | Age: 85
End: 2020-11-03
Attending: INTERNAL MEDICINE
Payer: MEDICARE

## 2020-11-03 VITALS
RESPIRATION RATE: 12 BRPM | DIASTOLIC BLOOD PRESSURE: 70 MMHG | HEART RATE: 76 BPM | BODY MASS INDEX: 26.57 KG/M2 | TEMPERATURE: 99.7 F | SYSTOLIC BLOOD PRESSURE: 138 MMHG | WEIGHT: 181.2 LBS | OXYGEN SATURATION: 94 %

## 2020-11-03 DIAGNOSIS — M54.50 INTERMITTENT LOW BACK PAIN: ICD-10-CM

## 2020-11-03 DIAGNOSIS — E78.2 MIXED HYPERLIPIDEMIA: ICD-10-CM

## 2020-11-03 DIAGNOSIS — G47.9 SLEEP DIFFICULTIES: ICD-10-CM

## 2020-11-03 DIAGNOSIS — I10 BENIGN ESSENTIAL HYPERTENSION: ICD-10-CM

## 2020-11-03 DIAGNOSIS — E11.42 CONTROLLED TYPE 2 DIABETES MELLITUS WITH DIABETIC POLYNEUROPATHY, WITHOUT LONG-TERM CURRENT USE OF INSULIN (H): ICD-10-CM

## 2020-11-03 DIAGNOSIS — E11.42 CONTROLLED TYPE 2 DIABETES MELLITUS WITH DIABETIC POLYNEUROPATHY, WITHOUT LONG-TERM CURRENT USE OF INSULIN (H): Primary | ICD-10-CM

## 2020-11-03 DIAGNOSIS — L84 PRE-ULCERATIVE CORN OR CALLOUS: ICD-10-CM

## 2020-11-03 LAB
ALBUMIN SERPL-MCNC: 4.1 G/DL (ref 3.5–5.7)
ALBUMIN UR-MCNC: NEGATIVE MG/DL
ALP SERPL-CCNC: 64 U/L (ref 34–104)
ALT SERPL W P-5'-P-CCNC: 9 U/L (ref 7–52)
ANION GAP SERPL CALCULATED.3IONS-SCNC: 6 MMOL/L (ref 3–14)
APPEARANCE UR: CLEAR
AST SERPL W P-5'-P-CCNC: 13 U/L (ref 13–39)
BILIRUB SERPL-MCNC: 1 MG/DL (ref 0.3–1)
BILIRUB UR QL STRIP: NEGATIVE
BUN SERPL-MCNC: 18 MG/DL (ref 7–25)
CALCIUM SERPL-MCNC: 8.9 MG/DL (ref 8.6–10.3)
CHLORIDE SERPL-SCNC: 102 MMOL/L (ref 98–107)
CHOLEST SERPL-MCNC: 121 MG/DL
CO2 SERPL-SCNC: 26 MMOL/L (ref 21–31)
COLOR UR AUTO: ABNORMAL
CREAT SERPL-MCNC: 1.07 MG/DL (ref 0.7–1.3)
CREAT UR-MCNC: 103 MG/DL
ERYTHROCYTE [DISTWIDTH] IN BLOOD BY AUTOMATED COUNT: 13.5 % (ref 10–15)
GFR SERPL CREATININE-BSD FRML MDRD: 66 ML/MIN/{1.73_M2}
GLUCOSE SERPL-MCNC: 261 MG/DL (ref 70–105)
GLUCOSE UR STRIP-MCNC: >1000 MG/DL
HBA1C MFR BLD: 7.2 % (ref 4–6)
HCT VFR BLD AUTO: 46.6 % (ref 40–53)
HDLC SERPL-MCNC: 29 MG/DL (ref 23–92)
HGB BLD-MCNC: 15.5 G/DL (ref 13.3–17.7)
HGB UR QL STRIP: NEGATIVE
KETONES UR STRIP-MCNC: NEGATIVE MG/DL
LDLC SERPL CALC-MCNC: 76 MG/DL
LEUKOCYTE ESTERASE UR QL STRIP: NEGATIVE
MCH RBC QN AUTO: 28.9 PG (ref 26.5–33)
MCHC RBC AUTO-ENTMCNC: 33.3 G/DL (ref 31.5–36.5)
MCV RBC AUTO: 87 FL (ref 78–100)
MICROALBUMIN UR-MCNC: 45 MG/L
MICROALBUMIN/CREAT UR: 43.88 MG/G CR (ref 0–17)
NITRATE UR QL: NEGATIVE
NONHDLC SERPL-MCNC: 92 MG/DL
PH UR STRIP: 5.5 PH (ref 5–7)
PLATELET # BLD AUTO: 111 10E9/L (ref 150–450)
POTASSIUM SERPL-SCNC: 4.2 MMOL/L (ref 3.5–5.1)
PROT SERPL-MCNC: 6.8 G/DL (ref 6.4–8.9)
RBC # BLD AUTO: 5.36 10E12/L (ref 4.4–5.9)
SODIUM SERPL-SCNC: 134 MMOL/L (ref 134–144)
SOURCE: ABNORMAL
SP GR UR STRIP: 1.02 (ref 1–1.03)
TRIGL SERPL-MCNC: 80 MG/DL
TSH SERPL DL<=0.05 MIU/L-ACNC: 1.39 IU/ML (ref 0.34–5.6)
UROBILINOGEN UR STRIP-MCNC: NORMAL MG/DL (ref 0–2)
WBC # BLD AUTO: 5 10E9/L (ref 4–11)

## 2020-11-03 PROCEDURE — 82043 UR ALBUMIN QUANTITATIVE: CPT | Mod: ZL | Performed by: INTERNAL MEDICINE

## 2020-11-03 PROCEDURE — 85027 COMPLETE CBC AUTOMATED: CPT | Mod: ZL | Performed by: INTERNAL MEDICINE

## 2020-11-03 PROCEDURE — 80053 COMPREHEN METABOLIC PANEL: CPT | Mod: ZL | Performed by: INTERNAL MEDICINE

## 2020-11-03 PROCEDURE — 83036 HEMOGLOBIN GLYCOSYLATED A1C: CPT | Mod: ZL | Performed by: INTERNAL MEDICINE

## 2020-11-03 PROCEDURE — 99214 OFFICE O/P EST MOD 30 MIN: CPT | Performed by: INTERNAL MEDICINE

## 2020-11-03 PROCEDURE — G0463 HOSPITAL OUTPT CLINIC VISIT: HCPCS

## 2020-11-03 PROCEDURE — 81003 URINALYSIS AUTO W/O SCOPE: CPT | Mod: ZL | Performed by: INTERNAL MEDICINE

## 2020-11-03 PROCEDURE — 80061 LIPID PANEL: CPT | Mod: ZL | Performed by: INTERNAL MEDICINE

## 2020-11-03 PROCEDURE — 84443 ASSAY THYROID STIM HORMONE: CPT | Mod: ZL | Performed by: INTERNAL MEDICINE

## 2020-11-03 PROCEDURE — 36415 COLL VENOUS BLD VENIPUNCTURE: CPT | Mod: ZL | Performed by: INTERNAL MEDICINE

## 2020-11-03 RX ORDER — OXYCODONE AND ACETAMINOPHEN 5; 325 MG/1; MG/1
1 TABLET ORAL EVERY 6 HOURS PRN
Qty: 30 TABLET | Refills: 0 | Status: SHIPPED | OUTPATIENT
Start: 2020-11-03 | End: 2021-03-17

## 2020-11-03 ASSESSMENT — ENCOUNTER SYMPTOMS
DIARRHEA: 0
PALPITATIONS: 1
CONFUSION: 0
VOMITING: 0
COUGH: 0
ABDOMINAL PAIN: 0
ARTHRALGIAS: 0
WHEEZING: 0
LIGHT-HEADEDNESS: 0
CHILLS: 0
SHORTNESS OF BREATH: 1
AGITATION: 0
FEVER: 0
BACK PAIN: 1
HEMATURIA: 0
BRUISES/BLEEDS EASILY: 0
EYE PAIN: 0
MYALGIAS: 0
SLEEP DISTURBANCE: 1
DYSURIA: 0
FATIGUE: 0
DIZZINESS: 0
NAUSEA: 0

## 2020-11-03 ASSESSMENT — PAIN SCALES - GENERAL: PAINLEVEL: NO PAIN (0)

## 2020-11-03 NOTE — PATIENT INSTRUCTIONS
Labs are stable.   Blood pressure is well controlled.   Thyroid is normal.   Cholesterol levels look very good.   Kidney function / Creatinine - is stable.     Results for orders placed or performed in visit on 11/03/20   TSH Reflex GH     Status: None   Result Value Ref Range    TSH Reflex 1.39 0.34 - 5.60 IU/mL   Lipid Profile     Status: None   Result Value Ref Range    Cholesterol 121 <200 mg/dL    Triglycerides 80 <150 mg/dL    HDL Cholesterol 29 23 - 92 mg/dL    LDL Cholesterol Calculated 76 <100 mg/dL    Non HDL Cholesterol 92 <130 mg/dL   Comprehensive metabolic panel     Status: Abnormal   Result Value Ref Range    Sodium 134 134 - 144 mmol/L    Potassium 4.2 3.5 - 5.1 mmol/L    Chloride 102 98 - 107 mmol/L    Carbon Dioxide 26 21 - 31 mmol/L    Anion Gap 6 3 - 14 mmol/L    Glucose 261 (H) 70 - 105 mg/dL    Urea Nitrogen 18 7 - 25 mg/dL    Creatinine 1.07 0.70 - 1.30 mg/dL    GFR Estimate 66 >60 mL/min/[1.73_m2]    GFR Estimate If Black 80 >60 mL/min/[1.73_m2]    Calcium 8.9 8.6 - 10.3 mg/dL    Bilirubin Total 1.0 0.3 - 1.0 mg/dL    Albumin 4.1 3.5 - 5.7 g/dL    Protein Total 6.8 6.4 - 8.9 g/dL    Alkaline Phosphatase 64 34 - 104 U/L    ALT 9 7 - 52 U/L    AST 13 13 - 39 U/L   CBC with platelets     Status: Abnormal   Result Value Ref Range    WBC 5.0 4.0 - 11.0 10e9/L    RBC Count 5.36 4.4 - 5.9 10e12/L    Hemoglobin 15.5 13.3 - 17.7 g/dL    Hematocrit 46.6 40.0 - 53.0 %    MCV 87 78 - 100 fl    MCH 28.9 26.5 - 33.0 pg    MCHC 33.3 31.5 - 36.5 g/dL    RDW 13.5 10.0 - 15.0 %    Platelet Count 111 (L) 150 - 450 10e9/L   Hemoglobin A1c     Status: Abnormal   Result Value Ref Range    Hemoglobin A1C 7.2 (H) 4.0 - 6.0 %   UA reflex to Microscopic     Status: Abnormal   Result Value Ref Range    Color Urine Light Yellow     Appearance Urine Clear     Glucose Urine >1000 (A) NEG^Negative mg/dL    Bilirubin Urine Negative NEG^Negative    Ketones Urine Negative NEG^Negative mg/dL    Specific Gravity Urine 1.018  1.003 - 1.035    Blood Urine Negative NEG^Negative    pH Urine 5.5 5.0 - 7.0 pH    Protein Albumin Urine Negative NEG^Negative mg/dL    Urobilinogen mg/dL Normal 0.0 - 2.0 mg/dL    Nitrite Urine Negative NEG^Negative    Leukocyte Esterase Urine Negative NEG^Negative    Source Midstream Urine       Aspects of Diabetes:   Recent Labs   Lab Test 11/03/20  0909 07/09/20  1319 04/07/20  1039   A1C 7.2* 6.9* 6.8*   LDL 76 70 74   HDL 29 31 32   TRIG 80 155* 143   ALT 9 10 11   CR 1.07 0.93 1.22   GFRESTIMATED 66 77 57*   GFRESTBLACK 80 >90 69   POTASSIUM 4.2 4.3 4.3      Hemoglobin A1c  Goal range is under 8%. Best is 6.5 to 7   Blood Pressure 138/70 Goal to keep less than 140/90   Tobacco  reports that he quit smoking about 45 years ago. His smoking use included cigarettes. He has a 10.00 pack-year smoking history. He has never used smokeless tobacco. Goal to abstain from tobacco   Aspirin or Plavix Anti-platelet therapy Aspirin or Plavix reduces risk of heart disease and stroke  -- sometimes used with other blood thinners, depending on bleeding risk and risk factors.    ACE/ARB Specific blood pressure meds These medications can reduce risk of kidney disease   Cholesterol Statins (Lipitor, Crestor, vs others) Statins reduce risk of heart disease and stroke   Eye Exam -- Do Yearly -- Annual diabetic eye exam   Healthy weight Wt Readings from Last 3 Encounters:   11/03/20 82.2 kg (181 lb 3.2 oz)   07/09/20 83.9 kg (185 lb)   12/23/19 86.2 kg (190 lb)     Body mass index is 26.57 kg/m .  Goal BMI under 30, best is under 25.      -- Trying to exercise daily (goal at least 20 min/day) with moderate aerobic activity   -- Eat healthy (resources from ADA at http://www.diabetes.org/)   -- Taking good care of my feet. Consider seeing the Podiatrist   -- Check blood sugars as directed, record in log book and bring to every appointment    Insurance companies are grading you and I on your blood sugar control -- Goal is to get your  A1c down to 7.9% or lower and NO Smoking!  -- Medicare and most insurance companies, will only cover Hemoglobin A1c labs to be rechecked every 91+ days.      Return for Diabetes labs and clinic follow-up appointment every 3 to 4 months.  --- (Go for about 91 to 100 days)  Schedule lab only appointment --- A few days AFTER: 2/1/21   Schedule clinic appointment with Dr. Montanez -- Same day as labs, or 1-2 days later.

## 2020-11-03 NOTE — NURSING NOTE
"Chief Complaint   Patient presents with     Diabetes     \"once in a while I get dizzy when I go to stand up.\"       Initial BP (!) 140/70 (BP Location: Right arm, Patient Position: Sitting, Cuff Size: Adult Regular)   Pulse 76   Temp 99.7  F (37.6  C) (Tympanic)   Resp 12   Wt 82.2 kg (181 lb 3.2 oz)   SpO2 94%   BMI 26.57 kg/m   Estimated body mass index is 26.57 kg/m  as calculated from the following:    Height as of 7/9/20: 1.759 m (5' 9.25\").    Weight as of this encounter: 82.2 kg (181 lb 3.2 oz).  Medication Reconciliation: Completed     Dayne Porras LPN  "

## 2020-11-03 NOTE — PROGRESS NOTES
"Nursing Notes:   Dayne Porras LPN  11/3/2020 10:35 AM  Signed  Chief Complaint   Patient presents with     Diabetes     \"once in a while I get dizzy when I go to stand up.\"       Initial BP (!) 140/70 (BP Location: Right arm, Patient Position: Sitting, Cuff Size: Adult Regular)   Pulse 76   Temp 99.7  F (37.6  C) (Tympanic)   Resp 12   Wt 82.2 kg (181 lb 3.2 oz)   SpO2 94%   BMI 26.57 kg/m   Estimated body mass index is 26.57 kg/m  as calculated from the following:    Height as of 7/9/20: 1.759 m (5' 9.25\").    Weight as of this encounter: 82.2 kg (181 lb 3.2 oz).  Medication Reconciliation: Completed     Dayne Porras LPN    Nursing note reviewed with patient.  Accuracy and completeness verified.   Mr. Dennison is a 85 year old male who:  Patient presents with:  Diabetes: \"once in a while I get dizzy when I go to stand up.\"      ICD-10-CM    1. Controlled type 2 diabetes mellitus with diabetic polyneuropathy, without long-term current use of insulin (H)  E11.42    2. Mixed hyperlipidemia  E78.2    3. Sleep difficulties  G47.9    4. Pre-ulcerative corn or callous  L84    5. Benign essential hypertension  I10    6. Intermittent low back pain  M54.5 oxyCODONE-acetaminophen (PERCOCET) 5-325 MG tablet     HPI  Patient presents for follow-up multiple issues.    Type 2 diabetes, has been well controlled.  Denies hypoglycemia.  Doing well with current medication regimen.  Does have diabetic neuropathy and history of preulcerative calluses.  Has been checking his feet regularly.  He does have foot deformity.  No recent open sores or ulcers have been identified.  Continue current medications.    Mixed hyperlipidemia, cholesterol levels are doing fairly well with use of pravastatin.  Currently 40 mg daily.  Tolerating well without side effects.  Continue current dosing.    Sleep difficulties, has been an ongoing issue.  Just recently stopped using melatonin.  He was taking half of a milligram at bedtime.  Even a full " milligram would cause him to have problems with sleepiness during the day.  He continues to experiment with sleeping medications.    Hypertension, blood pressures are well controlled.  Does still occasionally have some lightheadedness/dizziness.  Has not had any syncope or near syncopal episodes.  Overall continue current medications.    Intermittent low back pain, still giving him a lot of troubles at times.  His last prescription of oxycodone was for back in the spring.  He rarely uses them unless he has a very bad flareup of back pain.  He would like to get a small prescription filled once again today.  He is not using any benzodiazepines.  [facility] website reviewed, no abnormal findings noted.  Proper medication use and misuse reviewed.  Patient has been using medications appropriately.  Prescriptions refilled x40 tablets.    Functional Capacity: > or about 4 METS. + back pains limit at times.   Review of Systems   Constitutional: Negative for chills, fatigue and fever.   HENT: Negative for congestion and hearing loss.    Eyes: Negative for pain and visual disturbance.   Respiratory: Positive for shortness of breath (mild, stable.). Negative for cough and wheezing.    Cardiovascular: Positive for palpitations. Negative for chest pain.        Raynaud's syndrome, no ulcerations   Gastrointestinal: Negative for abdominal pain, diarrhea, nausea and vomiting.   Endocrine: Negative for cold intolerance and heat intolerance.   Genitourinary: Negative for dysuria and hematuria.        + feels like emptying bladder okay, occasionally will have to go back to bathroom a 2nd time.    Musculoskeletal: Positive for back pain (+ intermittent low back pain). Negative for arthralgias and myalgias.   Skin: Negative for pallor.   Allergic/Immunologic: Negative for immunocompromised state.   Neurological: Negative for dizziness and light-headedness.        Rare positional lightheadedness   Hematological: Does not bruise/bleed easily.  "  Psychiatric/Behavioral: Positive for sleep disturbance. Negative for agitation and confusion.        Reviewed and updated as needed this visit by Provider   Tobacco  Allergies  Meds  Problems  Med Hx  Surg Hx  Fam Hx          EXAM:   Vitals:    11/03/20 1020   BP: 138/70   BP Location: Right arm   Patient Position: Sitting   Cuff Size: Adult Regular   Pulse: 76   Resp: 12   Temp: 99.7  F (37.6  C)   TempSrc: Tympanic   SpO2: 94%   Weight: 82.2 kg (181 lb 3.2 oz)       Current Pain Score: No Pain (0)     BP Readings from Last 3 Encounters:   11/03/20 138/70   07/09/20 112/66   12/23/19 116/62      Wt Readings from Last 3 Encounters:   11/03/20 82.2 kg (181 lb 3.2 oz)   07/09/20 83.9 kg (185 lb)   12/23/19 86.2 kg (190 lb)      Estimated body mass index is 26.57 kg/m  as calculated from the following:    Height as of 7/9/20: 1.759 m (5' 9.25\").    Weight as of this encounter: 82.2 kg (181 lb 3.2 oz).     Physical Exam  Constitutional:       General: He is not in acute distress.     Appearance: He is well-developed. He is not diaphoretic.   HENT:      Head: Normocephalic and atraumatic.   Eyes:      General: No scleral icterus.     Conjunctiva/sclera: Conjunctivae normal.   Neck:      Musculoskeletal: Neck supple.      Vascular: No carotid bruit.   Cardiovascular:      Rate and Rhythm: Normal rate and regular rhythm.      Pulses: Normal pulses.      Heart sounds: Normal heart sounds.   Pulmonary:      Effort: Pulmonary effort is normal.      Breath sounds: Normal breath sounds.   Abdominal:      Palpations: Abdomen is soft.      Tenderness: There is no abdominal tenderness.   Musculoskeletal:         General: No deformity.      Right lower leg: No edema.      Left lower leg: No edema.   Lymphadenopathy:      Cervical: No cervical adenopathy.   Skin:     General: Skin is warm and dry.      Findings: No rash.   Neurological:      Mental Status: He is alert and oriented to person, place, and time. Mental status " is at baseline.   Psychiatric:         Mood and Affect: Mood normal.         Behavior: Behavior normal.          Procedures   INVESTIGATIONS:  - Labs reviewed in Epic     ASSESSMENT AND PLAN:  Problem List Items Addressed This Visit        Nervous and Auditory    Controlled type 2 diabetes mellitus with diabetic polyneuropathy, without long-term current use of insulin (H) - Primary       Endocrine    Mixed hyperlipidemia       Circulatory    Benign essential hypertension       Musculoskeletal and Integumentary    Pre-ulcerative corn or callous       Other    Sleep difficulties      Other Visit Diagnoses     Intermittent low back pain        Relevant Medications    oxyCODONE-acetaminophen (PERCOCET) 5-325 MG tablet        reviewed diet, exercise and weight control, recommended sodium restriction, cardiovascular risk and specific lipid/LDL goals reviewed, specific diabetic recommendations low cholesterol diet, weight control and daily exercise discussed, use of aspirin to prevent MI and TIA's discussed  -- Expected clinical course discussed    -- Medications and their side effects discussed    Patient Instructions     Labs are stable.   Blood pressure is well controlled.   Thyroid is normal.   Cholesterol levels look very good.   Kidney function / Creatinine - is stable.     Results for orders placed or performed in visit on 11/03/20   TSH Reflex GH     Status: None   Result Value Ref Range    TSH Reflex 1.39 0.34 - 5.60 IU/mL   Lipid Profile     Status: None   Result Value Ref Range    Cholesterol 121 <200 mg/dL    Triglycerides 80 <150 mg/dL    HDL Cholesterol 29 23 - 92 mg/dL    LDL Cholesterol Calculated 76 <100 mg/dL    Non HDL Cholesterol 92 <130 mg/dL   Comprehensive metabolic panel     Status: Abnormal   Result Value Ref Range    Sodium 134 134 - 144 mmol/L    Potassium 4.2 3.5 - 5.1 mmol/L    Chloride 102 98 - 107 mmol/L    Carbon Dioxide 26 21 - 31 mmol/L    Anion Gap 6 3 - 14 mmol/L    Glucose 261 (H) 70 -  105 mg/dL    Urea Nitrogen 18 7 - 25 mg/dL    Creatinine 1.07 0.70 - 1.30 mg/dL    GFR Estimate 66 >60 mL/min/[1.73_m2]    GFR Estimate If Black 80 >60 mL/min/[1.73_m2]    Calcium 8.9 8.6 - 10.3 mg/dL    Bilirubin Total 1.0 0.3 - 1.0 mg/dL    Albumin 4.1 3.5 - 5.7 g/dL    Protein Total 6.8 6.4 - 8.9 g/dL    Alkaline Phosphatase 64 34 - 104 U/L    ALT 9 7 - 52 U/L    AST 13 13 - 39 U/L   CBC with platelets     Status: Abnormal   Result Value Ref Range    WBC 5.0 4.0 - 11.0 10e9/L    RBC Count 5.36 4.4 - 5.9 10e12/L    Hemoglobin 15.5 13.3 - 17.7 g/dL    Hematocrit 46.6 40.0 - 53.0 %    MCV 87 78 - 100 fl    MCH 28.9 26.5 - 33.0 pg    MCHC 33.3 31.5 - 36.5 g/dL    RDW 13.5 10.0 - 15.0 %    Platelet Count 111 (L) 150 - 450 10e9/L   Hemoglobin A1c     Status: Abnormal   Result Value Ref Range    Hemoglobin A1C 7.2 (H) 4.0 - 6.0 %   UA reflex to Microscopic     Status: Abnormal   Result Value Ref Range    Color Urine Light Yellow     Appearance Urine Clear     Glucose Urine >1000 (A) NEG^Negative mg/dL    Bilirubin Urine Negative NEG^Negative    Ketones Urine Negative NEG^Negative mg/dL    Specific Gravity Urine 1.018 1.003 - 1.035    Blood Urine Negative NEG^Negative    pH Urine 5.5 5.0 - 7.0 pH    Protein Albumin Urine Negative NEG^Negative mg/dL    Urobilinogen mg/dL Normal 0.0 - 2.0 mg/dL    Nitrite Urine Negative NEG^Negative    Leukocyte Esterase Urine Negative NEG^Negative    Source Midstream Urine       Aspects of Diabetes:   Recent Labs   Lab Test 11/03/20  0909 07/09/20  1319 04/07/20  1039   A1C 7.2* 6.9* 6.8*   LDL 76 70 74   HDL 29 31 32   TRIG 80 155* 143   ALT 9 10 11   CR 1.07 0.93 1.22   GFRESTIMATED 66 77 57*   GFRESTBLACK 80 >90 69   POTASSIUM 4.2 4.3 4.3      Hemoglobin A1c  Goal range is under 8%. Best is 6.5 to 7   Blood Pressure 138/70 Goal to keep less than 140/90   Tobacco  reports that he quit smoking about 45 years ago. His smoking use included cigarettes. He has a 10.00 pack-year smoking  history. He has never used smokeless tobacco. Goal to abstain from tobacco   Aspirin or Plavix Anti-platelet therapy Aspirin or Plavix reduces risk of heart disease and stroke  -- sometimes used with other blood thinners, depending on bleeding risk and risk factors.    ACE/ARB Specific blood pressure meds These medications can reduce risk of kidney disease   Cholesterol Statins (Lipitor, Crestor, vs others) Statins reduce risk of heart disease and stroke   Eye Exam -- Do Yearly -- Annual diabetic eye exam   Healthy weight Wt Readings from Last 3 Encounters:   11/03/20 82.2 kg (181 lb 3.2 oz)   07/09/20 83.9 kg (185 lb)   12/23/19 86.2 kg (190 lb)     Body mass index is 26.57 kg/m .  Goal BMI under 30, best is under 25.      -- Trying to exercise daily (goal at least 20 min/day) with moderate aerobic activity   -- Eat healthy (resources from ADA at http://www.diabetes.org/)   -- Taking good care of my feet. Consider seeing the Podiatrist   -- Check blood sugars as directed, record in log book and bring to every appointment    Insurance companies are grading you and I on your blood sugar control -- Goal is to get your A1c down to 7.9% or lower and NO Smoking!  -- Medicare and most insurance companies, will only cover Hemoglobin A1c labs to be rechecked every 91+ days.      Return for Diabetes labs and clinic follow-up appointment every 3 to 4 months.  --- (Go for about 91 to 100 days)  Schedule lab only appointment --- A few days AFTER: 2/1/21   Schedule clinic appointment with Dr. Montanez -- Same day as labs, or 1-2 days later.      Nawaf Montanez MD   Internal Medicine  [facility]     Portions of this note were dictated using speech recognition software. The note has been proofread but errors in the text may have been overlooked. Please contact me if there are any concerns regarding the accuracy of the dictation.

## 2021-01-21 ENCOUNTER — APPOINTMENT (OUTPATIENT)
Dept: GENERAL RADIOLOGY | Facility: OTHER | Age: 86
End: 2021-01-21
Attending: STUDENT IN AN ORGANIZED HEALTH CARE EDUCATION/TRAINING PROGRAM
Payer: MEDICARE

## 2021-01-21 ENCOUNTER — HOSPITAL ENCOUNTER (EMERGENCY)
Facility: OTHER | Age: 86
Discharge: HOME | End: 2021-01-21
Attending: STUDENT IN AN ORGANIZED HEALTH CARE EDUCATION/TRAINING PROGRAM | Admitting: STUDENT IN AN ORGANIZED HEALTH CARE EDUCATION/TRAINING PROGRAM
Payer: MEDICARE

## 2021-01-21 ENCOUNTER — NURSE TRIAGE (OUTPATIENT)
Dept: INTERNAL MEDICINE | Facility: OTHER | Age: 86
End: 2021-01-21

## 2021-01-21 VITALS
OXYGEN SATURATION: 98 % | RESPIRATION RATE: 21 BRPM | HEART RATE: 58 BPM | DIASTOLIC BLOOD PRESSURE: 85 MMHG | SYSTOLIC BLOOD PRESSURE: 165 MMHG | TEMPERATURE: 97.5 F

## 2021-01-21 DIAGNOSIS — R53.83 FATIGUE, UNSPECIFIED TYPE: ICD-10-CM

## 2021-01-21 DIAGNOSIS — R29.898 WEAKNESS OF RIGHT LOWER EXTREMITY: ICD-10-CM

## 2021-01-21 DIAGNOSIS — R42 DIZZINESS: ICD-10-CM

## 2021-01-21 LAB
ANION GAP SERPL CALCULATED.3IONS-SCNC: 8 MMOL/L (ref 3–14)
BASOPHILS # BLD AUTO: 0.1 10E9/L (ref 0–0.2)
BASOPHILS NFR BLD AUTO: 1 %
BUN SERPL-MCNC: 20 MG/DL (ref 7–25)
CALCIUM SERPL-MCNC: 9.5 MG/DL (ref 8.6–10.3)
CHLORIDE SERPL-SCNC: 101 MMOL/L (ref 98–107)
CO2 SERPL-SCNC: 26 MMOL/L (ref 21–31)
CREAT SERPL-MCNC: 0.95 MG/DL (ref 0.7–1.3)
DIFFERENTIAL METHOD BLD: NORMAL
EOSINOPHIL # BLD AUTO: 0.3 10E9/L (ref 0–0.7)
EOSINOPHIL NFR BLD AUTO: 5 %
ERYTHROCYTE [DISTWIDTH] IN BLOOD BY AUTOMATED COUNT: 14.6 % (ref 10–15)
GFR SERPL CREATININE-BSD FRML MDRD: 76 ML/MIN/{1.73_M2}
GLUCOSE SERPL-MCNC: 147 MG/DL (ref 70–105)
HCT VFR BLD AUTO: 47 % (ref 40–53)
HGB BLD-MCNC: 15.6 G/DL (ref 13.3–17.7)
IMM GRANULOCYTES # BLD: 0 10E9/L (ref 0–0.4)
IMM GRANULOCYTES NFR BLD: 0.5 %
LYMPHOCYTES # BLD AUTO: 1.2 10E9/L (ref 0.8–5.3)
LYMPHOCYTES NFR BLD AUTO: 19.5 %
MCH RBC QN AUTO: 28.9 PG (ref 26.5–33)
MCHC RBC AUTO-ENTMCNC: 33.2 G/DL (ref 31.5–36.5)
MCV RBC AUTO: 87 FL (ref 78–100)
MONOCYTES # BLD AUTO: 0.4 10E9/L (ref 0–1.3)
MONOCYTES NFR BLD AUTO: 6.9 %
NEUTROPHILS # BLD AUTO: 4.2 10E9/L (ref 1.6–8.3)
NEUTROPHILS NFR BLD AUTO: 67.1 %
PLATELET # BLD AUTO: 159 10E9/L (ref 150–450)
POTASSIUM SERPL-SCNC: 4.1 MMOL/L (ref 3.5–5.1)
RBC # BLD AUTO: 5.4 10E12/L (ref 4.4–5.9)
SODIUM SERPL-SCNC: 135 MMOL/L (ref 134–144)
WBC # BLD AUTO: 6.2 10E9/L (ref 4–11)

## 2021-01-21 PROCEDURE — 71045 X-RAY EXAM CHEST 1 VIEW: CPT

## 2021-01-21 PROCEDURE — 80048 BASIC METABOLIC PNL TOTAL CA: CPT | Performed by: STUDENT IN AN ORGANIZED HEALTH CARE EDUCATION/TRAINING PROGRAM

## 2021-01-21 PROCEDURE — 36415 COLL VENOUS BLD VENIPUNCTURE: CPT | Performed by: STUDENT IN AN ORGANIZED HEALTH CARE EDUCATION/TRAINING PROGRAM

## 2021-01-21 PROCEDURE — 99285 EMERGENCY DEPT VISIT HI MDM: CPT | Mod: 25 | Performed by: STUDENT IN AN ORGANIZED HEALTH CARE EDUCATION/TRAINING PROGRAM

## 2021-01-21 PROCEDURE — 99283 EMERGENCY DEPT VISIT LOW MDM: CPT | Performed by: STUDENT IN AN ORGANIZED HEALTH CARE EDUCATION/TRAINING PROGRAM

## 2021-01-21 PROCEDURE — 93010 ELECTROCARDIOGRAM REPORT: CPT | Performed by: INTERNAL MEDICINE

## 2021-01-21 PROCEDURE — 93005 ELECTROCARDIOGRAM TRACING: CPT | Performed by: STUDENT IN AN ORGANIZED HEALTH CARE EDUCATION/TRAINING PROGRAM

## 2021-01-21 PROCEDURE — 85025 COMPLETE CBC W/AUTO DIFF WBC: CPT | Performed by: STUDENT IN AN ORGANIZED HEALTH CARE EDUCATION/TRAINING PROGRAM

## 2021-01-21 NOTE — TELEPHONE ENCOUNTER
"S-(situation): Pt c/o feeling a little dizzy the last few days.    B-(background): DM type II, cardiac disease. LOV 11/3/20- positional lightheadedness discussed. Next appointment 3/18.    A-(assessment): A few days ago, Pt was about to pour cup coffee, when he \"heard a bang\" (unsure if imagined), almost passed out and his left leg went numb. Sx. resolved within 2 hrs. Now having intermittent lightheadedness. Drinks very little- 1 sm. bottle water and 2 lg. cups coffee/24 hrs. Last urinated 1 hr ago. Pt leaving for [place] 1/27.    Denies: headache, fever, chest pain, change in vision, change in speech, vomiting, SOB, diarrhea, bleeding, blood sugars out of range, dry mouth, dark/cloudy/odorous urine, disorientation, fainting, palpitations, requires support to walk, spinning sensation    R-(recommendations): Protocol recommends Pt go to Office now, for lightheadedness present now. Pt requests review by Dr. Montanez, to find out if this can wait until tomorrow with available IM provider or for possible work-in. Please call Pt at [phone]; ok to leave detailed message.    Sandra Ch RN .............. 1/21/2021  4:26 PM      ______________________________________________________________          Additional Information    Negative: Difficult to awaken or acting confused (e.g., disoriented, slurred speech)    Negative: New neurologic deficit that is present NOW, sudden onset of ANY of the following: * Weakness of the face, arm, or leg on one side of the body * Numbness of the face, arm, or leg on one side of the body * Loss of speech or garbled speech    Negative: Sounds like a life-threatening emergency to the triager    Negative: Confusion, disorientation, or hallucinations is the main symptom    Dizziness is the main symptom    Negative: Shock suspected (e.g., cold/pale/clammy skin, too weak to stand, low BP, rapid pulse)    Negative: Difficult to awaken or acting confused (e.g., disoriented, slurred speech)    " Negative: Fainted, and still feels dizzy afterwards    Negative: Severe difficulty breathing (e.g., struggling for each breath, speaks in single words)    Negative: Overdose (accidental or intentional) of medications    Negative: New neurologic deficit that is present now: * Weakness of the face, arm, or leg on one side of the body * Numbness of the face, arm, or leg on one side of the body * Loss of speech or garbled speech    Negative: Heart beating < 50 beats per minute OR > 140 beats per minute    Negative: Sounds like a life-threatening emergency to the triager    Negative: Chest pain    Negative: Rectal bleeding, bloody stool, or tarry-black stool    Negative: Vomiting is the main symptom    Negative: Diarrhea is the main symptom    Negative: Headache is the main symptom    Negative: Heat exhaustion suspected (i.e., dehydration from heat exposure)    Negative: Patient states that he/she is having an anxiety/panic attack    Negative: SEVERE dizziness (e.g., unable to stand, requires support to walk, feels like passing out now)    Negative: SEVERE headache or neck pain    Negative: Spinning or tilting sensation (vertigo) present now and one or more stroke risk factors (i.e., hypertension, diabetes, prior stroke/TIA, heart attack, age over 60) (Exception: prior physician evaluation for this AND no different/worse than usual)    Negative: Loss of vision or double vision    Negative: Extra heart beats OR irregular heart beating (i.e., 'palpitations')    Negative: Difficulty breathing    Negative: Drinking very little and has signs of dehydration (e.g., no urine > 12 hours, very dry mouth, very lightheaded)    Negative: Follows bleeding (e.g., stomach, rectum, vagina) (Exception: became dizzy from sight of small amount blood)    Negative: Patient sounds very sick or weak to the triager    Lightheadedness (dizziness) present now, after 2 hours of rest and fluids    Protocols used: NEUROLOGIC DEFICIT-A-OH,  DIZZINESS-A-OH

## 2021-01-21 NOTE — TELEPHONE ENCOUNTER
Patient has been a little dizzy the last few days.   Please call patient back.  Thank you Gretta Quintana on 1/21/2021 at 1:47 PM

## 2021-01-21 NOTE — TELEPHONE ENCOUNTER
"Called and spoke to Patient after verifying last name and date of birth. Pt notified of Dr. Montanez' response/recommendation. Pt replied, \"Well, I won't be going in until tomorrow morning, because I have bowling tonight.\" Writer urged Pt to go to ED now as recommended by Dr. Montanez and risks were discussed. Pt agreed to go to ED, stating, \"Will you give the ER a heads up? I will grab a bite to eat, and then I will go in.\" Pt informed that he should have another adult drive him in and Pt urged to call 911, if his symptoms change or worsen. Pt agreeable.     Called and spoke with nurse Rayne in ED and hand-off report provided.     Sandra Ch RN .............. 1/21/2021  4:52 PM    "

## 2021-01-22 NOTE — DISCHARGE INSTRUCTIONS
Evaluation here was reassuring. Please review attached instructions about dizziness including reasons to return to the emergency department for re-evaluation.

## 2021-01-22 NOTE — ED TRIAGE NOTES
"Patient presents to ED from home for dizziness and numbness in left leg on Saturday.  Patient denies any symptoms at this time.  Stated \" I feel fine now. 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     "

## 2021-01-22 NOTE — ED PROVIDER NOTES
"  History     Chief Complaint   Patient presents with     Dizziness     Neurologic Problem       Steven Dennison is a 85 year old male who presents with concerns for an episode of dizziness and right lower extremity weakness.  This episode occurred 2 days ago while standing drinking coffee at home when suddenly he felt lightheaded with dizziness along with right leg weakness that lasted several seconds and then completely resolved.  Subsequently he has felt \"a little off\" and fatigued.  Denies history of stroke, heart attack, PE, DVT.  Denies fever, chills, headache, vision change, dysarthria, dysphagia, balance issue, chest pain or other pain, dyspnea, cough, nausea, vomiting, diarrhea, dysuria, recent medication change. Denies sick contacts. Able to bowl yesterday without issue. He contacted his doctor about the above episode and was directed to the emergency department as a result.     Allergies   Allergen Reactions     Ace Inhibitors Other (See Comments)     -- Declines     Cephalexin Other (See Comments)     Other reaction(s):Pt Doesn't Remember  Pt does not remember      Clindamycin Other (See Comments)     Other reaction(s): Pt Doesn't Remember  Pt does not remember     Hydromorphone Other (See Comments)     Other reaction(s): Bradycardia     Amoxicillin Rash       Patient Active Problem List    Diagnosis Date Noted     Benign essential hypertension 11/03/2020     Priority: Medium     History of tobacco use 07/09/2020     Priority: Medium     History of bilateral cataract extraction 07/17/2018     Priority: Medium     Vitamin B12 deficiency 04/13/2018     Priority: Medium     Vitamin D deficiency 04/13/2018     Priority: Medium     Osteoarthrosis involving lower leg 01/24/2018     Priority: Medium     Overview:   IMO Update  Updated per 10/1/17 IMO import       Degeneration of lumbar or lumbosacral intervertebral disc 01/24/2018     Priority: Medium     Overview:   recurring       Controlled type 2 diabetes " mellitus with diabetic polyneuropathy, without long-term current use of insulin (H) 12/08/2017     Priority: Medium     Osteopenia 09/27/2017     Priority: Medium     Acquired deformity of right foot 09/08/2017     Priority: Medium     Pre-ulcerative corn or callous 09/08/2017     Priority: Medium     Rotator cuff tendinitis 12/29/2016     Priority: Medium     Benign prostatic hyperplasia 07/19/2016     Priority: Medium     Overview:   Updated per 10/1/17 IMO import       Status post total replacement of left shoulder 07/19/2016     Priority: Medium     Ventricular bigeminy 07/19/2016     Priority: Medium     Biceps tendonitis on right 03/31/2016     Priority: Medium     Bunion of right foot 03/31/2016     Priority: Medium     Sleep difficulties 03/31/2016     Priority: Medium     Nocturia 05/01/2015     Priority: Medium     Abdominal aortic ectasia (H) 06/18/2014     Priority: Medium     Erectile dysfunction 05/27/2014     Priority: Medium     Premature atrial contractions 01/30/2014     Priority: Medium     Raynaud's phenomenon without gangrene 01/30/2014     Priority: Medium     Mixed hyperlipidemia 06/03/2013     Priority: Medium       Past Medical History:   Diagnosis Date     Controlled type 2 diabetes mellitus with diabetic polyneuropathy, without long-term current use of insulin (H) 12/8/2017     Eczema 12/14/2016     Renal calculus 1/24/2018       Past Surgical History:   Procedure Laterality Date     APPENDECTOMY OPEN      Appendectomy at age 6 due to appendicitis     ARTHROSCOPY KNEE      left knee     COLONOSCOPY  01/01/2000 2000     COLONOSCOPY  01/01/2011 2011,adenomatous polyps, one with high grade dysplasia , next due 2014     OTHER SURGICAL HISTORY      ,HERNIA REPAIR,right inguinal     OTHER SURGICAL HISTORY      2003,[id number],ARTHROPLASTY,left total knee     OTHER SURGICAL HISTORY      1984,600000,OTHER,left first/rib resection     OTHER SURGICAL HISTORY      1983,600000,OTHER     OTHER  SURGICAL HISTORY      14,[id number],CYSTOSCOPY W/ URETEROSCOPY W/ LITHOTRIPSY,Dr. Chelsi GREEN     OTHER SURGICAL HISTORY      ,HERNIA REPAIR,x 4       Family History   Problem Relation Age of Onset     Other - See Comments Father          at 74 of natural causes     Heart Disease Mother 69        Heart Disease, of MI     Diabetes Mother         Diabetes     Other - See Comments Mother         Stroke     Family History Negative Sister         Good Health     Family History Negative Sister         Good Health     Cancer Brother         Cancer       Social History     Tobacco Use     Smoking status: Former Smoker     Packs/day: 1.00     Years: 10.00     Pack years: 10.00     Types: Cigarettes     Quit date: 1975     Years since quittin.0     Smokeless tobacco: Never Used   Substance Use Topics     Alcohol use: Yes     Alcohol/week: 5.0 standard drinks     Drug use: No       Medications:         Apple Cider Vinegar 300 MG TABS       aspirin 81 MG EC tablet       B Complex Vitamins (VITAMIN-B COMPLEX) TABS       blood glucose (NO BRAND SPECIFIED) lancets standard       blood glucose (ONETOUCH ULTRA) test strip       blood glucose monitoring (NO BRAND SPECIFIED) meter device kit       blood glucose monitoring (PRODIGY TWIST TOP 28G) lancets       Blood Glucose Monitoring Suppl (FIFTY50 GLUCOSE METER 2.0) W/DEVICE KIT       Cholecalciferol (VITAMIN D) 2000 units tablet       cinnamon 500 MG CAPS       Dextromethorphan-guaiFENesin (MUCINEX DM MAXIMUM STRENGTH)  MG TB12       fluticasone (FLONASE) 50 MCG/ACT nasal spray       gabapentin (NEURONTIN) 100 MG capsule       Garlic 1000 MG CAPS       glipiZIDE (GLUCOTROL) 5 MG tablet       Krill Oil 500 MG CAPS       lecithin (LECITHIN) 1200 MG CAPS capsule       LUMIGAN 0.01 % SOLN ophthalmic solution       Magnesium Oxide 420 MG TABS       Melatonin 10 MG TABS tablet       metFORMIN (GLUCOPHAGE) 500 MG tablet       metoprolol tartrate (LOPRESSOR)  25 MG tablet       NIFEdipine ER OSMOTIC (PROCARDIA XL) 30 MG 24 hr tablet       oxyCODONE-acetaminophen (PERCOCET) 5-325 MG tablet       pravastatin (PRAVACHOL) 40 MG tablet       tiZANidine (ZANAFLEX) 2 MG tablet        Review of Systems: See HPI for pertinent negatives and positives.    Physical Exam   BP (!) 165/85   Pulse 58   Temp 97.5  F (36.4  C) (Tympanic)   Resp 21   SpO2 98%      General: awake, comfortable  HEENT: atraumatic, neck supple  Respiratory: normal effort, clear to auscultation bilaterally  Cardiovascular: regular rate and rhythm, no murmurs or gallops  Abdomen: soft, nondistended, nontender  Extremities: no deformities, edema, or tenderness  Skin: warm, dry, no rashes  Neuro: alert, normal interaction, fluent speech, CN 2-12 intact, normal  and pedal and lower extremity strength bilaterally, normal bilateral upper and lower extremity sensation, normal FTN and HTS, no pronator drift  Psych: appropriate mood and affect    ED Course      ED Course as of Jan 21 2023   Thu Jan 21, 2021   1910 EKG: sinus rhythm with isolated PVC. Borderline  and  intervals. No ST abnormalities or abnormal TWI.          Results for orders placed or performed during the hospital encounter of 01/21/21 (from the past 24 hour(s))   CBC with platelets differential   Result Value Ref Range    WBC 6.2 4.0 - 11.0 10e9/L    RBC Count 5.40 4.4 - 5.9 10e12/L    Hemoglobin 15.6 13.3 - 17.7 g/dL    Hematocrit 47.0 40.0 - 53.0 %    MCV 87 78 - 100 fl    MCH 28.9 26.5 - 33.0 pg    MCHC 33.2 31.5 - 36.5 g/dL    RDW 14.6 10.0 - 15.0 %    Platelet Count 159 150 - 450 10e9/L    Diff Method Automated Method     % Neutrophils 67.1 %    % Lymphocytes 19.5 %    % Monocytes 6.9 %    % Eosinophils 5.0 %    % Basophils 1.0 %    % Immature Granulocytes 0.5 %    Absolute Neutrophil 4.2 1.6 - 8.3 10e9/L    Absolute Lymphocytes 1.2 0.8 - 5.3 10e9/L    Absolute Monocytes 0.4 0.0 - 1.3 10e9/L    Absolute Eosinophils 0.3 0.0 -  0.7 10e9/L    Absolute Basophils 0.1 0.0 - 0.2 10e9/L    Abs Immature Granulocytes 0.0 0 - 0.4 10e9/L   Basic metabolic panel   Result Value Ref Range    Sodium 135 134 - 144 mmol/L    Potassium 4.1 3.5 - 5.1 mmol/L    Chloride 101 98 - 107 mmol/L    Carbon Dioxide 26 21 - 31 mmol/L    Anion Gap 8 3 - 14 mmol/L    Glucose 147 (H) 70 - 105 mg/dL    Urea Nitrogen 20 7 - 25 mg/dL    Creatinine 0.95 0.70 - 1.30 mg/dL    GFR Estimate 76 >60 mL/min/[1.73_m2]    GFR Estimate If Black >90 >60 mL/min/[1.73_m2]    Calcium 9.5 8.6 - 10.3 mg/dL   XR Chest Port 1 View    Narrative    Procedure:XR CHEST PORT 1 VW    Clinical history:Male, 85 years, left subscapular pain    Technique: Single view was obtained.    Comparison: None    Findings: The cardiac silhouette is normal. The pulmonary vasculature  is normal.    The lungs are clear. Bony structures demonstrate a left shoulder  arthroplasty. There is a calcified bony fragment lying along the  caudal margin of the articulation of the arthroplasty device and may  represent a displaced fracture fragment.      Impression    Impression:   No evidence of acute cardiopulmonary disease.     4 cm x 1 cm calcific density along the inferior aspect of the left  shoulder arthroplasty may represent a displaced fracture fragment.    CHRISTOPHER KEMP MD       Medications - No data to display    Assessments & Plan (with Medical Decision Making)     I have reviewed the nursing notes.    Patient evaluated for brief self-resolved dizziness and right leg weakness. Vitals, exam including neurologic, and labs unremarkable. Imaging with CXR performed with mild left subscapular tenderness he mentioned during auscultation with no concerning findings in this region. EKG nonischemic and without concerning dysrhythmia. Etiology of above symptoms unclear but could represent TIA. Patient appears to be fairly well optimized with recent lipids, A1C (appropriate for age). Recommend continuing his regular  exercise, diabetes management, and focusing on healthy diet. Patient given follow-up / ED return precautions. All questions answered and patient comfortable with plan for discharge. Discharged in stable condition.    I have reviewed the findings, diagnosis, plan and need for any follow up with the patient.    New Prescriptions    No medications on file       Final diagnoses:   Weakness of right lower extremity - Fleeting lasting several seconds, completely self-resolved   Dizziness   Fatigue, unspecified type       1/21/2021   [facility]     Poli Pineda MD  01/21/21 2024       Poli Pineda MD  01/21/21 2025

## 2021-01-27 LAB — INTERPRETATION ECG - MUSE: NORMAL

## 2021-03-17 ENCOUNTER — OFFICE VISIT (OUTPATIENT)
Dept: INTERNAL MEDICINE | Facility: OTHER | Age: 86
End: 2021-03-17
Attending: INTERNAL MEDICINE
Payer: MEDICARE

## 2021-03-17 VITALS
RESPIRATION RATE: 16 BRPM | TEMPERATURE: 97.1 F | WEIGHT: 184.6 LBS | SYSTOLIC BLOOD PRESSURE: 138 MMHG | HEART RATE: 58 BPM | DIASTOLIC BLOOD PRESSURE: 72 MMHG | OXYGEN SATURATION: 97 % | BODY MASS INDEX: 27.06 KG/M2

## 2021-03-17 DIAGNOSIS — E53.8 VITAMIN B12 DEFICIENCY: ICD-10-CM

## 2021-03-17 DIAGNOSIS — M54.50 INTERMITTENT LOW BACK PAIN: ICD-10-CM

## 2021-03-17 DIAGNOSIS — E78.2 MIXED HYPERLIPIDEMIA: ICD-10-CM

## 2021-03-17 DIAGNOSIS — F51.01 PRIMARY INSOMNIA: ICD-10-CM

## 2021-03-17 DIAGNOSIS — I73.00 RAYNAUD'S PHENOMENON WITHOUT GANGRENE: ICD-10-CM

## 2021-03-17 DIAGNOSIS — E11.42 CONTROLLED TYPE 2 DIABETES MELLITUS WITH DIABETIC POLYNEUROPATHY, WITHOUT LONG-TERM CURRENT USE OF INSULIN (H): Primary | ICD-10-CM

## 2021-03-17 DIAGNOSIS — I77.811 ABDOMINAL AORTIC ECTASIA (H): ICD-10-CM

## 2021-03-17 DIAGNOSIS — E55.9 VITAMIN D DEFICIENCY: ICD-10-CM

## 2021-03-17 DIAGNOSIS — I49.1 PREMATURE ATRIAL CONTRACTIONS: ICD-10-CM

## 2021-03-17 DIAGNOSIS — R14.3 FLATULENCE: ICD-10-CM

## 2021-03-17 LAB
ALBUMIN SERPL-MCNC: 4.2 G/DL (ref 3.5–5.7)
ALBUMIN UR-MCNC: 10 MG/DL
ALP SERPL-CCNC: 56 U/L (ref 34–104)
ALT SERPL W P-5'-P-CCNC: 12 U/L (ref 7–52)
ANION GAP SERPL CALCULATED.3IONS-SCNC: 7 MMOL/L (ref 3–14)
APPEARANCE UR: CLEAR
AST SERPL W P-5'-P-CCNC: 13 U/L (ref 13–39)
BILIRUB SERPL-MCNC: 1.1 MG/DL (ref 0.3–1)
BILIRUB UR QL STRIP: NEGATIVE
BUN SERPL-MCNC: 20 MG/DL (ref 7–25)
CALCIUM SERPL-MCNC: 9.3 MG/DL (ref 8.6–10.3)
CHLORIDE SERPL-SCNC: 102 MMOL/L (ref 98–107)
CHOLEST SERPL-MCNC: 153 MG/DL
CO2 SERPL-SCNC: 28 MMOL/L (ref 21–31)
COLOR UR AUTO: YELLOW
CREAT SERPL-MCNC: 0.94 MG/DL (ref 0.7–1.3)
CREAT UR-MCNC: 107 MG/DL
ERYTHROCYTE [DISTWIDTH] IN BLOOD BY AUTOMATED COUNT: 13.3 % (ref 10–15)
GFR SERPL CREATININE-BSD FRML MDRD: 76 ML/MIN/{1.73_M2}
GLUCOSE SERPL-MCNC: 236 MG/DL (ref 70–105)
GLUCOSE UR STRIP-MCNC: >1000 MG/DL
HBA1C MFR BLD: 7.4 % (ref 4–6)
HCT VFR BLD AUTO: 48.3 % (ref 40–53)
HDLC SERPL-MCNC: 33 MG/DL (ref 23–92)
HGB BLD-MCNC: 16.4 G/DL (ref 13.3–17.7)
HGB UR QL STRIP: NEGATIVE
KETONES UR STRIP-MCNC: 5 MG/DL
LDLC SERPL CALC-MCNC: 85 MG/DL
LEUKOCYTE ESTERASE UR QL STRIP: NEGATIVE
MCH RBC QN AUTO: 29.4 PG (ref 26.5–33)
MCHC RBC AUTO-ENTMCNC: 34 G/DL (ref 31.5–36.5)
MCV RBC AUTO: 87 FL (ref 78–100)
MICROALBUMIN UR-MCNC: 52 MG/L
MICROALBUMIN/CREAT UR: 48.88 MG/G CR (ref 0–17)
MUCOUS THREADS #/AREA URNS LPF: PRESENT /LPF
NITRATE UR QL: NEGATIVE
NONHDLC SERPL-MCNC: 120 MG/DL
PH UR STRIP: 6 PH (ref 5–7)
PLATELET # BLD AUTO: 132 10E9/L (ref 150–450)
POTASSIUM SERPL-SCNC: 4.1 MMOL/L (ref 3.5–5.1)
PROT SERPL-MCNC: 6.7 G/DL (ref 6.4–8.9)
RBC # BLD AUTO: 5.58 10E12/L (ref 4.4–5.9)
RBC #/AREA URNS AUTO: 0 /HPF (ref 0–2)
SODIUM SERPL-SCNC: 137 MMOL/L (ref 134–144)
SOURCE: ABNORMAL
SP GR UR STRIP: 1.02 (ref 1–1.03)
TRIGL SERPL-MCNC: 176 MG/DL
TSH SERPL DL<=0.05 MIU/L-ACNC: 1.89 IU/ML (ref 0.34–5.6)
UROBILINOGEN UR STRIP-MCNC: NORMAL MG/DL (ref 0–2)
WBC # BLD AUTO: 6.5 10E9/L (ref 4–11)
WBC #/AREA URNS AUTO: <1 /HPF (ref 0–5)

## 2021-03-17 PROCEDURE — G0463 HOSPITAL OUTPT CLINIC VISIT: HCPCS

## 2021-03-17 PROCEDURE — 80061 LIPID PANEL: CPT | Mod: ZL | Performed by: INTERNAL MEDICINE

## 2021-03-17 PROCEDURE — 83036 HEMOGLOBIN GLYCOSYLATED A1C: CPT | Mod: ZL | Performed by: INTERNAL MEDICINE

## 2021-03-17 PROCEDURE — 82043 UR ALBUMIN QUANTITATIVE: CPT | Mod: ZL | Performed by: INTERNAL MEDICINE

## 2021-03-17 PROCEDURE — 85027 COMPLETE CBC AUTOMATED: CPT | Mod: ZL | Performed by: INTERNAL MEDICINE

## 2021-03-17 PROCEDURE — 80053 COMPREHEN METABOLIC PANEL: CPT | Mod: ZL | Performed by: INTERNAL MEDICINE

## 2021-03-17 PROCEDURE — 84443 ASSAY THYROID STIM HORMONE: CPT | Mod: ZL | Performed by: INTERNAL MEDICINE

## 2021-03-17 PROCEDURE — 81001 URINALYSIS AUTO W/SCOPE: CPT | Mod: ZL | Performed by: INTERNAL MEDICINE

## 2021-03-17 PROCEDURE — 36415 COLL VENOUS BLD VENIPUNCTURE: CPT | Mod: ZL | Performed by: INTERNAL MEDICINE

## 2021-03-17 PROCEDURE — 99214 OFFICE O/P EST MOD 30 MIN: CPT | Performed by: INTERNAL MEDICINE

## 2021-03-17 RX ORDER — PHENOL 1.4 %
10 AEROSOL, SPRAY (ML) MUCOUS MEMBRANE
COMMUNITY
Start: 2021-03-17 | End: 2022-10-26

## 2021-03-17 RX ORDER — LANCETS 28 GAUGE
EACH MISCELLANEOUS
Qty: 200 EACH | Refills: 2 | Status: SHIPPED | OUTPATIENT
Start: 2021-03-17 | End: 2024-09-04

## 2021-03-17 RX ORDER — GABAPENTIN 100 MG/1
100 CAPSULE ORAL AT BEDTIME
Qty: 90 CAPSULE | Refills: 3 | Status: SHIPPED | OUTPATIENT
Start: 2021-03-17 | End: 2021-10-12

## 2021-03-17 RX ORDER — GLIPIZIDE 5 MG/1
5 TABLET ORAL
Qty: 180 TABLET | Refills: 3 | Status: SHIPPED | OUTPATIENT
Start: 2021-03-17 | End: 2022-01-19

## 2021-03-17 RX ORDER — FLUTICASONE PROPIONATE 50 MCG
SPRAY, SUSPENSION (ML) NASAL
Refills: 3 | Status: CANCELLED | OUTPATIENT
Start: 2021-03-17

## 2021-03-17 RX ORDER — NIFEDIPINE 30 MG/1
30 TABLET, EXTENDED RELEASE ORAL DAILY
Qty: 90 TABLET | Refills: 3 | Status: SHIPPED | OUTPATIENT
Start: 2021-03-17 | End: 2021-07-07

## 2021-03-17 RX ORDER — OXYCODONE AND ACETAMINOPHEN 5; 325 MG/1; MG/1
.5-1 TABLET ORAL EVERY 6 HOURS PRN
Qty: 30 TABLET | Refills: 0 | Status: SHIPPED | OUTPATIENT
Start: 2021-03-17 | End: 2021-09-07

## 2021-03-17 RX ORDER — PRAVASTATIN SODIUM 40 MG
40 TABLET ORAL AT BEDTIME
Qty: 90 TABLET | Refills: 3 | Status: SHIPPED | OUTPATIENT
Start: 2021-03-17 | End: 2022-03-15

## 2021-03-17 RX ORDER — METOPROLOL TARTRATE 25 MG/1
12.5 TABLET, FILM COATED ORAL 2 TIMES DAILY
Qty: 90 TABLET | Refills: 3 | Status: SHIPPED | OUTPATIENT
Start: 2021-03-17 | End: 2021-07-07

## 2021-03-17 ASSESSMENT — ENCOUNTER SYMPTOMS
MYALGIAS: 0
ABDOMINAL PAIN: 0
DIZZINESS: 0
NAUSEA: 0
DIARRHEA: 0
CONFUSION: 0
SHORTNESS OF BREATH: 1
FATIGUE: 0
CHILLS: 0
VOMITING: 0
HEMATURIA: 0
PALPITATIONS: 1
BACK PAIN: 1
LIGHT-HEADEDNESS: 0
AGITATION: 0
WHEEZING: 0
DYSURIA: 0
ARTHRALGIAS: 0
EYE PAIN: 0
FEVER: 0
SLEEP DISTURBANCE: 1
COUGH: 0
BRUISES/BLEEDS EASILY: 0

## 2021-03-17 ASSESSMENT — PAIN SCALES - GENERAL: PAINLEVEL: NO PAIN (0)

## 2021-03-17 NOTE — NURSING NOTE
Chief Complaint   Patient presents with     Diabetes       Medication Reconciliation complete.   Lorrie Farfan LPN  ..................3/17/2021   11:45 AM

## 2021-03-17 NOTE — PATIENT INSTRUCTIONS
Start Melatonin 5 to 10 mg --- 1 to 2 to 3 hours before bedtime... to help with sleep initiation.   -- continue same dose for 2 to 3 weeks... before adjusting.   -- the medication can take a few weeks for best results.     Blood pressure is well controlled.   Diabetes is well controlled.     Medications refilled.   Labs are stable.     To help with weight loss and improve blood sugar control....    -- Try to avoid Carbohydrates as much as possible -- breads, pasta, baked goods, cakes, oatmeal, cold cereal, potatoes.   -- Eat more lean meats, proteins, eggs, nuts, vegetables.    -- Start or Continue regular daily exercise.      Results for orders placed or performed in visit on 03/17/21   UA reflex to Microscopic     Status: Abnormal   Result Value Ref Range    Color Urine Yellow     Appearance Urine Clear     Glucose Urine >1000 (A) NEG^Negative mg/dL    Bilirubin Urine Negative NEG^Negative    Ketones Urine 5 (A) NEG^Negative mg/dL    Specific Gravity Urine 1.022 1.003 - 1.035    Blood Urine Negative NEG^Negative    pH Urine 6.0 5.0 - 7.0 pH    Protein Albumin Urine 10 (A) NEG^Negative mg/dL    Urobilinogen mg/dL Normal 0.0 - 2.0 mg/dL    Nitrite Urine Negative NEG^Negative    Leukocyte Esterase Urine Negative NEG^Negative    Source Midstream Urine     RBC Urine 0 0 - 2 /HPF    WBC Urine <1 0 - 5 /HPF    Mucous Urine Present (A) NEG^Negative /LPF   Lipid Profile     Status: Abnormal   Result Value Ref Range    Cholesterol 153 <200 mg/dL    Triglycerides 176 (H) <150 mg/dL    HDL Cholesterol 33 23 - 92 mg/dL    LDL Cholesterol Calculated 85 <100 mg/dL    Non HDL Cholesterol 120 <130 mg/dL   Comprehensive metabolic panel     Status: Abnormal   Result Value Ref Range    Sodium 137 134 - 144 mmol/L    Potassium 4.1 3.5 - 5.1 mmol/L    Chloride 102 98 - 107 mmol/L    Carbon Dioxide 28 21 - 31 mmol/L    Anion Gap 7 3 - 14 mmol/L    Glucose 236 (H) 70 - 105 mg/dL    Urea Nitrogen 20 7 - 25 mg/dL    Creatinine 0.94 0.70 -  1.30 mg/dL    GFR Estimate 76 >60 mL/min/[1.73_m2]    GFR Estimate If Black >90 >60 mL/min/[1.73_m2]    Calcium 9.3 8.6 - 10.3 mg/dL    Bilirubin Total 1.1 (H) 0.3 - 1.0 mg/dL    Albumin 4.2 3.5 - 5.7 g/dL    Protein Total 6.7 6.4 - 8.9 g/dL    Alkaline Phosphatase 56 34 - 104 U/L    ALT 12 7 - 52 U/L    AST 13 13 - 39 U/L   CBC with platelets     Status: Abnormal   Result Value Ref Range    WBC 6.5 4.0 - 11.0 10e9/L    RBC Count 5.58 4.4 - 5.9 10e12/L    Hemoglobin 16.4 13.3 - 17.7 g/dL    Hematocrit 48.3 40.0 - 53.0 %    MCV 87 78 - 100 fl    MCH 29.4 26.5 - 33.0 pg    MCHC 34.0 31.5 - 36.5 g/dL    RDW 13.3 10.0 - 15.0 %    Platelet Count 132 (L) 150 - 450 10e9/L   Hemoglobin A1c     Status: Abnormal   Result Value Ref Range    Hemoglobin A1C 7.4 (H) 4.0 - 6.0 %      Aspects of Diabetes:   Recent Labs   Lab Test 03/17/21  1131 01/21/21  1937 11/03/20  0909 07/09/20  1319   A1C 7.4*  --  7.2* 6.9*   LDL 85  --  76 70   HDL 33  --  29 31   TRIG 176*  --  80 155*   ALT 12  --  9 10   CR 0.94 0.95 1.07 0.93   GFRESTIMATED 76 76 66 77   GFRESTBLACK >90 >90 80 >90   POTASSIUM 4.1 4.1 4.2 4.3      Hemoglobin A1c  Goal range is under 8%. Best is 6.5 to 7   Blood Pressure 144/62 Goal to keep less than 140/90   Tobacco  reports that he quit smoking about 46 years ago. His smoking use included cigarettes. He has a 10.00 pack-year smoking history. He has never used smokeless tobacco. Goal to abstain from tobacco   Aspirin or Plavix Anti-platelet therapy Aspirin or Plavix reduces risk of heart disease and stroke  -- sometimes used with other blood thinners, depending on bleeding risk and risk factors.    ACE/ARB Specific blood pressure meds These medications can reduce risk of kidney disease   Cholesterol Statins (Lipitor, Crestor, vs others) Statins reduce risk of heart disease and stroke   Eye Exam -- Do Yearly -- Annual diabetic eye exam   Healthy weight Wt Readings from Last 3 Encounters:   03/17/21 83.7 kg (184 lb 9.6 oz)    11/03/20 82.2 kg (181 lb 3.2 oz)   07/09/20 83.9 kg (185 lb)     Body mass index is 27.06 kg/m .  Goal BMI under 30, best is under 25.      -- Trying to exercise daily (goal at least 20 min/day) with moderate aerobic activity   -- Eat healthy (resources from ADA at http://www.diabetes.org/)   -- Taking good care of my feet. Consider seeing the Podiatrist   -- Check blood sugars as directed, record in log book and bring to every appointment    Insurance companies are grading you and I on your blood sugar control -- Goal is to get your A1c down to 7.9% or lower and NO Smoking!  -- Medicare and most insurance companies, will only cover Hemoglobin A1c labs to be rechecked every 91+ days.      Return for Diabetes labs and clinic follow-up appointment every 3 to 4 months.    Schedule lab only appointment --- A few days AFTER: 6/15/21   Schedule clinic appointment with Dr. Montanez -- Same day as labs, or 1-2 days later.

## 2021-03-17 NOTE — LETTER
March 19, 2021      Steven Dennison  [id number] [address] [id number]        Dear ,    We are writing to inform you of your test results.    Labs are stable. Continue current medications.   Plan to recheck labs again in 3 to 4 months.     Nawaf Montanez MD      Resulted Orders   TSH Reflex GH   Result Value Ref Range    TSH Reflex 1.89 0.34 - 5.60 IU/mL   UA reflex to Microscopic   Result Value Ref Range    Color Urine Yellow     Appearance Urine Clear     Glucose Urine >1000 (A) NEG^Negative mg/dL    Bilirubin Urine Negative NEG^Negative    Ketones Urine 5 (A) NEG^Negative mg/dL    Specific Gravity Urine 1.022 1.003 - 1.035    Blood Urine Negative NEG^Negative    pH Urine 6.0 5.0 - 7.0 pH    Protein Albumin Urine 10 (A) NEG^Negative mg/dL    Urobilinogen mg/dL Normal 0.0 - 2.0 mg/dL    Nitrite Urine Negative NEG^Negative    Leukocyte Esterase Urine Negative NEG^Negative    Source Midstream Urine     RBC Urine 0 0 - 2 /HPF    WBC Urine <1 0 - 5 /HPF    Mucous Urine Present (A) NEG^Negative /LPF   Lipid Profile   Result Value Ref Range    Cholesterol 153 <200 mg/dL    Triglycerides 176 (H) <150 mg/dL      Comment:      Borderline high:  150-199 mg/dl  High:             200-499 mg/dl  Very high:       >499 mg/dl      HDL Cholesterol 33 23 - 92 mg/dL    LDL Cholesterol Calculated 85 <100 mg/dL      Comment:      Desirable:       <100 mg/dl    Non HDL Cholesterol 120 <130 mg/dL   Comprehensive metabolic panel   Result Value Ref Range    Sodium 137 134 - 144 mmol/L    Potassium 4.1 3.5 - 5.1 mmol/L    Chloride 102 98 - 107 mmol/L    Carbon Dioxide 28 21 - 31 mmol/L    Anion Gap 7 3 - 14 mmol/L    Glucose 236 (H) 70 - 105 mg/dL    Urea Nitrogen 20 7 - 25 mg/dL    Creatinine 0.94 0.70 - 1.30 mg/dL    GFR Estimate 76 >60 mL/min/[1.73_m2]    GFR Estimate If Black >90 >60 mL/min/[1.73_m2]    Calcium 9.3 8.6 - 10.3 mg/dL    Bilirubin Total 1.1 (H) 0.3 - 1.0 mg/dL    Albumin 4.2 3.5 - 5.7 g/dL     Protein Total 6.7 6.4 - 8.9 g/dL    Alkaline Phosphatase 56 34 - 104 U/L    ALT 12 7 - 52 U/L    AST 13 13 - 39 U/L   CBC with platelets   Result Value Ref Range    WBC 6.5 4.0 - 11.0 10e9/L    RBC Count 5.58 4.4 - 5.9 10e12/L    Hemoglobin 16.4 13.3 - 17.7 g/dL    Hematocrit 48.3 40.0 - 53.0 %    MCV 87 78 - 100 fl    MCH 29.4 26.5 - 33.0 pg    MCHC 34.0 31.5 - 36.5 g/dL    RDW 13.3 10.0 - 15.0 %    Platelet Count 132 (L) 150 - 450 10e9/L   Hemoglobin A1c   Result Value Ref Range    Hemoglobin A1C 7.4 (H) 4.0 - 6.0 %   Albumin Random Urine Quantitative with Creat Ratio   Result Value Ref Range    Creatinine Urine 107 mg/dL    Albumin Urine mg/L 52 mg/L    Albumin Urine mg/g Cr 48.88 (H) 0 - 17 mg/g Cr       If you have any questions or concerns, please call the clinic at the number listed above.       Sincerely,      Nawaf Montanez MD

## 2021-03-17 NOTE — PROGRESS NOTES
Mr. Dennison is a 85 year old male who presents to the clinic for evaluation of the following problems:      ICD-10-CM    1. Controlled type 2 diabetes mellitus with diabetic polyneuropathy, without long-term current use of insulin (H)  E11.42 TSH Reflex GH     UA reflex to Microscopic     Comprehensive metabolic panel     CBC with platelets     Hemoglobin A1c     Albumin Random Urine Quantitative with Creat Ratio     blood glucose (ONETOUCH ULTRA) test strip     gabapentin (NEURONTIN) 100 MG capsule     glipiZIDE (GLUCOTROL) 5 MG tablet     metFORMIN (GLUCOPHAGE) 500 MG tablet     blood glucose monitoring (PRODIGY TWIST TOP 28G) lancets   2. Mixed hyperlipidemia  E78.2 Lipid Profile     pravastatin (PRAVACHOL) 40 MG tablet   3. Intermittent low back pain  M54.5 oxyCODONE-acetaminophen (PERCOCET) 5-325 MG tablet   4. Premature atrial contractions  I49.1 metoprolol tartrate (LOPRESSOR) 25 MG tablet   5. Raynaud's phenomenon without gangrene  I73.00 NIFEdipine ER OSMOTIC (PROCARDIA XL) 30 MG 24 hr tablet   6. Abdominal aortic ectasia (H)  I77.811    7. Vitamin B12 deficiency  E53.8    8. Vitamin D deficiency  E55.9    9. Flatulence  R14.3    10. Primary insomnia  F51.01 Melatonin 10 MG TABS tablet     HPI  Patient presents for diabetic follow-up.  He has diabetic polyneuropathy.  Doing well with current medication regimen.  Denies hypoglycemia.  Due for lab work today.  Needs multiple medications refilled today.    Mixed hyperlipidemia, cholesterol levels have improved with use of pravastatin, currently 40 mg daily.  Tolerating well without side effects.  Needs refills.  Check labs.    Intermittent low back pain.  Rarely uses Percocet.  Uses approximately 30 tablets in 6 months.  He would like a small prescription refilled today.  [facility] website reviewed, no abnormal findings noted.  Proper medication use and misuse reviewed, patient has been using medications appropriately.  Prescriptions refilled as noted.    Premature  atrial contractions, continues with metoprolol 25 mg, he takes half tablet, 12.5 mg twice daily.  Refill sent to pharmacy.    Raynaud's phenomenon without gangrene, doing very well with nifedipine, 30 mg daily.  Needs refills.    History abdominal aortic ectasia, has been stable.  No recent changes.  Plan to recheck abdominal aortic ultrasound in 3 to 6 months.    Vitamin D and vitamin B-12 deficiency.  Continues on oral replacement.  Continue current dosing.    Frequent flatulence.  Patient states that he does eat quite a bit of fiber.  Encouraged him to consider cutting down on his fiber intake, trying some Beano.  Follow-up as needed.    Primary insomnia, at one point was taking melatonin, he has a hard time falling asleep.  He took it at bedtime and was subsequently causing morning sleepiness.  Advised that he try taking it anywhere from 1, 2, or 3 hours before bedtime to help cut down on the morning sleepiness.  Can start with 5 to 10 mg.  Med list updated.    + Gets lumbar epidural steroid injection through [facility] - done in [place] - last injection in Early January 2021.     Functional Capacity: > 4 METS.   Review of Systems   Constitutional: Negative for chills, fatigue and fever.   HENT: Negative for congestion and hearing loss.    Eyes: Negative for pain and visual disturbance.   Respiratory: Positive for shortness of breath (mild, stable.). Negative for cough and wheezing.    Cardiovascular: Positive for palpitations. Negative for chest pain.        Raynaud's syndrome, no ulcerations   Gastrointestinal: Negative for abdominal pain, diarrhea, nausea and vomiting.   Endocrine: Negative for cold intolerance and heat intolerance.   Genitourinary: Negative for dysuria and hematuria.        + feels like emptying bladder okay, occasionally will have to go back to bathroom a 2nd time.    Musculoskeletal: Positive for back pain (+ intermittent low back pain). Negative for arthralgias and myalgias.   Skin:  "Negative for pallor.   Allergic/Immunologic: Negative for immunocompromised state.   Neurological: Negative for dizziness and light-headedness.        Rare positional lightheadedness   Hematological: Does not bruise/bleed easily.   Psychiatric/Behavioral: Positive for sleep disturbance (+ insomnia). Negative for agitation and confusion.      Reviewed and updated as needed this visit by Provider   Tobacco  Allergies  Meds  Problems  Med Hx  Surg Hx  Fam Hx          EXAM:   Vitals:    03/17/21 1145 03/17/21 1225   BP: (!) 144/62 138/72   BP Location: Right arm Left arm   Patient Position: Sitting Sitting   Cuff Size: Adult Regular Adult Regular   Pulse: 58    Resp: 16    Temp: 97.1  F (36.2  C)    TempSrc: Tympanic    SpO2: 97%    Weight: 83.7 kg (184 lb 9.6 oz)        Current Pain Score: No Pain (0)     BP Readings from Last 3 Encounters:   03/17/21 138/72   01/21/21 (!) 165/85   11/03/20 138/70      Wt Readings from Last 3 Encounters:   03/17/21 83.7 kg (184 lb 9.6 oz)   11/03/20 82.2 kg (181 lb 3.2 oz)   07/09/20 83.9 kg (185 lb)      Estimated body mass index is 27.06 kg/m  as calculated from the following:    Height as of 7/9/20: 1.759 m (5' 9.25\").    Weight as of this encounter: 83.7 kg (184 lb 9.6 oz).     Physical Exam  Constitutional:       General: He is not in acute distress.     Appearance: He is well-developed. He is not diaphoretic.   HENT:      Head: Normocephalic and atraumatic.   Eyes:      General: No scleral icterus.     Conjunctiva/sclera: Conjunctivae normal.   Neck:      Musculoskeletal: Neck supple.      Vascular: No carotid bruit.   Cardiovascular:      Rate and Rhythm: Normal rate and regular rhythm.      Pulses: Normal pulses.      Heart sounds: Normal heart sounds.   Pulmonary:      Effort: Pulmonary effort is normal.      Breath sounds: Normal breath sounds.   Abdominal:      Palpations: Abdomen is soft.      Tenderness: There is no abdominal tenderness.   Musculoskeletal:         " General: No deformity.      Right lower leg: No edema.      Left lower leg: No edema.   Lymphadenopathy:      Cervical: No cervical adenopathy.   Skin:     General: Skin is warm and dry.      Findings: No rash.   Neurological:      Mental Status: He is alert and oriented to person, place, and time. Mental status is at baseline.   Psychiatric:         Mood and Affect: Mood normal.         Behavior: Behavior normal.          Procedures   INVESTIGATIONS:  - Labs reviewed in Epic   Results for orders placed or performed in visit on 03/17/21   TSH Reflex GH     Status: None   Result Value Ref Range    TSH Reflex 1.89 0.34 - 5.60 IU/mL   UA reflex to Microscopic     Status: Abnormal   Result Value Ref Range    Color Urine Yellow     Appearance Urine Clear     Glucose Urine >1000 (A) NEG^Negative mg/dL    Bilirubin Urine Negative NEG^Negative    Ketones Urine 5 (A) NEG^Negative mg/dL    Specific Gravity Urine 1.022 1.003 - 1.035    Blood Urine Negative NEG^Negative    pH Urine 6.0 5.0 - 7.0 pH    Protein Albumin Urine 10 (A) NEG^Negative mg/dL    Urobilinogen mg/dL Normal 0.0 - 2.0 mg/dL    Nitrite Urine Negative NEG^Negative    Leukocyte Esterase Urine Negative NEG^Negative    Source Midstream Urine     RBC Urine 0 0 - 2 /HPF    WBC Urine <1 0 - 5 /HPF    Mucous Urine Present (A) NEG^Negative /LPF   Lipid Profile     Status: Abnormal   Result Value Ref Range    Cholesterol 153 <200 mg/dL    Triglycerides 176 (H) <150 mg/dL    HDL Cholesterol 33 23 - 92 mg/dL    LDL Cholesterol Calculated 85 <100 mg/dL    Non HDL Cholesterol 120 <130 mg/dL   Comprehensive metabolic panel     Status: Abnormal   Result Value Ref Range    Sodium 137 134 - 144 mmol/L    Potassium 4.1 3.5 - 5.1 mmol/L    Chloride 102 98 - 107 mmol/L    Carbon Dioxide 28 21 - 31 mmol/L    Anion Gap 7 3 - 14 mmol/L    Glucose 236 (H) 70 - 105 mg/dL    Urea Nitrogen 20 7 - 25 mg/dL    Creatinine 0.94 0.70 - 1.30 mg/dL    GFR Estimate 76 >60 mL/min/[1.73_m2]    GFR  Estimate If Black >90 >60 mL/min/[1.73_m2]    Calcium 9.3 8.6 - 10.3 mg/dL    Bilirubin Total 1.1 (H) 0.3 - 1.0 mg/dL    Albumin 4.2 3.5 - 5.7 g/dL    Protein Total 6.7 6.4 - 8.9 g/dL    Alkaline Phosphatase 56 34 - 104 U/L    ALT 12 7 - 52 U/L    AST 13 13 - 39 U/L   CBC with platelets     Status: Abnormal   Result Value Ref Range    WBC 6.5 4.0 - 11.0 10e9/L    RBC Count 5.58 4.4 - 5.9 10e12/L    Hemoglobin 16.4 13.3 - 17.7 g/dL    Hematocrit 48.3 40.0 - 53.0 %    MCV 87 78 - 100 fl    MCH 29.4 26.5 - 33.0 pg    MCHC 34.0 31.5 - 36.5 g/dL    RDW 13.3 10.0 - 15.0 %    Platelet Count 132 (L) 150 - 450 10e9/L   Hemoglobin A1c     Status: Abnormal   Result Value Ref Range    Hemoglobin A1C 7.4 (H) 4.0 - 6.0 %      ASSESSMENT AND PLAN:  Problem List Items Addressed This Visit        Nervous and Auditory    Controlled type 2 diabetes mellitus with diabetic polyneuropathy, without long-term current use of insulin (H) - Primary    Relevant Medications    blood glucose (ONETOUCH ULTRA) test strip    gabapentin (NEURONTIN) 100 MG capsule    glipiZIDE (GLUCOTROL) 5 MG tablet    metFORMIN (GLUCOPHAGE) 500 MG tablet    blood glucose monitoring (PRODIGY TWIST TOP 28G) lancets    Intermittent low back pain    Relevant Medications    oxyCODONE-acetaminophen (PERCOCET) 5-325 MG tablet       Digestive    Vitamin B12 deficiency    Vitamin D deficiency    Flatulence       Endocrine    Mixed hyperlipidemia    Relevant Medications    pravastatin (PRAVACHOL) 40 MG tablet       Circulatory    Abdominal aortic ectasia (H)    Premature atrial contractions    Relevant Medications    metoprolol tartrate (LOPRESSOR) 25 MG tablet       Musculoskeletal and Integumentary    Raynaud's phenomenon without gangrene    Relevant Medications    NIFEdipine ER OSMOTIC (PROCARDIA XL) 30 MG 24 hr tablet      Other Visit Diagnoses     Primary insomnia        Relevant Medications    Melatonin 10 MG TABS tablet        reviewed diet, exercise and weight  control, recommended sodium restriction, cardiovascular risk and specific lipid/LDL goals reviewed, specific diabetic recommendations low cholesterol diet, weight control and daily exercise discussed, use of aspirin to prevent MI and TIA's discussed  -- Expected clinical course discussed    -- Medications and their side effects discussed    Patient Instructions     Start Melatonin 5 to 10 mg --- 1 to 2 to 3 hours before bedtime... to help with sleep initiation.   -- continue same dose for 2 to 3 weeks... before adjusting.   -- the medication can take a few weeks for best results.     Blood pressure is well controlled.   Diabetes is well controlled.     Medications refilled.   Labs are stable.     To help with weight loss and improve blood sugar control....    -- Try to avoid Carbohydrates as much as possible -- breads, pasta, baked goods, cakes, oatmeal, cold cereal, potatoes.   -- Eat more lean meats, proteins, eggs, nuts, vegetables.    -- Start or Continue regular daily exercise.      Results for orders placed or performed in visit on 03/17/21   UA reflex to Microscopic     Status: Abnormal   Result Value Ref Range    Color Urine Yellow     Appearance Urine Clear     Glucose Urine >1000 (A) NEG^Negative mg/dL    Bilirubin Urine Negative NEG^Negative    Ketones Urine 5 (A) NEG^Negative mg/dL    Specific Gravity Urine 1.022 1.003 - 1.035    Blood Urine Negative NEG^Negative    pH Urine 6.0 5.0 - 7.0 pH    Protein Albumin Urine 10 (A) NEG^Negative mg/dL    Urobilinogen mg/dL Normal 0.0 - 2.0 mg/dL    Nitrite Urine Negative NEG^Negative    Leukocyte Esterase Urine Negative NEG^Negative    Source Midstream Urine     RBC Urine 0 0 - 2 /HPF    WBC Urine <1 0 - 5 /HPF    Mucous Urine Present (A) NEG^Negative /LPF   Lipid Profile     Status: Abnormal   Result Value Ref Range    Cholesterol 153 <200 mg/dL    Triglycerides 176 (H) <150 mg/dL    HDL Cholesterol 33 23 - 92 mg/dL    LDL Cholesterol Calculated 85 <100 mg/dL     Non HDL Cholesterol 120 <130 mg/dL   Comprehensive metabolic panel     Status: Abnormal   Result Value Ref Range    Sodium 137 134 - 144 mmol/L    Potassium 4.1 3.5 - 5.1 mmol/L    Chloride 102 98 - 107 mmol/L    Carbon Dioxide 28 21 - 31 mmol/L    Anion Gap 7 3 - 14 mmol/L    Glucose 236 (H) 70 - 105 mg/dL    Urea Nitrogen 20 7 - 25 mg/dL    Creatinine 0.94 0.70 - 1.30 mg/dL    GFR Estimate 76 >60 mL/min/[1.73_m2]    GFR Estimate If Black >90 >60 mL/min/[1.73_m2]    Calcium 9.3 8.6 - 10.3 mg/dL    Bilirubin Total 1.1 (H) 0.3 - 1.0 mg/dL    Albumin 4.2 3.5 - 5.7 g/dL    Protein Total 6.7 6.4 - 8.9 g/dL    Alkaline Phosphatase 56 34 - 104 U/L    ALT 12 7 - 52 U/L    AST 13 13 - 39 U/L   CBC with platelets     Status: Abnormal   Result Value Ref Range    WBC 6.5 4.0 - 11.0 10e9/L    RBC Count 5.58 4.4 - 5.9 10e12/L    Hemoglobin 16.4 13.3 - 17.7 g/dL    Hematocrit 48.3 40.0 - 53.0 %    MCV 87 78 - 100 fl    MCH 29.4 26.5 - 33.0 pg    MCHC 34.0 31.5 - 36.5 g/dL    RDW 13.3 10.0 - 15.0 %    Platelet Count 132 (L) 150 - 450 10e9/L   Hemoglobin A1c     Status: Abnormal   Result Value Ref Range    Hemoglobin A1C 7.4 (H) 4.0 - 6.0 %      Aspects of Diabetes:   Recent Labs   Lab Test 03/17/21  1131 01/21/21  1937 11/03/20  0909 07/09/20  1319   A1C 7.4*  --  7.2* 6.9*   LDL 85  --  76 70   HDL 33  --  29 31   TRIG 176*  --  80 155*   ALT 12  --  9 10   CR 0.94 0.95 1.07 0.93   GFRESTIMATED 76 76 66 77   GFRESTBLACK >90 >90 80 >90   POTASSIUM 4.1 4.1 4.2 4.3      Hemoglobin A1c  Goal range is under 8%. Best is 6.5 to 7   Blood Pressure 144/62 Goal to keep less than 140/90   Tobacco  reports that he quit smoking about 46 years ago. His smoking use included cigarettes. He has a 10.00 pack-year smoking history. He has never used smokeless tobacco. Goal to abstain from tobacco   Aspirin or Plavix Anti-platelet therapy Aspirin or Plavix reduces risk of heart disease and stroke  -- sometimes used with other blood thinners,  depending on bleeding risk and risk factors.    ACE/ARB Specific blood pressure meds These medications can reduce risk of kidney disease   Cholesterol Statins (Lipitor, Crestor, vs others) Statins reduce risk of heart disease and stroke   Eye Exam -- Do Yearly -- Annual diabetic eye exam   Healthy weight Wt Readings from Last 3 Encounters:   03/17/21 83.7 kg (184 lb 9.6 oz)   11/03/20 82.2 kg (181 lb 3.2 oz)   07/09/20 83.9 kg (185 lb)     Body mass index is 27.06 kg/m .  Goal BMI under 30, best is under 25.      -- Trying to exercise daily (goal at least 20 min/day) with moderate aerobic activity   -- Eat healthy (resources from ADA at http://www.diabetes.org/)   -- Taking good care of my feet. Consider seeing the Podiatrist   -- Check blood sugars as directed, record in log book and bring to every appointment    Insurance companies are grading you and I on your blood sugar control -- Goal is to get your A1c down to 7.9% or lower and NO Smoking!  -- Medicare and most insurance companies, will only cover Hemoglobin A1c labs to be rechecked every 91+ days.      Return for Diabetes labs and clinic follow-up appointment every 3 to 4 months.    Schedule lab only appointment --- A few days AFTER: 6/15/21   Schedule clinic appointment with Dr. Montanez -- Same day as labs, or 1-2 days later.       Nawaf Montanez MD   Internal Medicine  [facility]     Portions of this note were dictated using speech recognition software. The note has been proofread but errors in the text may have been overlooked. Please contact me if there are any concerns regarding the accuracy of the dictation.

## 2021-03-23 ENCOUNTER — TELEPHONE (OUTPATIENT)
Dept: INTERNAL MEDICINE | Facility: OTHER | Age: 86
End: 2021-03-23

## 2021-03-23 NOTE — TELEPHONE ENCOUNTER
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 He would like Nawaf Montanez MD to give him some feedback regarding this. Lorrie Farfan LPN .............3/23/2021  1:10 PM

## 2021-03-23 NOTE — TELEPHONE ENCOUNTER
He does not have an abdominal aortic aneurysm.     Ultrasound was normal / negative.     Nawaf Montanez MD

## 2021-04-11 DIAGNOSIS — E11.42 CONTROLLED TYPE 2 DIABETES MELLITUS WITH DIABETIC POLYNEUROPATHY, WITHOUT LONG-TERM CURRENT USE OF INSULIN (H): ICD-10-CM

## 2021-04-13 RX ORDER — GABAPENTIN 100 MG/1
CAPSULE ORAL
Qty: 90 CAPSULE | Refills: 3 | OUTPATIENT
Start: 2021-04-13

## 2021-04-13 NOTE — TELEPHONE ENCOUNTER
Disp Refills Start End AUGUSTIN   gabapentin (NEURONTIN) 100 MG capsule 90 capsule 3 3/17/2021  No   Sig - Route: Take 1 capsule (100 mg) by mouth At Bedtime - Oral     Redundant refill request refused: Too soon: Should have refills available. Ingris Maldonado RN  ....................  4/13/2021   8:26 AM

## 2021-04-27 DIAGNOSIS — E11.42 CONTROLLED TYPE 2 DIABETES MELLITUS WITH DIABETIC POLYNEUROPATHY, WITHOUT LONG-TERM CURRENT USE OF INSULIN (H): ICD-10-CM

## 2021-04-27 RX ORDER — GLIPIZIDE 5 MG/1
TABLET ORAL
Qty: 180 TABLET | Refills: 3 | OUTPATIENT
Start: 2021-04-27

## 2021-04-27 NOTE — TELEPHONE ENCOUNTER
[facility] sent Rx request for the following:      GLIPIZIDE 5MG TABLETS  Sig: TAKE 1 TABLET(5 MG) BY MOUTH TWICE DAILY BEFORE MEALS      Last Prescription Date:   3/17/2021  Last Fill Qty/Refills:         180, R-3    Last Office Visit:              3/17/2021   Future Office visit:           7/7/2021    Redundant refill request refused: Too soon:    Unable to complete prescription refill per RN Medication Refill Policy.................... Keny Yanez RN ....................  4/27/2021   9:21 AM

## 2021-06-19 DIAGNOSIS — I73.00 RAYNAUD'S PHENOMENON WITHOUT GANGRENE: ICD-10-CM

## 2021-06-22 RX ORDER — NIFEDIPINE 30 MG/1
TABLET, EXTENDED RELEASE ORAL
Qty: 90 TABLET | Refills: 3 | OUTPATIENT
Start: 2021-06-22

## 2021-07-06 RX ORDER — GUAIFENESIN AND DEXTROMETHORPHAN HYDROBROMIDE 1200; 60 MG/1; MG/1
1 TABLET, EXTENDED RELEASE ORAL 2 TIMES DAILY PRN
Qty: 60 TABLET | Refills: 11 | Status: CANCELLED | OUTPATIENT
Start: 2021-07-06

## 2021-07-07 ENCOUNTER — OFFICE VISIT (OUTPATIENT)
Dept: INTERNAL MEDICINE | Facility: OTHER | Age: 86
End: 2021-07-07
Attending: INTERNAL MEDICINE
Payer: MEDICARE

## 2021-07-07 VITALS
WEIGHT: 183 LBS | OXYGEN SATURATION: 95 % | HEIGHT: 69 IN | BODY MASS INDEX: 27.11 KG/M2 | TEMPERATURE: 97 F | SYSTOLIC BLOOD PRESSURE: 130 MMHG | RESPIRATION RATE: 16 BRPM | DIASTOLIC BLOOD PRESSURE: 62 MMHG | HEART RATE: 57 BPM

## 2021-07-07 DIAGNOSIS — I73.00 RAYNAUD'S PHENOMENON WITHOUT GANGRENE: ICD-10-CM

## 2021-07-07 DIAGNOSIS — E11.42 CONTROLLED TYPE 2 DIABETES MELLITUS WITH DIABETIC POLYNEUROPATHY, WITHOUT LONG-TERM CURRENT USE OF INSULIN (H): ICD-10-CM

## 2021-07-07 DIAGNOSIS — Z87.891 HISTORY OF TOBACCO USE: ICD-10-CM

## 2021-07-07 DIAGNOSIS — E55.9 VITAMIN D DEFICIENCY: ICD-10-CM

## 2021-07-07 DIAGNOSIS — I10 BENIGN ESSENTIAL HYPERTENSION: ICD-10-CM

## 2021-07-07 DIAGNOSIS — I49.1 PREMATURE ATRIAL CONTRACTIONS: ICD-10-CM

## 2021-07-07 DIAGNOSIS — E11.42 CONTROLLED TYPE 2 DIABETES MELLITUS WITH DIABETIC POLYNEUROPATHY, WITHOUT LONG-TERM CURRENT USE OF INSULIN (H): Primary | ICD-10-CM

## 2021-07-07 DIAGNOSIS — E78.2 MIXED HYPERLIPIDEMIA: ICD-10-CM

## 2021-07-07 DIAGNOSIS — Z71.85 VACCINE COUNSELING: ICD-10-CM

## 2021-07-07 DIAGNOSIS — I49.8 VENTRICULAR BIGEMINY: ICD-10-CM

## 2021-07-07 DIAGNOSIS — F11.90 CHRONIC, CONTINUOUS USE OF OPIOIDS: ICD-10-CM

## 2021-07-07 DIAGNOSIS — Z02.89 PAIN MEDICATION AGREEMENT: ICD-10-CM

## 2021-07-07 DIAGNOSIS — Z00.00 ENCOUNTER FOR MEDICARE ANNUAL WELLNESS EXAM: ICD-10-CM

## 2021-07-07 DIAGNOSIS — E53.8 VITAMIN B12 DEFICIENCY: ICD-10-CM

## 2021-07-07 DIAGNOSIS — M21.961 ACQUIRED DEFORMITY OF RIGHT FOOT: ICD-10-CM

## 2021-07-07 LAB
ALBUMIN SERPL-MCNC: 4.2 G/DL (ref 3.5–5.7)
ALBUMIN UR-MCNC: 10 MG/DL
ALP SERPL-CCNC: 58 U/L (ref 34–104)
ALT SERPL W P-5'-P-CCNC: 13 U/L (ref 7–52)
ANION GAP SERPL CALCULATED.3IONS-SCNC: 7 MMOL/L (ref 3–14)
APPEARANCE UR: CLEAR
AST SERPL W P-5'-P-CCNC: 13 U/L (ref 13–39)
BILIRUB SERPL-MCNC: 1 MG/DL (ref 0.3–1)
BILIRUB UR QL STRIP: NEGATIVE
BUN SERPL-MCNC: 23 MG/DL (ref 7–25)
CALCIUM SERPL-MCNC: 9.5 MG/DL (ref 8.6–10.3)
CHLORIDE SERPL-SCNC: 104 MMOL/L (ref 98–107)
CHOLEST SERPL-MCNC: 150 MG/DL
CO2 SERPL-SCNC: 27 MMOL/L (ref 21–31)
COLOR UR AUTO: YELLOW
CREAT SERPL-MCNC: 1.04 MG/DL (ref 0.7–1.3)
CREAT UR-MCNC: 136 MG/DL
ERYTHROCYTE [DISTWIDTH] IN BLOOD BY AUTOMATED COUNT: 14 % (ref 10–15)
GFR SERPL CREATININE-BSD FRML MDRD: 68 ML/MIN/{1.73_M2}
GLUCOSE SERPL-MCNC: 155 MG/DL (ref 70–105)
GLUCOSE UR STRIP-MCNC: NEGATIVE MG/DL
HBA1C MFR BLD: 7.1 % (ref 4–6)
HCT VFR BLD AUTO: 48.2 % (ref 40–53)
HDLC SERPL-MCNC: 37 MG/DL (ref 23–92)
HGB BLD-MCNC: 16 G/DL (ref 13.3–17.7)
HGB UR QL STRIP: NEGATIVE
KETONES UR STRIP-MCNC: NEGATIVE MG/DL
LDLC SERPL CALC-MCNC: 86 MG/DL
LEUKOCYTE ESTERASE UR QL STRIP: NEGATIVE
MCH RBC QN AUTO: 29.4 PG (ref 26.5–33)
MCHC RBC AUTO-ENTMCNC: 33.2 G/DL (ref 31.5–36.5)
MCV RBC AUTO: 89 FL (ref 78–100)
MICROALBUMIN UR-MCNC: 50 MG/L
MICROALBUMIN/CREAT UR: 37.06 MG/G CR (ref 0–17)
MUCOUS THREADS #/AREA URNS LPF: PRESENT /LPF
NITRATE UR QL: NEGATIVE
NONHDLC SERPL-MCNC: 113 MG/DL
PH UR STRIP: 6 PH (ref 5–7)
PLATELET # BLD AUTO: 144 10E9/L (ref 150–450)
POTASSIUM SERPL-SCNC: 4.1 MMOL/L (ref 3.5–5.1)
PROT SERPL-MCNC: 6.7 G/DL (ref 6.4–8.9)
RBC # BLD AUTO: 5.44 10E12/L (ref 4.4–5.9)
RBC #/AREA URNS AUTO: 1 /HPF (ref 0–2)
SODIUM SERPL-SCNC: 138 MMOL/L (ref 134–144)
SOURCE: ABNORMAL
SP GR UR STRIP: 1.02 (ref 1–1.03)
TRIGL SERPL-MCNC: 135 MG/DL
TSH SERPL DL<=0.05 MIU/L-ACNC: 2.4 IU/ML (ref 0.34–5.6)
UROBILINOGEN UR STRIP-MCNC: NORMAL MG/DL (ref 0–2)
WBC # BLD AUTO: 6.6 10E9/L (ref 4–11)
WBC #/AREA URNS AUTO: 1 /HPF (ref 0–5)

## 2021-07-07 PROCEDURE — 96372 THER/PROPH/DIAG INJ SC/IM: CPT | Performed by: INTERNAL MEDICINE

## 2021-07-07 PROCEDURE — 81001 URINALYSIS AUTO W/SCOPE: CPT | Mod: ZL | Performed by: INTERNAL MEDICINE

## 2021-07-07 PROCEDURE — 83036 HEMOGLOBIN GLYCOSYLATED A1C: CPT | Mod: ZL | Performed by: INTERNAL MEDICINE

## 2021-07-07 PROCEDURE — 85027 COMPLETE CBC AUTOMATED: CPT | Mod: ZL | Performed by: INTERNAL MEDICINE

## 2021-07-07 PROCEDURE — G0463 HOSPITAL OUTPT CLINIC VISIT: HCPCS | Mod: 25

## 2021-07-07 PROCEDURE — 80053 COMPREHEN METABOLIC PANEL: CPT | Mod: ZL | Performed by: INTERNAL MEDICINE

## 2021-07-07 PROCEDURE — 99214 OFFICE O/P EST MOD 30 MIN: CPT | Mod: 25 | Performed by: INTERNAL MEDICINE

## 2021-07-07 PROCEDURE — 82043 UR ALBUMIN QUANTITATIVE: CPT | Mod: ZL | Performed by: INTERNAL MEDICINE

## 2021-07-07 PROCEDURE — G0439 PPPS, SUBSEQ VISIT: HCPCS | Performed by: INTERNAL MEDICINE

## 2021-07-07 PROCEDURE — 84443 ASSAY THYROID STIM HORMONE: CPT | Mod: ZL | Performed by: INTERNAL MEDICINE

## 2021-07-07 PROCEDURE — 80061 LIPID PANEL: CPT | Mod: ZL | Performed by: INTERNAL MEDICINE

## 2021-07-07 PROCEDURE — 36415 COLL VENOUS BLD VENIPUNCTURE: CPT | Mod: ZL | Performed by: INTERNAL MEDICINE

## 2021-07-07 PROCEDURE — 250N000011 HC RX IP 250 OP 636: Performed by: INTERNAL MEDICINE

## 2021-07-07 RX ORDER — DILTIAZEM HYDROCHLORIDE 120 MG/1
120 CAPSULE, EXTENDED RELEASE ORAL EVERY EVENING
Qty: 90 CAPSULE | Refills: 3 | Status: SHIPPED | OUTPATIENT
Start: 2021-07-07 | End: 2021-12-08

## 2021-07-07 RX ORDER — CYANOCOBALAMIN 1000 UG/ML
1000 INJECTION, SOLUTION INTRAMUSCULAR; SUBCUTANEOUS ONCE
Status: COMPLETED | OUTPATIENT
Start: 2021-07-07 | End: 2021-07-07

## 2021-07-07 RX ADMIN — CYANOCOBALAMIN 1000 MCG: 1000 INJECTION, SOLUTION INTRAMUSCULAR at 10:53

## 2021-07-07 ASSESSMENT — ENCOUNTER SYMPTOMS
CHILLS: 0
DIARRHEA: 0
ABDOMINAL PAIN: 0
BRUISES/BLEEDS EASILY: 0
WHEEZING: 0
LIGHT-HEADEDNESS: 0
FEVER: 0
DIZZINESS: 0
CONFUSION: 0
PALPITATIONS: 1
DYSURIA: 0
AGITATION: 0
ARTHRALGIAS: 0
FATIGUE: 1
NAUSEA: 0
BACK PAIN: 1
HEMATURIA: 0
SHORTNESS OF BREATH: 1
VOMITING: 0
MYALGIAS: 0
COUGH: 0
EYE PAIN: 0
SLEEP DISTURBANCE: 1

## 2021-07-07 ASSESSMENT — PAIN SCALES - GENERAL: PAINLEVEL: MILD PAIN (3)

## 2021-07-07 ASSESSMENT — MIFFLIN-ST. JEOR: SCORE: 1513.2

## 2021-07-07 NOTE — PROGRESS NOTES
"Medication Reconciliation: complete     FOOD SECURITY SCREENING QUESTIONS  Hunger Vital Signs:  Within the past 12 months we worried whether our food would run out before we got money to buy more. Never  Within the past 12 months the food we bought just didn't last and we didn't have money to get more. Never  Lorrie Farfan LPN 6/24/2021 10:34 AM    SUBJECTIVE:   Steven Dennison is a 86 year old male who presents for Preventive Visit.  Patient has been advised of split billing requirements and indicates understanding: Yes  Are you in the first 12 months of your Medicare Part B coverage?  No    Physical Health:    In general, how would you rate your overall physical health? good    Outside of work, how many days during the week do you exercise? 6-7 days/week    Outside of work, approximately how many minutes a day do you exercise?15-30 minutes    If you drink alcohol do you typically have >3 drinks per day or >7 drinks per week? No    Do you usually eat at least 4 servings of fruit and vegetables a day, include whole grains & fiber and avoid regularly eating high fat or \"junk\" foods? NO    Do you have any problems taking medications regularly?  YES    Do you have any side effects from medications? none    Needs assistance for the following daily activities: no assistance needed    Which of the following safety concerns are present in your home?  none identified     Hearing impairment: Yes, patient has bilateral hearing aides but doesn't wear them    In the past 6 months, have you been bothered by leaking of urine? yes    Mental Health:    In general, how would you rate your overall mental or emotional health? good  PHQ-2 Score: 0    Do you feel safe in your environment? Yes    Have you ever done Advance Care Planning? (For example, a Health Directive, POLST, or a discussion with a medical provider or your loved ones about your wishes): Yes, advance care planning is on file.    Additional concerns to address?  " No    Fall risk:  Fallen 2 or more times in the past year?: No  Any fall with injury in the past year?: No    Cognitive Screenin) Repeat 3 items (Leader, Season, Table)    2) Clock draw: NORMAL  3) 3 item recall: Recalls 2 objects   Results: NORMAL clock, 1-2 items recalled: COGNITIVE IMPAIRMENT LESS LIKELY    Mini-CogTM Copyright ROCÍO Leiva. Licensed by the author for use in [facility]; reprinted with permission (chuckie@[facility]). All rights reserved.      Do you have sleep apnea, excessive snoring or daytime drowsiness?: yes    Reviewed and updated as needed this visit by clinical staff  Tobacco  Allergies  Meds  Problems  Med Hx  Surg Hx  Fam Hx  Soc Hx          Reviewed and updated as needed this visit by Provider  Tobacco  Allergies  Meds  Problems  Med Hx  Surg Hx  Fam Hx         Social History     Tobacco Use     Smoking status: Former Smoker     Packs/day: 1.00     Years: 10.00     Pack years: 10.00     Types: Cigarettes     Quit date: 1975     Years since quittin.5     Smokeless tobacco: Never Used   Substance Use Topics     Alcohol use: Yes     Alcohol/week: 5.0 standard drinks     Comment: occasional                           Current providers sharing in care for this patient include:   Patient Care Team:  Nawaf Montanez MD as PCP - General (Internal Medicine)  Nawaf Montanez MD as Assigned PCP  Keri Sal RN as Diabetes Educator (Diabetes Education)    The following health maintenance items are reviewed in Epic and correct as of today:  Health Maintenance   Topic Date Due     ZOSTER IMMUNIZATION (2 of 3) 2010     URINE DRUG SCREEN  2020     EYE EXAM  2021     DIABETIC FOOT EXAM  2021     LORENZO ASSESSMENT  2021     PHQ-9  2021     A1C  2022     FALL RISK ASSESSMENT  2022     MEDICARE ANNUAL WELLNESS VISIT  2022     BMP  2022     LIPID  2022     MICROALBUMIN  2022     TREATMENT AGREEMENT FOR  "CHRONIC PAIN MANAGEMENT  07/07/2022     ADVANCE CARE PLANNING  07/07/2026     DTAP/TDAP/TD IMMUNIZATION (4 - Td) 02/12/2030     PHQ-2  Completed     Pneumococcal Vaccine: 65+ Years  Completed     COVID-19 Vaccine  Completed     IPV IMMUNIZATION  Aged Out     MENINGITIS IMMUNIZATION  Aged Out     INFLUENZA VACCINE  Discontinued     Review of Systems   Constitutional: Positive for fatigue (Continues on Metformin - stopped taking B-complex). Negative for chills and fever.   HENT: Negative for congestion and hearing loss.    Eyes: Negative for pain and visual disturbance.   Respiratory: Positive for shortness of breath (mild, stable.). Negative for cough and wheezing.    Cardiovascular: Positive for palpitations. Negative for chest pain.        Raynaud's syndrome, no ulcerations   Gastrointestinal: Negative for abdominal pain, diarrhea, nausea and vomiting.   Endocrine: Negative for cold intolerance and heat intolerance.   Genitourinary: Negative for dysuria and hematuria.        + feels like emptying bladder okay, occasionally will have to go back to bathroom a 2nd time.    Musculoskeletal: Positive for back pain (+ intermittent low back pain) and gait problem (+ balance is off at times, does better with lace-up boots in the woods). Negative for arthralgias and myalgias.   Skin: Negative for pallor.   Allergic/Immunologic: Negative for immunocompromised state.   Neurological: Negative for dizziness and light-headedness.        Rare positional lightheadedness   Hematological: Does not bruise/bleed easily.   Psychiatric/Behavioral: Positive for sleep disturbance (+ insomnia). Negative for agitation and confusion.        OBJECTIVE:   /62 (BP Location: Right arm, Patient Position: Sitting, Cuff Size: Adult Regular)   Pulse 57   Temp 97  F (36.1  C) (Tympanic)   Resp 16   Ht 1.765 m (5' 9.49\")   Wt 83 kg (183 lb)   SpO2 95%   BMI 26.65 kg/m   Estimated body mass index is 26.65 kg/m  as calculated from the " "following:    Height as of this encounter: 1.765 m (5' 9.49\").    Weight as of this encounter: 83 kg (183 lb).    Physical Exam  Constitutional:       General: He is not in acute distress.     Appearance: He is well-developed. He is not diaphoretic.   HENT:      Head: Normocephalic and atraumatic.   Eyes:      General: No scleral icterus.     Conjunctiva/sclera: Conjunctivae normal.   Neck:      Musculoskeletal: Neck supple.      Vascular: No carotid bruit.   Cardiovascular:      Rate and Rhythm: Normal rate and regular rhythm.      Pulses: Normal pulses.      Heart sounds: Normal heart sounds.   Pulmonary:      Effort: Pulmonary effort is normal.      Breath sounds: Normal breath sounds.   Abdominal:      Palpations: Abdomen is soft.      Tenderness: There is no abdominal tenderness.   Musculoskeletal:         General: No deformity.      Right lower leg: No edema.      Left lower leg: No edema.   Lymphadenopathy:      Cervical: No cervical adenopathy.   Skin:     General: Skin is warm and dry.      Findings: No rash.   Neurological:      Mental Status: He is alert and oriented to person, place, and time. Mental status is at baseline.   Psychiatric:         Mood and Affect: Mood normal.         Behavior: Behavior normal.        Diagnostic Test Results:  Labs [facility] in [facility]  Recent Labs   Lab Test 07/07/21  0855 03/17/21  1131 11/03/20  0909 11/03/20  0909   A1C 7.1* 7.4*  --  7.2*   LDL 86 85  --  76   HDL 37 33  --  29   TRIG 135 176*  --  80   ALT 13 12  --  9   CR 1.04 0.94   < > 1.07   GFRESTIMATED 68 76   < > 66   GFRESTBLACK 82 >90   < > 80   POTASSIUM 4.1 4.1   < > 4.2    < > = values in this interval not displayed.      ASSESSMENT / PLAN:       ICD-10-CM    1. Controlled type 2 diabetes mellitus with diabetic polyneuropathy, without long-term current use of insulin (H)  E11.42 metFORMIN (GLUCOPHAGE) 500 MG tablet   2. History of tobacco use  Z87.891    3. Pain medication agreement-opioid/nonopioid 7-7-21  " Z02.89    4. Chronic, continuous use of opioids  F11.90    5. Encounter for Medicare annual wellness exam  Z00.00    6. Benign essential hypertension  I10    7. Acquired deformity of right foot  M21.961    8. Mixed hyperlipidemia  E78.2    9. Vitamin D deficiency  E55.9    10. Premature atrial contractions  I49.1 diltiazem ER (DILT-XR) 120 MG 24 hr capsule   11. Ventricular bigeminy  I49.8 diltiazem ER (DILT-XR) 120 MG 24 hr capsule   12. Raynaud's phenomenon without gangrene  I73.00 diltiazem ER (DILT-XR) 120 MG 24 hr capsule   13. Vitamin B12 deficiency  E53.8 cyanocobalamin injection 1,000 mcg   14. Vaccine counseling  Z71.89    Patient presents for Medicare annual wellness visit as well as follow-up multiple issues.    Has been having a lot more fatigue and malaise.  He is concerned this is being caused by metoprolol.  His pulses in the 50s today.  He even cut down on his metoprolol dose to half of a tablet or 12.5 mg twice daily.  Not really make much difference.  He is currently taking the phentermine to help with Raynaud's phenomenon.  Overall doing well in that regard.  He would like to make medication changes.  We reviewed some options.  Plan to stop nifedipine.  Stop metoprolol.  Start diltiazem 120 mg once daily in the evening.  He will skip all of his meds the rest of the day today and tomorrow and start diltiazem tomorrow night.    Type 2 diabetes with diabetic neuropathy.  Chronic.  Still using gabapentin regularly.  Very rarely using oxycodone.  He still has a number of tablets left from his last prescription.  Only taking Metformin 500 mg twice daily with meals.  Also on glipizide.  His hemoglobin A1c has come down now.  He was quite worried about this because he has been socializing and going out to eat with people, and eating a great deal of food outside of his normal diet.    History of tobacco use, no longer smoking.  Reports breathing is at baseline.    Chronic continuous opioid use, chronic  "pain syndrome.  Again only occasionally using Percocet.  Only uses it when his back flares up which is not too common and has not been for quite a while.  No refills needed at this time.    Chronic right foot deformity, acquired.  Does limit his walking at times.    Mixed upper lipidemia, cholesterol levels have improved with use of pravastatin.  Tolerating well without side effects.    Vitamin D deficiency.  Continues with oral replacement.  Advised continued dosing.    Premature atrial contractions with ventricular bigeminy, noted on previous heart monitor.  Switch to diltiazem as noted above.    Rainouts phenomenon with out gangrene, see above.  Switched nifedipine over to diltiazem.    B12 deficiency, has been taking oral replacement but he would like a B12 shot today.  Orders placed.    Vaccine counseling completed.    Patient has been advised of split billing requirements and indicates understanding: Yes    COUNSELING:  Reviewed preventive health counseling, as reflected in patient instructions  Special attention given to:       Regular exercise       Healthy diet/nutrition       Vision screening       Hearing screening       Dental care       Bladder control       Fall risk prevention       Immunizations       Aspirin prophylaxis     Estimated body mass index is 26.65 kg/m  as calculated from the following:    Height as of this encounter: 1.765 m (5' 9.49\").    Weight as of this encounter: 83 kg (183 lb).        He reports that he quit smoking about 46 years ago. His smoking use included cigarettes. He has a 10.00 pack-year smoking history. He has never used smokeless tobacco.    Appropriate preventive services were discussed with this patient, including applicable screening as appropriate for cardiovascular disease, diabetes, osteopenia/osteoporosis, and glaucoma.  As appropriate for age/gender, discussed screening for colorectal cancer, prostate cancer, breast cancer, and cervical cancer. Checklist " reviewing preventive services available has been given to the patient.    Reviewed patients plan of care and provided an AVS. The Complex Care Plan (for patients with higher acuity and needing more deliberate coordination of services) for Steven meets the Care Plan requirement. This Care Plan has been established and reviewed with the Patient.    Counseling Resources:  ATP IV Guidelines  Pooled Cohorts Equation Calculator  Breast Cancer Risk Calculator  BRCA-Related Cancer Risk Assessment: FHS-7 Tool  FRAX Risk Assessment  ICSI Preventive Guidelines  Dietary Guidelines for Americans, 2010  USDA's MyPlate  ASA Prophylaxis  Lung CA Screening    Nawaf Montanez MD  [facility]

## 2021-07-07 NOTE — PATIENT INSTRUCTIONS
Patient Education   Personalized Prevention Plan  You are due for the preventive services outlined below.  Your care team is available to assist you in scheduling these services.  If you have already completed any of these items, please share that information with your care team to update in your medical record.  Health Maintenance Due   Topic Date Due     TREATMENT AGREEMENT FOR CHRONIC PAIN MANAGEMENT  Never done     COVID-19 Vaccine (1) Never done     Zoster (Shingles) Vaccine (2 of 3) 02/16/2010     URINE DRUG SCREEN  07/05/2020     Eye Exam  03/18/2021     Diabetic Foot Exam  07/09/2021     Anxiety Assessment  07/09/2021     Depression Assessment  07/09/2021     Preventive Health Recommendations  See your health care provider every year to    Review health changes.     Discuss preventive care.      Review your medicines if your doctor has prescribed any.    Talk with your health care provider about whether you should have a test to screen for prostate cancer (PSA).    Every 3 years, have a diabetes test (fasting glucose). If you are at risk for diabetes, you should have this test more often.    Every 5 years, have a cholesterol test. Have this test more often if you are at risk for high cholesterol or heart disease.     Every 10 years, have a colonoscopy. Or, have a yearly FIT test (stool test). These exams will check for colon cancer.    Talk to with your health care provider about screening for Abdominal Aortic Aneurysm if you have a family history of AAA or have a history of smoking.    Shots:     Get a flu shot each year.     Get a tetanus shot every 10 years.     Talk to your doctor about your pneumonia vaccines. There are now two you should receive - Pneumovax (PPSV 23) and Prevnar (PCV 13).    Talk to your pharmacist about a shingles vaccine.     Talk to your doctor about the hepatitis B vaccine.    Nutrition:     Eat at least 5 servings of fruits and vegetables each day.     Eat whole-grain bread,  whole-wheat pasta and brown rice instead of white grains and rice.     Get adequate Calcium and Vitamin D.     Lifestyle    Exercise for at least 150 minutes a week (30 minutes a day, 5 days a week). This will help you control your weight and prevent disease.     Limit alcohol to one drink per day.     No smoking.     Wear sunscreen to prevent skin cancer.     See your dentist every six months for an exam and cleaning.     See your eye doctor every 1 to 2 years to screen for conditions such as glaucoma, macular degeneration and cataracts.    Personalized Prevention Plan  You are due for the preventive services outlined below.  Your care team is available to assist you in scheduling these services.  If you have already completed any of these items, please share that information with your care team to update in your medical record.  Health Maintenance   Topic Date Due     TREATMENT AGREEMENT FOR CHRONIC PAIN MANAGEMENT  Never done     COVID-19 Vaccine (1) Never done     ZOSTER IMMUNIZATION (2 of 3) 02/16/2010     URINE DRUG SCREEN  07/05/2020     EYE EXAM  03/18/2021     DIABETIC FOOT EXAM  07/09/2021     LORENZO ASSESSMENT  07/09/2021     PHQ-9  07/09/2021     A1C  01/07/2022     FALL RISK ASSESSMENT  03/17/2022     MEDICARE ANNUAL WELLNESS VISIT  07/07/2022     BMP  07/07/2022     LIPID  07/07/2022     MICROALBUMIN  07/07/2022     ADVANCE CARE PLANNING  07/07/2026     DTAP/TDAP/TD IMMUNIZATION (4 - Td) 02/12/2030     PHQ-2  Completed     Pneumococcal Vaccine: 65+ Years  Completed     IPV IMMUNIZATION  Aged Out     MENINGITIS IMMUNIZATION  Aged Out     INFLUENZA VACCINE  Discontinued         -- Stop Lopressor/Metoprolol   -- Stop Procardia / Nifedipine    Controlled type 2 diabetes mellitus with diabetic polyneuropathy, without long-term current use of insulin (H)  Med list updated:  - metFORMIN (GLUCOPHAGE) 500 MG tablet; Take 1 tablet (500 mg) by mouth 2 times daily (with meals) - for Diabetes  Dispense: 180 tablet;  Refill: 3    Premature atrial contractions  Ventricular bigeminy  Raynaud's phenomenon without gangrene    START:   - diltiazem ER (DILT-XR) 120 MG 24 hr capsule; Take 1 capsule (120 mg) by mouth every evening - for Heart and Raynaud's --- Stop Lopressor/Metoprolol and Stop Procardia / Nifedipine  Dispense: 90 capsule; Refill: 3      Results for orders placed or performed in visit on 07/07/21   TSH Reflex GH     Status: None   Result Value Ref Range    TSH Reflex 2.40 0.34 - 5.60 IU/mL   UA reflex to Microscopic     Status: Abnormal   Result Value Ref Range    Color Urine Yellow     Appearance Urine Clear     Glucose Urine Negative NEG^Negative mg/dL    Bilirubin Urine Negative NEG^Negative    Ketones Urine Negative NEG^Negative mg/dL    Specific Gravity Urine 1.023 1.003 - 1.035    Blood Urine Negative NEG^Negative    pH Urine 6.0 5.0 - 7.0 pH    Protein Albumin Urine 10 (A) NEG^Negative mg/dL    Urobilinogen mg/dL Normal 0.0 - 2.0 mg/dL    Nitrite Urine Negative NEG^Negative    Leukocyte Esterase Urine Negative NEG^Negative    Source Midstream Urine     RBC Urine 1 0 - 2 /HPF    WBC Urine 1 0 - 5 /HPF    Mucous Urine Present (A) NEG^Negative /LPF   Lipid Profile     Status: None   Result Value Ref Range    Cholesterol 150 <200 mg/dL    Triglycerides 135 <150 mg/dL    HDL Cholesterol 37 23 - 92 mg/dL    LDL Cholesterol Calculated 86 <100 mg/dL    Non HDL Cholesterol 113 <130 mg/dL   Comprehensive metabolic panel     Status: Abnormal   Result Value Ref Range    Sodium 138 134 - 144 mmol/L    Potassium 4.1 3.5 - 5.1 mmol/L    Chloride 104 98 - 107 mmol/L    Carbon Dioxide 27 21 - 31 mmol/L    Anion Gap 7 3 - 14 mmol/L    Glucose 155 (H) 70 - 105 mg/dL    Urea Nitrogen 23 7 - 25 mg/dL    Creatinine 1.04 0.70 - 1.30 mg/dL    GFR Estimate 68 >60 mL/min/[1.73_m2]    GFR Estimate If Black 82 >60 mL/min/[1.73_m2]    Calcium 9.5 8.6 - 10.3 mg/dL    Bilirubin Total 1.0 0.3 - 1.0 mg/dL    Albumin 4.2 3.5 - 5.7 g/dL    Protein  Total 6.7 6.4 - 8.9 g/dL    Alkaline Phosphatase 58 34 - 104 U/L    ALT 13 7 - 52 U/L    AST 13 13 - 39 U/L   CBC with platelets     Status: Abnormal   Result Value Ref Range    WBC 6.6 4.0 - 11.0 10e9/L    RBC Count 5.44 4.4 - 5.9 10e12/L    Hemoglobin 16.0 13.3 - 17.7 g/dL    Hematocrit 48.2 40.0 - 53.0 %    MCV 89 78 - 100 fl    MCH 29.4 26.5 - 33.0 pg    MCHC 33.2 31.5 - 36.5 g/dL    RDW 14.0 10.0 - 15.0 %    Platelet Count 144 (L) 150 - 450 10e9/L   Hemoglobin A1c     Status: Abnormal   Result Value Ref Range    Hemoglobin A1C 7.1 (H) 4.0 - 6.0 %      Aspects of Diabetes:   Recent Labs   Lab Test 07/07/21  0855 03/17/21  1131 11/03/20  0909 11/03/20  0909   A1C 7.1* 7.4*  --  7.2*   LDL 86 85  --  76   HDL 37 33  --  29   TRIG 135 176*  --  80   ALT 13 12  --  9   CR 1.04 0.94   < > 1.07   GFRESTIMATED 68 76   < > 66   GFRESTBLACK 82 >90   < > 80   POTASSIUM 4.1 4.1   < > 4.2    < > = values in this interval not displayed.      Hemoglobin A1c  Goal range is under 8%. Best is 6.5 to 7   Blood Pressure 130/62 Goal to keep less than 140/90   Tobacco  reports that he quit smoking about 46 years ago. His smoking use included cigarettes. He has a 10.00 pack-year smoking history. He has never used smokeless tobacco. Goal to abstain from tobacco   Aspirin or Plavix Anti-platelet therapy Aspirin or Plavix reduces risk of heart disease and stroke  -- sometimes used with other blood thinners, depending on bleeding risk and risk factors.    ACE/ARB Specific blood pressure meds These medications can reduce risk of kidney disease   Cholesterol Statins (Lipitor, Crestor, vs others) Statins reduce risk of heart disease and stroke   Eye Exam -- Do Yearly -- Annual diabetic eye exam   Healthy weight Wt Readings from Last 3 Encounters:   07/07/21 83 kg (183 lb)   03/17/21 83.7 kg (184 lb 9.6 oz)   11/03/20 82.2 kg (181 lb 3.2 oz)     Body mass index is 26.65 kg/m .  Goal BMI under 30, best is under 25.      -- Trying to exercise  daily (goal at least 20 min/day) with moderate aerobic activity   -- Eat healthy (resources from ADA at http://www.diabetes.org/)   -- Taking good care of my feet. Consider seeing the Podiatrist   -- Check blood sugars as directed, record in log book and bring to every appointment    Insurance companies are grading you and I on your blood sugar control -- Goal is to get your A1c down to 7.9% or lower and NO Smoking!  -- Medicare and most insurance companies, will only cover Hemoglobin A1c labs to be rechecked every 91+ days.      Return for Diabetes labs and clinic follow-up appointment every 3 to 4 months.    Schedule lab only appointment --- A few days AFTER: 10/5/21   Schedule clinic appointment with Dr. Montanez -- Same day as labs, or 1-2 days later.

## 2021-08-10 ENCOUNTER — TELEPHONE (OUTPATIENT)
Dept: INTERNAL MEDICINE | Facility: OTHER | Age: 86
End: 2021-08-10

## 2021-08-10 NOTE — TELEPHONE ENCOUNTER
I reviewed the ultrasound report.  Looks like he might have torn a small muscle in his calf, causing a hematoma or deep muscle bruise.    Could consider warm packs and an Ace wrap to the calf as needed.  Okay to take Tylenol if needed for pain.    Activity as tolerated.    We can place a physical therapy referral if needed.    Nawaf Montanez MD

## 2021-08-10 NOTE — TELEPHONE ENCOUNTER
Patient was told by [facility] ED that he has a blood clot in left leg below his knee in the muscle not in blood vessel    He would like to know what you would want him to do    Please call to advise    Thank you

## 2021-08-10 NOTE — TELEPHONE ENCOUNTER
See ER notes from 8/5/21. See in clinic for follow up or phone visit?  Jaimie Mabry CMA([facility])..................8/10/2021   12:26 PM

## 2021-08-11 NOTE — TELEPHONE ENCOUNTER
After patient's name and date of birth verified the below information was given.  Patient declines physical therapy referral at this time.  Lawanda Reed LPN ---- 8/11/2021 9:35 AM

## 2021-09-07 ENCOUNTER — OFFICE VISIT (OUTPATIENT)
Dept: INTERNAL MEDICINE | Facility: OTHER | Age: 86
End: 2021-09-07
Attending: INTERNAL MEDICINE
Payer: MEDICARE

## 2021-09-07 VITALS
DIASTOLIC BLOOD PRESSURE: 68 MMHG | BODY MASS INDEX: 26.21 KG/M2 | OXYGEN SATURATION: 99 % | SYSTOLIC BLOOD PRESSURE: 138 MMHG | WEIGHT: 180 LBS | RESPIRATION RATE: 16 BRPM | TEMPERATURE: 97.5 F | HEART RATE: 66 BPM

## 2021-09-07 DIAGNOSIS — R39.15 URINARY URGENCY: ICD-10-CM

## 2021-09-07 DIAGNOSIS — G89.4 CHRONIC PAIN SYNDROME: ICD-10-CM

## 2021-09-07 DIAGNOSIS — S89.90XA INJURY OF CALF: ICD-10-CM

## 2021-09-07 DIAGNOSIS — R35.1 NOCTURIA: ICD-10-CM

## 2021-09-07 DIAGNOSIS — M54.50 INTERMITTENT LOW BACK PAIN: ICD-10-CM

## 2021-09-07 DIAGNOSIS — T14.8XXA PULLED MUSCLE: Primary | ICD-10-CM

## 2021-09-07 DIAGNOSIS — Z02.89 PAIN MEDICATION AGREEMENT: ICD-10-CM

## 2021-09-07 DIAGNOSIS — Z12.5 ENCOUNTER FOR SCREENING FOR MALIGNANT NEOPLASM OF PROSTATE: ICD-10-CM

## 2021-09-07 DIAGNOSIS — Z87.891 HISTORY OF TOBACCO USE: ICD-10-CM

## 2021-09-07 LAB
ALBUMIN UR-MCNC: 20 MG/DL
APPEARANCE UR: CLEAR
BILIRUB UR QL STRIP: NEGATIVE
COLOR UR AUTO: YELLOW
CREAT UR-MCNC: 121 MG/DL
GLUCOSE UR STRIP-MCNC: 300 MG/DL
HGB UR QL STRIP: NEGATIVE
HOLD SPECIMEN: NORMAL
KETONES UR STRIP-MCNC: NEGATIVE MG/DL
LEUKOCYTE ESTERASE UR QL STRIP: NEGATIVE
MUCOUS THREADS #/AREA URNS LPF: PRESENT /LPF
NITRATE UR QL: NEGATIVE
PH UR STRIP: 6 [PH] (ref 5–9)
PSA SERPL-MCNC: 3.05 UG/L (ref 0–4)
RBC URINE: 0 /HPF
SP GR UR STRIP: 1.02 (ref 1–1.03)
UROBILINOGEN UR STRIP-MCNC: NORMAL MG/DL
WBC URINE: <1 /HPF

## 2021-09-07 PROCEDURE — 99214 OFFICE O/P EST MOD 30 MIN: CPT | Performed by: INTERNAL MEDICINE

## 2021-09-07 PROCEDURE — G0463 HOSPITAL OUTPT CLINIC VISIT: HCPCS

## 2021-09-07 PROCEDURE — 81001 URINALYSIS AUTO W/SCOPE: CPT | Mod: ZL | Performed by: INTERNAL MEDICINE

## 2021-09-07 PROCEDURE — 82570 ASSAY OF URINE CREATININE: CPT | Mod: ZL | Performed by: INTERNAL MEDICINE

## 2021-09-07 PROCEDURE — G0103 PSA SCREENING: HCPCS | Mod: ZL | Performed by: INTERNAL MEDICINE

## 2021-09-07 PROCEDURE — 36415 COLL VENOUS BLD VENIPUNCTURE: CPT | Mod: ZL | Performed by: INTERNAL MEDICINE

## 2021-09-07 PROCEDURE — 80307 DRUG TEST PRSMV CHEM ANLYZR: CPT | Mod: ZL,XU | Performed by: INTERNAL MEDICINE

## 2021-09-07 RX ORDER — OXYCODONE AND ACETAMINOPHEN 5; 325 MG/1; MG/1
.5-1 TABLET ORAL EVERY 6 HOURS PRN
Qty: 12 TABLET | Refills: 0 | Status: SHIPPED | OUTPATIENT
Start: 2021-09-07 | End: 2022-01-19

## 2021-09-07 RX ORDER — GUAIFENESIN AND DEXTROMETHORPHAN HYDROBROMIDE 1200; 60 MG/1; MG/1
1 TABLET, EXTENDED RELEASE ORAL 2 TIMES DAILY PRN
Qty: 60 TABLET | Refills: 11 | Status: SHIPPED | OUTPATIENT
Start: 2021-09-07 | End: 2022-03-15

## 2021-09-07 ASSESSMENT — ENCOUNTER SYMPTOMS
DIARRHEA: 0
PALPITATIONS: 1
MYALGIAS: 0
VOMITING: 0
NAUSEA: 0
SHORTNESS OF BREATH: 1
CHILLS: 0
CONFUSION: 0
FATIGUE: 1
BACK PAIN: 1
SLEEP DISTURBANCE: 1
BRUISES/BLEEDS EASILY: 0
FEVER: 0
DIZZINESS: 0
LIGHT-HEADEDNESS: 0
EYE PAIN: 0
ARTHRALGIAS: 1
WHEEZING: 0
FREQUENCY: 1
DYSURIA: 0
COUGH: 0
ABDOMINAL PAIN: 0
HEMATURIA: 0
AGITATION: 0

## 2021-09-07 ASSESSMENT — PAIN SCALES - GENERAL: PAINLEVEL: MILD PAIN (2)

## 2021-09-07 NOTE — PATIENT INSTRUCTIONS
Time should heal the calf muscle injury. Likely torn muscle.     Change activity as needed / tolerated.     Check UA and PSA today.     Urology clinic referral was sent - Dr. Gagnon - they will call with date/time of appointment.         Return as needed for follow-up with Dr. Montanez.    Clinic : [phone]  Appointment line: [phone]

## 2021-09-07 NOTE — LETTER
September 12, 2021      Steven Dennison  [id number] [address] [address]        Dear ,    We are writing to inform you of your test results.    PSA/prostate cancer lab is within normal range.    Hemoglobin A1c/diabetes lab has improved slightly.    Glucose is mildly elevated in the urine.    Recent Labs   Lab Test 07/07/21  0855 03/17/21  1131 11/03/20  0909   A1C 7.1* 7.4* 7.2*   LDL 86 85 76   HDL 37 33 29   TRIG 135 176* 80   ALT 13 12 9   CR 1.04 0.94 1.07   GFRESTIMATED 68 76 66   GFRESTBLACK 82 >90 80   POTASSIUM 4.1 4.1 4.2        Labs are otherwise stable.   Continue current medications.   Plan to recheck labs again in 3 to 4 months.     Electronically signed by:  Nawaf Montanez MD  on September 12, 2021     Resulted Orders   UA with Microscopic reflex to Culture   Result Value Ref Range    Color Urine Yellow Colorless, Straw, Light Yellow, Yellow    Appearance Urine Clear Clear    Glucose Urine 300  (A) Negative mg/dL    Bilirubin Urine Negative Negative    Ketones Urine Negative Negative mg/dL    Specific Gravity Urine 1.024 1.000 - 1.030    Blood Urine Negative Negative    pH Urine 6.0 5.0 - 9.0    Protein Albumin Urine 20  (A) Negative mg/dL    Urobilinogen Urine Normal Normal, 2.0 mg/dL    Nitrite Urine Negative Negative    Leukocyte Esterase Urine Negative Negative    Mucus Urine Present (A) None Seen /LPF    RBC Urine 0 <=2 /HPF    WBC Urine <1 <=5 /HPF    Narrative    Urine Culture not indicated   PSA Screen GH   Result Value Ref Range    Prostate Specific Antigen Screen 3.05 0.00 - 4.00 ug/L   Urine Drug Confirmation Panel   Result Value Ref Range    Gabapentin (Neurontin) Present (A) Absent      Comment:      Sources of gabapentin are prescription medications.    Creatinine Urine mg/dL 121 mg/dL    Narrative    Contact the Chemistry Service pager at [phone] for interpretation assistance.    This test was developed and its performance characteristics determined by the  [facility],  Special Chemistry Laboratory. It has not been cleared or approved by the FDA. The laboratory is regulated under CLIA as qualified to perform high-complexity testing. This test is used for clinical purposes. It should not be regarded as investigational or for research.   Creatinine random urine   Result Value Ref Range    Creatinine Urine mg/dL 121 mg/dL      Comment:      The reference range has not been established for creatinine in random urines. The results should be integrated into the clinical context for interpretation.   Extra Purple Top Tube   Result Value Ref Range    Hold Specimen JIC        If you have any questions or concerns, please call the clinic at the number listed above.       Sincerely,      Nawaf Montanez MD

## 2021-09-07 NOTE — PROGRESS NOTES
Mr. Dennison is a 86 year old male who presents to the clinic for evaluation of the following problems:      ICD-10-CM    1. Pulled muscle  T14.8XXA    2. Injury of calf  S89.90XA    3. History of tobacco use  Z87.891 Dextromethorphan-guaiFENesin (MUCINEX DM MAXIMUM STRENGTH)  MG TB12   4. Intermittent low back pain  M54.5 oxyCODONE-acetaminophen (PERCOCET) 5-325 MG tablet   5. Nocturia  R35.1 PSA Screen GH     UA with Microscopic reflex to Culture     Adult Urology Referral     UA with Microscopic reflex to Culture     PSA Screen GH   6. Pain medication agreement-opioid/nonopioid 7-7-21  Z02.89 Drug Confirmation Panel Urine with Creatinine     Drug Confirmation Panel Urine with Creatinine   7. Chronic pain syndrome  G89.4    8. Encounter for screening for malignant neoplasm of prostate   Z12.5 PSA Screen GH     PSA Screen GH   9. Urinary urgency  R39.15 Adult Urology Referral     HPI  Patient presents for follow-up of multiple issues.    Left calf pain.  Has pain just below the left knee in the upper calf posteriorly.  He was actually seen down in [facility] for this.  They were initially worried about possible DVT.  Ultrasound came back negative.  Based on his symptoms and history.  He had sudden pain when he stepped down off of a trailer, may be about a foot and a half or 2 feet down.  Increased pain with activity or palpation.  Less pain with straight leg walking or trying to limit use of his calf muscles.  Symptoms have been slowly improving since onset.  Advised patient to continue with conservative management.  Likely tore his gracilis or other small muscle in his calf.    History of tobacco use, has some coughing at times.  He would like refills of Mucinex DM.    Patient has intermittent low back pain.  Controlled substance agreement was completed previously.  Uses a combination of occasional Percocet with gabapentin.  [facility] website reviewed.  He last filled Percocet many months ago.  Advised that  with the new prescribing changes, maximum new prescription is less than 100 mg equivalents in total.  Based on his Percocet dosing this would be approximately 12 tablets or 90 mEq.  He is perfectly fine with this and would like a new prescription.  He only intermittently ends up using the Percocet if his back pain flares up.    Nocturia, frequent urination and urinary urgency.  Reports he gets up at night 4-5 times.  States that he does take over-the-counter prostate pills twice daily but he is not sure what they are called.  Check PSA, urinalysis.  Urology referral placed.  + Patient does have 300 of glucose in his urine.  No obvious infection.  PSA came back normal.  --Could potentially have overactive bladder versus BPH versus other.  Recommend urology eval.    Controlled substance agreement previously completed.  Urine drug screen today.    Chronic pain syndrome, more of an intermittent issue with back pain rather than chronic daily pain.    PSA, prostate cancer lab screening today.    Functional Capacity: about 4 METS.   Review of Systems   Constitutional: Positive for fatigue (Continues on Metformin - stopped taking B-complex). Negative for chills and fever.   HENT: Negative for congestion and hearing loss.    Eyes: Negative for pain and visual disturbance.   Respiratory: Positive for shortness of breath (mild, stable.). Negative for cough and wheezing.    Cardiovascular: Positive for palpitations. Negative for chest pain.        Raynaud's syndrome, no ulcerations   Gastrointestinal: Negative for abdominal pain, diarrhea, nausea and vomiting.   Endocrine: Negative for cold intolerance and heat intolerance.   Genitourinary: Positive for frequency and urgency. Negative for dysuria and hematuria.        + Nocturia 4-5x nightly   Musculoskeletal: Positive for arthralgias (Right shoulder pains, since over-use with arm), back pain (+ intermittent low back pain) and gait problem (intermittent, due to left calf).  "Negative for myalgias.   Skin: Negative for pallor.   Allergic/Immunologic: Negative for immunocompromised state.   Neurological: Negative for dizziness and light-headedness.        Rare positional lightheadedness   Hematological: Does not bruise/bleed easily.   Psychiatric/Behavioral: Positive for sleep disturbance (+ insomnia). Negative for agitation and confusion.        Reviewed and updated as needed this visit by Provider   Tobacco  Allergies  Meds  Problems  Med Hx  Surg Hx  Fam Hx          EXAM:   Vitals:    09/07/21 1325 09/07/21 1333   BP: (!) 150/72 138/68   Pulse: 66    Resp: 16    Temp: 97.5  F (36.4  C)    TempSrc: Tympanic    SpO2: 99%    Weight: 81.6 kg (180 lb)        Current Pain Score: Mild Pain (2)     BP Readings from Last 3 Encounters:   09/07/21 138/68   07/07/21 130/62   03/17/21 138/72      Wt Readings from Last 3 Encounters:   09/07/21 81.6 kg (180 lb)   07/07/21 83 kg (183 lb)   03/17/21 83.7 kg (184 lb 9.6 oz)      Estimated body mass index is 26.21 kg/m  as calculated from the following:    Height as of 7/7/21: 1.765 m (5' 9.49\").    Weight as of this encounter: 81.6 kg (180 lb).     Physical Exam  Constitutional:       General: He is not in acute distress.     Appearance: He is well-developed. He is not diaphoretic.   HENT:      Head: Normocephalic and atraumatic.   Eyes:      General: No scleral icterus.     Conjunctiva/sclera: Conjunctivae normal.   Neck:      Vascular: No carotid bruit.   Cardiovascular:      Rate and Rhythm: Normal rate and regular rhythm.      Pulses: Normal pulses.      Heart sounds: Normal heart sounds.   Pulmonary:      Effort: Pulmonary effort is normal.      Breath sounds: Normal breath sounds.   Abdominal:      Palpations: Abdomen is soft.      Tenderness: There is no abdominal tenderness.   Musculoskeletal:         General: Tenderness (left calf, mild) present. No deformity.      Cervical back: Neck supple.      Right lower leg: No edema.      Left " lower leg: No edema.   Lymphadenopathy:      Cervical: No cervical adenopathy.   Skin:     General: Skin is warm and dry.      Findings: No rash.   Neurological:      Mental Status: He is alert and oriented to person, place, and time. Mental status is at baseline.   Psychiatric:         Mood and Affect: Mood normal.         Behavior: Behavior normal.          Procedures   INVESTIGATIONS:  - Labs reviewed in Epic   Results for orders placed or performed in visit on 09/07/21   UA with Microscopic reflex to Culture     Status: Abnormal    Specimen: Urine, Midstream   Result Value Ref Range    Color Urine Yellow Colorless, Straw, Light Yellow, Yellow    Appearance Urine Clear Clear    Glucose Urine 300  (A) Negative mg/dL    Bilirubin Urine Negative Negative    Ketones Urine Negative Negative mg/dL    Specific Gravity Urine 1.024 1.000 - 1.030    Blood Urine Negative Negative    pH Urine 6.0 5.0 - 9.0    Protein Albumin Urine 20  (A) Negative mg/dL    Urobilinogen Urine Normal Normal, 2.0 mg/dL    Nitrite Urine Negative Negative    Leukocyte Esterase Urine Negative Negative    Mucus Urine Present (A) None Seen /LPF    RBC Urine 0 <=2 /HPF    WBC Urine <1 <=5 /HPF    Narrative    Urine Culture not indicated   PSA Screen GH     Status: Normal   Result Value Ref Range    Prostate Specific Antigen Screen 3.05 0.00 - 4.00 ug/L   Creatinine random urine     Status: None   Result Value Ref Range    Creatinine Urine mg/dL 121 mg/dL   Extra Purple Top Tube     Status: None   Result Value Ref Range    Hold Specimen [facility]    Drug Confirmation Panel Urine with Creatinine     Status: None (In process)    Narrative    The following orders were created for panel order Drug Confirmation Panel Urine with Creatinine.  Procedure                               Abnormality         Status                     ---------                               -----------         ------                     Urine Drug Confirmation ...[[id number]]                       In process                 Creatinine random urine[[id number]]                          Final result                 Please view results for these tests on the individual orders.   Extra Tube     Status: None    Narrative    The following orders were created for panel order Extra Tube.  Procedure                               Abnormality         Status                     ---------                               -----------         ------                     Extra Purple Top Tube[[id number]]                            Final result                 Please view results for these tests on the individual orders.      ASSESSMENT AND PLAN:  Problem List Items Addressed This Visit        Nervous and Auditory    Intermittent low back pain    Relevant Medications    oxyCODONE-acetaminophen (PERCOCET) 5-325 MG tablet       Urinary    Nocturia    Relevant Orders    PSA Screen GH (Completed)    UA with Microscopic reflex to Culture (Completed)    Adult Urology Referral       Behavioral    History of tobacco use    Relevant Medications    Dextromethorphan-guaiFENesin (MUCINEX DM MAXIMUM STRENGTH)  MG TB12       Other    Pain medication agreement-opioid/nonopioid 7-7-21    Relevant Orders    Drug Confirmation Panel Urine with Creatinine      Other Visit Diagnoses     Pulled muscle    -  Primary    Relevant Medications    oxyCODONE-acetaminophen (PERCOCET) 5-325 MG tablet    Injury of calf        Relevant Medications    oxyCODONE-acetaminophen (PERCOCET) 5-325 MG tablet    Chronic pain syndrome        Encounter for screening for malignant neoplasm of prostate         Relevant Orders    PSA Screen GH (Completed)    Urinary urgency        Relevant Orders    Adult Urology Referral        -- Expected clinical course discussed    -- Medications and their side effects discussed    Patient Instructions   Time should heal the calf muscle injury. Likely torn muscle.     Change activity as needed / tolerated.     Check UA and PSA  today.     Urology clinic referral was sent - Dr. Gagnon - they will call with date/time of appointment.         Return as needed for follow-up with Dr. Montanez.    Clinic : [phone]  Appointment line: [phone]     Electronically signed by:  Nawaf Montanez MD on 9/7/2021  Internal Medicine  [facility]

## 2021-09-07 NOTE — NURSING NOTE
"Chief Complaint   Patient presents with     Musculoskeletal Problem   Patient is here for left leg pain and right shoulder pain. Patient denies any trauma. Left leg started three or four weeks ago. Right shoulder started about 2 weeks ago. Right shoulder only hurts when he lifts it up to read. Patient states he is urinating three to four times a night.    Initial BP (!) 150/72   Pulse 66   Temp 97.5  F (36.4  C) (Tympanic)   Resp 16   Wt 81.6 kg (180 lb)   SpO2 99%   BMI 26.21 kg/m   Estimated body mass index is 26.21 kg/m  as calculated from the following:    Height as of 7/7/21: 1.765 m (5' 9.49\").    Weight as of this encounter: 81.6 kg (180 lb).  Medication Reconciliation: complete    FOOD SECURITY SCREENING QUESTIONS  Hunger Vital Signs:  Within the past 12 months we worried whether our food would run out before we got money to buy more. Never  Within the past 12 months the food we bought just didn't last and we didn't have money to get more. Never      Advanced Care Directive Reviewed    Chapito Alvarado, TAYLOR  "

## 2021-09-10 LAB
CREATININE URINE MG/DL  (SYNCED VALUE): 121 MG/DL
GABAPENTIN UR QL CFM: PRESENT

## 2021-09-15 ENCOUNTER — OFFICE VISIT (OUTPATIENT)
Dept: UROLOGY | Facility: OTHER | Age: 86
End: 2021-09-15
Attending: INTERNAL MEDICINE
Payer: MEDICARE

## 2021-09-15 VITALS
BODY MASS INDEX: 26.21 KG/M2 | RESPIRATION RATE: 16 BRPM | OXYGEN SATURATION: 98 % | WEIGHT: 180 LBS | DIASTOLIC BLOOD PRESSURE: 64 MMHG | HEART RATE: 65 BPM | SYSTOLIC BLOOD PRESSURE: 132 MMHG

## 2021-09-15 DIAGNOSIS — N32.81 OVERACTIVE BLADDER: ICD-10-CM

## 2021-09-15 DIAGNOSIS — R39.15 URINARY URGENCY: ICD-10-CM

## 2021-09-15 DIAGNOSIS — R35.1 NOCTURIA: Primary | ICD-10-CM

## 2021-09-15 PROCEDURE — G0463 HOSPITAL OUTPT CLINIC VISIT: HCPCS | Mod: 25

## 2021-09-15 PROCEDURE — 99203 OFFICE O/P NEW LOW 30 MIN: CPT | Performed by: UROLOGY

## 2021-09-15 PROCEDURE — 51798 US URINE CAPACITY MEASURE: CPT | Performed by: UROLOGY

## 2021-09-15 PROCEDURE — G0463 HOSPITAL OUTPT CLINIC VISIT: HCPCS

## 2021-09-15 RX ORDER — TAMSULOSIN HYDROCHLORIDE 0.4 MG/1
0.4 CAPSULE ORAL EVERY EVENING
Qty: 90 CAPSULE | Refills: 3 | Status: SHIPPED | OUTPATIENT
Start: 2021-09-15 | End: 2022-03-15

## 2021-09-15 ASSESSMENT — PAIN SCALES - GENERAL: PAINLEVEL: EXTREME PAIN (8)

## 2021-09-15 NOTE — NURSING NOTE
Chief Complaint   Patient presents with     Follow Up     nocturia/ urinary urgency   Patient presents to the clinic today for a follow up for nocturia and urinary urgency    Review of Systems:    Weight loss:    No     Recent fever/chills:  No   Night sweats:   No  Current skin rash:  No   Recent hair loss:  No  Heat intolerance:  No   Cold intolerance:  No  Chest pain:   No   Palpitations:   No  Shortness of breath:  No   Wheezing:   No  Constipation:    Yes   Diarrhea:   No   Nausea:   No   Vomiting:   No   Kidney/side pain:  No   Back pain:   Yes  Frequent headaches:  No   Dizziness:     Yes  Leg swelling:   No   Calf pain:    Yes    Post-Void Residual  A post-void residual was measured by ultrasonic bladder scanner.  38 mL  Michelle Fuentes LPN  9/15/2021 9:37 AM      Medication Reconciliation: completed   Michelle Fuentes LPN  9/15/2021 9:28 AM

## 2021-09-15 NOTE — PROGRESS NOTES
Type of Visit  NPV    Chief Complaint  Nocturia    HPI  Mr. Dennison is a 86 year old male who presents with urinary complaints.  The patient denies dysuria and gross hematuria.  He was taking Flomax in the past but discontinued a few years ago.  He is unsure of the exact timing and rationale for discontinuing the medication.  Today he presents with nocturia with urinary frequency.  Occasionally he experiences a weak stream.  He has had progressively worsening symptoms for the past 2 years.      Past Medical History  He  has a past medical history of Controlled type 2 diabetes mellitus with diabetic polyneuropathy, without long-term current use of insulin (H) (12/8/2017), Eczema (12/14/2016), and Renal calculus (1/24/2018).  Patient Active Problem List   Diagnosis     Abdominal aortic ectasia (H)     Acquired deformity of right foot     Biceps tendonitis on right     Bunion of right foot     Controlled type 2 diabetes mellitus with diabetic polyneuropathy, without long-term current use of insulin (H)     Osteoarthrosis involving lower leg     Degeneration of lumbar or lumbosacral intervertebral disc     Erectile dysfunction     Mixed hyperlipidemia     Intermittent low back pain     Nocturia     Osteopenia     Premature atrial contractions     Pre-ulcerative corn or callous     Raynaud's phenomenon without gangrene     Rotator cuff tendinitis     Sleep difficulties     Benign prostatic hyperplasia     Status post total replacement of left shoulder     Ventricular bigeminy     Vitamin B12 deficiency     Vitamin D deficiency     History of bilateral cataract extraction     History of tobacco use     Benign essential hypertension     Flatulence     Pain medication agreement-opioid/nonopioid 7-7-21     Chronic, continuous use of opioids       Past Surgical History  He  has a past surgical history that includes Arthroscopy knee; other surgical history; Appendectomy open; Colonoscopy (01/01/2000); other surgical history;  other surgical history; other surgical history; Colonoscopy (2011); other surgical history; and other surgical history.    Medications  He has a current medication list which includes the following prescription(s): tamsulosin, apple cider vinegar, aspirin, vitamin-b complex, blood glucose, blood glucose monitoring, blood glucose monitoring, fifty50 glucose meter 2.0, vitamin d3, cinnamon, mucinex dm maximum strength, diltiazem er, fluticasone, gabapentin, garlic, glipizide, krill oil, lecithin, lumigan, magnesium oxide, melatonin, melatonin, metformin, oxycodone-acetaminophen, pravastatin, and tizanidine.    Allergies  Allergies   Allergen Reactions     Ace Inhibitors Other (See Comments)     -- Declines     Cephalexin Other (See Comments)     Other reaction(s):Pt Doesn't Remember  Pt does not remember      Clindamycin Other (See Comments)     Other reaction(s): Pt Doesn't Remember  Pt does not remember     Hydromorphone Other (See Comments)     Other reaction(s): Bradycardia     Amoxicillin Rash       Social History  He  reports that he quit smoking about 46 years ago. His smoking use included cigarettes. He has a 10.00 pack-year smoking history. He has never used smokeless tobacco. He reports current alcohol use of about 5.0 standard drinks of alcohol per week. He reports that he does not use drugs.  No drug abuse.    Family History  Family History   Problem Relation Age of Onset     Other - See Comments Father          at 74 of natural causes     Heart Disease Mother 69        Heart Disease, of MI     Diabetes Mother         Diabetes     Other - See Comments Mother         Stroke     Family History Negative Sister         Good Health     Family History Negative Sister         Good Health     Cancer Brother         Cancer       Review of Systems  I personally reviewed the ROS with the patient.    Nursing Notes:   Michelle Fuentes LPN  9/15/2021  9:40 AM  Addendum  Chief Complaint   Patient presents  with     Follow Up     nocturia/ urinary urgency   Patient presents to the clinic today for a follow up for nocturia and urinary urgency    Review of Systems:    Weight loss:    No     Recent fever/chills:  No   Night sweats:   No  Current skin rash:  No   Recent hair loss:  No  Heat intolerance:  No   Cold intolerance:  No  Chest pain:   No   Palpitations:   No  Shortness of breath:  No   Wheezing:   No  Constipation:    Yes   Diarrhea:   No   Nausea:   No   Vomiting:   No   Kidney/side pain:  No   Back pain:   Yes  Frequent headaches:  No   Dizziness:     Yes  Leg swelling:   No   Calf pain:    Yes    Post-Void Residual  A post-void residual was measured by ultrasonic bladder scanner.  38 mL  Michelle Fuentes LPN  9/15/2021 9:37 AM      Medication Reconciliation: completed   Michelle Fuentes LPN  9/15/2021 9:28 AM       Physical Exam  Vitals:    09/15/21 0935   BP: 132/64   BP Location: Right arm   Patient Position: Sitting   Cuff Size: Adult Regular   Pulse: 65   Resp: 16   SpO2: 98%   Weight: 81.6 kg (180 lb)     Constitutional: No acute distress.  Alert and cooperative   Head: NCAT  Eyes: Conjunctivae normal  Cardiovascular: Regular rate.  Pulmonary/Chest: Respirations are even and non-labored bilaterally, no audible wheezing  Abdominal: Soft. No distension, tenderness, masses or guarding.   Neurological: A + O x 3.  Cranial Nerves II-XII grossly intact.  Extremities: CONNIE x 4, Warm. No clubbing.  No cyanosis.    Skin: Pink, warm and dry.  No visible rashes noted.  Psychiatric:  Normal mood and affect  Back:  No left CVA tenderness.  No right CVA tenderness.  Genitourinary:  Nonpalpable bladder    Labs  Results for GERMANIA EDMONDSON (MRN [id number]) as of 5/19/2016 11:00   5/1/2015 10:37   PSA 1.950     Radiographic Studies  7/22/2014  CT a/p  IMPRESSION: At the time of the prior exam 2/20/2014 there was an obstructing up to 7 mm in diameter right mid-ureteral calculus present. This calculus is no longer seen and there  is no evidence for hydronephrosis involving either kidney. The 3 mm in diameter right mid-renal calculus has not significantly changed, remaining nonobstructive. No new abnormalities are identified.    Post-Void Residual  A post-void residual was measured by ultrasonic bladder scanner.  38 mL today  86 mL (previously recorded)  45 mL (previously recorded)    Assessment & Plan  Mr. Dennison is a 86 y.o. male with progressively worsening urinary symptoms.  Low PVR.  Restart Flomax 0.4mg daily  If this is ineffective he can follow-up for alternative treatment options

## 2021-09-21 ENCOUNTER — TRANSFERRED RECORDS (OUTPATIENT)
Dept: HEALTH INFORMATION MANAGEMENT | Facility: OTHER | Age: 86
End: 2021-09-21

## 2021-09-21 LAB — RETINOPATHY: NEGATIVE

## 2021-10-01 PROBLEM — M17.10 UNILATERAL PRIMARY OSTEOARTHRITIS, UNSPECIFIED KNEE: Status: ACTIVE | Noted: 2018-01-24

## 2021-10-06 ENCOUNTER — TELEPHONE (OUTPATIENT)
Dept: LAB | Facility: OTHER | Age: 86
End: 2021-10-06

## 2021-10-06 DIAGNOSIS — E11.42 CONTROLLED TYPE 2 DIABETES MELLITUS WITH DIABETIC POLYNEUROPATHY, WITHOUT LONG-TERM CURRENT USE OF INSULIN (H): Primary | ICD-10-CM

## 2021-10-06 DIAGNOSIS — E78.2 MIXED HYPERLIPIDEMIA: ICD-10-CM

## 2021-10-12 ENCOUNTER — OFFICE VISIT (OUTPATIENT)
Dept: INTERNAL MEDICINE | Facility: OTHER | Age: 86
End: 2021-10-12
Attending: INTERNAL MEDICINE
Payer: MEDICARE

## 2021-10-12 ENCOUNTER — HOSPITAL ENCOUNTER (OUTPATIENT)
Dept: GENERAL RADIOLOGY | Facility: OTHER | Age: 86
End: 2021-10-12
Attending: INTERNAL MEDICINE
Payer: MEDICARE

## 2021-10-12 VITALS
TEMPERATURE: 96.1 F | BODY MASS INDEX: 26.5 KG/M2 | WEIGHT: 182 LBS | DIASTOLIC BLOOD PRESSURE: 66 MMHG | RESPIRATION RATE: 16 BRPM | OXYGEN SATURATION: 99 % | HEART RATE: 54 BPM | SYSTOLIC BLOOD PRESSURE: 138 MMHG

## 2021-10-12 DIAGNOSIS — M21.611 BUNION OF RIGHT FOOT: ICD-10-CM

## 2021-10-12 DIAGNOSIS — I73.00 RAYNAUD'S PHENOMENON WITHOUT GANGRENE: ICD-10-CM

## 2021-10-12 DIAGNOSIS — I10 BENIGN ESSENTIAL HYPERTENSION: ICD-10-CM

## 2021-10-12 DIAGNOSIS — E11.42 CONTROLLED TYPE 2 DIABETES MELLITUS WITH DIABETIC POLYNEUROPATHY, WITHOUT LONG-TERM CURRENT USE OF INSULIN (H): ICD-10-CM

## 2021-10-12 DIAGNOSIS — E78.2 MIXED HYPERLIPIDEMIA: ICD-10-CM

## 2021-10-12 DIAGNOSIS — R05.8 COUGH PRODUCTIVE OF PURULENT SPUTUM: Primary | ICD-10-CM

## 2021-10-12 DIAGNOSIS — R91.1 NODULE OF MIDDLE LOBE OF RIGHT LUNG: ICD-10-CM

## 2021-10-12 DIAGNOSIS — R05.8 COUGH PRODUCTIVE OF PURULENT SPUTUM: ICD-10-CM

## 2021-10-12 LAB
ALBUMIN SERPL-MCNC: 4.1 G/DL (ref 3.5–5.7)
ALBUMIN UR-MCNC: 20 MG/DL
ALP SERPL-CCNC: 71 U/L (ref 34–104)
ALT SERPL W P-5'-P-CCNC: 13 U/L (ref 7–52)
ANION GAP SERPL CALCULATED.3IONS-SCNC: 5 MMOL/L (ref 3–14)
APPEARANCE UR: CLEAR
AST SERPL W P-5'-P-CCNC: 13 U/L (ref 13–39)
BILIRUB SERPL-MCNC: 0.6 MG/DL (ref 0.3–1)
BILIRUB UR QL STRIP: NEGATIVE
BUN SERPL-MCNC: 23 MG/DL (ref 7–25)
CALCIUM SERPL-MCNC: 9.2 MG/DL (ref 8.6–10.3)
CHLORIDE BLD-SCNC: 106 MMOL/L (ref 98–107)
CHOLEST SERPL-MCNC: 141 MG/DL
CO2 SERPL-SCNC: 28 MMOL/L (ref 21–31)
COLOR UR AUTO: YELLOW
CREAT SERPL-MCNC: 0.83 MG/DL (ref 0.7–1.3)
ERYTHROCYTE [DISTWIDTH] IN BLOOD BY AUTOMATED COUNT: 13.9 % (ref 10–15)
FASTING STATUS PATIENT QL REPORTED: YES
GFR SERPL CREATININE-BSD FRML MDRD: 80 ML/MIN/1.73M2
GLUCOSE BLD-MCNC: 126 MG/DL (ref 70–105)
GLUCOSE UR STRIP-MCNC: 150 MG/DL
HBA1C MFR BLD: 7.2 % (ref 4–6.2)
HCT VFR BLD AUTO: 44.9 % (ref 40–53)
HDLC SERPL-MCNC: 36 MG/DL (ref 23–92)
HGB BLD-MCNC: 15.1 G/DL (ref 13.3–17.7)
HGB UR QL STRIP: NEGATIVE
KETONES UR STRIP-MCNC: NEGATIVE MG/DL
LDLC SERPL CALC-MCNC: 93 MG/DL
LEUKOCYTE ESTERASE UR QL STRIP: NEGATIVE
MCH RBC QN AUTO: 29.5 PG (ref 26.5–33)
MCHC RBC AUTO-ENTMCNC: 33.6 G/DL (ref 31.5–36.5)
MCV RBC AUTO: 88 FL (ref 78–100)
NITRATE UR QL: NEGATIVE
NONHDLC SERPL-MCNC: 105 MG/DL
PH UR STRIP: 7 [PH] (ref 5–9)
PLATELET # BLD AUTO: 129 10E3/UL (ref 150–450)
POTASSIUM BLD-SCNC: 4.5 MMOL/L (ref 3.5–5.1)
PROT SERPL-MCNC: 6.4 G/DL (ref 6.4–8.9)
RBC # BLD AUTO: 5.12 10E6/UL (ref 4.4–5.9)
RBC URINE: 1 /HPF
SODIUM SERPL-SCNC: 139 MMOL/L (ref 134–144)
SP GR UR STRIP: 1.02 (ref 1–1.03)
TRIGL SERPL-MCNC: 59 MG/DL
TSH SERPL DL<=0.005 MIU/L-ACNC: 1.64 MU/L (ref 0.4–4)
UROBILINOGEN UR STRIP-MCNC: NORMAL MG/DL
WBC # BLD AUTO: 6.6 10E3/UL (ref 4–11)
WBC URINE: <1 /HPF

## 2021-10-12 PROCEDURE — 80061 LIPID PANEL: CPT | Mod: ZL | Performed by: INTERNAL MEDICINE

## 2021-10-12 PROCEDURE — 71046 X-RAY EXAM CHEST 2 VIEWS: CPT

## 2021-10-12 PROCEDURE — 99214 OFFICE O/P EST MOD 30 MIN: CPT | Performed by: INTERNAL MEDICINE

## 2021-10-12 PROCEDURE — 84443 ASSAY THYROID STIM HORMONE: CPT | Mod: ZL | Performed by: INTERNAL MEDICINE

## 2021-10-12 PROCEDURE — 80053 COMPREHEN METABOLIC PANEL: CPT | Mod: ZL | Performed by: INTERNAL MEDICINE

## 2021-10-12 PROCEDURE — 81003 URINALYSIS AUTO W/O SCOPE: CPT | Mod: ZL | Performed by: INTERNAL MEDICINE

## 2021-10-12 PROCEDURE — G0463 HOSPITAL OUTPT CLINIC VISIT: HCPCS | Mod: 25

## 2021-10-12 PROCEDURE — 83036 HEMOGLOBIN GLYCOSYLATED A1C: CPT | Mod: ZL | Performed by: INTERNAL MEDICINE

## 2021-10-12 PROCEDURE — G0463 HOSPITAL OUTPT CLINIC VISIT: HCPCS

## 2021-10-12 PROCEDURE — 85027 COMPLETE CBC AUTOMATED: CPT | Mod: ZL | Performed by: INTERNAL MEDICINE

## 2021-10-12 PROCEDURE — 36415 COLL VENOUS BLD VENIPUNCTURE: CPT | Mod: ZL | Performed by: INTERNAL MEDICINE

## 2021-10-12 RX ORDER — GABAPENTIN 100 MG/1
100 CAPSULE ORAL AT BEDTIME
Qty: 90 CAPSULE | Refills: 1 | Status: SHIPPED | OUTPATIENT
Start: 2021-10-12 | End: 2022-01-19

## 2021-10-12 RX ORDER — IRBESARTAN AND HYDROCHLOROTHIAZIDE 150; 12.5 MG/1; MG/1
1 TABLET, FILM COATED ORAL DAILY
Qty: 90 TABLET | Refills: 1 | Status: SHIPPED | OUTPATIENT
Start: 2021-10-12 | End: 2021-11-04

## 2021-10-12 ASSESSMENT — ANXIETY QUESTIONNAIRES
3. WORRYING TOO MUCH ABOUT DIFFERENT THINGS: SEVERAL DAYS
5. BEING SO RESTLESS THAT IT IS HARD TO SIT STILL: NOT AT ALL
IF YOU CHECKED OFF ANY PROBLEMS ON THIS QUESTIONNAIRE, HOW DIFFICULT HAVE THESE PROBLEMS MADE IT FOR YOU TO DO YOUR WORK, TAKE CARE OF THINGS AT HOME, OR GET ALONG WITH OTHER PEOPLE: NOT DIFFICULT AT ALL
6. BECOMING EASILY ANNOYED OR IRRITABLE: SEVERAL DAYS
2. NOT BEING ABLE TO STOP OR CONTROL WORRYING: SEVERAL DAYS
GAD7 TOTAL SCORE: 3
7. FEELING AFRAID AS IF SOMETHING AWFUL MIGHT HAPPEN: NOT AT ALL
1. FEELING NERVOUS, ANXIOUS, OR ON EDGE: NOT AT ALL

## 2021-10-12 ASSESSMENT — ENCOUNTER SYMPTOMS
CONFUSION: 0
FEVER: 0
SLEEP DISTURBANCE: 1
MYALGIAS: 0
DIZZINESS: 0
VOMITING: 0
SHORTNESS OF BREATH: 1
DYSURIA: 0
HEMATURIA: 0
CHILLS: 0
ARTHRALGIAS: 1
BACK PAIN: 1
ABDOMINAL PAIN: 0
WHEEZING: 0
NAUSEA: 0
FREQUENCY: 1
AGITATION: 0
BRUISES/BLEEDS EASILY: 0
LIGHT-HEADEDNESS: 0
EYE PAIN: 0
PALPITATIONS: 1
COUGH: 1
FATIGUE: 1
DIARRHEA: 0

## 2021-10-12 ASSESSMENT — PATIENT HEALTH QUESTIONNAIRE - PHQ9
5. POOR APPETITE OR OVEREATING: NOT AT ALL
SUM OF ALL RESPONSES TO PHQ QUESTIONS 1-9: 2

## 2021-10-12 ASSESSMENT — PAIN SCALES - GENERAL: PAINLEVEL: NO PAIN (0)

## 2021-10-12 NOTE — PROGRESS NOTES
Assessment & Plan       ICD-10-CM    1. Cough productive of purulent sputum  R05.8 XR Chest 2 Views   2. Controlled type 2 diabetes mellitus with diabetic polyneuropathy, without long-term current use of insulin (H)  E11.42 gabapentin (NEURONTIN) 100 MG capsule     Comprehensive metabolic panel     CBC with platelets     Hemoglobin A1c     TSH with free T4 reflex     UA reflex to Microscopic     CANCELED: Albumin Random Urine Quantitative with Creat Ratio   3. Benign essential hypertension  I10 irbesartan-hydrochlorothiazide (AVALIDE) 150-12.5 MG tablet   4. Mixed hyperlipidemia  E78.2 Lipid Profile   5. Raynaud's phenomenon without gangrene  I73.00    6. Bunion of right foot  M21.611    7. Nodule of middle lobe of right lung  R91.1 CT Chest w/o Contrast   Patient presents for diabetic follow-up, as well as follow-up multiple issues.    Type 2 diabetes, has diabetic polyneuropathy.  Has foot deformity with bunions of both feet.  Has preulcerative callus on his right first MTP joint as well.  Diabetic foot exam was completed today.  Needs refills of his Neurontin to help with his neuropathy pain.  Rare opioid use.  Does take Percocet on a very rare occasion.   website reviewed.  Notable findings noted other than above.  Proper medication use misuse reviewed, patient has been using medications appropriate.  Prescriptions refilled.    Productive cough years been going on for months now.  Reports weight has been relatively stable.  Appetite has been okay.  He does not cook for himself he lives home alone.  He is .  - With his productive cough, chest x-ray recommended.  Results came back showing + New right-sided lung nodule.  Chest CT ordered.    Wt Readings from Last 4 Encounters:   10/12/21 82.6 kg (182 lb)   09/15/21 81.6 kg (180 lb)   09/07/21 81.6 kg (180 lb)   07/07/21 83 kg (183 lb)      Distant tobacco use history.  No recent smoking.    Hypertension, blood pressure is uncontrolled.  Initial couple  blood pressures were 166 and 154 systolic to recheck it improved but he has had high blood pressures at home as well.  Start irbesartan 150 mg and hydrochlorothiazide 12.5 mg once daily.  Combination tablets sent to pharmacy.  Recommend close follow-up of blood pressure.  Blood pressure checks at home as well.  He will need recheck of metabolic panel at follow-up appointment.    Mixed upper lipidemia, cholesterol levels have all improved and are now all at goal with use of pravastatin 40 mg daily.    Raynaud's phenomenon, use of diltiazem has been helpful.  Continue current dosing.    Right foot bunion, see above.  Has bilateral foot deformity.    Right lung nodule, new diagnosis.  Chest CT ordered.    Return in about 4 weeks (around 11/9/2021) for - Follow-up Hypertension.    Nawaf Montanez MD  [facility]    Subjective     Steven Dennison is a 86 year old male who presents to clinic today for the following health issues:    HPI     Diabetes Follow-up    How often are you checking your blood sugar? Two times daily  Blood sugar testing frequency justification:  Adjustment of medication(s)  What time of day are you checking your blood sugars (select all that apply)?  Before meals  Have you had any blood sugars above 200?  Yes on occasion  Have you had any blood sugars below 70?  Yes rare    What symptoms do you notice when your blood sugar is low?  Shaky, Dizzy, Weak, Lethargy --- x2 in the past, long ago    What concerns do you have today about your diabetes? None     Do you have any of these symptoms? (Select all that apply)  No numbness or tingling in feet.  No redness, sores or blisters on feet.  No complaints of excessive thirst.  No reports of blurry vision.  No significant changes to weight.    Hyperlipidemia Follow-Up      Are you regularly taking any medication or supplement to lower your cholesterol?   Yes- Pravastatin    Are you having muscle aches or other side effects that you think  could be caused by your cholesterol lowering medication?  No    Hypertension Follow-up      Do you check your blood pressure regularly outside of the clinic? Yes     Are you following a low salt diet? Yes    Are your blood pressures ever more than 140 on the top number (systolic) OR more   than 90 on the bottom number (diastolic), for example 140/90? Yes    BP Readings from Last 2 Encounters:   10/12/21 138/66   09/15/21 132/64     Hemoglobin A1C (%)   Date Value   10/12/2021 7.2 (H)   07/07/2021 7.1 (H)   03/17/2021 7.4 (H)     LDL Cholesterol Calculated (mg/dL)   Date Value   10/12/2021 93   07/07/2021 86   03/17/2021 85         How many servings of fruits and vegetables do you eat daily?  0-1    On average, how many sweetened beverages do you drink each day (Examples: soda, juice, sweet tea, etc.  Do NOT count diet or artificially sweetened beverages)?   0    How many days per week do you exercise enough to make your heart beat faster? 5    How many minutes a day do you exercise enough to make your heart beat faster? 20 - 29  How many days per week do you miss taking your medication? 1    What makes it hard for you to take your medications?  remembering to take    Review of Systems   Constitutional: Positive for fatigue (Continues on Metformin - stopped taking B-complex). Negative for chills and fever.   HENT: Negative for congestion and hearing loss.    Eyes: Negative for pain and visual disturbance.   Respiratory: Positive for cough (+ with phlegm) and shortness of breath (mild, stable.). Negative for wheezing.    Cardiovascular: Positive for palpitations. Negative for chest pain.        Raynaud's syndrome, no ulcerations   Gastrointestinal: Negative for abdominal pain, diarrhea, nausea and vomiting.   Endocrine: Negative for cold intolerance and heat intolerance.   Genitourinary: Positive for frequency and urgency. Negative for dysuria and hematuria.        + Nocturia 4-5x nightly   Musculoskeletal: Positive  for arthralgias (Right shoulder pains, since over-use with arm), back pain (+ intermittent low back pain) and gait problem (intermittent, due to left calf). Negative for myalgias.   Skin: Negative for pallor.   Allergic/Immunologic: Negative for immunocompromised state.   Neurological: Negative for dizziness and light-headedness.        Rare positional lightheadedness   Hematological: Does not bruise/bleed easily.   Psychiatric/Behavioral: Positive for sleep disturbance (+ insomnia). Negative for agitation and confusion.          Objective    /66 (BP Location: Left arm, Patient Position: Sitting)   Pulse 54   Temp (!) 96.1  F (35.6  C) (Tympanic)   Resp 16   Wt 82.6 kg (182 lb)   SpO2 99%   BMI 26.50 kg/m    Body mass index is 26.5 kg/m .  Physical Exam  Constitutional:       General: He is not in acute distress.     Appearance: He is well-developed. He is not diaphoretic.   HENT:      Head: Normocephalic and atraumatic.   Eyes:      General: No scleral icterus.     Conjunctiva/sclera: Conjunctivae normal.   Neck:      Vascular: No carotid bruit.   Cardiovascular:      Rate and Rhythm: Normal rate and regular rhythm.      Pulses: Normal pulses.   Pulmonary:      Effort: Pulmonary effort is normal.      Breath sounds: Normal breath sounds.   Abdominal:      Palpations: Abdomen is soft.      Tenderness: There is no abdominal tenderness.   Musculoskeletal:         General: No deformity.      Cervical back: Neck supple.      Right lower leg: Edema present.      Left lower leg: Edema present.   Lymphadenopathy:      Cervical: No cervical adenopathy.   Skin:     General: Skin is warm and dry.      Coloration: Skin is not jaundiced.      Findings: No rash.      Comments: Diabetic Foot Exam:  Findings:   LEFT: normal DP and PT pulses, no trophic changes or ulcerative lesions, normal monofilament exam and Bunion of left 5th toe with foot deformity    RIGHT: normal DP and PT pulses, normal monofilament exam and  Callous on 1st MTP joint. + Bunion with foot deformity, pressing on right 2nd toe    + Varicose veins of bilateral feet     Neurological:      Mental Status: He is alert and oriented to person, place, and time. Mental status is at baseline.   Psychiatric:         Mood and Affect: Mood normal.         Behavior: Behavior normal.        Results for orders placed or performed during the hospital encounter of 10/12/21   XR Chest 2 Views     Status: None    Narrative    PROCEDURE: XR CHEST 2 VW 10/12/2021 10:35 AM    HISTORY: Cough productive of purulent sputum    COMPARISONS: 1/21/2021.    TECHNIQUE: 2 views.       FINDINGS: Heart is stable in size. There is elongation of the thoracic  aorta. There is some mild diffuse interstitial prominence without  large confluent infiltrate.    There is a nodular density at the right lung base, not seen on the  prior exam. This appears to lie in the middle lobe as it is anterior  on the lateral view.    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 There is degenerative  change in the spine.           Impression    IMPRESSION: Right middle lobe nodule, not seen on the prior exam.  Recommend CT to characterize this further.    LILIA CURTIS MD         SYSTEM ID:  [id number]   Results for orders placed or performed in visit on 10/12/21   Comprehensive metabolic panel     Status: Abnormal   Result Value Ref Range    Sodium 139 134 - 144 mmol/L    Potassium 4.5 3.5 - 5.1 mmol/L    Chloride 106 98 - 107 mmol/L    Carbon Dioxide (CO2) 28 21 - 31 mmol/L    Anion Gap 5 3 - 14 mmol/L    Urea Nitrogen 23 7 - 25 mg/dL    Creatinine 0.83 0.70 - 1.30 mg/dL    Calcium 9.2 8.6 - 10.3 mg/dL    Glucose 126 (H) 70 - 105 mg/dL    Alkaline Phosphatase 71 34 - 104 U/L    AST 13 13 - 39 U/L    ALT 13 7 - 52 U/L    Protein Total 6.4 6.4 - 8.9 g/dL    Albumin 4.1 3.5 - 5.7 g/dL    Bilirubin Total 0.6 0.3 - 1.0 mg/dL    GFR Estimate 80 >60 mL/min/1.73m2   Lipid Profile     Status: None   Result Value Ref  Range    Cholesterol 141 <200 mg/dL    Triglycerides 59 <150 mg/dL    Direct Measure HDL 36 23 - 92 mg/dL    LDL Cholesterol Calculated 93 <=100 mg/dL    Non HDL Cholesterol 105 <130 mg/dL    Patient Fasting > 8hrs? Yes     Narrative    Cholesterol  Desirable:  <200 mg/dL    Triglycerides  Normal:  Less than 150 mg/dL  Borderline High:  150-199 mg/dL  High:  200-499 mg/dL  Very High:  Greater than or equal to 500 mg/dL    Direct Measure HDL  Female:  Greater than or equal to 50 mg/dL   Male:  Greater than or equal to 40 mg/dL    LDL Cholesterol  Desirable:  <100mg/dL  Above Desirable:  100-129 mg/dL   Borderline High:  130-159 mg/dL   High:  160-189 mg/dL   Very High:  >= 190 mg/dL    Non HDL Cholesterol  Desirable:  130 mg/dL  Above Desirable:  130-159 mg/dL  Borderline High:  160-189 mg/dL  High:  190-219 mg/dL  Very High:  Greater than or equal to 220 mg/dL   CBC with platelets     Status: Abnormal   Result Value Ref Range    WBC Count 6.6 4.0 - 11.0 10e3/uL    RBC Count 5.12 4.40 - 5.90 10e6/uL    Hemoglobin 15.1 13.3 - 17.7 g/dL    Hematocrit 44.9 40.0 - 53.0 %    MCV 88 78 - 100 fL    MCH 29.5 26.5 - 33.0 pg    MCHC 33.6 31.5 - 36.5 g/dL    RDW 13.9 10.0 - 15.0 %    Platelet Count 129 (L) 150 - 450 10e3/uL   Hemoglobin A1c     Status: Abnormal   Result Value Ref Range    Hemoglobin A1C 7.2 (H) 4.0 - 6.2 %   TSH with free T4 reflex     Status: Normal   Result Value Ref Range    TSH 1.64 0.40 - 4.00 mU/L   UA reflex to Microscopic     Status: Abnormal   Result Value Ref Range    Color Urine Yellow Colorless, Straw, Light Yellow, Yellow    Appearance Urine Clear Clear    Glucose Urine 150  (A) Negative mg/dL    Bilirubin Urine Negative Negative    Ketones Urine Negative Negative mg/dL    Specific Gravity Urine 1.020 1.000 - 1.030    Blood Urine Negative Negative    pH Urine 7.0 5.0 - 9.0    Protein Albumin Urine 20  (A) Negative mg/dL    Urobilinogen Urine Normal Normal, 2.0 mg/dL    Nitrite Urine Negative  Negative    Leukocyte Esterase Urine Negative Negative    RBC Urine 1 <=2 /HPF    WBC Urine <1 <=5 /HPF      + Diabetes is well controlled. Platelet count is low but stable.  Urinalysis is abnormal with elevated glucose and protein.  TSH is normal.  Hemoglobin is normal.  Cholesterol is at goal.  Elevated random glucose.  Creatinine is stable.

## 2021-10-12 NOTE — PATIENT INSTRUCTIONS
Immunization History   Administered Date(s) Administered     COVID-19,PF,Moderna 04/21/2021, 05/19/2021     Flu, Unspecified 11/24/2006, 02/03/2009, 12/22/2009, 09/06/2012, 10/15/2013, 11/06/2014     Influenza (H1N1) 01/14/2010     Pneumo Conj 13-V (2010&after) 09/13/2016     Pneumococcal 23 valent 11/21/2003, 01/10/2012     TDAP Vaccine (Adacel) 02/12/2020     TDAP Vaccine (Boostrix) 09/12/2014, 09/10/2015     Zoster vaccine, live 12/22/2009      -- The 3rd COVID-19 shots are coming.    -- Moderna and Pfizer 3rd shots:   - If immunocompromised, can get 3rd shot -- 28+ days from date of 2nd shot.   - Otherwise - (Tentative) vaccine schedule is to get 3rd shot -- about 6 months from date of 2nd shot.     -- Consider Annual Flu / Influenza vaccination - Every Fall (Starting about October 1st)    -- Pneumonia / Pneumococcal PCV 23 vaccines are done / completed.     -- Get the new shingles shot (2 in series) (Shingrix) - from one of the local pharmacies, at your convenience. -- Check cost/coverage.   -- or -- If these are very expensive, go to the Local Public Health Department     [facility]   [address] [place] [address]    ---- Shot clinic is the FIRST Thursday of Each Month -- from 2 to 6 PM, by Appointment only.     Call for an appointment -- [phone]      Diabetic labs today.   Initial blood pressure was high.     Start Irbesartan 150 mg / Hydrochlorothiazide (HCTZ).  Combination -- 1 tablet daily, for blood pressure.     Return in approximately 4 week(s), or sooner as needed for follow-up with Dr. Montanez.  - Follow-up Hypertension    Clinic : [phone]  Appointment line: [phone]

## 2021-10-12 NOTE — NURSING NOTE
Patient presents to clinic today for diabetic check and also right shoulder problem (which [facility]).  Previous A1C is at goal of <8  Lab Results   Component Value Date    A1C 7.1 07/07/2021    A1C 7.4 03/17/2021    A1C 7.2 11/03/2020    ALBUMIN 4.2 07/07/2021     Date of last Foot exam 7/9/20  Eye exam Yes- Date of last eye exam: 9/21/21,  Location: [facility]  Patient is a Tobacco User  Patient is on a daily aspirin  Patient is on a statin  Blood pressure today of:     BP Readings from Last 1 Encounters:   10/12/21 (!) 154/66      is not at the goal of <139/89 for diabetics.     Medication reconciliation completed.    ACP on file? yes    FOOD SECURITY SCREENING QUESTIONS  Hunger Vital Signs:  Within the past 12 months we worried whether our food would run out before we got money to buy more. Never  Within the past 12 months the food we bought just didn't last and we didn't have money to get more. Never    Jaimie Mabry CMA([facility])..................10/12/2021   9:41 AM

## 2021-10-12 NOTE — LETTER
October 12, 2021       Steven Dennison  [address] [address] [address]        Dear ,    We are writing to inform you of your test results.    Diabetes is well controlled.    Platelet count is low but stable.    Chest xray is abnormal, with new right lung nodule noted.   -- chest CT ordered  - they will call with date/time of appointment.      Labs are otherwise stable.   Continue current medications.   Plan to recheck labs again in 3 to 4 months.     Electronically signed by:  Nawaf Montanez MD  on October 12, 2021     Results for orders placed or performed during the hospital encounter of 10/12/21   XR Chest 2 Views     Status: None    Narrative    PROCEDURE: XR CHEST 2 VW 10/12/2021 10:35 AM    HISTORY: Cough productive of purulent sputum    COMPARISONS: 1/21/2021.    TECHNIQUE: 2 views.       FINDINGS: Heart is stable in size. There is elongation of the thoracic  aorta. There is some mild diffuse interstitial prominence without  large confluent infiltrate.    There is a nodular density at the right lung base, not seen on the  prior exam. This appears to lie in the middle lobe as it is anterior  on the lateral view.    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 There is degenerative  change in the spine.           Impression    IMPRESSION: Right middle lobe nodule, not seen on the prior exam.  Recommend CT to characterize this further.    LILIA CURTIS MD         SYSTEM ID:  [id number]   Results for orders placed or performed in visit on 10/12/21   Comprehensive metabolic panel     Status: Abnormal   Result Value Ref Range    Sodium 139 134 - 144 mmol/L    Potassium 4.5 3.5 - 5.1 mmol/L    Chloride 106 98 - 107 mmol/L    Carbon Dioxide (CO2) 28 21 - 31 mmol/L    Anion Gap 5 3 - 14 mmol/L    Urea Nitrogen 23 7 - 25 mg/dL    Creatinine 0.83 0.70 - 1.30 mg/dL    Calcium 9.2 8.6 - 10.3 mg/dL    Glucose 126 (H) 70 - 105 mg/dL    Alkaline Phosphatase 71 34 - 104 U/L    AST 13 13 - 39 U/L     ALT 13 7 - 52 U/L    Protein Total 6.4 6.4 - 8.9 g/dL    Albumin 4.1 3.5 - 5.7 g/dL    Bilirubin Total 0.6 0.3 - 1.0 mg/dL    GFR Estimate 80 >60 mL/min/1.73m2   Lipid Profile     Status: None   Result Value Ref Range    Cholesterol 141 <200 mg/dL    Triglycerides 59 <150 mg/dL    Direct Measure HDL 36 23 - 92 mg/dL    LDL Cholesterol Calculated 93 <=100 mg/dL    Non HDL Cholesterol 105 <130 mg/dL    Patient Fasting > 8hrs? Yes     Narrative    Cholesterol  Desirable:  <200 mg/dL    Triglycerides  Normal:  Less than 150 mg/dL  Borderline High:  150-199 mg/dL  High:  200-499 mg/dL  Very High:  Greater than or equal to 500 mg/dL    Direct Measure HDL  Female:  Greater than or equal to 50 mg/dL   Male:  Greater than or equal to 40 mg/dL    LDL Cholesterol  Desirable:  <100mg/dL  Above Desirable:  100-129 mg/dL   Borderline High:  130-159 mg/dL   High:  160-189 mg/dL   Very High:  >= 190 mg/dL    Non HDL Cholesterol  Desirable:  130 mg/dL  Above Desirable:  130-159 mg/dL  Borderline High:  160-189 mg/dL  High:  190-219 mg/dL  Very High:  Greater than or equal to 220 mg/dL   CBC with platelets     Status: Abnormal   Result Value Ref Range    WBC Count 6.6 4.0 - 11.0 10e3/uL    RBC Count 5.12 4.40 - 5.90 10e6/uL    Hemoglobin 15.1 13.3 - 17.7 g/dL    Hematocrit 44.9 40.0 - 53.0 %    MCV 88 78 - 100 fL    MCH 29.5 26.5 - 33.0 pg    MCHC 33.6 31.5 - 36.5 g/dL    RDW 13.9 10.0 - 15.0 %    Platelet Count 129 (L) 150 - 450 10e3/uL   Hemoglobin A1c     Status: Abnormal   Result Value Ref Range    Hemoglobin A1C 7.2 (H) 4.0 - 6.2 %   TSH with free T4 reflex     Status: Normal   Result Value Ref Range    TSH 1.64 0.40 - 4.00 mU/L   UA reflex to Microscopic     Status: Abnormal   Result Value Ref Range    Color Urine Yellow Colorless, Straw, Light Yellow, Yellow    Appearance Urine Clear Clear    Glucose Urine 150  (A) Negative mg/dL    Bilirubin Urine Negative Negative    Ketones Urine Negative Negative mg/dL    Specific  Gravity Urine 1.020 1.000 - 1.030    Blood Urine Negative Negative    pH Urine 7.0 5.0 - 9.0    Protein Albumin Urine 20  (A) Negative mg/dL    Urobilinogen Urine Normal Normal, 2.0 mg/dL    Nitrite Urine Negative Negative    Leukocyte Esterase Urine Negative Negative    RBC Urine 1 <=2 /HPF    WBC Urine <1 <=5 /HPF      If you have any questions or concerns, please call the clinic at the number listed above.       Sincerely,      Nawaf Montanez MD

## 2021-10-13 ASSESSMENT — ANXIETY QUESTIONNAIRES: GAD7 TOTAL SCORE: 3

## 2021-10-15 ENCOUNTER — NURSE TRIAGE (OUTPATIENT)
Dept: NURSING | Facility: CLINIC | Age: 86
End: 2021-10-15

## 2021-10-15 NOTE — TELEPHONE ENCOUNTER
Patient says he missed a call on Wednesday regarding tests he had done on Tuesday.  Please call patient to discuss chest x-ray results.  Patient requests that caller lets the phone ring multiple times.  He does not usually answer on the first few rings.  Please call [phone]    Esther Rodríguez RN  Triage Nurse Advisor    Reason for Disposition    Caller requesting lab results     Radiology results    Protocols used: PCP CALL - NO TRIAGE-A-

## 2021-10-15 NOTE — TELEPHONE ENCOUNTER
Please see results note and call patient with chest xray results.     Electronically signed by:  Nawaf Montanez MD  on October 15, 2021 5:46 PM

## 2021-10-18 ENCOUNTER — TELEPHONE (OUTPATIENT)
Dept: INTERNAL MEDICINE | Facility: OTHER | Age: 86
End: 2021-10-18

## 2021-10-18 ENCOUNTER — HOSPITAL ENCOUNTER (OUTPATIENT)
Dept: CT IMAGING | Facility: OTHER | Age: 86
Discharge: HOME | End: 2021-10-18
Attending: INTERNAL MEDICINE | Admitting: INTERNAL MEDICINE
Payer: MEDICARE

## 2021-10-18 DIAGNOSIS — R91.1 NODULE OF MIDDLE LOBE OF RIGHT LUNG: ICD-10-CM

## 2021-10-18 PROCEDURE — 250N000011 HC RX IP 250 OP 636: Performed by: INTERNAL MEDICINE

## 2021-10-18 PROCEDURE — 71260 CT THORAX DX C+: CPT | Mod: ME

## 2021-10-18 RX ORDER — IOPAMIDOL 755 MG/ML
100 INJECTION, SOLUTION INTRAVASCULAR ONCE
Status: COMPLETED | OUTPATIENT
Start: 2021-10-18 | End: 2021-10-18

## 2021-10-18 RX ADMIN — IOPAMIDOL 100 ML: 755 INJECTION, SOLUTION INTRAVENOUS at 12:53

## 2021-10-18 NOTE — LETTER
October 18, 2021      Steven Dennison  [address] [address]        Dear ,    We are writing to inform you of your test results.      Healing posterior right eighth rib fracture.     Multiple chronic benign bone islands, one of which accounts for the  nodular appearance on recent chest x-ray.      Electronically signed by:  Nawaf Montanez MD  on October 18, 2021         Resulted Orders   CT Chest w Contrast    Narrative    PROCEDURE:  CT CHEST W CONTRAST.      HISTORY:  Abnormal xray - lung nodule, >= 1 cm; New right middle lobe  lung nodule on chest x-ray 10/12/2021; Nodule of middle lobe of right  lung      TECHNIQUE:  Contrast enhanced helical thoracic CT was performed.    COMPARISON:  7/27/14    MEDS/CONTRAST: 100 ml isovue 370    FINDINGS:    Cardiac size is unchanged. There is no pericardial or pleural  effusion. The main pulmonary artery is borderline dilated, suggesting  possible pulmonary hypertension. No suspicious thoracic adenopathy is  identified.    The central airways are patent. There is no focal consolidation or  intrapulmonary mass.         Limited views of the upper abdomen reveal no adrenal mass or acute  process. Multiple hepatic cysts are redemonstrated.    A healing posterior right eighth rib fracture is present  osteopenia/osteoporosis. A prominent bone island is redemonstrated T6.  Another bone island within the anterior right fifth rib is unchanged  compared to 2014 and likely accounting for the nodular appearance.      Impression    IMPRESSION:    Healing posterior right eighth rib fracture.    Multiple chronic benign bone islands, one of which accounts for the  nodular appearance on recent chest x-ray.    JACQUI REYES MD         SYSTEM ID:  [id number]       If you have any questions or concerns, please call the clinic at the number listed above.       Sincerely,      Nawaf Montanez MD

## 2021-10-19 NOTE — TELEPHONE ENCOUNTER
After verifying name and birth date, patient was notified of provider's response to X-ray and I read him the CT result letter that was just sent.    Jaimie Marques([facility])........10/19/2021  12:49 PM

## 2021-10-29 ENCOUNTER — TELEPHONE (OUTPATIENT)
Dept: INTERNAL MEDICINE | Facility: OTHER | Age: 86
End: 2021-10-29

## 2021-10-29 NOTE — TELEPHONE ENCOUNTER
Patient states he has a rash around his genital area, started about 2 days after he started the irbesartan- hydrochlorothiazide. He stopped it 3 days ago. Also thinks is too strong, doesn't think he has that bad of bp.   Wondering what he should do now?  He is leaving soon to go out of town until Tuesday.   Carmelina Martinez LPN .............10/29/2021     10:33 AM

## 2021-10-29 NOTE — TELEPHONE ENCOUNTER
The patient states he is getting a rash from the last heart medicine he was given so he is going to quit taking it.  Please advise .

## 2021-11-01 NOTE — TELEPHONE ENCOUNTER
Patient notified.  Is wondering if he should go back to metoprolol or something else?  If he doesn't answer ok to leave a message.   Carmelina Martinez LPN........11/1/2021  11:29 AM

## 2021-11-04 DIAGNOSIS — I10 BENIGN ESSENTIAL HYPERTENSION: Primary | ICD-10-CM

## 2021-11-04 DIAGNOSIS — I10 BENIGN ESSENTIAL HYPERTENSION: ICD-10-CM

## 2021-11-04 RX ORDER — AMLODIPINE BESYLATE 5 MG/1
TABLET ORAL
Qty: 90 TABLET | OUTPATIENT
Start: 2021-11-04

## 2021-11-04 RX ORDER — AMLODIPINE BESYLATE 5 MG/1
5 TABLET ORAL DAILY
Qty: 30 TABLET | Refills: 1 | Status: SHIPPED | OUTPATIENT
Start: 2021-11-04 | End: 2021-11-10

## 2021-11-04 NOTE — PROGRESS NOTES
Patient notified states he doesn't have a bp monitor, also wont be in town until the 10th, so he wont  the medication until then. Offered to have sent to a different pharmacy and close location, he said no. But he will pick it up on the 10th.   Carmelina Martinez LPN .............11/4/2021     10:17 AM

## 2021-11-04 NOTE — PROGRESS NOTES
1. Benign essential hypertension    START:   - amLODIPine (NORVASC) 5 MG tablet; Take 1 tablet (5 mg) by mouth daily - for Hypertension Dispense: 30 tablet; Refill: 1    ----- Stop Avelide (caused rash)        Continue to check blood pressures at home and record.   -- please bring to follow-up appointment     Electronically signed by:  Nawaf Montanez MD  on November 4, 2021 7:58 AM

## 2021-11-10 ENCOUNTER — OFFICE VISIT (OUTPATIENT)
Dept: INTERNAL MEDICINE | Facility: OTHER | Age: 86
End: 2021-11-10
Attending: INTERNAL MEDICINE
Payer: MEDICARE

## 2021-11-10 VITALS
WEIGHT: 184.6 LBS | BODY MASS INDEX: 26.88 KG/M2 | SYSTOLIC BLOOD PRESSURE: 160 MMHG | RESPIRATION RATE: 16 BRPM | OXYGEN SATURATION: 95 % | TEMPERATURE: 97 F | DIASTOLIC BLOOD PRESSURE: 80 MMHG | HEART RATE: 56 BPM

## 2021-11-10 DIAGNOSIS — I10 UNCONTROLLED HYPERTENSION: Primary | ICD-10-CM

## 2021-11-10 DIAGNOSIS — E11.42 CONTROLLED TYPE 2 DIABETES MELLITUS WITH DIABETIC POLYNEUROPATHY, WITHOUT LONG-TERM CURRENT USE OF INSULIN (H): ICD-10-CM

## 2021-11-10 DIAGNOSIS — L08.1 ERYTHRASMA: ICD-10-CM

## 2021-11-10 DIAGNOSIS — I10 BENIGN ESSENTIAL HYPERTENSION: ICD-10-CM

## 2021-11-10 DIAGNOSIS — N18.2 CHRONIC KIDNEY DISEASE, STAGE 2 (MILD): ICD-10-CM

## 2021-11-10 PROCEDURE — 99214 OFFICE O/P EST MOD 30 MIN: CPT | Performed by: INTERNAL MEDICINE

## 2021-11-10 PROCEDURE — G0463 HOSPITAL OUTPT CLINIC VISIT: HCPCS

## 2021-11-10 RX ORDER — IRBESARTAN AND HYDROCHLOROTHIAZIDE 150; 12.5 MG/1; MG/1
1 TABLET, FILM COATED ORAL EVERY MORNING
Qty: 90 TABLET | Refills: 0 | Status: SHIPPED | OUTPATIENT
Start: 2021-11-10 | End: 2021-12-08

## 2021-11-10 RX ORDER — OXYCODONE AND ACETAMINOPHEN 5; 325 MG/1; MG/1
.5-1 TABLET ORAL EVERY 6 HOURS PRN
Qty: 12 TABLET | Refills: 0 | Status: CANCELLED | OUTPATIENT
Start: 2021-11-10

## 2021-11-10 RX ORDER — TIZANIDINE 2 MG/1
TABLET ORAL
Qty: 120 TABLET | Refills: 3 | Status: CANCELLED | OUTPATIENT
Start: 2021-11-10

## 2021-11-10 RX ORDER — AMLODIPINE BESYLATE 5 MG/1
5 TABLET ORAL EVERY EVENING
Qty: 90 TABLET | Refills: 0 | Status: SHIPPED | OUTPATIENT
Start: 2021-11-10 | End: 2021-12-08

## 2021-11-10 RX ORDER — MUPIROCIN 20 MG/G
OINTMENT TOPICAL 2 TIMES DAILY
Qty: 120 G | Refills: 3 | Status: SHIPPED | OUTPATIENT
Start: 2021-11-10 | End: 2022-10-26

## 2021-11-10 ASSESSMENT — ANXIETY QUESTIONNAIRES
1. FEELING NERVOUS, ANXIOUS, OR ON EDGE: NOT AT ALL
7. FEELING AFRAID AS IF SOMETHING AWFUL MIGHT HAPPEN: NOT AT ALL
GAD7 TOTAL SCORE: 4
7. FEELING AFRAID AS IF SOMETHING AWFUL MIGHT HAPPEN: NOT AT ALL
6. BECOMING EASILY ANNOYED OR IRRITABLE: NOT AT ALL
GAD7 TOTAL SCORE: 4
2. NOT BEING ABLE TO STOP OR CONTROL WORRYING: MORE THAN HALF THE DAYS
3. WORRYING TOO MUCH ABOUT DIFFERENT THINGS: MORE THAN HALF THE DAYS
GAD7 TOTAL SCORE: 4
4. TROUBLE RELAXING: NOT AT ALL
5. BEING SO RESTLESS THAT IT IS HARD TO SIT STILL: NOT AT ALL
8. IF YOU CHECKED OFF ANY PROBLEMS, HOW DIFFICULT HAVE THESE MADE IT FOR YOU TO DO YOUR WORK, TAKE CARE OF THINGS AT HOME, OR GET ALONG WITH OTHER PEOPLE?: NOT DIFFICULT AT ALL

## 2021-11-10 ASSESSMENT — ENCOUNTER SYMPTOMS
SLEEP DISTURBANCE: 1
CHILLS: 0
ARTHRALGIAS: 1
PALPITATIONS: 1
DIZZINESS: 0
BACK PAIN: 1
AGITATION: 0
FREQUENCY: 1
EYE PAIN: 0
FATIGUE: 1
VOMITING: 0
DIARRHEA: 0
SHORTNESS OF BREATH: 1
NAUSEA: 0
WHEEZING: 0
COUGH: 1
DYSURIA: 0
HEMATURIA: 0
FEVER: 0
CONFUSION: 0
ABDOMINAL PAIN: 0
MYALGIAS: 0
LIGHT-HEADEDNESS: 0
BRUISES/BLEEDS EASILY: 0

## 2021-11-10 ASSESSMENT — PATIENT HEALTH QUESTIONNAIRE - PHQ9
SUM OF ALL RESPONSES TO PHQ QUESTIONS 1-9: 8
10. IF YOU CHECKED OFF ANY PROBLEMS, HOW DIFFICULT HAVE THESE PROBLEMS MADE IT FOR YOU TO DO YOUR WORK, TAKE CARE OF THINGS AT HOME, OR GET ALONG WITH OTHER PEOPLE: NOT DIFFICULT AT ALL
SUM OF ALL RESPONSES TO PHQ QUESTIONS 1-9: 8

## 2021-11-10 ASSESSMENT — PAIN SCALES - GENERAL: PAINLEVEL: NO PAIN (0)

## 2021-11-10 NOTE — PROGRESS NOTES
Medication Reconciliation: complete   Advance Care Directive Reviewed    FOOD SECURITY SCREENING QUESTIONS  Hunger Vital Signs:  Within the past 12 months we worried whether our food would run out before we got money to buy more. Never  Within the past 12 months the food we bought just didn't last and we didn't have money to get more. Never  Lorrie Farfan LPN .............11/10/2021  3:37 PM  Previous A1C is at goal of <8  Lab Results   Component Value Date    A1C 7.2 10/12/2021    A1C 7.1 07/07/2021    A1C 7.4 03/17/2021    A1C 7.2 11/03/2020    A1C 6.9 07/09/2020    A1C 6.8 04/07/2020     Urine microalbumin:creatine: 10-12-21  Foot exam 10-12-21  Eye exam 9-22-21    Tobacco User no  Patient is on a daily aspirin  Patient is on a Statin.  Blood pressure today of:     BP Readings from Last 1 Encounters:   11/10/21 (!) 200/90      is not at the goal of <139/89 for diabetics.    Lorrie Farfan LPN on 11/10/2021 at 3:39 PM

## 2021-11-10 NOTE — PROGRESS NOTES
Assessment & Plan       ICD-10-CM    1. Uncontrolled hypertension  I10 amLODIPine (NORVASC) 5 MG tablet     irbesartan-hydrochlorothiazide (AVALIDE) 150-12.5 MG tablet     US Renal Complete w Doppler Complete   2. Chronic kidney disease, stage 2 (mild)  N18.2    3. Erythrasma  L08.1 mupirocin (BACTROBAN) 2 % external ointment     amoxicillin-clavulanate (AUGMENTIN) 875-125 MG tablet   4. Benign essential hypertension  I10    5. Controlled type 2 diabetes mellitus with diabetic polyneuropathy, without long-term current use of insulin (H)  E11.42      Uncontrolled hypertension.  Blood pressure is extremely high.  Recheck was still high.  He was given a prescription for amlodipine but has yet to start this.  Needs updated prescription.  Start amlodipine 5 mg daily.    He started Avalide but was concerned that this caused a rash.  On his examination today, he actually has erythrasma which should be unrelated to his Avalide.  Recommend restarting Avalide.  Start treatment for erythrasma.    Given his uncontrolled hypertension which is new in the last few months.  Check renal ultrasound, check for renal artery stenosis.    Stage II chronic kidney disease.  Kidney function  improved slightly but has been slowly declining.    Erythrasma.   new diagnosis.  Start topical Bactroban ointment.  Start this Augmentin.    Type 2 diabetes, currently controlled.  Has chronic thyroid polyneuropathy.  He denies hypoglycemia.  Continue to monitor closely.     Encouraged regular exercise    Return in about 4 weeks (around 12/8/2021) for - Follow-up Hypertension.    Nawaf Montanez MD  [facility]   Steven is a 86 year old who presents for the following health issues     HPI     Hypertension Follow-up      Do you check your blood pressure regularly outside of the clinic? No     Are you following a low salt diet? Yes    Are your blood pressures ever more than 140 on the top number (systolic) OR more   than  90 on the bottom number (diastolic), for example 140/90? No    Rash  Onset/Duration: about 2 weeks ago  Description  Location: bilateral upper inner groin/thighs  Character: red, little itchy, sharp margins  Itching: mild  Intensity:  moderate  Progression of Symptoms:  constant  Accompanying signs and symptoms:   Fever: no  Body aches or joint pain: no  Sore throat symptoms: no  Recent cold symptoms: no  History:           Previous episodes of similar rash: None  New exposures:  None  Recent travel: no  Exposure to similar rash: no  Precipitating or alleviating factors: none  Therapies tried and outcome: hydrocortisone cream -  not effective    Review of Systems   Constitutional: Positive for fatigue (Continues on Metformin - stopped taking B-complex). Negative for chills and fever.   HENT: Negative for congestion and hearing loss.    Eyes: Negative for pain and visual disturbance.   Respiratory: Positive for cough (+ with phlegm) and shortness of breath (mild, stable.). Negative for wheezing.    Cardiovascular: Positive for palpitations. Negative for chest pain.        Raynaud's syndrome, no ulcerations   Gastrointestinal: Negative for abdominal pain, diarrhea, nausea and vomiting.   Endocrine: Negative for cold intolerance and heat intolerance.   Genitourinary: Positive for frequency and urgency. Negative for dysuria and hematuria.        + Nocturia 4-5x nightly   Musculoskeletal: Positive for arthralgias (Right shoulder pains, since over-use with arm), back pain (+ intermittent low back pain) and gait problem (intermittent, due to left calf). Negative for myalgias.   Skin: Positive for rash (groin/thighs). Negative for pallor.   Allergic/Immunologic: Negative for immunocompromised state.   Neurological: Negative for dizziness and light-headedness.        Rare positional lightheadedness   Hematological: Does not bruise/bleed easily.   Psychiatric/Behavioral: Positive for sleep disturbance (+ insomnia). Negative  for agitation and confusion.          Objective    BP (!) 160/80 (BP Location: Right arm, Patient Position: Sitting, Cuff Size: Adult Regular)   Pulse 56   Temp 97  F (36.1  C) (Tympanic)   Resp 16   Wt 83.7 kg (184 lb 9.6 oz)   SpO2 95%   BMI 26.88 kg/m    Body mass index is 26.88 kg/m .  Physical Exam  Constitutional:       General: He is not in acute distress.     Appearance: He is well-developed. He is not diaphoretic.   HENT:      Head: Normocephalic and atraumatic.   Eyes:      General: No scleral icterus.     Conjunctiva/sclera: Conjunctivae normal.   Neck:      Vascular: No carotid bruit.   Cardiovascular:      Rate and Rhythm: Normal rate and regular rhythm.      Pulses: Normal pulses.   Pulmonary:      Effort: Pulmonary effort is normal.      Breath sounds: Normal breath sounds.   Abdominal:      Palpations: Abdomen is soft.      Tenderness: There is no abdominal tenderness.   Musculoskeletal:         General: No deformity.      Cervical back: Neck supple.      Right lower le+ Pitting Edema present.      Left lower le+ Pitting Edema present.   Lymphadenopathy:      Cervical: No cervical adenopathy.   Skin:     General: Skin is warm and dry.      Coloration: Skin is not jaundiced.      Findings: Rash present.      Comments: + Erythrasma of groin/thighs - coral pink with woods lamp   Neurological:      Mental Status: He is alert and oriented to person, place, and time. Mental status is at baseline.   Psychiatric:         Mood and Affect: Mood normal.         Behavior: Behavior normal.          Recent Labs   Lab Test 10/12/21  1022 21  0855 21  1131   A1C 7.2* 7.1* 7.4*   LDL 93 86 85   HDL 36 37 33   TRIG 59 135 176*   ALT 13 13 12   CR 0.83 1.04 0.94   GFRESTIMATED 80 68 76   GFRESTBLACK  --  82 >90   POTASSIUM 4.5 4.1 4.1   TSH 1.64  --   --    Hemoglobin A1c is well controlled.  LDL is not at goal.  Triglycerides are at goal.  ALT is normal.  Creatinine has improved.  Potassium  is normal.  TSH normal        Answers for HPI/ROS submitted by the patient on 11/10/2021  If you checked off any problems, how difficult have these problems made it for you to do your work, take care of things at home, or get along with other people?: Not difficult at all  PHQ9 TOTAL SCORE: 8  LORENZO 7 TOTAL SCORE: 4

## 2021-11-11 ASSESSMENT — ANXIETY QUESTIONNAIRES: GAD7 TOTAL SCORE: 4

## 2021-11-11 ASSESSMENT — PATIENT HEALTH QUESTIONNAIRE - PHQ9: SUM OF ALL RESPONSES TO PHQ QUESTIONS 1-9: 8

## 2021-11-23 ENCOUNTER — TELEPHONE (OUTPATIENT)
Dept: INTERNAL MEDICINE | Facility: OTHER | Age: 86
End: 2021-11-23
Payer: MEDICARE

## 2021-11-23 NOTE — TELEPHONE ENCOUNTER
Patient notified states understanding.   Carmelina Martinez LPN .............11/23/2021     3:52 PM

## 2021-11-23 NOTE — TELEPHONE ENCOUNTER
Patient had appt with NATHANIELS 11/10/21, has appt scheduled 12/8/21.   Patient states last night he check and was 194/88 p 61, today was 193/79 p 56.   Wondering how his bp is connected to and what it has to do with the renal ultrasound. Tried to explain that his bp being uncontrolled could be causing damage to kidneys. Patient states it doesn't make sense wondering if DJS could explain it  and if he needs to be concerned with his heart and bp right now. Wondering if ok to wait for Friday.   Carmelina Martinez LPN .............11/23/2021     10:30 AM

## 2021-11-23 NOTE — TELEPHONE ENCOUNTER
Patient called regarding the US that is scheduled for Friday (11.26.2021).  He would like to be seen sooner.  Radiology was contacted and it was verified that there was no sooner appointments available.  He stated that his BP has been elevated and would like a call back to discuss.  Gemma Marinelli on 11/23/2021 at 9:19 AM

## 2021-11-26 ENCOUNTER — HOSPITAL ENCOUNTER (OUTPATIENT)
Dept: ULTRASOUND IMAGING | Facility: OTHER | Age: 86
Discharge: HOME | End: 2021-11-26
Attending: INTERNAL MEDICINE | Admitting: INTERNAL MEDICINE
Payer: MEDICARE

## 2021-11-26 DIAGNOSIS — I10 UNCONTROLLED HYPERTENSION: ICD-10-CM

## 2021-11-26 PROCEDURE — 93975 VASCULAR STUDY: CPT

## 2021-12-08 ENCOUNTER — OFFICE VISIT (OUTPATIENT)
Dept: INTERNAL MEDICINE | Facility: OTHER | Age: 86
End: 2021-12-08
Attending: INTERNAL MEDICINE
Payer: MEDICARE

## 2021-12-08 VITALS
SYSTOLIC BLOOD PRESSURE: 138 MMHG | DIASTOLIC BLOOD PRESSURE: 86 MMHG | RESPIRATION RATE: 16 BRPM | WEIGHT: 180.8 LBS | OXYGEN SATURATION: 90 % | HEART RATE: 60 BPM | BODY MASS INDEX: 26.33 KG/M2 | TEMPERATURE: 97.6 F

## 2021-12-08 DIAGNOSIS — I49.8 VENTRICULAR BIGEMINY: ICD-10-CM

## 2021-12-08 DIAGNOSIS — I49.1 PREMATURE ATRIAL CONTRACTIONS: ICD-10-CM

## 2021-12-08 DIAGNOSIS — I10 BENIGN ESSENTIAL HYPERTENSION: Primary | ICD-10-CM

## 2021-12-08 DIAGNOSIS — E11.42 CONTROLLED TYPE 2 DIABETES MELLITUS WITH DIABETIC POLYNEUROPATHY, WITHOUT LONG-TERM CURRENT USE OF INSULIN (H): ICD-10-CM

## 2021-12-08 DIAGNOSIS — E78.2 MIXED HYPERLIPIDEMIA: ICD-10-CM

## 2021-12-08 DIAGNOSIS — I73.00 RAYNAUD'S PHENOMENON WITHOUT GANGRENE: ICD-10-CM

## 2021-12-08 PROCEDURE — 99214 OFFICE O/P EST MOD 30 MIN: CPT | Performed by: INTERNAL MEDICINE

## 2021-12-08 PROCEDURE — G0463 HOSPITAL OUTPT CLINIC VISIT: HCPCS

## 2021-12-08 RX ORDER — DILTIAZEM HYDROCHLORIDE 120 MG/1
120 CAPSULE, EXTENDED RELEASE ORAL EVERY EVENING
Qty: 90 CAPSULE | Refills: 1 | Status: SHIPPED | OUTPATIENT
Start: 2021-12-08 | End: 2022-03-15

## 2021-12-08 RX ORDER — IRBESARTAN AND HYDROCHLOROTHIAZIDE 150; 12.5 MG/1; MG/1
1 TABLET, FILM COATED ORAL EVERY MORNING
Qty: 90 TABLET | Refills: 1 | Status: SHIPPED | OUTPATIENT
Start: 2021-12-08 | End: 2022-01-19

## 2021-12-08 RX ORDER — AMLODIPINE BESYLATE 5 MG/1
5 TABLET ORAL EVERY EVENING
Qty: 90 TABLET | Refills: 1 | Status: SHIPPED | OUTPATIENT
Start: 2021-12-08 | End: 2022-01-19

## 2021-12-08 ASSESSMENT — ENCOUNTER SYMPTOMS
COUGH: 1
NAUSEA: 0
EYE PAIN: 0
SHORTNESS OF BREATH: 1
CONFUSION: 0
VOMITING: 0
AGITATION: 0
FEVER: 0
ARTHRALGIAS: 1
BRUISES/BLEEDS EASILY: 0
DIZZINESS: 0
DIARRHEA: 0
PALPITATIONS: 1
MYALGIAS: 0
CHILLS: 0
LIGHT-HEADEDNESS: 0
SLEEP DISTURBANCE: 1
DYSURIA: 0
HEMATURIA: 0
FATIGUE: 1
FREQUENCY: 1
ABDOMINAL PAIN: 0
BACK PAIN: 1
WHEEZING: 0

## 2021-12-08 ASSESSMENT — PAIN SCALES - GENERAL: PAINLEVEL: NO PAIN (0)

## 2021-12-08 NOTE — PROGRESS NOTES
Assessment & Plan       ICD-10-CM    1. Benign essential hypertension  I10 amLODIPine (NORVASC) 5 MG tablet     diltiazem ER (DILT-XR) 120 MG 24 hr capsule     irbesartan-hydrochlorothiazide (AVALIDE) 150-12.5 MG tablet   2. Premature atrial contractions  I49.1 diltiazem ER (DILT-XR) 120 MG 24 hr capsule   3. Ventricular bigeminy  I49.8 diltiazem ER (DILT-XR) 120 MG 24 hr capsule   4. Raynaud's phenomenon without gangrene  I73.00 diltiazem ER (DILT-XR) 120 MG 24 hr capsule   5. Controlled type 2 diabetes mellitus with diabetic polyneuropathy, without long-term current use of insulin (H)  E11.42    6. Mixed hyperlipidemia  E78.2      Patient presents for hypertension follow-up as well as follow-up multiple issues.    Hypertension, blood pressure is currently under much better control.  Has found current medication helpful and well-tolerated.  Currently taking amlodipine, diltiazem and Avalide 150-12.5 mg.  Doing well with current dosing.  Denies syncope or presyncope.  Needs medication refills.    PACs with history of ventricular bigeminy.  Also history of Raynaud's phenomenon without gangrene.  Diltiazem in addition to amlodipine has been helpful.  Continue current dosing.  Needs refills.    Type 2 diabetes with diabetic polyneuropathy.  Chronic, ongoing.  Hemoglobin A1c is controlled.  Continue current medications.  Denies hypoglycemia.    Mixed hyperlipidemia, LDL is not currently at goal.  Does continue with pravastatin.  He is not interested in dose adjustment at this time.  Encouraged reduced oral caloric intake, regular exercise, weight loss.  If this is not adequate we will need to consider increasing pravastatin or changing to a stronger statin.    Encouraged regular exercise    Return in about 6 weeks (around 1/19/2022) for Diabetes Follow-up, + Get Diabetic labs prior to clinic appointment.    Nawaf Montanez MD  [facility]    Michael Harris is a 86 year old who presents for the  following health issues     HPI     Hypertension Follow-up      Do you check your blood pressure regularly outside of the clinic? Yes     Are you following a low salt diet? Yes    Are your blood pressures ever more than 140 on the top number (systolic) OR more   than 90 on the bottom number (diastolic), for example 140/90? No        How many servings of fruits and vegetables do you eat daily?  0-1    On average, how many sweetened beverages do you drink each day (Examples: soda, juice, sweet tea, etc.  Do NOT count diet or artificially sweetened beverages)?   1    How many days per week do you exercise enough to make your heart beat faster? 7    How many minutes a day do you exercise enough to make your heart beat faster? 30 - 60    How many days per week do you miss taking your medication? 0    Review of Systems   Constitutional: Positive for fatigue (Continues on Metformin - stopped taking B-complex). Negative for chills and fever.   HENT: Negative for congestion and hearing loss.    Eyes: Negative for pain and visual disturbance.   Respiratory: Positive for cough (+ with phlegm) and shortness of breath (mild, stable.). Negative for wheezing.    Cardiovascular: Positive for palpitations. Negative for chest pain.        Raynaud's syndrome, no ulcerations   Gastrointestinal: Negative for abdominal pain, diarrhea, nausea and vomiting.   Endocrine: Negative for cold intolerance and heat intolerance.   Genitourinary: Positive for frequency and urgency. Negative for dysuria and hematuria.        + Nocturia 4-5x nightly   Musculoskeletal: Positive for arthralgias (Right shoulder pains, if does too much or using in certain positions), back pain (+ intermittent low back pain) and gait problem (intermittent, due to left calf, limping). Negative for myalgias.   Skin: Negative for pallor and rash.   Allergic/Immunologic: Negative for immunocompromised state.   Neurological: Negative for dizziness and light-headedness.         Rare positional lightheadedness   Hematological: Does not bruise/bleed easily.   Psychiatric/Behavioral: Positive for sleep disturbance (+ insomnia). Negative for agitation and confusion.          Objective    /86   Pulse 60   Temp 97.6  F (36.4  C) (Tympanic)   Resp 16   Wt 82 kg (180 lb 12.8 oz)   SpO2 90%   BMI 26.33 kg/m    Body mass index is 26.33 kg/m .  Physical Exam  Constitutional:       General: He is not in acute distress.     Appearance: He is well-developed. He is not diaphoretic.   HENT:      Head: Normocephalic and atraumatic.   Eyes:      General: No scleral icterus.     Conjunctiva/sclera: Conjunctivae normal.   Cardiovascular:      Rate and Rhythm: Normal rate and regular rhythm.      Pulses: Normal pulses.   Pulmonary:      Effort: Pulmonary effort is normal.      Breath sounds: Normal breath sounds.   Abdominal:      Palpations: Abdomen is soft.      Tenderness: There is no abdominal tenderness.   Musculoskeletal:         General: No deformity.      Cervical back: Neck supple.      Right lower leg: Edema (trace) present.      Left lower leg: Edema (trace) present.   Lymphadenopathy:      Cervical: No cervical adenopathy.   Skin:     General: Skin is warm and dry.      Coloration: Skin is not jaundiced.      Findings: No rash.   Neurological:      Mental Status: He is alert and oriented to person, place, and time. Mental status is at baseline.   Psychiatric:         Mood and Affect: Mood normal.         Behavior: Behavior normal.          Recent Labs   Lab Test 10/12/21  1022 07/07/21  0855 03/17/21  1131   A1C 7.2* 7.1* 7.4*   LDL 93 86 85   HDL 36 37 33   TRIG 59 135 176*   ALT 13 13 12   CR 0.83 1.04 0.94   GFRESTIMATED 80 68 76   GFRESTBLACK  --  82 >90   POTASSIUM 4.5 4.1 4.1   TSH 1.64  --   --    Hemoglobin A1c is controlled at 7.2%.  LDL is not at goal, less than 70.  HDL and triglycerides are at goal.  ALT is normal.  Creatinine has improved slightly.  Potassium is normal.   TSH is normal.

## 2021-12-08 NOTE — NURSING NOTE
"Chief Complaint   Patient presents with     RECHECK     hypertension       Initial BP (!) 148/82 (BP Location: Left arm, Patient Position: Sitting, Cuff Size: Adult Large)   Pulse 60   Temp 97.6  F (36.4  C) (Tympanic)   Resp 16   Wt 82 kg (180 lb 12.8 oz)   SpO2 90%   BMI 26.33 kg/m   Estimated body mass index is 26.33 kg/m  as calculated from the following:    Height as of 7/7/21: 1.765 m (5' 9.49\").    Weight as of this encounter: 82 kg (180 lb 12.8 oz).  Medication Reconciliation: complete    FOOD SECURITY SCREENING QUESTIONS  Hunger Vital Signs:  Within the past 12 months we worried whether our food would run out before we got money to buy more. Never  Within the past 12 months the food we bought just didn't last and we didn't have money to get more. Never  Imelda Grace LPN 12/8/2021 3:02 PM     n  Advance care directive on file? no  Advance care directive provided to patient? yes      Imelda Grace LPN    "

## 2021-12-08 NOTE — PATIENT INSTRUCTIONS
Blood pressure is much better controlled.     Diabetes is well controlled.     Medications refilled.   Labs are stable.       Diabetes follow-up and clinic appointment -- same day -- on/after January 19th, 2022 --- with Dr. Montanez.

## 2022-01-12 ENCOUNTER — TELEPHONE (OUTPATIENT)
Dept: INTERNAL MEDICINE | Facility: OTHER | Age: 87
End: 2022-01-12
Payer: COMMERCIAL

## 2022-01-12 NOTE — TELEPHONE ENCOUNTER
"Called and spoke with the patient. He states since he started ditiazem he has been dizzy and states \" my legs just do not want to work and I am out of it\".    Mona Fernandez LPN on 1/12/2022 at 1:19 PM    "

## 2022-01-13 NOTE — TELEPHONE ENCOUNTER
Called and spoke with patient, patient states he will stop the diltiazem until next wednesday when he has his next appointment.   Imelda Grace LPN on 1/13/2022 at 1:48 PM

## 2022-01-19 ENCOUNTER — LAB (OUTPATIENT)
Dept: LAB | Facility: OTHER | Age: 87
End: 2022-01-19
Attending: INTERNAL MEDICINE
Payer: COMMERCIAL

## 2022-01-19 ENCOUNTER — OFFICE VISIT (OUTPATIENT)
Dept: INTERNAL MEDICINE | Facility: OTHER | Age: 87
End: 2022-01-19
Attending: INTERNAL MEDICINE
Payer: COMMERCIAL

## 2022-01-19 VITALS
WEIGHT: 183.6 LBS | HEART RATE: 54 BPM | RESPIRATION RATE: 16 BRPM | OXYGEN SATURATION: 98 % | SYSTOLIC BLOOD PRESSURE: 138 MMHG | BODY MASS INDEX: 26.73 KG/M2 | DIASTOLIC BLOOD PRESSURE: 82 MMHG

## 2022-01-19 DIAGNOSIS — E78.2 MIXED HYPERLIPIDEMIA: ICD-10-CM

## 2022-01-19 DIAGNOSIS — I73.00 RAYNAUD'S PHENOMENON WITHOUT GANGRENE: ICD-10-CM

## 2022-01-19 DIAGNOSIS — I77.811 ABDOMINAL AORTIC ECTASIA (H): ICD-10-CM

## 2022-01-19 DIAGNOSIS — I10 BENIGN ESSENTIAL HYPERTENSION: ICD-10-CM

## 2022-01-19 DIAGNOSIS — E11.42 CONTROLLED TYPE 2 DIABETES MELLITUS WITH DIABETIC POLYNEUROPATHY, WITHOUT LONG-TERM CURRENT USE OF INSULIN (H): ICD-10-CM

## 2022-01-19 DIAGNOSIS — E11.65 UNCONTROLLED TYPE 2 DIABETES MELLITUS WITH HYPERGLYCEMIA (H): ICD-10-CM

## 2022-01-19 LAB
ALBUMIN SERPL-MCNC: 4 G/DL (ref 3.5–5.7)
ALBUMIN UR-MCNC: NEGATIVE MG/DL
ALP SERPL-CCNC: 61 U/L (ref 34–104)
ALT SERPL W P-5'-P-CCNC: 13 U/L (ref 7–52)
ANION GAP SERPL CALCULATED.3IONS-SCNC: 8 MMOL/L (ref 3–14)
APPEARANCE UR: CLEAR
AST SERPL W P-5'-P-CCNC: 12 U/L (ref 13–39)
BILIRUB SERPL-MCNC: 0.9 MG/DL (ref 0.3–1)
BILIRUB UR QL STRIP: NEGATIVE
BUN SERPL-MCNC: 29 MG/DL (ref 7–25)
CALCIUM SERPL-MCNC: 9.4 MG/DL (ref 8.6–10.3)
CHLORIDE BLD-SCNC: 103 MMOL/L (ref 98–107)
CHOLEST SERPL-MCNC: 120 MG/DL
CO2 SERPL-SCNC: 28 MMOL/L (ref 21–31)
COLOR UR AUTO: ABNORMAL
CREAT SERPL-MCNC: 1.04 MG/DL (ref 0.7–1.3)
ERYTHROCYTE [DISTWIDTH] IN BLOOD BY AUTOMATED COUNT: 13.7 % (ref 10–15)
FASTING STATUS PATIENT QL REPORTED: NO
GFR SERPL CREATININE-BSD FRML MDRD: 70 ML/MIN/1.73M2
GLUCOSE BLD-MCNC: 253 MG/DL (ref 70–105)
GLUCOSE UR STRIP-MCNC: >1000 MG/DL
HBA1C MFR BLD: 9.1 % (ref 4–6.2)
HCT VFR BLD AUTO: 43.9 % (ref 40–53)
HDLC SERPL-MCNC: 32 MG/DL (ref 23–92)
HGB BLD-MCNC: 15 G/DL (ref 13.3–17.7)
HGB UR QL STRIP: NEGATIVE
KETONES UR STRIP-MCNC: ABNORMAL MG/DL
LDLC SERPL CALC-MCNC: 59 MG/DL
LEUKOCYTE ESTERASE UR QL STRIP: NEGATIVE
MCH RBC QN AUTO: 30.1 PG (ref 26.5–33)
MCHC RBC AUTO-ENTMCNC: 34.2 G/DL (ref 31.5–36.5)
MCV RBC AUTO: 88 FL (ref 78–100)
NITRATE UR QL: NEGATIVE
NONHDLC SERPL-MCNC: 88 MG/DL
PH UR STRIP: 5.5 [PH] (ref 5–9)
PLATELET # BLD AUTO: 148 10E3/UL (ref 150–450)
POTASSIUM BLD-SCNC: 4.7 MMOL/L (ref 3.5–5.1)
PROT SERPL-MCNC: 6.3 G/DL (ref 6.4–8.9)
RBC # BLD AUTO: 4.99 10E6/UL (ref 4.4–5.9)
SODIUM SERPL-SCNC: 139 MMOL/L (ref 134–144)
SP GR UR STRIP: 1.02 (ref 1–1.03)
TRIGL SERPL-MCNC: 143 MG/DL
TSH SERPL DL<=0.005 MIU/L-ACNC: 1.85 MU/L (ref 0.4–4)
UROBILINOGEN UR STRIP-MCNC: NORMAL MG/DL
WBC # BLD AUTO: 5.7 10E3/UL (ref 4–11)

## 2022-01-19 PROCEDURE — 82043 UR ALBUMIN QUANTITATIVE: CPT | Mod: ZL

## 2022-01-19 PROCEDURE — 81003 URINALYSIS AUTO W/O SCOPE: CPT | Mod: ZL

## 2022-01-19 PROCEDURE — 99214 OFFICE O/P EST MOD 30 MIN: CPT | Performed by: INTERNAL MEDICINE

## 2022-01-19 PROCEDURE — 36415 COLL VENOUS BLD VENIPUNCTURE: CPT | Mod: ZL

## 2022-01-19 PROCEDURE — G0463 HOSPITAL OUTPT CLINIC VISIT: HCPCS

## 2022-01-19 PROCEDURE — 80053 COMPREHEN METABOLIC PANEL: CPT | Mod: ZL

## 2022-01-19 PROCEDURE — 84443 ASSAY THYROID STIM HORMONE: CPT | Mod: ZL

## 2022-01-19 PROCEDURE — 83036 HEMOGLOBIN GLYCOSYLATED A1C: CPT | Mod: ZL

## 2022-01-19 PROCEDURE — 80061 LIPID PANEL: CPT | Mod: ZL

## 2022-01-19 PROCEDURE — 85041 AUTOMATED RBC COUNT: CPT | Mod: ZL

## 2022-01-19 RX ORDER — GLIPIZIDE 5 MG/1
5 TABLET ORAL
Qty: 180 TABLET | Refills: 3 | Status: SHIPPED | OUTPATIENT
Start: 2022-01-19 | End: 2022-03-15

## 2022-01-19 RX ORDER — AMLODIPINE BESYLATE 5 MG/1
5 TABLET ORAL 2 TIMES DAILY
Qty: 180 TABLET | Refills: 1 | Status: SHIPPED | OUTPATIENT
Start: 2022-01-19 | End: 2022-03-15

## 2022-01-19 RX ORDER — GABAPENTIN 100 MG/1
100 CAPSULE ORAL AT BEDTIME
Qty: 90 CAPSULE | Refills: 1 | Status: SHIPPED | OUTPATIENT
Start: 2022-01-19 | End: 2022-04-20

## 2022-01-19 ASSESSMENT — ENCOUNTER SYMPTOMS
HEMATURIA: 0
AGITATION: 0
MYALGIAS: 0
EYE PAIN: 0
DIARRHEA: 0
SLEEP DISTURBANCE: 1
FATIGUE: 1
WHEEZING: 0
NAUSEA: 0
BACK PAIN: 1
ARTHRALGIAS: 1
LIGHT-HEADEDNESS: 0
FEVER: 0
CHILLS: 0
FREQUENCY: 1
SHORTNESS OF BREATH: 1
CONFUSION: 0
DIZZINESS: 0
BRUISES/BLEEDS EASILY: 0
ABDOMINAL PAIN: 0
VOMITING: 0
DYSURIA: 0
COUGH: 1
PALPITATIONS: 1

## 2022-01-19 ASSESSMENT — PAIN SCALES - GENERAL: PAINLEVEL: NO PAIN (0)

## 2022-01-19 NOTE — PATIENT INSTRUCTIONS
Blood pressure is still a little high at home.     -- Increase Norvasc / Amlodipine to 5 mg in AM and 5 mg in PM.       Stop: Irbesartan / Hydrochlorothiazide      Blood sugar levels are very high...      Results for orders placed or performed in visit on 01/19/22   Comprehensive metabolic panel     Status: Abnormal   Result Value Ref Range    Sodium 139 134 - 144 mmol/L    Potassium 4.7 3.5 - 5.1 mmol/L    Chloride 103 98 - 107 mmol/L    Carbon Dioxide (CO2) 28 21 - 31 mmol/L    Anion Gap 8 3 - 14 mmol/L    Urea Nitrogen 29 (H) 7 - 25 mg/dL    Creatinine 1.04 0.70 - 1.30 mg/dL    Calcium 9.4 8.6 - 10.3 mg/dL    Glucose 253 (H) 70 - 105 mg/dL    Alkaline Phosphatase 61 34 - 104 U/L    AST 12 (L) 13 - 39 U/L    ALT 13 7 - 52 U/L    Protein Total 6.3 (L) 6.4 - 8.9 g/dL    Albumin 4.0 3.5 - 5.7 g/dL    Bilirubin Total 0.9 0.3 - 1.0 mg/dL    GFR Estimate 70 >60 mL/min/1.73m2   Lipid Profile     Status: None   Result Value Ref Range    Cholesterol 120 <200 mg/dL    Triglycerides 143 <150 mg/dL    Direct Measure HDL 32 23 - 92 mg/dL    LDL Cholesterol Calculated 59 <=100 mg/dL    Non HDL Cholesterol 88 <130 mg/dL    Patient Fasting > 8hrs? No     Narrative    Cholesterol  Desirable:  <200 mg/dL    Triglycerides  Normal:  Less than 150 mg/dL  Borderline High:  150-199 mg/dL  High:  200-499 mg/dL  Very High:  Greater than or equal to 500 mg/dL    Direct Measure HDL  Female:  Greater than or equal to 50 mg/dL   Male:  Greater than or equal to 40 mg/dL    LDL Cholesterol  Desirable:  <100mg/dL  Above Desirable:  100-129 mg/dL   Borderline High:  130-159 mg/dL   High:  160-189 mg/dL   Very High:  >= 190 mg/dL    Non HDL Cholesterol  Desirable:  130 mg/dL  Above Desirable:  130-159 mg/dL  Borderline High:  160-189 mg/dL  High:  190-219 mg/dL  Very High:  Greater than or equal to 220 mg/dL   CBC with platelets     Status: Abnormal   Result Value Ref Range    WBC Count 5.7 4.0 - 11.0 10e3/uL    RBC Count 4.99 4.40 - 5.90 10e6/uL     Hemoglobin 15.0 13.3 - 17.7 g/dL    Hematocrit 43.9 40.0 - 53.0 %    MCV 88 78 - 100 fL    MCH 30.1 26.5 - 33.0 pg    MCHC 34.2 31.5 - 36.5 g/dL    RDW 13.7 10.0 - 15.0 %    Platelet Count 148 (L) 150 - 450 10e3/uL   Hemoglobin A1c     Status: Abnormal   Result Value Ref Range    Hemoglobin A1C 9.1 (H) 4.0 - 6.2 %   TSH with free T4 reflex     Status: Normal   Result Value Ref Range    TSH 1.85 0.40 - 4.00 mU/L   UA reflex to Microscopic     Status: Abnormal   Result Value Ref Range    Color Urine Light Yellow Colorless, Straw, Light Yellow, Yellow    Appearance Urine Clear Clear    Glucose Urine >1000 (A) Negative mg/dL    Bilirubin Urine Negative Negative    Ketones Urine Trace (A) Negative mg/dL    Specific Gravity Urine 1.022 1.000 - 1.030    Blood Urine Negative Negative    pH Urine 5.5 5.0 - 9.0    Protein Albumin Urine Negative Negative mg/dL    Urobilinogen Urine Normal Normal, 2.0 mg/dL    Nitrite Urine Negative Negative    Leukocyte Esterase Urine Negative Negative    Narrative    Microscopic not indicated      Aspects of Diabetes:   Recent Labs   Lab Test 01/19/22  1420 10/12/21  1022 07/07/21  0855 03/17/21  1131   A1C 9.1* 7.2* 7.1* 7.4*   LDL 59 93 86 85   HDL 32 36 37 33   TRIG 143 59 135 176*   ALT 13 13 13 12   CR 1.04 0.83 1.04 0.94   GFRESTIMATED 70 80 68 76   GFRESTBLACK  --   --  82 >90   POTASSIUM 4.7 4.5 4.1 4.1   TSH 1.85 1.64  --   --       Hemoglobin A1c  Goal range is under 8%. Best is 6.5 to 7   Blood Pressure 138/82 Goal to keep less than 140/90   Tobacco  reports that he quit smoking about 47 years ago. His smoking use included cigarettes. He has a 10.00 pack-year smoking history. He has never used smokeless tobacco. Goal to abstain from tobacco   Aspirin or Plavix Anti-platelet therapy Aspirin or Plavix reduces risk of heart disease and stroke  -- sometimes used with other blood thinners, depending on bleeding risk and risk factors.    ACE/ARB Specific blood pressure meds These  medications can reduce risk of kidney disease   Cholesterol Statins (Lipitor, Crestor, vs others) Statins reduce risk of heart disease and stroke   Eye Exam -- Do Yearly -- Annual diabetic eye exam   Healthy weight Wt Readings from Last 3 Encounters:   01/19/22 83.3 kg (183 lb 9.6 oz)   12/08/21 82 kg (180 lb 12.8 oz)   11/10/21 83.7 kg (184 lb 9.6 oz)     Body mass index is 26.73 kg/m .  Goal BMI under 30, best is under 25.      -- Trying to exercise daily (goal at least 20 min/day) with moderate aerobic activity   -- Eat healthy (resources from ADA at http://www.diabetes.org/)   -- Taking good care of my feet. Consider seeing the Podiatrist   -- Check blood sugars as directed, record in log book and bring to every appointment    Insurance companies are grading you and I on your blood sugar control -- Goal is to get your A1c down to 7.9% or lower and NO Smoking!  -- Medicare and most insurance companies, will only cover Hemoglobin A1c labs to be rechecked every 91+ days.      Return for Diabetes labs and clinic follow-up appointment every 3 to 4 months.    Schedule lab only appointment --- A few days AFTER: 4/19/22   Schedule clinic appointment with Dr. Montanez -- Same day as labs, or 1-2 days later.

## 2022-01-19 NOTE — PROGRESS NOTES
"Assessment & Plan       ICD-10-CM    1. Uncontrolled type 2 diabetes mellitus with diabetic polyneuropathy, without long-term current use of insulin (H)  E11.42 gabapentin (NEURONTIN) 100 MG capsule    E11.65 glipiZIDE (GLUCOTROL) 5 MG tablet   2. Uncontrolled type 2 diabetes mellitus with hyperglycemia (H)  E11.65    3. Abdominal aortic ectasia (H)  I77.811    4. Benign essential hypertension  I10 amLODIPine (NORVASC) 5 MG tablet   5. Raynaud's phenomenon without gangrene  I73.00 amLODIPine (NORVASC) 5 MG tablet     Patient presents for follow-up of diabetes and hypertension up significantly after starting irbesartan-hydrochlorothiazide.  This is now continued.  Needs refills on gabapentin.  Has diabetic polyneuropathy that is quite painful and irritating previously with well-controlled diabetes.  Blood sugars today were in the 140-1 Nawaf doing much better since stopping irbesartan-hydrochlorothiazide.  Hopefully continue to improve back on his diabetic controlled range of 7.2%.      Has history of abdominal aortic ectasia.  Issues.  Continue to monitor.    Uncontrolled hypertension.  Blood pressure is quite elevated.  States that his blood pressures at home have been 160-170.  May end up having renal artery stenosis.  We will need to consider checking renal ultrasound if blood pressures do not come down.  For now we will increase in pain from 5 mg every evening up to 5 mg twice daily morning and evening.  Has noticed that his Raynaud's phenomenon has been worsening and he has had finger discoloration recently.  Hopefully the dose increase of amlodipine will help with this.    Recommend blood pressure checks at home with close clinic follow-up, if blood pressures are not improved.     BMI:   Estimated body mass index is 26.73 kg/m  as calculated from the following:    Height as of 7/7/21: 1.765 m (5' 9.49\").    Weight as of this encounter: 83.3 kg (183 lb 9.6 oz).     Encouraged regular exercise    Return in about " 3 months (around 4/19/2022) for - Labs every 91+ days, DM Follow-up 1-2 days later, with Dr. Montanez.    Nawaf Montanez MD  [facility]   Steven is a 86 year old who presents for the following health issues hypertension.     History of Present Illness       Hypertension: He presents for follow up of hypertension.  He does check blood pressure  regularly outside of the clinic. Outside blood pressures have been over 140/90. He follows a low salt diet.         Hypertension Follow-up      Do you check your blood pressure regularly outside of the clinic? Yes     Are you following a low salt diet? Yes    Are your blood pressures ever more than 140 on the top number (systolic) OR more   than 90 on the bottom number (diastolic), for example 140/90? Yes      How many servings of fruits and vegetables do you eat daily?  0-1    On average, how many sweetened beverages do you drink each day (Examples: soda, juice, sweet tea, etc.  Do NOT count diet or artificially sweetened beverages)?   0    How many days per week do you exercise enough to make your heart beat faster? 7    How many minutes a day do you exercise enough to make your heart beat faster? 10 - 19    How many days per week do you miss taking your medication? 0    Review of Systems   Constitutional: Positive for fatigue (Continues on Metformin - stopped taking B-complex). Negative for chills and fever.   HENT: Negative for congestion and hearing loss.    Eyes: Negative for pain and visual disturbance.   Respiratory: Positive for cough (+ with phlegm) and shortness of breath (mild, stable.). Negative for wheezing.    Cardiovascular: Positive for palpitations. Negative for chest pain.        Raynaud's syndrome, no ulcerations   Gastrointestinal: Negative for abdominal pain, diarrhea, nausea and vomiting.   Endocrine: Positive for cold intolerance. Negative for heat intolerance.        + Hyperglycemia 300+ when on Irbesartan/HCTZ    Genitourinary: Positive for frequency and urgency. Negative for dysuria and hematuria.        + Nocturia 4-5x nightly   Musculoskeletal: Positive for arthralgias (Right shoulder pains, if does too much or using in certain positions), back pain (+ intermittent low back pain) and gait problem (intermittent, due to left calf, limping). Negative for myalgias.   Skin: Negative for pallor and rash.   Allergic/Immunologic: Negative for immunocompromised state.   Neurological: Negative for dizziness and light-headedness.        Rare positional lightheadedness   Hematological: Does not bruise/bleed easily.   Psychiatric/Behavioral: Positive for sleep disturbance (+ insomnia). Negative for agitation and confusion.            Objective    /82   Pulse 54   Resp 16   Wt 83.3 kg (183 lb 9.6 oz)   SpO2 98%   BMI 26.73 kg/m    Body mass index is 26.73 kg/m .  Physical Exam  Constitutional:       General: He is not in acute distress.     Appearance: He is well-developed. He is not diaphoretic.   HENT:      Head: Normocephalic and atraumatic.   Eyes:      General: No scleral icterus.     Conjunctiva/sclera: Conjunctivae normal.   Cardiovascular:      Rate and Rhythm: Normal rate and regular rhythm.      Pulses: Normal pulses.   Pulmonary:      Effort: Pulmonary effort is normal.      Breath sounds: Normal breath sounds.   Abdominal:      Palpations: Abdomen is soft.      Tenderness: There is no abdominal tenderness.   Musculoskeletal:         General: No deformity.      Cervical back: Neck supple.      Right lower leg: Edema (trace) present.      Left lower leg: Edema (trace) present.   Lymphadenopathy:      Cervical: No cervical adenopathy.   Skin:     General: Skin is warm and dry.      Coloration: Skin is not jaundiced.      Findings: No rash.   Neurological:      Mental Status: He is alert and oriented to person, place, and time. Mental status is at baseline.   Psychiatric:         Mood and Affect: Mood normal.          Behavior: Behavior normal.          Recent Labs   Lab Test 01/19/22  1420 10/12/21  1022 07/07/21  0855 03/17/21  1131   A1C 9.1* 7.2* 7.1* 7.4*   LDL 59 93 86 85   HDL 32 36 37 33   TRIG 143 59 135 176*   ALT 13 13 13 12   CR 1.04 0.83 1.04 0.94   GFRESTIMATED 70 80 68 76   GFRESTBLACK  --   --  82 >90   POTASSIUM 4.7 4.5 4.1 4.1   TSH 1.85 1.64  --   --    Hemoglobin A1c is now poorly controlled.  Blood sugar shot up greater than 300+ after starting irbesartan/hydrochlorothiazide.  LDL is at goal.  HDL and triglycerides are at goal.  ALT is normal.  Creatinine has worsened slightly.  Potassium and TSH are normal.

## 2022-01-19 NOTE — NURSING NOTE
"Chief Complaint   Patient presents with     Follow Up     1 month      Hypertension     Patient presents to the clinic for 1 month follow up for hypertension. Patient refused to have a tempeture taken.      Initial There were no vitals taken for this visit. Estimated body mass index is 26.33 kg/m  as calculated from the following:    Height as of 7/7/21: 1.765 m (5' 9.49\").    Weight as of 12/8/21: 82 kg (180 lb 12.8 oz).  Medication Reconciliation: complete    FOOD SECURITY SCREENING QUESTIONS  The next two questions are to help us understand your food security.  If you are feeling you need any assistance in this area, we have resources available to support you today.    Hunger Vital Signs:  Within the past 12 months we worried whether our food would run out before we got money to buy more. Never  Within the past 12 months the food we bought just didn't last and we didn't have money to get more. Never      Sonia Rowell RN 1/19/2022 3:24 PM  "

## 2022-01-20 LAB
CREAT UR-MCNC: 68 MG/DL
MICROALBUMIN UR-MCNC: 33 MG/L
MICROALBUMIN/CREAT UR: 48.53 MG/G CR (ref 0–17)

## 2022-03-10 ENCOUNTER — TELEPHONE (OUTPATIENT)
Dept: INTERNAL MEDICINE | Facility: OTHER | Age: 87
End: 2022-03-10
Payer: COMMERCIAL

## 2022-03-10 NOTE — TELEPHONE ENCOUNTER
Patient would like to discuss his medications, he does not feel it is right.  Please call      Daisha Bingham on 3/10/2022 at 9:46 AM

## 2022-03-11 NOTE — PROGRESS NOTES
Assessment & Plan       ICD-10-CM    1. Benign essential hypertension  I10 amLODIPine (NORVASC) 5 MG tablet     diltiazem ER (DILT-XR) 120 MG 24 hr capsule   2. Raynaud's phenomenon without gangrene  I73.00 amLODIPine (NORVASC) 5 MG tablet     diltiazem ER (DILT-XR) 120 MG 24 hr capsule   3. Premature atrial contractions  I49.1 diltiazem ER (DILT-XR) 120 MG 24 hr capsule   4. Ventricular bigeminy  I49.8 diltiazem ER (DILT-XR) 120 MG 24 hr capsule   5. Nocturia  R35.1 tamsulosin (FLOMAX) 0.4 MG capsule   6. Mixed hyperlipidemia  E78.2 pravastatin (PRAVACHOL) 40 MG tablet   7. Vaccine counseling  Z71.85    8. Uncontrolled type 2 diabetes mellitus with diabetic polyneuropathy, without long-term current use of insulin (H)  E11.42 glipiZIDE (GLUCOTROL) 5 MG tablet    E11.65    9. Vitamin B12 deficiency  E53.8 vitamin B-12 (CYANOCOBALAMIN) 2500 MCG sublingual tablet   Patient presents for diabetes and hypertension follow-up.  Wife is with him today.  He has questions about his medications.  He is not sure when he is supposed be taking his medications.  They have a number of different med list today.  These were all reviewed and updated and given a new med list today.    Hypertension.  Blood pressure is now well controlled.  Has been very poorly controlled at home.  States that his blood pressures last night were in the 130-140 range.  He wonders about needing to take all of these medications.  At this point his blood pressures have now finally improved and are currently well controlled with current medication regimen.  Needs refills of amlodipine, diltiazem.  Also taking Flomax for his BPH which has been helpful continue current dosing.    Raynaud's phenomenon without gangrene.  Improved with above medications.  No changes.  Needs refills.    PVCs with PACs, ventricular bigeminy.  Appears to be improved with diltiazem.  Tolerating well.  Needs refills.    Nocturia, BPH.  Flomax has been helpful.  Needs refills.    Mixed  hyperlipidemia.  Currently taking pravastatin 40 mg daily.  Seems to be tolerating well without side effects.  No changes for now.    Uncontrolled type 2 diabetes per blood sugar has been very high, greater than 300.  Wife indicates that she is concerned about this.  Evidently he has only been taking glipizide 2.5 mg or half tablet twice daily.  We will increase this up to 5 mg 3 times daily with meals.  --Continue Metformin 500 mg twice daily.    Vaccine counseling completed.  Encouraged consideration of shingles shot, COVID-19 booster shot.    Vitamin B12 deficiency.  Has not been taking B12 recently.  Recommend restarting.  Takes a B complex at home intermittently but ran out a while ago.  Start sublingual B12 tablets plus B complex.  This is especially since he continues with Metformin use.    Encouraged regular exercise.     Return in about 3 months (around 6/15/2022) for - Labs every 91+ days, DM Follow-up 1-2 days later, with Dr. Montanez.    Nawaf Montanez MD  [facility]     Michael Harris is a 86 year old who presents for the following health issues  accompanied by his spouse.    HPI     Diabetes Follow-up    How often are you checking your blood sugar? Two times daily  Blood sugar testing frequency justification:  Uncontrolled diabetes  What time of day are you checking your blood sugars (select all that apply)?  Before and after meals  Have you had any blood sugars above 200?  Yes almost every day  Have you had any blood sugars below 70?  No    What symptoms do you notice when your blood sugar is low?  Shaky, Dizzy and Weak    What concerns do you have today about your diabetes? None and Blood sugar is often over 200     Do you have any of these symptoms? (Select all that apply)  Burning in feet    Hyperlipidemia Follow-Up      Are you regularly taking any medication or supplement to lower your cholesterol?   Yes- Pravastatin    Are you having muscle aches or other side effects  that you think could be caused by your cholesterol lowering medication?  No    Hypertension Follow-up      Do you check your blood pressure regularly outside of the clinic? Yes     Are you following a low salt diet? No    Are your blood pressures ever more than 140 on the top number (systolic) OR more   than 90 on the bottom number (diastolic), for example 140/90? Yes    BP Readings from Last 2 Encounters:   03/15/22 124/68   01/19/22 138/82     Hemoglobin A1C POCT (%)   Date Value   07/07/2021 7.1 (H)   03/17/2021 7.4 (H)     Hemoglobin A1C (%)   Date Value   01/19/2022 9.1 (H)   10/12/2021 7.2 (H)     LDL Cholesterol Calculated (mg/dL)   Date Value   01/19/2022 59   10/12/2021 93   07/07/2021 86   03/17/2021 85     Review of Systems   Constitutional: Positive for fatigue (Continues on Metformin - stopped taking B-complex). Negative for chills and fever.   HENT: Negative for congestion and hearing loss.    Eyes: Negative for pain and visual disturbance.   Respiratory: Positive for cough (+ with phlegm) and shortness of breath (mild, stable.). Negative for wheezing.    Cardiovascular: Positive for palpitations. Negative for chest pain.        Raynaud's syndrome, no ulcerations   Gastrointestinal: Negative for abdominal pain, diarrhea, nausea and vomiting.   Endocrine: Positive for cold intolerance. Negative for heat intolerance.        + Hyperglycemia -- only taking Glipizide 2.5 mg BID -> Glipizide increased to 5 mg TID on 3/15/2022   Genitourinary: Positive for frequency and urgency. Negative for dysuria and hematuria.        + Nocturia 4-5x nightly   Musculoskeletal: Positive for arthralgias (Right shoulder pains, if does too much or using in certain positions), back pain (+ intermittent low back pain) and gait problem (intermittent, due to left calf, limping). Negative for myalgias.   Skin: Negative for pallor and rash.   Allergic/Immunologic: Negative for immunocompromised state.   Neurological: Negative for  dizziness and light-headedness.        Rare positional lightheadedness   Hematological: Does not bruise/bleed easily.   Psychiatric/Behavioral: Positive for sleep disturbance (+ insomnia). Negative for agitation and confusion.          Objective    /68 (BP Location: Right arm, Patient Position: Sitting, Cuff Size: Adult Regular)   Pulse 70   Resp 16   Wt 85.3 kg (188 lb)   SpO2 97%   BMI 27.37 kg/m    Body mass index is 27.37 kg/m .  Physical Exam  Constitutional:       General: He is not in acute distress.     Appearance: He is well-developed. He is not diaphoretic.   HENT:      Head: Normocephalic and atraumatic.   Eyes:      General: No scleral icterus.     Conjunctiva/sclera: Conjunctivae normal.   Cardiovascular:      Rate and Rhythm: Normal rate and regular rhythm.      Pulses: Normal pulses.   Pulmonary:      Effort: Pulmonary effort is normal.      Breath sounds: Normal breath sounds.   Abdominal:      Palpations: Abdomen is soft.      Tenderness: There is no abdominal tenderness.   Musculoskeletal:         General: No deformity.      Cervical back: Neck supple.      Right lower leg: Edema (trace) present.      Left lower leg: Edema (trace) present.   Lymphadenopathy:      Cervical: No cervical adenopathy.   Skin:     General: Skin is warm and dry.      Coloration: Skin is not jaundiced.      Findings: No rash.   Neurological:      Mental Status: He is alert and oriented to person, place, and time. Mental status is at baseline.   Psychiatric:         Mood and Affect: Mood normal.         Behavior: Behavior normal.        Recent Labs   Lab Test 01/19/22  1420 10/12/21  1022 07/07/21  0855 03/17/21  1131   A1C 9.1* 7.2* 7.1* 7.4*   LDL 59 93 86 85   HDL 32 36 37 33   TRIG 143 59 135 176*   ALT 13 13 13 12   CR 1.04 0.83 1.04 0.94   GFRESTIMATED 70 80 68 76   GFRESTBLACK  --   --  82 >90   POTASSIUM 4.7 4.5 4.1 4.1   TSH 1.85 1.64  --   --    Hemoglobin A1c is poorly controlled.  Patient presents for  hypertension and diabetes follow-up.

## 2022-03-11 NOTE — TELEPHONE ENCOUNTER
"Called and spoke with patient. He has questions about metformin and he states \"he has found out its not good for him and wants to know what it does to him\". He wants something that will not irritate his body and will help him. He states it is giving his leg trouble and makes is legs tired. He states he is cutting back on them wants something to replace it.    \Mona Fernandez LPN on 3/11/2022 at 9:57 AM    "

## 2022-03-11 NOTE — TELEPHONE ENCOUNTER
Please call patient.  Needs clinic follow-up appointment.       Multiple problem appointment at 10:40 AM on Tuesday, March 15 x40 minutes.  -- His blood pressure was uncontrolled.  Diabetes was uncontrolled.       Stop the Metformin for now.      Electronically signed by:  Nawaf Montanez MD  on March 11, 2022 10:14 AM

## 2022-03-15 ENCOUNTER — OFFICE VISIT (OUTPATIENT)
Dept: INTERNAL MEDICINE | Facility: OTHER | Age: 87
End: 2022-03-15
Attending: INTERNAL MEDICINE
Payer: COMMERCIAL

## 2022-03-15 VITALS
SYSTOLIC BLOOD PRESSURE: 124 MMHG | OXYGEN SATURATION: 97 % | WEIGHT: 188 LBS | BODY MASS INDEX: 27.37 KG/M2 | HEART RATE: 70 BPM | DIASTOLIC BLOOD PRESSURE: 68 MMHG | RESPIRATION RATE: 16 BRPM

## 2022-03-15 DIAGNOSIS — I49.8 VENTRICULAR BIGEMINY: ICD-10-CM

## 2022-03-15 DIAGNOSIS — I73.00 RAYNAUD'S PHENOMENON WITHOUT GANGRENE: ICD-10-CM

## 2022-03-15 DIAGNOSIS — R35.1 NOCTURIA: ICD-10-CM

## 2022-03-15 DIAGNOSIS — I10 BENIGN ESSENTIAL HYPERTENSION: Primary | ICD-10-CM

## 2022-03-15 DIAGNOSIS — E53.8 VITAMIN B12 DEFICIENCY: ICD-10-CM

## 2022-03-15 DIAGNOSIS — Z71.85 VACCINE COUNSELING: ICD-10-CM

## 2022-03-15 DIAGNOSIS — I49.1 PREMATURE ATRIAL CONTRACTIONS: ICD-10-CM

## 2022-03-15 DIAGNOSIS — E78.2 MIXED HYPERLIPIDEMIA: ICD-10-CM

## 2022-03-15 PROCEDURE — 99214 OFFICE O/P EST MOD 30 MIN: CPT | Performed by: INTERNAL MEDICINE

## 2022-03-15 PROCEDURE — G0463 HOSPITAL OUTPT CLINIC VISIT: HCPCS

## 2022-03-15 RX ORDER — CYANOCOBALAMIN (VITAMIN B-12) 2500 MCG
2500 TABLET, SUBLINGUAL SUBLINGUAL DAILY
Qty: 100 TABLET | Refills: 3 | Status: SHIPPED | OUTPATIENT
Start: 2022-03-15 | End: 2023-04-03

## 2022-03-15 RX ORDER — PRAVASTATIN SODIUM 40 MG
40 TABLET ORAL AT BEDTIME
Qty: 90 TABLET | Refills: 1 | Status: SHIPPED | OUTPATIENT
Start: 2022-03-15 | End: 2022-04-20

## 2022-03-15 RX ORDER — DILTIAZEM HYDROCHLORIDE 120 MG/1
120 CAPSULE, EXTENDED RELEASE ORAL EVERY EVENING
Qty: 90 CAPSULE | Refills: 1 | Status: SHIPPED | OUTPATIENT
Start: 2022-03-15 | End: 2022-04-20

## 2022-03-15 RX ORDER — TAMSULOSIN HYDROCHLORIDE 0.4 MG/1
0.4 CAPSULE ORAL EVERY EVENING
Qty: 90 CAPSULE | Refills: 1 | Status: SHIPPED | OUTPATIENT
Start: 2022-03-15 | End: 2022-04-20

## 2022-03-15 RX ORDER — GLIPIZIDE 5 MG/1
5 TABLET ORAL
Qty: 90 TABLET | Refills: 3 | Status: SHIPPED | OUTPATIENT
Start: 2022-03-15 | End: 2022-03-16

## 2022-03-15 RX ORDER — AMLODIPINE BESYLATE 5 MG/1
5 TABLET ORAL 2 TIMES DAILY
Qty: 180 TABLET | Refills: 1 | Status: SHIPPED | OUTPATIENT
Start: 2022-03-15 | End: 2022-04-20

## 2022-03-15 ASSESSMENT — ENCOUNTER SYMPTOMS
VOMITING: 0
CONFUSION: 0
FATIGUE: 1
NAUSEA: 0
WHEEZING: 0
HEMATURIA: 0
LIGHT-HEADEDNESS: 0
EYE PAIN: 0
ARTHRALGIAS: 1
DYSURIA: 0
FREQUENCY: 1
DIZZINESS: 0
BACK PAIN: 1
MYALGIAS: 0
DIARRHEA: 0
SLEEP DISTURBANCE: 1
CHILLS: 0
AGITATION: 0
SHORTNESS OF BREATH: 1
ABDOMINAL PAIN: 0
PALPITATIONS: 1
COUGH: 1
BRUISES/BLEEDS EASILY: 0
FEVER: 0

## 2022-03-15 ASSESSMENT — PAIN SCALES - GENERAL: PAINLEVEL: MILD PAIN (2)

## 2022-03-15 NOTE — NURSING NOTE
Patient presents to clinic today for follow up on blood pressure.  Medication reconciliation completed.    ACP on file? yes    FOOD SECURITY SCREENING QUESTIONS    The next two questions are to help us understand your food security.  If you are feeling you need any assistance in this area, we have resources available to support you today.    Hunger Vital Signs:  Within the past 12 months we worried whether our food would run out before we got money to buy more. Never  Within the past 12 months the food we bought just didn't last and we didn't have money to get more. Never    Jaimie Mabry CMA(AAMA)..................3/15/2022   9:57 AM

## 2022-03-15 NOTE — PATIENT INSTRUCTIONS
Diabetes needs help...     + Increase Glipizide to 5 mg -- 3 times daily with meals.     Blood pressure is now well controlled.   -- Continue current blood pressure medications.     Aspects of Diabetes:   Recent Labs   Lab Test 01/19/22  1420 10/12/21  1022 07/07/21  0855 03/17/21  1131   A1C 9.1* 7.2* 7.1* 7.4*   LDL 59 93 86 85   HDL 32 36 37 33   TRIG 143 59 135 176*   ALT 13 13 13 12   CR 1.04 0.83 1.04 0.94   GFRESTIMATED 70 80 68 76   GFRESTBLACK  --   --  82 >90   POTASSIUM 4.7 4.5 4.1 4.1   TSH 1.85 1.64  --   --       Hemoglobin A1c  Goal range is under 8%. Best is 6.5 to 7   Blood Pressure 124/68 Goal to keep less than 140/90   Tobacco  reports that he quit smoking about 47 years ago. His smoking use included cigarettes. He has a 10.00 pack-year smoking history. He has never used smokeless tobacco. Goal to abstain from tobacco   Aspirin or Plavix Anti-platelet therapy Aspirin or Plavix reduces risk of heart disease and stroke  -- sometimes used with other blood thinners, depending on bleeding risk and risk factors.    ACE/ARB Specific blood pressure meds These medications can reduce risk of kidney disease   Cholesterol Statins (Lipitor, Crestor, vs others) Statins reduce risk of heart disease and stroke   Eye Exam -- Do Yearly -- Annual diabetic eye exam   Healthy weight Wt Readings from Last 3 Encounters:   03/15/22 85.3 kg (188 lb)   01/19/22 83.3 kg (183 lb 9.6 oz)   12/08/21 82 kg (180 lb 12.8 oz)     Body mass index is 27.37 kg/m .  Goal BMI under 30, best is under 25.      -- Trying to exercise daily (goal at least 20 min/day) with moderate aerobic activity   -- Eat healthy (resources from ADA at http://www.diabetes.org/)   -- Taking good care of my feet. Consider seeing the Podiatrist   -- Check blood sugars as directed, record in log book and bring to every appointment    Insurance companies are grading you and I on your blood sugar control -- Goal is to get your A1c down to 7.9% or lower and NO  Smoking!  -- Medicare and most insurance companies, will only cover Hemoglobin A1c labs to be rechecked every 91+ days.      Return for Diabetes labs and clinic follow-up appointment every 3 to 4 months.    Schedule lab only appointment --- A few days AFTER: 6/13/22   Schedule clinic appointment with Dr. Montanez -- Same day as labs, or 1-2 days later.

## 2022-03-17 DIAGNOSIS — E11.42 CONTROLLED TYPE 2 DIABETES MELLITUS WITH DIABETIC POLYNEUROPATHY, WITHOUT LONG-TERM CURRENT USE OF INSULIN (H): ICD-10-CM

## 2022-03-17 RX ORDER — BLOOD SUGAR DIAGNOSTIC
STRIP MISCELLANEOUS
Qty: 200 STRIP | Refills: 3 | Status: SHIPPED | OUTPATIENT
Start: 2022-03-17 | End: 2022-03-25

## 2022-03-17 NOTE — TELEPHONE ENCOUNTER
"Requested Prescriptions   Pending Prescriptions Disp Refills     ONETOUCH ULTRA test strip [Pharmacy Med Name: ONE TOUCH ULTRA BLUE TESTST(NEW)100] 200 strip 3     Sig: USE TO TEST BLOOD SUGAR EVERY DAY       Diabetic Supplies Protocol Passed - 3/17/2022 10:46 AM        Passed - Medication is active on med list        Passed - Patient is 18 years of age or older        Passed - Recent (6 mo) or future (30 days) visit within the authorizing provider's specialty     Patient had office visit in the last 6 months or has a visit in the next 30 days with authorizing provider.  See \"Patient Info\" tab in inbasket, or \"Choose Columns\" in Meds & Orders section of the refill encounter.         Last Written Prescription Date:  3/17/21  Last Fill Quantity: 200 strip,   # refills: 3  Last Office Visit: 3/15/22 Tarik  Future Office visit:    Next 5 appointments (look out 90 days)    Apr 20, 2022  3:00 PM  SHORT with Nawaf Montanez MD  [facility] ([facility] ) [address]  [place] [address]  [phone]         Routing refill request to provider for review/approval because:  Routed to provider for review and consideration.  Brenda J. Goodell, RN on 3/17/2022 at 3:26 PM      "

## 2022-03-24 DIAGNOSIS — E11.42 CONTROLLED TYPE 2 DIABETES MELLITUS WITH DIABETIC POLYNEUROPATHY, WITHOUT LONG-TERM CURRENT USE OF INSULIN (H): ICD-10-CM

## 2022-03-24 NOTE — TELEPHONE ENCOUNTER
Pharmacy requesting test strips to test blood sugar twice daily. Last filled 03/17/2022 #200 x 3 sig is to test blood sugar every day. Message left patient requesting a return call to verify how patient is testing.      Disp Refills Start End AUGUSITN   ONETOUCH ULTRA test strip 200 strip 3 3/17/2022  No   Sig: USE TO TEST BLOOD SUGAR EVERY DAY   Sent to pharmacy as: OneTouch Ultra In Vitro Strip (blood glucose)   Class: E-Prescribe

## 2022-03-25 RX ORDER — BLOOD SUGAR DIAGNOSTIC
STRIP MISCELLANEOUS
Qty: 200 STRIP | Refills: 3 | Status: SHIPPED | OUTPATIENT
Start: 2022-03-25 | End: 2022-10-26

## 2022-03-25 NOTE — TELEPHONE ENCOUNTER
"Patient calls back and states he is testing blood sugar twice daily as requested by pharmacy. \"I am heading into town today and need them filled today\". Unable to complete prescription refill per RNMedication Refill Policy.................... Sherine Hammer RN ....................  3/25/2022   8:35 AM          "

## 2022-04-08 DIAGNOSIS — I73.00 RAYNAUD'S PHENOMENON WITHOUT GANGRENE: ICD-10-CM

## 2022-04-08 DIAGNOSIS — I49.1 PREMATURE ATRIAL CONTRACTIONS: ICD-10-CM

## 2022-04-08 DIAGNOSIS — I49.8 VENTRICULAR BIGEMINY: ICD-10-CM

## 2022-04-08 RX ORDER — DILTIAZEM HYDROCHLORIDE 120 MG/1
CAPSULE, EXTENDED RELEASE ORAL
Qty: 90 CAPSULE | OUTPATIENT
Start: 2022-04-08

## 2022-04-08 NOTE — TELEPHONE ENCOUNTER
[facility] sent Rx request for the following:      Requested Prescriptions   Pending Prescriptions Disp Refills     diltiazem ER (TIAZAC) 120 MG 24 hr ER beaded capsule [Pharmacy Med Name: DILTIAZEM ER 120MG CAPSULES (24 HR)] 90 capsule      Sig: TAKE ONE CAPSULE BY MOUTH EVERY EVENING     Last Prescription Date:   3/15/22  Last Fill Qty/Refills:         90, R-1    Last Office Visit:              3/15/22   Future Office visit:           4/20/22  Unable to complete prescription refill per RN Medication Refill Policy. Redundant refill request refused: Too soon:  Corrina Hansen, RN on 4/8/2022 at 1:27 PM

## 2022-04-20 ENCOUNTER — OFFICE VISIT (OUTPATIENT)
Dept: INTERNAL MEDICINE | Facility: OTHER | Age: 87
End: 2022-04-20
Attending: INTERNAL MEDICINE
Payer: COMMERCIAL

## 2022-04-20 ENCOUNTER — LAB (OUTPATIENT)
Dept: LAB | Facility: OTHER | Age: 87
End: 2022-04-20
Attending: INTERNAL MEDICINE
Payer: COMMERCIAL

## 2022-04-20 VITALS
HEART RATE: 67 BPM | HEIGHT: 69 IN | WEIGHT: 186.6 LBS | DIASTOLIC BLOOD PRESSURE: 78 MMHG | RESPIRATION RATE: 20 BRPM | OXYGEN SATURATION: 97 % | SYSTOLIC BLOOD PRESSURE: 126 MMHG | BODY MASS INDEX: 27.64 KG/M2

## 2022-04-20 DIAGNOSIS — E11.42 CONTROLLED TYPE 2 DIABETES MELLITUS WITH DIABETIC POLYNEUROPATHY, WITHOUT LONG-TERM CURRENT USE OF INSULIN (H): ICD-10-CM

## 2022-04-20 DIAGNOSIS — E78.2 MIXED HYPERLIPIDEMIA: ICD-10-CM

## 2022-04-20 DIAGNOSIS — I49.1 PREMATURE ATRIAL CONTRACTIONS: ICD-10-CM

## 2022-04-20 DIAGNOSIS — Z71.85 VACCINE COUNSELING: ICD-10-CM

## 2022-04-20 DIAGNOSIS — I49.8 VENTRICULAR BIGEMINY: ICD-10-CM

## 2022-04-20 DIAGNOSIS — R35.1 NOCTURIA: ICD-10-CM

## 2022-04-20 DIAGNOSIS — I10 BENIGN ESSENTIAL HYPERTENSION: ICD-10-CM

## 2022-04-20 DIAGNOSIS — I73.00 RAYNAUD'S PHENOMENON WITHOUT GANGRENE: ICD-10-CM

## 2022-04-20 DIAGNOSIS — E11.65 UNCONTROLLED TYPE 2 DIABETES MELLITUS WITH HYPERGLYCEMIA (H): Primary | ICD-10-CM

## 2022-04-20 DIAGNOSIS — N18.2 CHRONIC KIDNEY DISEASE, STAGE 2 (MILD): ICD-10-CM

## 2022-04-20 LAB
ALBUMIN SERPL-MCNC: 4.1 G/DL (ref 3.5–5.7)
ALBUMIN UR-MCNC: NEGATIVE MG/DL
ALP SERPL-CCNC: 56 U/L (ref 34–104)
ALT SERPL W P-5'-P-CCNC: 12 U/L (ref 7–52)
ANION GAP SERPL CALCULATED.3IONS-SCNC: 7 MMOL/L (ref 3–14)
APPEARANCE UR: CLEAR
AST SERPL W P-5'-P-CCNC: 11 U/L (ref 13–39)
BILIRUB SERPL-MCNC: 0.8 MG/DL (ref 0.3–1)
BILIRUB UR QL STRIP: NEGATIVE
BUN SERPL-MCNC: 26 MG/DL (ref 7–25)
CALCIUM SERPL-MCNC: 9.2 MG/DL (ref 8.6–10.3)
CHLORIDE BLD-SCNC: 104 MMOL/L (ref 98–107)
CHOLEST SERPL-MCNC: 114 MG/DL
CO2 SERPL-SCNC: 26 MMOL/L (ref 21–31)
COLOR UR AUTO: YELLOW
CREAT SERPL-MCNC: 0.99 MG/DL (ref 0.7–1.3)
ERYTHROCYTE [DISTWIDTH] IN BLOOD BY AUTOMATED COUNT: 13.1 % (ref 10–15)
FASTING STATUS PATIENT QL REPORTED: NORMAL
GFR SERPL CREATININE-BSD FRML MDRD: 75 ML/MIN/1.73M2
GLUCOSE BLD-MCNC: 210 MG/DL (ref 70–105)
GLUCOSE UR STRIP-MCNC: 200 MG/DL
HBA1C MFR BLD: 8 % (ref 4–6.2)
HCT VFR BLD AUTO: 41.5 % (ref 40–53)
HDLC SERPL-MCNC: 33 MG/DL (ref 23–92)
HGB BLD-MCNC: 14.2 G/DL (ref 13.3–17.7)
HGB UR QL STRIP: NEGATIVE
KETONES UR STRIP-MCNC: NEGATIVE MG/DL
LDLC SERPL CALC-MCNC: 59 MG/DL
LEUKOCYTE ESTERASE UR QL STRIP: NEGATIVE
MCH RBC QN AUTO: 30.1 PG (ref 26.5–33)
MCHC RBC AUTO-ENTMCNC: 34.2 G/DL (ref 31.5–36.5)
MCV RBC AUTO: 88 FL (ref 78–100)
NITRATE UR QL: NEGATIVE
NONHDLC SERPL-MCNC: 81 MG/DL
PH UR STRIP: 6 [PH] (ref 5–9)
PLATELET # BLD AUTO: 145 10E3/UL (ref 150–450)
POTASSIUM BLD-SCNC: 4.1 MMOL/L (ref 3.5–5.1)
PROT SERPL-MCNC: 6.2 G/DL (ref 6.4–8.9)
RBC # BLD AUTO: 4.71 10E6/UL (ref 4.4–5.9)
SODIUM SERPL-SCNC: 137 MMOL/L (ref 134–144)
SP GR UR STRIP: 1.02 (ref 1–1.03)
TRIGL SERPL-MCNC: 111 MG/DL
TSH SERPL DL<=0.005 MIU/L-ACNC: 1.61 MU/L (ref 0.4–4)
UROBILINOGEN UR STRIP-MCNC: NORMAL MG/DL
WBC # BLD AUTO: 5.8 10E3/UL (ref 4–11)

## 2022-04-20 PROCEDURE — 36415 COLL VENOUS BLD VENIPUNCTURE: CPT | Mod: ZL

## 2022-04-20 PROCEDURE — 80053 COMPREHEN METABOLIC PANEL: CPT | Mod: ZL

## 2022-04-20 PROCEDURE — 85027 COMPLETE CBC AUTOMATED: CPT | Mod: ZL

## 2022-04-20 PROCEDURE — G0463 HOSPITAL OUTPT CLINIC VISIT: HCPCS

## 2022-04-20 PROCEDURE — 80061 LIPID PANEL: CPT | Mod: ZL

## 2022-04-20 PROCEDURE — 99214 OFFICE O/P EST MOD 30 MIN: CPT | Performed by: INTERNAL MEDICINE

## 2022-04-20 PROCEDURE — 83036 HEMOGLOBIN GLYCOSYLATED A1C: CPT | Mod: ZL

## 2022-04-20 PROCEDURE — 84443 ASSAY THYROID STIM HORMONE: CPT | Mod: ZL

## 2022-04-20 PROCEDURE — 81003 URINALYSIS AUTO W/O SCOPE: CPT | Mod: ZL

## 2022-04-20 RX ORDER — DILTIAZEM HYDROCHLORIDE 120 MG/1
120 CAPSULE, EXTENDED RELEASE ORAL EVERY EVENING
Qty: 90 CAPSULE | Refills: 1 | Status: SHIPPED | OUTPATIENT
Start: 2022-04-20 | End: 2022-07-21

## 2022-04-20 RX ORDER — IRBESARTAN AND HYDROCHLOROTHIAZIDE 150; 12.5 MG/1; MG/1
1 TABLET, FILM COATED ORAL EVERY MORNING
COMMUNITY
Start: 2022-04-04 | End: 2022-05-12

## 2022-04-20 RX ORDER — GABAPENTIN 100 MG/1
100 CAPSULE ORAL AT BEDTIME
Qty: 90 CAPSULE | Refills: 1 | Status: SHIPPED | OUTPATIENT
Start: 2022-04-20 | End: 2022-05-12

## 2022-04-20 RX ORDER — GLIPIZIDE 5 MG/1
5 TABLET ORAL
Qty: 270 TABLET | Refills: 1 | Status: SHIPPED | OUTPATIENT
Start: 2022-04-20 | End: 2022-07-21

## 2022-04-20 RX ORDER — DILTIAZEM HYDROCHLORIDE 120 MG/1
CAPSULE, EXTENDED RELEASE ORAL
COMMUNITY
Start: 2022-04-04 | End: 2022-05-05

## 2022-04-20 RX ORDER — PRAVASTATIN SODIUM 40 MG
40 TABLET ORAL AT BEDTIME
Qty: 90 TABLET | Refills: 1 | Status: SHIPPED | OUTPATIENT
Start: 2022-04-20 | End: 2022-07-21

## 2022-04-20 RX ORDER — AMLODIPINE BESYLATE 5 MG/1
5 TABLET ORAL 2 TIMES DAILY
Qty: 180 TABLET | Refills: 1 | Status: SHIPPED | OUTPATIENT
Start: 2022-04-20 | End: 2022-05-12

## 2022-04-20 RX ORDER — TAMSULOSIN HYDROCHLORIDE 0.4 MG/1
0.4 CAPSULE ORAL EVERY EVENING
Qty: 90 CAPSULE | Refills: 1 | Status: SHIPPED | OUTPATIENT
Start: 2022-04-20 | End: 2022-07-21

## 2022-04-20 ASSESSMENT — ENCOUNTER SYMPTOMS
DYSURIA: 0
ABDOMINAL PAIN: 0
DIARRHEA: 0
COUGH: 0
LIGHT-HEADEDNESS: 0
CONFUSION: 0
ARTHRALGIAS: 1
SLEEP DISTURBANCE: 1
FEVER: 0
FREQUENCY: 1
SHORTNESS OF BREATH: 1
EYE PAIN: 0
AGITATION: 0
VOMITING: 0
WHEEZING: 0
BACK PAIN: 1
HEMATURIA: 0
DIZZINESS: 0
FATIGUE: 1
NAUSEA: 0
CHILLS: 0
BRUISES/BLEEDS EASILY: 0
MYALGIAS: 0
PALPITATIONS: 1

## 2022-04-20 ASSESSMENT — PAIN SCALES - GENERAL: PAINLEVEL: NO PAIN (0)

## 2022-04-20 NOTE — PROGRESS NOTES
Assessment & Plan       ICD-10-CM    1. Uncontrolled type 2 diabetes mellitus with hyperglycemia (H)  E11.65 metFORMIN (GLUCOPHAGE) 500 MG tablet     gabapentin (NEURONTIN) 100 MG capsule     glipiZIDE (GLUCOTROL) 5 MG tablet   2. Benign essential hypertension  I10 diltiazem ER (TIAZAC) 120 MG 24 hr ER beaded capsule     amLODIPine (NORVASC) 5 MG tablet     diltiazem ER (DILT-XR) 120 MG 24 hr capsule   3. Chronic kidney disease, stage 2 (mild)  N18.2    4. Mixed hyperlipidemia  E78.2 pravastatin (PRAVACHOL) 40 MG tablet   5. Raynaud's phenomenon without gangrene  I73.00 diltiazem ER (TIAZAC) 120 MG 24 hr ER beaded capsule     amLODIPine (NORVASC) 5 MG tablet     diltiazem ER (DILT-XR) 120 MG 24 hr capsule   6. Premature atrial contractions  I49.1 diltiazem ER (DILT-XR) 120 MG 24 hr capsule   7. Ventricular bigeminy  I49.8 diltiazem ER (DILT-XR) 120 MG 24 hr capsule   8. Nocturia  R35.1 tamsulosin (FLOMAX) 0.4 MG capsule   9. Vaccine counseling  Z71.85    Type 2 diabetes with hyperglycemia.  Blood sugars are uncontrolled.  He plans to continue working on healthy diet exercise and weight loss.  Needs refills of metformin and glipizide.  Continues with gabapentin for diabetic polyneuropathy.  Needs refills.    Hypertension.  Blood pressure is currently controlled.  Denies syncope or presyncope.  Doing well with current medication regimen.  Continues on diltiazem and amlodipine.  Needs refills.    Chronic kidney disease stage II.  Kidney function has been relatively stable.  Slow decline over time.  Encourage NSAID avoidance.    Mixed hyperlipidemia.  Cholesterol levels are all at goal.  Doing well with pravastatin.  Needs refills.  Denies side effects.    Raynaud's phenomenon without gangrene.  Doing well with amlodipine and diltiazem.  Needs refills.  Has not had any cold white fingers in a long time.    Premature atrial contractions and PVCs.  Diltiazem has been very effective.  Needs refills    Nocturia, ongoing but  improved with Flomax.  Needs refills.    Vaccine counseling completed.  Encouraged consideration of shingles vaccination, COVID shot.    Encouraged weight loss and regular exercise.     Return in about 3 months (around 7/20/2022) for - Labs every 91+ days, DM Follow-up 1-2 days later, with Dr. Montanez.    Nawaf Montanez MD  [facility]]      Michael Harris is a 86 year old who presents for the following health issues     History of Present Illness       Diabetes:   He presents for follow up of diabetes.  He is checking home blood glucose two times daily. He checks blood glucose before meals.  Blood glucose is sometimes over 200 and sometimes under 70. He is aware of hypoglycemia symptoms including shakiness, dizziness and weakness. He is concerned about blood sugar frequently over 200 and low blood sugar, several less than 70 in the past few weeks.  He is having burning in feet. The patient has had a diabetic eye exam in the last 12 months. Eye exam performed on 03/2022. Location of last eye exam [facility] .        Hypertension: He presents for follow up of hypertension.  He does not check blood pressure  regularly outside of the clinic. Outpatient blood pressures have not been over 140/90. He follows a low salt diet.     He eats 0-1 servings of fruits and vegetables daily.He consumes 0 sweetened beverage(s) daily.He exercises with enough effort to increase his heart rate 20 to 29 minutes per day.  He exercises with enough effort to increase his heart rate 6 days per week.   He is taking medications regularly.     Review of Systems   Constitutional: Positive for fatigue (Continues on Metformin - stopped taking B-complex). Negative for chills and fever.   HENT: Negative for congestion and hearing loss.    Eyes: Negative for pain and visual disturbance.   Respiratory: Positive for shortness of breath (mild, stable.). Negative for cough and wheezing.    Cardiovascular: Positive for  "palpitations. Negative for chest pain.        Raynaud's syndrome, no ulcerations   Gastrointestinal: Negative for abdominal pain, diarrhea, nausea and vomiting.   Endocrine: Positive for cold intolerance. Negative for heat intolerance.        + Hyperglycemia - Glipizide increased to 5 mg TID on 3/15/2022   Genitourinary: Positive for frequency and urgency. Negative for dysuria and hematuria.        + Nocturia 4-5x nightly   Musculoskeletal: Positive for arthralgias (Right shoulder pains, if does too much or using in certain positions), back pain (+ intermittent low back pain) and gait problem (intermittent, due to left calf, limping). Negative for myalgias.   Skin: Negative for pallor and rash.   Allergic/Immunologic: Negative for immunocompromised state.   Neurological: Negative for dizziness and light-headedness.        Rare positional lightheadedness   Hematological: Does not bruise/bleed easily.   Psychiatric/Behavioral: Positive for sleep disturbance (+ insomnia). Negative for agitation and confusion.          Objective    /78 (BP Location: Right arm, Patient Position: Sitting, Cuff Size: Adult Large)   Pulse 67   Resp 20   Ht 1.753 m (5' 9\")   Wt 84.6 kg (186 lb 9.6 oz)   SpO2 97%   BMI 27.56 kg/m    Body mass index is 27.56 kg/m .  Physical Exam  Constitutional:       General: He is not in acute distress.     Appearance: He is well-developed. He is not diaphoretic.   HENT:      Head: Normocephalic and atraumatic.   Eyes:      General: No scleral icterus.     Conjunctiva/sclera: Conjunctivae normal.   Cardiovascular:      Rate and Rhythm: Normal rate and regular rhythm.      Pulses: Normal pulses.   Pulmonary:      Effort: Pulmonary effort is normal.      Breath sounds: Normal breath sounds.   Abdominal:      Palpations: Abdomen is soft.      Tenderness: There is no abdominal tenderness.   Musculoskeletal:         General: No deformity.      Cervical back: Neck supple.      Right lower leg: Edema " (trace) present.      Left lower leg: Edema (trace) present.   Lymphadenopathy:      Cervical: No cervical adenopathy.   Skin:     General: Skin is warm and dry.      Coloration: Skin is not jaundiced.      Findings: No rash.   Neurological:      Mental Status: He is alert and oriented to person, place, and time. Mental status is at baseline.   Psychiatric:         Mood and Affect: Mood normal.         Behavior: Behavior normal.          Recent Labs   Lab Test 04/20/22  1410 01/19/22  1420 10/12/21  1022 10/12/21  1022 07/07/21  0855 03/17/21  1131   A1C 8.0* 9.1*  --  7.2* 7.1* 7.4*   LDL 59 59  --  93 86 85   HDL 33 32  --  36 37 33   TRIG 111 143  --  59 135 176*   ALT 12 13  --  13 13 12   CR 0.99 1.04   < > 0.83 1.04 0.94   GFRESTIMATED 75 70   < > 80 68 76   GFRESTBLACK  --   --   --   --  82 >90   POTASSIUM 4.1 4.7   < > 4.5 4.1 4.1   TSH 1.61 1.85   < > 1.64  --   --     < > = values in this interval not displayed.   Hemoglobin A1c is high but has improved.  LDL is at goal.  HDL and triglycerides are at goal.  ALT is normal.  Creatinine is at baseline.  Potassium and TSH are normal.

## 2022-04-20 NOTE — PATIENT INSTRUCTIONS
Continue the higher dose of Glipizide 5 mg -- as directed for diabetes.     Continue Irbesartan/hydrochlorothiazide for blood pressure.       Diabetes is better, but glucose levels are still a little high....     Labs are otherwise stable.     Medications refilled.     Results for orders placed or performed in visit on 04/20/22   Comprehensive metabolic panel     Status: Abnormal   Result Value Ref Range    Sodium 137 134 - 144 mmol/L    Potassium 4.1 3.5 - 5.1 mmol/L    Chloride 104 98 - 107 mmol/L    Carbon Dioxide (CO2) 26 21 - 31 mmol/L    Anion Gap 7 3 - 14 mmol/L    Urea Nitrogen 26 (H) 7 - 25 mg/dL    Creatinine 0.99 0.70 - 1.30 mg/dL    Calcium 9.2 8.6 - 10.3 mg/dL    Glucose 210 (H) 70 - 105 mg/dL    Alkaline Phosphatase 56 34 - 104 U/L    AST 11 (L) 13 - 39 U/L    ALT 12 7 - 52 U/L    Protein Total 6.2 (L) 6.4 - 8.9 g/dL    Albumin 4.1 3.5 - 5.7 g/dL    Bilirubin Total 0.8 0.3 - 1.0 mg/dL    GFR Estimate 75 >60 mL/min/1.73m2   Lipid Profile     Status: None   Result Value Ref Range    Cholesterol 114 <200 mg/dL    Triglycerides 111 <150 mg/dL    Direct Measure HDL 33 23 - 92 mg/dL    LDL Cholesterol Calculated 59 <=100 mg/dL    Non HDL Cholesterol 81 <130 mg/dL    Patient Fasting > 8hrs? Unknown     Narrative    Cholesterol  Desirable:  <200 mg/dL    Triglycerides  Normal:  Less than 150 mg/dL  Borderline High:  150-199 mg/dL  High:  200-499 mg/dL  Very High:  Greater than or equal to 500 mg/dL    Direct Measure HDL  Female:  Greater than or equal to 50 mg/dL   Male:  Greater than or equal to 40 mg/dL    LDL Cholesterol  Desirable:  <100mg/dL  Above Desirable:  100-129 mg/dL   Borderline High:  130-159 mg/dL   High:  160-189 mg/dL   Very High:  >= 190 mg/dL    Non HDL Cholesterol  Desirable:  130 mg/dL  Above Desirable:  130-159 mg/dL  Borderline High:  160-189 mg/dL  High:  190-219 mg/dL  Very High:  Greater than or equal to 220 mg/dL   CBC with platelets     Status: Abnormal   Result Value Ref Range     WBC Count 5.8 4.0 - 11.0 10e3/uL    RBC Count 4.71 4.40 - 5.90 10e6/uL    Hemoglobin 14.2 13.3 - 17.7 g/dL    Hematocrit 41.5 40.0 - 53.0 %    MCV 88 78 - 100 fL    MCH 30.1 26.5 - 33.0 pg    MCHC 34.2 31.5 - 36.5 g/dL    RDW 13.1 10.0 - 15.0 %    Platelet Count 145 (L) 150 - 450 10e3/uL   Hemoglobin A1c     Status: Abnormal   Result Value Ref Range    Hemoglobin A1C 8.0 (H) 4.0 - 6.2 %   TSH with free T4 reflex     Status: Normal   Result Value Ref Range    TSH 1.61 0.40 - 4.00 mU/L   UA reflex to Microscopic     Status: Abnormal   Result Value Ref Range    Color Urine Yellow Colorless, Straw, Light Yellow, Yellow    Appearance Urine Clear Clear    Glucose Urine 200  (A) Negative mg/dL    Bilirubin Urine Negative Negative    Ketones Urine Negative Negative mg/dL    Specific Gravity Urine 1.019 1.000 - 1.030    Blood Urine Negative Negative    pH Urine 6.0 5.0 - 9.0    Protein Albumin Urine Negative Negative mg/dL    Urobilinogen Urine Normal Normal, 2.0 mg/dL    Nitrite Urine Negative Negative    Leukocyte Esterase Urine Negative Negative    Narrative    Microscopic not indicated      Aspects of Diabetes:   Recent Labs   Lab Test 04/20/22  1410 01/19/22  1420 10/12/21  1022 10/12/21  1022 07/07/21  0855 03/17/21  1131   A1C 8.0* 9.1*  --  7.2* 7.1* 7.4*   LDL 59 59  --  93 86 85   HDL 33 32  --  36 37 33   TRIG 111 143  --  59 135 176*   ALT 12 13  --  13 13 12   CR 0.99 1.04   < > 0.83 1.04 0.94   GFRESTIMATED 75 70   < > 80 68 76   GFRESTBLACK  --   --   --   --  82 >90   POTASSIUM 4.1 4.7   < > 4.5 4.1 4.1   TSH 1.61 1.85   < > 1.64  --   --     < > = values in this interval not displayed.      Hemoglobin A1c  Goal range is under 8%. Best is 6.5 to 7   Blood Pressure 126/78 Goal to keep less than 140/90   Tobacco  reports that he quit smoking about 47 years ago. His smoking use included cigarettes. He has a 10.00 pack-year smoking history. He has never used smokeless tobacco. Goal to abstain from tobacco    Aspirin or Plavix Anti-platelet therapy Aspirin or Plavix reduces risk of heart disease and stroke  -- sometimes used with other blood thinners, depending on bleeding risk and risk factors.    ACE/ARB Specific blood pressure meds These medications can reduce risk of kidney disease   Cholesterol Statins (Lipitor, Crestor, vs others) Statins reduce risk of heart disease and stroke   Eye Exam -- Do Yearly -- Annual diabetic eye exam   Healthy weight Wt Readings from Last 3 Encounters:   04/20/22 84.6 kg (186 lb 9.6 oz)   03/15/22 85.3 kg (188 lb)   01/19/22 83.3 kg (183 lb 9.6 oz)     Body mass index is 27.56 kg/m .  Goal BMI under 30, best is under 25.      -- Trying to exercise daily (goal at least 20 min/day) with moderate aerobic activity   -- Eat healthy (resources from ADA at http://www.diabetes.org/)   -- Taking good care of my feet. Consider seeing the Podiatrist   -- Check blood sugars as directed, record in log book and bring to every appointment    Insurance companies are grading you and I on your blood sugar control -- Goal is to get your A1c down to 7.9% or lower and NO Smoking!  -- Medicare and most insurance companies, will only cover Hemoglobin A1c labs to be rechecked every 91+ days.      Return for Diabetes labs and clinic follow-up appointment every 3 to 4 months.    Schedule lab only appointment --- A few days AFTER: 7/19/22   Schedule clinic appointment with Dr. Montanez -- Same day as labs, or 1-2 days later.

## 2022-04-20 NOTE — NURSING NOTE
"Chief Complaint   Patient presents with     RECHECK     Diabetic check         Initial /78 (BP Location: Right arm, Patient Position: Sitting, Cuff Size: Adult Large)   Pulse 67   Resp 20   Ht 1.753 m (5' 9\")   Wt 84.6 kg (186 lb 9.6 oz)   SpO2 97%   BMI 27.56 kg/m   Estimated body mass index is 27.56 kg/m  as calculated from the following:    Height as of this encounter: 1.753 m (5' 9\").    Weight as of this encounter: 84.6 kg (186 lb 9.6 oz).       FOOD SECURITY SCREENING QUESTIONS:    The next two questions are to help us understand your food security.  If you are feeling you need any assistance in this area, we have resources available to support you today.    Hunger Vital Signs:  Within the past 12 months we worried whether our food would run out before we got money to buy more. Never  Within the past 12 months the food we bought just didn't last and we didn't have money to get more. Never    Advance Care Directive on file? no      Medication reconciliation complete.      Jerel Benson,on 4/20/2022 at 2:58 PM        "

## 2022-05-04 ENCOUNTER — NURSE TRIAGE (OUTPATIENT)
Dept: INTERNAL MEDICINE | Facility: OTHER | Age: 87
End: 2022-05-04
Payer: COMMERCIAL

## 2022-05-04 NOTE — TELEPHONE ENCOUNTER
"Called and spoke with Patient and verified his last name and . He gave verbal permission to discuss PHI with his friend Ayana.     Ayana states earlier today Pt's BP was 125/42 and pulse was 56. Pt c/o dizziness and weakness of both legs and sat in chair. Left pupil appeared larger than right. Sx mostly resolved including pupil size with minimal dizziness if he \"does anything too much.\"     Protocol recommends Pt be seen within 3 days in office, due to diastolic BP <50 mm. No openings in clinic this week. Ayana plans to stay with Pt one more day to monitor. Pt lives in [place] (50 miles from [place] or [place]).     Routing to Dr. Montanez, to review and advise. Please return call to Ayana at [phone]; ok to leave detailed message. Sandra Ch RN .............. 2022  3:47 PM      Reason for Disposition    Diastolic BP < 50 mm Hg    Additional Information    Negative: Systolic BP < 90 and feeling dizzy, lightheaded, or weak    Negative: Started suddenly after an allergic medicine, an allergic food, or bee sting    Negative: Shock suspected (e.g., cold/pale/clammy skin, too weak to stand, low BP, rapid pulse)    Negative: Difficult to awaken or acting confused  (e.g., disoriented, slurred speech)    Negative: Fainted    Negative: Chest pain    Negative: Bleeding (e.g., vomiting blood, rectal bleeding or tarry stools, severe vaginal bleeding)    Negative: Extra heart beats or heart is beating fast  (i.e., \"palpitations\")    Negative: Sounds like a life-threatening emergency to the triager    Negative: Systolic BP < 80 and NOT dizzy, lightheaded or weak (feels normal)    Negative: Abdominal pain    Negative: Major surgery in the past month    Negative: Fever > 100.4 F (38.0 C)    Negative: Drinking very little and has signs of dehydration (e.g., no urine > 12 hours, very dry mouth, very lightheaded)    Negative: Systolic BP < 90 and NOT dizzy, lightheaded or weak    Negative: Fall in systolic BP > 20 mm " Hg from normal and feeling dizzy, lightheaded, or weak    Negative: Patient sounds very sick or weak to the triager    Negative: Systolic BP  while taking blood pressure medications and NOT dizzy, lightheaded or weak    Negative: Fall in systolic BP > 20 mm Hg from normal and NOT dizzy, lightheaded, or weak    Negative: Fall in systolic BP > 20 mmHg after standing up    Negative: Fall in systolic BP > 20 mmHg after eating a meal    Answer Assessment - Initial Assessment Questions  Denies: missed doses medication, dehydration, bleeding, chest pain, SOB, abnormal heart beat, arm weakness, headache    Protocols used: LOW BLOOD PRESSURE-A-OH

## 2022-05-04 NOTE — TELEPHONE ENCOUNTER
Please call the patient and his friend Ayana.  They are together.  She took his BP and its 125/45.   They are concerned.  Please call soon.      Krista Hinkle on 5/4/2022 at 11:20 AM

## 2022-05-04 NOTE — TELEPHONE ENCOUNTER
Please call patient.     Is he having any problems?    If he feels fine, wouldn't change anything.     Electronically signed by:  Nawaf Montanez MD  on May 4, 2022 3:24 PM

## 2022-05-05 NOTE — TELEPHONE ENCOUNTER
Please call patient and verify which medications he is actually taking.  Please have him verify with his pill bottles.    Last time he was in the office, the medications were listed were not the same as what he was taking.    Electronically signed by:  Nawaf Montanez MD  on May 5, 2022 4:50 PM

## 2022-05-05 NOTE — TELEPHONE ENCOUNTER
"Called Ayana Dan and she verified Pt's last name and . She states Pt called her today at 11:30 AM and noted he was feeling good. BP at that time was 126/56. Last evening, Pt was less dizzy, but would hang onto the couch and felt like his head was \"swimming\" if he bent down to grab something off of the cough. Ayana Dan had him sit for while. Pt checking BP upon waking, at noon and around 6 PM (taking average of 3 BP readings, 5 minutes apart each time). Erma has instructed him to \"not cross his ankles, hold his hand a certain way, not mess around, and take this serious.\" Erma verbalized plan to check in with Pt again this evening.    Routing update to Dr. Montanez. Please advise if medication change is desired. Ok to call Steven or Ayana Dan.    Sandra Ch RN .............. 2022  4:47 PM    "

## 2022-05-05 NOTE — TELEPHONE ENCOUNTER
Provider Recommendation Follow Up:   Unable to reach patient/caregiver. Left message to return call to [phone] and ask for Sherron at extension [phone].     Sandra Ch RN .............. 5/5/2022  8:28 AM

## 2022-05-05 NOTE — TELEPHONE ENCOUNTER
Called and spoke to Patient after verifying last name and date of birth. Pt states he is not home right now and requested a call back tomorrow morning around 9 AM and he will have his medications ready to check. Sandra Ch RN .............. 5/5/2022  5:03 PM

## 2022-05-05 NOTE — TELEPHONE ENCOUNTER
Provider Response to 2nd Level Triage Request    I have reviewed the RN documentation. My recommendation is:  Please call and check on patient this morning.  We may need to reduce his irbesartan-hydrochlorothiazide tablet down to half of a tablet in the morning.      Electronically signed by:  Nawaf Montanez MD  on May 5, 2022 8:11 AM

## 2022-05-06 NOTE — TELEPHONE ENCOUNTER
Please schedule with me to review blood pressures in the next couple weeks, if there is availability.     Please schedule with Sports medicine for right arm/shoulder pain.     Electronically signed by:  Nawaf Montanez MD  on May 6, 2022 11:40 AM

## 2022-05-06 NOTE — TELEPHONE ENCOUNTER
Should only be taking 1 tablet of Diltiazem 120 mg once daily...     ER and XR are the same drugs... (with different initials). 120 mg once daily.     Take one or the other... once daily.... Not both.     Electronically signed by:  Nawaf Montanez MD  on May 6, 2022 11:37 AM

## 2022-05-06 NOTE — TELEPHONE ENCOUNTER
"Called and spoke to Patient after verifying last name and date of birth.    1.) Pt taking all medications as listed on active medication list except:    Pt takes apple cider vinegar tablets only as needed.    Pt does not take garlic caps. Please remove from list.    2.) Pt also takes Potassium 99 mg every morning. Please add to list.    3.) Pt states he is taking Metformin from prescription bottle dated 10/4/19. He states he has 10 bottles in his medicine cabinet. Pt has read reports that this medication can be harmful and would prefer to not take. He currently takes as ordered.    4.) Per active medication list, Pt has one entry for Diltiazem:  diltiazem ER (DILT-XR) 120 MG 24 hr capsule 90 capsule 1 4/20/2022  No   Sig - Route: Take 1 capsule (120 mg) by mouth every evening - for Heart and Raynaud's - Oral     Pt reports has 2 different bottles of Diltiazem, one reads Diltiazem  mg and the other reads Diltiazem  mg. The pills look completely different: one is 1/4\" x 1/2\" orange and white and the other is 1/8\" x 1/8\" and blue in color. One is dated 3/5/22 and the other is dated 4/4/22. Pt takes both.    Called and spoke with Yasmeen at [facility] and she confirmed they have both on Pt's profile. The ER was dispensed 4/4 and XR was dispensed 3/5, both for #90 and both ordered by Dr. Montanez. Pharmacist confirmed ER and XR are different colors and shapes.    5.) Pt reports BP and P readings this AM:    134/58, 51    130/58, 50    129/58, 52    6.) Pt is wondering why his legs are very tired from the knees down and he is not always steady when he walks.    7.) Pt states last fall he used a chainsaw for 2 hours with his right arm. The next day he could barely move his arm. He has struggled with arm soreness since last Fall, and today right arm is very sore. Pt is wondering if it is popping out of place and wishing to see Dr. Montanez soon in regards to this.    Routing to Dr. Montanez to review and advise. " Sandra Ch RN .............. 5/6/2022  9:44 AM

## 2022-05-10 ENCOUNTER — TELEPHONE (OUTPATIENT)
Dept: INTERNAL MEDICINE | Facility: OTHER | Age: 87
End: 2022-05-10
Payer: COMMERCIAL

## 2022-05-10 NOTE — TELEPHONE ENCOUNTER
+ Still having symptoms, despite only taking 1 diltiazem daily.     --> reduce irbesartan-hydrochlorothiazide tablet down to half of a tablet (1/2 tablet) in the morning    Electronically signed by:  Nawaf Montanez MD  on May 10, 2022 3:56 PM

## 2022-05-10 NOTE — TELEPHONE ENCOUNTER
"Called and spoke with Patient's friend Ayana Dan. She is not with Pt at this time. She lives in [place]. She states Pt has been talking with her every morning and evening and she last saw him 2 days ago. She is more worried about him than she was when she called a few days ago. He tells her that he is woozy, dizzy and legs feel weak from the knees down. His blood pressure was \"way down on top over 42.\" Sandra Ch RN .............. 5/10/2022  3:20 PM  "

## 2022-05-10 NOTE — TELEPHONE ENCOUNTER
"Called and spoke to Patient after verifying last name and date of birth. Pt states he feels \"a little lightheaded, woozy, doesn't feel like doing much of anything, and weak from the knees down.\" Pt does \"putz outside\" and still walks on his treadmill. Pt confirmed he stopped extra dose of diltiazem. He plans to see Dr. Montanez this coming Thursday. Pt desires to fill out consent to communicate form for friend Ayana Dan while at appointment.    BP and Pulse readings from today:    Upon wakin/47,60    111/50,57    109/50,58    1200:     112/49,58    114/45,58    114/45,58    Per triage encounter dated , Dr. Montanez noted: We may need to reduce his irbesartan-hydrochlorothiazide tablet down to half of a tablet in the morning. Routing to Dr. Montanez, to review and advise.     Sandra Ch RN .............. 5/10/2022  3:19 PM  "

## 2022-05-10 NOTE — TELEPHONE ENCOUNTER
Ayana Dan is concerned about the patient.  He has been giving her his B/P and has been dizzy and weak from  the knees down.    Please call  Ayana Dan    Thank you   Gretta Quintana on 5/10/2022 at 12:44 PM  Ayana Dan was told that we may not be able to talk her and will have to talk to patient   She is very worried about him.

## 2022-05-10 NOTE — TELEPHONE ENCOUNTER
Called and spoke to Patient after verifying last name and date of birth. Pt informed of Dr. Montanez' response. The patient indicates understanding of these issues and agrees with the plan. Sandra Ch RN .............. 5/10/2022  4:09 PM

## 2022-05-12 ENCOUNTER — OFFICE VISIT (OUTPATIENT)
Dept: FAMILY MEDICINE | Facility: OTHER | Age: 87
End: 2022-05-12
Attending: FAMILY MEDICINE
Payer: COMMERCIAL

## 2022-05-12 ENCOUNTER — OFFICE VISIT (OUTPATIENT)
Dept: INTERNAL MEDICINE | Facility: OTHER | Age: 87
End: 2022-05-12
Attending: INTERNAL MEDICINE
Payer: COMMERCIAL

## 2022-05-12 ENCOUNTER — HOSPITAL ENCOUNTER (OUTPATIENT)
Dept: GENERAL RADIOLOGY | Facility: OTHER | Age: 87
Discharge: HOME | End: 2022-05-12
Attending: FAMILY MEDICINE
Payer: COMMERCIAL

## 2022-05-12 VITALS
HEIGHT: 69 IN | HEART RATE: 68 BPM | RESPIRATION RATE: 20 BRPM | WEIGHT: 185 LBS | OXYGEN SATURATION: 98 % | SYSTOLIC BLOOD PRESSURE: 128 MMHG | BODY MASS INDEX: 27.4 KG/M2 | DIASTOLIC BLOOD PRESSURE: 80 MMHG

## 2022-05-12 VITALS
BODY MASS INDEX: 27.12 KG/M2 | DIASTOLIC BLOOD PRESSURE: 80 MMHG | RESPIRATION RATE: 18 BRPM | OXYGEN SATURATION: 98 % | SYSTOLIC BLOOD PRESSURE: 128 MMHG | WEIGHT: 185 LBS | HEART RATE: 68 BPM

## 2022-05-12 DIAGNOSIS — M75.51 SUBACROMIAL BURSITIS OF RIGHT SHOULDER JOINT: ICD-10-CM

## 2022-05-12 DIAGNOSIS — M19.011 ARTHRITIS OF RIGHT ACROMIOCLAVICULAR JOINT: ICD-10-CM

## 2022-05-12 DIAGNOSIS — I10 BENIGN ESSENTIAL HYPERTENSION: ICD-10-CM

## 2022-05-12 DIAGNOSIS — I73.00 RAYNAUD'S PHENOMENON WITHOUT GANGRENE: ICD-10-CM

## 2022-05-12 DIAGNOSIS — E11.65 UNCONTROLLED TYPE 2 DIABETES MELLITUS WITH HYPERGLYCEMIA (H): ICD-10-CM

## 2022-05-12 DIAGNOSIS — M75.41 SUBACROMIAL IMPINGEMENT OF RIGHT SHOULDER: ICD-10-CM

## 2022-05-12 DIAGNOSIS — S46.811A TEAR OF RIGHT INFRASPINATUS TENDON, INITIAL ENCOUNTER: ICD-10-CM

## 2022-05-12 DIAGNOSIS — M19.011 ARTHRITIS OF RIGHT GLENOHUMERAL JOINT: ICD-10-CM

## 2022-05-12 DIAGNOSIS — S46.011A RUPTURE OF RIGHT SUPRASPINATUS TENDON, INITIAL ENCOUNTER: Primary | ICD-10-CM

## 2022-05-12 PROCEDURE — G0463 HOSPITAL OUTPT CLINIC VISIT: HCPCS

## 2022-05-12 PROCEDURE — 99214 OFFICE O/P EST MOD 30 MIN: CPT | Performed by: FAMILY MEDICINE

## 2022-05-12 PROCEDURE — 99214 OFFICE O/P EST MOD 30 MIN: CPT | Performed by: INTERNAL MEDICINE

## 2022-05-12 PROCEDURE — G0463 HOSPITAL OUTPT CLINIC VISIT: HCPCS | Mod: 25 | Performed by: INTERNAL MEDICINE

## 2022-05-12 PROCEDURE — 73030 X-RAY EXAM OF SHOULDER: CPT | Mod: RT

## 2022-05-12 PROCEDURE — G0463 HOSPITAL OUTPT CLINIC VISIT: HCPCS | Mod: 25,27

## 2022-05-12 RX ORDER — GABAPENTIN 100 MG/1
100 CAPSULE ORAL AT BEDTIME
Qty: 90 CAPSULE | Refills: 1 | Status: SHIPPED | OUTPATIENT
Start: 2022-05-12 | End: 2022-07-21

## 2022-05-12 RX ORDER — AMLODIPINE BESYLATE 5 MG/1
2.5 TABLET ORAL DAILY
Qty: 180 TABLET | Refills: 1 | COMMUNITY
Start: 2022-05-12 | End: 2022-10-26

## 2022-05-12 RX ORDER — IRBESARTAN AND HYDROCHLOROTHIAZIDE 150; 12.5 MG/1; MG/1
0.5 TABLET, FILM COATED ORAL EVERY MORNING
COMMUNITY
Start: 2022-05-12 | End: 2022-07-05

## 2022-05-12 ASSESSMENT — PAIN SCALES - GENERAL
PAINLEVEL: NO PAIN (0)
PAINLEVEL: MILD PAIN (2)

## 2022-05-12 NOTE — PATIENT INSTRUCTIONS
Double check dosing of the Norvasc / Amlodipine..... need to know if it is:    2.5 mg (1/2 tablet)   or   1 full tablet (5 mg) once daily...   or   even 1 tablet (5 mg) twice daily.       Glad you are feeling better!      Blood pressure checks at home - check some in AM, some in Afternoon, some in Evening and record   -- bring these with you to your next appointment.     Goal blood pressures -- less than 140 and less than 90.    -- Ideally would like the numbers about 110-130 and 70-80.  -- If running higher or lower than this on regular basis, will need to adjust your medications.        Return as needed for follow-up with Dr. Montanez.    Clinic : [phone]  Appointment line: [phone]

## 2022-05-12 NOTE — PROGRESS NOTES
"HPI:  86-year-old male coming in for evaluation of a right shoulder/upper arm injury that occurred in 2021.  The patient was working on a chainsaw.  He had an acute injury affecting the lateral aspect of his shoulder/upper arm.  He had 1 previous evaluation and was told that he might have a \"blood clot\" in his upper arm.  He has continued to have pain.  He has difficulty with lying in bed.  He mostly has a dull 2/10 pain but can be as bad as 10/10 and sharp.  He does have associated tenderness.  He has found that KT tape and a strap around the upper arm help with his symptoms.      EXAM:  /80 (BP Location: Left arm, Patient Position: Sitting, Cuff Size: Adult Regular)   Pulse 68   Resp 18   Wt 83.9 kg (185 lb)   SpO2 98%   BMI 27.12 kg/m    MUSCULOSKELETAL EXAM:  RIGHT SHOULDER/UPPER ARM  Inspection:  -No gross deformity  -No bruising or swelling  -Scars:  None    Tenderness to palpation of the:  -SC joint:  Negative  -AC joint:  Negative  -Clavicle:  Negative  -Biceps tendon in bicipital groove:  Negative  -Deltoid musculature:  Negative  -Upper trapezius musculature:  Negative    Range of Motion:  -Active flexion:  150 left, 150 right  -Active abduction:  150 left, 150 right    Strength:  -Supraspinatus:  4-/5  -Infraspinatus:  4-/5  -Subscapularis:  5/5  -Deltoid:  4+/5    Special Tests:  -Eric test: Positive pain and weakness  -Holbrook test: Positive  -Neer test: Positive  -Mid-arc pain:  Absent  -Lag sign:  Negative    Other:  -Intact sensation to light touch distally.  -No signs of cyanosis. Normal skin temperature of the upper extremity.  -Elbow:  No gross deformity. Full range of motion.  -Hand/wrist:  No gross deformity. Full range of motion.  -Left shoulder:  No gross deformity. No palpable tenderness. Normal strength.      IMAGIN2022: 3 view left shoulder x-ray  -Diffuse osteopenia.  Moderate degenerative changes in the GH and AC joints.  High riding humerus within the " glenoid suggesting chronic rotator cuff injury.  No acute fracture.      ASSESSMENT/PLAN:  Diagnoses and all orders for this visit:  Rupture of right supraspinatus tendon, initial encounter  -     XR Shoulder Right G/E 3 Views  -     XR SHOULDER CONTRAST CT/MR INJECTION ; Future  -     MR SHOULDER ARTHROGRAM  RIGHT W CONTRAST; Future  Tear of right infraspinatus tendon, initial encounter  -     XR Shoulder Right G/E 3 Views  -     XR SHOULDER CONTRAST CT/MR INJECTION ; Future  -     MR SHOULDER ARTHROGRAM  RIGHT W CONTRAST; Future  Subacromial impingement of right shoulder  -     XR Shoulder Right G/E 3 Views  -     XR SHOULDER CONTRAST CT/MR INJECTION ; Future  -     MR SHOULDER ARTHROGRAM  RIGHT W CONTRAST; Future  Subacromial bursitis of right shoulder joint  -     XR Shoulder Right G/E 3 Views  -     XR SHOULDER CONTRAST CT/MR INJECTION ; Future  -     MR SHOULDER ARTHROGRAM  RIGHT W CONTRAST; Future  Arthritis of right acromioclavicular joint  Arthritis of right glenohumeral joint    86-year-old male with chronic rotator cuff tears involving the supraspinatus and infraspinatus tendons.  X-rays were performed in the office and personally reviewed by me with the findings as demonstrated above by my interpretation.  We discussed neck steps would either be nonsurgical with physical therapy to optimize function of the joint or surgical evaluation.  At this point it is unlikely that a complete tear would be able to be reattached due to atrophy as it has been about 8 months since his injury.  He may be a candidate for a reverse shoulder.  He is undecided which route he would like to go.  We will proceed with an MRI to further evaluate underlying rotator cuff disease to help plan for possible surgical interventions in the future.  - MR arthrogram right shoulder  - Follow-up in the office to review results  - Further management based on the results of advanced imaging  - If patient elects to proceed with surgical  interventions, he would like to see a doctor in [place], [place]      Kennedy Cox MD  5/12/2022  2:14 PM    Total time spent with this patient was 36 minutes which included chart review, visualization and interpretation of images, time spent with the patient, and documentation.

## 2022-05-12 NOTE — NURSING NOTE
"Chief Complaint   Patient presents with     Arm Pain     Right upper arm pain, ongoing for six months     Patient presents for right upper arm pain ongoing for 6 months. Pain 2-10/10. No known injury. Patient states \"he was running a chainsaw for a couple of hours and the next day his upper arm hurt.\"    Initial /80 (BP Location: Left arm, Patient Position: Sitting, Cuff Size: Adult Regular)   Pulse 68   Resp 18   Wt 83.9 kg (185 lb)   SpO2 98%   BMI 27.12 kg/m   Estimated body mass index is 27.12 kg/m  as calculated from the following:    Height as of an earlier encounter on 5/12/22: 1.759 m (5' 9.25\").    Weight as of this encounter: 83.9 kg (185 lb).       Medication Reconciliation: Complete    Nettie Salgado LPN .......  5/12/2022  2:03 PM   "

## 2022-05-12 NOTE — NURSING NOTE
"Chief Complaint   Patient presents with blood pressure             Initial /80 (BP Location: Right arm, Patient Position: Sitting, Cuff Size: Adult Large)   Pulse 68   Resp 20   Ht 1.759 m (5' 9.25\")   Wt 83.9 kg (185 lb)   SpO2 98%   BMI 27.12 kg/m   Estimated body mass index is 27.12 kg/m  as calculated from the following:    Height as of this encounter: 1.759 m (5' 9.25\").    Weight as of this encounter: 83.9 kg (185 lb).       FOOD SECURITY SCREENING QUESTIONS:    The next two questions are to help us understand your food security.  If you are feeling you need any assistance in this area, we have resources available to support you today.    Hunger Vital Signs:  Within the past 12 months we worried whether our food would run out before we got money to buy more. Never  Within the past 12 months the food we bought just didn't last and we didn't have money to get more. Never    Advance Care Directive on file?       Medication reconciliation complete.      Jerel Benson,on 5/12/2022 at 1:14 PM        "

## 2022-05-12 NOTE — PROGRESS NOTES
Assessment & Plan       ICD-10-CM    1. Benign essential hypertension  I10 amLODIPine (NORVASC) 5 MG tablet   2. Raynaud's phenomenon without gangrene  I73.00 amLODIPine (NORVASC) 5 MG tablet   3. Uncontrolled type 2 diabetes mellitus with hyperglycemia (H)  E11.65 gabapentin (NEURONTIN) 100 MG capsule     Patient presents for follow-up of multiple issues.    Hypertension.  Blood pressure is currently controlled but he is still having quite a bit of lightheadedness symptoms.  He believes that he is currently taking only 2.5 mg of amlodipine daily rather than current prescribed dosing of 5 mg twice daily.  We did reduce his irbesartan hydrochlorothiazide down to half of a tablet started yesterday.  He was taking 2 tablets of diltiazem or 240 mg once daily in the evening rather than just 1 tablet daily.  Since reducing that dose he has noted that his pulse is gotten into the 60s and 70s now rather than 50s and he did notice rather significant reduction in dizzy spells.  Advised that he check on the amlodipine dosing.  Reduction of irbesartan noted and updated today.  This was just changed yesterday by telephone.    Uncontrolled type 2 diabetes with diabetic neuropathy and hyperglycemia.  Continues with gabapentin.  Has been having more neuropathy pain.  Dose was adjusted today.  Refill sent to pharmacy.    Continue home blood pressure checks.  Has follow-up appointment in about 6 weeks.    Encouraged weight loss and regular exercise.     Return for Return as needed for follow-up with Dr. Montanez., Appointments: [phone].    Nawaf Montanez MD  [facility] [place]     Michael Harris is a 86 year old who presents for the following health issues    History of Present Illness       Hypertension: He presents for follow up of hypertension.  He does check blood pressure  regularly outside of the clinic. Outside blood pressures have been over 140/90. He does not follow a low salt diet.       Review of  "Systems   Constitutional: Positive for fatigue (Continues on Metformin - stopped taking B-complex). Negative for chills and fever.   HENT: Negative for congestion and hearing loss.    Eyes: Negative for pain and visual disturbance.   Respiratory: Positive for shortness of breath (mild, stable.). Negative for cough and wheezing.    Cardiovascular: Positive for palpitations. Negative for chest pain.        Raynaud's syndrome, no ulcerations   Gastrointestinal: Negative for abdominal pain, diarrhea, nausea and vomiting.   Endocrine: Positive for cold intolerance. Negative for heat intolerance.        + Hyperglycemia - Glipizide increased to 5 mg TID on 3/15/2022 -blood sugars are improving.   Genitourinary: Positive for frequency and urgency. Negative for dysuria and hematuria.        + Nocturia 4-5x nightly   Musculoskeletal: Positive for arthralgias (Right shoulder pains, if does too much or using in certain positions), back pain (+ intermittent low back pain) and gait problem (intermittent, due to left calf, limping). Negative for myalgias.   Skin: Negative for pallor and rash.   Allergic/Immunologic: Negative for immunocompromised state.   Neurological: Positive for dizziness and light-headedness (Mostly in the mornings, better with medication reduction).   Hematological: Does not bruise/bleed easily.   Psychiatric/Behavioral: Positive for sleep disturbance (+ insomnia). Negative for agitation and confusion.            Objective    /80 (BP Location: Right arm, Patient Position: Sitting, Cuff Size: Adult Large)   Pulse 68   Resp 20   Ht 1.759 m (5' 9.25\")   Wt 83.9 kg (185 lb)   SpO2 98%   BMI 27.12 kg/m    Body mass index is 27.12 kg/m .  Physical Exam  Constitutional:       General: He is not in acute distress.     Appearance: He is well-developed. He is not diaphoretic.   HENT:      Head: Normocephalic and atraumatic.   Eyes:      General: No scleral icterus.     Conjunctiva/sclera: Conjunctivae " normal.   Cardiovascular:      Rate and Rhythm: Normal rate and regular rhythm.      Pulses: Normal pulses.   Pulmonary:      Effort: Pulmonary effort is normal.      Breath sounds: Normal breath sounds.   Abdominal:      Palpations: Abdomen is soft.      Tenderness: There is no abdominal tenderness.   Musculoskeletal:         General: No deformity.      Cervical back: Neck supple.      Right lower leg: Edema (trace) present.      Left lower leg: Edema (trace) present.   Lymphadenopathy:      Cervical: No cervical adenopathy.   Skin:     General: Skin is warm and dry.      Coloration: Skin is not jaundiced.      Findings: No rash.   Neurological:      Mental Status: He is alert and oriented to person, place, and time. Mental status is at baseline.   Psychiatric:         Mood and Affect: Mood normal.         Behavior: Behavior normal.          Recent Labs   Lab Test 04/20/22  1410 01/19/22  1420 10/12/21  1022 10/12/21  1022 07/07/21  0855 03/17/21  1131   A1C 8.0* 9.1*  --  7.2* 7.1* 7.4*   LDL 59 59  --  93 86 85   HDL 33 32  --  36 37 33   TRIG 111 143  --  59 135 176*   ALT 12 13  --  13 13 12   CR 0.99 1.04   < > 0.83 1.04 0.94   GFRESTIMATED 75 70   < > 80 68 76   GFRESTBLACK  --   --   --   --  82 >90   POTASSIUM 4.1 4.7   < > 4.5 4.1 4.1   TSH 1.61 1.85   < > 1.64  --   --     < > = values in this interval not displayed.   Hemoglobin A1c is high and not at goal.  LDL is at goal.  Triglycerides are at goal.  Potassium and TSH are normal.  Creatinine is at baseline.  ALT is normal.

## 2022-05-13 ASSESSMENT — ENCOUNTER SYMPTOMS
SHORTNESS OF BREATH: 1
AGITATION: 0
FEVER: 0
EYE PAIN: 0
COUGH: 0
CHILLS: 0
NAUSEA: 0
VOMITING: 0
WHEEZING: 0
HEMATURIA: 0
ARTHRALGIAS: 1
SLEEP DISTURBANCE: 1
CONFUSION: 0
PALPITATIONS: 1
DYSURIA: 0
LIGHT-HEADEDNESS: 1
BRUISES/BLEEDS EASILY: 0
DIARRHEA: 0
DIZZINESS: 1
FREQUENCY: 1
ABDOMINAL PAIN: 0
BACK PAIN: 1
MYALGIAS: 0
FATIGUE: 1

## 2022-06-02 ENCOUNTER — HOSPITAL ENCOUNTER (OUTPATIENT)
Dept: MRI IMAGING | Facility: OTHER | Age: 87
Discharge: HOME | End: 2022-06-02
Attending: FAMILY MEDICINE
Payer: COMMERCIAL

## 2022-06-02 ENCOUNTER — HOSPITAL ENCOUNTER (OUTPATIENT)
Dept: GENERAL RADIOLOGY | Facility: OTHER | Age: 87
Discharge: HOME | End: 2022-06-02
Attending: FAMILY MEDICINE
Payer: COMMERCIAL

## 2022-06-02 DIAGNOSIS — S46.811A TEAR OF RIGHT INFRASPINATUS TENDON, INITIAL ENCOUNTER: ICD-10-CM

## 2022-06-02 DIAGNOSIS — S46.011A RUPTURE OF RIGHT SUPRASPINATUS TENDON, INITIAL ENCOUNTER: ICD-10-CM

## 2022-06-02 DIAGNOSIS — M75.41 SUBACROMIAL IMPINGEMENT OF RIGHT SHOULDER: ICD-10-CM

## 2022-06-02 DIAGNOSIS — M75.51 SUBACROMIAL BURSITIS OF RIGHT SHOULDER JOINT: ICD-10-CM

## 2022-06-02 PROCEDURE — 250N000009 HC RX 250: Performed by: RADIOLOGY

## 2022-06-02 PROCEDURE — A9575 INJ GADOTERATE MEGLUMI 0.1ML: HCPCS | Performed by: RADIOLOGY

## 2022-06-02 PROCEDURE — 73222 MRI JOINT UPR EXTREM W/DYE: CPT | Mod: RT

## 2022-06-02 PROCEDURE — 77002 NEEDLE LOCALIZATION BY XRAY: CPT

## 2022-06-02 PROCEDURE — 255N000002 HC RX 255 OP 636: Performed by: RADIOLOGY

## 2022-06-02 PROCEDURE — 23350 INJECTION FOR SHOULDER X-RAY: CPT

## 2022-06-02 RX ORDER — GADOTERATE MEGLUMINE 376.9 MG/ML
0.1 INJECTION INTRAVENOUS ONCE
Status: COMPLETED | OUTPATIENT
Start: 2022-06-02 | End: 2022-06-02

## 2022-06-02 RX ORDER — LIDOCAINE HYDROCHLORIDE 10 MG/ML
1 INJECTION, SOLUTION INFILTRATION; PERINEURAL ONCE
Status: COMPLETED | OUTPATIENT
Start: 2022-06-02 | End: 2022-06-02

## 2022-06-02 RX ADMIN — IOHEXOL 1 ML: 240 INJECTION, SOLUTION INTRATHECAL; INTRAVASCULAR; INTRAVENOUS; ORAL at 15:53

## 2022-06-02 RX ADMIN — LIDOCAINE HYDROCHLORIDE 2 ML: 10 INJECTION, SOLUTION INFILTRATION; PERINEURAL at 15:54

## 2022-06-02 RX ADMIN — GADOTERATE MEGLUMINE 0.1 ML: 376.9 INJECTION INTRAVENOUS at 15:54

## 2022-06-23 ENCOUNTER — TELEPHONE (OUTPATIENT)
Dept: INTERNAL MEDICINE | Facility: OTHER | Age: 87
End: 2022-06-23

## 2022-06-23 DIAGNOSIS — E11.42 CONTROLLED TYPE 2 DIABETES MELLITUS WITH DIABETIC POLYNEUROPATHY, WITHOUT LONG-TERM CURRENT USE OF INSULIN (H): Primary | ICD-10-CM

## 2022-06-23 NOTE — TELEPHONE ENCOUNTER
Please call the patient.  He has blood sugars too high for surgery.   What should he do?  Does he need a work in?      Krista Hinkle on 6/23/2022 at 11:45 AM

## 2022-06-23 NOTE — TELEPHONE ENCOUNTER
Date of birth and last name verified.  A1C needs to be belwow 7.5.    They cancelled surgery for7/11/2022 because A1C was to high  At 8.0    He is seeing you on Monday and would like an A1C redrawn as the other one was from a month ago.\  Jerel Benson .......  6/23/2022  4:35 PM

## 2022-06-24 NOTE — TELEPHONE ENCOUNTER
Standing labs ordered if he would like to come in early and have labs done so we can discuss at visit.     Quincy Belcher MD on 6/24/2022 at 12:36 PM

## 2022-06-27 ENCOUNTER — OFFICE VISIT (OUTPATIENT)
Dept: INTERNAL MEDICINE | Facility: OTHER | Age: 87
End: 2022-06-27
Attending: STUDENT IN AN ORGANIZED HEALTH CARE EDUCATION/TRAINING PROGRAM
Payer: COMMERCIAL

## 2022-06-27 ENCOUNTER — TELEPHONE (OUTPATIENT)
Dept: INTERNAL MEDICINE | Facility: OTHER | Age: 87
End: 2022-06-27

## 2022-06-27 VITALS
HEART RATE: 60 BPM | RESPIRATION RATE: 20 BRPM | DIASTOLIC BLOOD PRESSURE: 68 MMHG | HEIGHT: 69 IN | OXYGEN SATURATION: 98 % | BODY MASS INDEX: 27.25 KG/M2 | SYSTOLIC BLOOD PRESSURE: 136 MMHG | WEIGHT: 184 LBS

## 2022-06-27 DIAGNOSIS — E11.42 CONTROLLED TYPE 2 DIABETES MELLITUS WITH DIABETIC POLYNEUROPATHY, WITHOUT LONG-TERM CURRENT USE OF INSULIN (H): ICD-10-CM

## 2022-06-27 DIAGNOSIS — N18.2 CHRONIC KIDNEY DISEASE, STAGE 2 (MILD): ICD-10-CM

## 2022-06-27 DIAGNOSIS — E11.65 UNCONTROLLED TYPE 2 DIABETES MELLITUS WITH HYPERGLYCEMIA (H): ICD-10-CM

## 2022-06-27 DIAGNOSIS — Z01.818 PREOPERATIVE EXAMINATION: Primary | ICD-10-CM

## 2022-06-27 DIAGNOSIS — I49.1 PREMATURE ATRIAL CONTRACTIONS: ICD-10-CM

## 2022-06-27 LAB
ANION GAP SERPL CALCULATED.3IONS-SCNC: 8 MMOL/L (ref 3–14)
ATRIAL RATE - MUSE: 57 BPM
BUN SERPL-MCNC: 33 MG/DL (ref 7–25)
CALCIUM SERPL-MCNC: 9.2 MG/DL (ref 8.6–10.3)
CHLORIDE BLD-SCNC: 106 MMOL/L (ref 98–107)
CO2 SERPL-SCNC: 24 MMOL/L (ref 21–31)
CREAT SERPL-MCNC: 1.03 MG/DL (ref 0.7–1.3)
DIASTOLIC BLOOD PRESSURE - MUSE: NORMAL MMHG
GFR SERPL CREATININE-BSD FRML MDRD: 71 ML/MIN/1.73M2
GLUCOSE BLD-MCNC: 109 MG/DL (ref 70–105)
HBA1C MFR BLD: 7.2 % (ref 4–6.2)
HGB BLD-MCNC: 14.5 G/DL (ref 13.3–17.7)
INTERPRETATION ECG - MUSE: NORMAL
P AXIS - MUSE: 10 DEGREES
POTASSIUM BLD-SCNC: 4.1 MMOL/L (ref 3.5–5.1)
PR INTERVAL - MUSE: 220 MS
QRS DURATION - MUSE: 126 MS
QT - MUSE: 440 MS
QTC - MUSE: 428 MS
R AXIS - MUSE: -38 DEGREES
SODIUM SERPL-SCNC: 138 MMOL/L (ref 134–144)
SYSTOLIC BLOOD PRESSURE - MUSE: NORMAL MMHG
T AXIS - MUSE: 47 DEGREES
VENTRICULAR RATE- MUSE: 57 BPM

## 2022-06-27 PROCEDURE — 85018 HEMOGLOBIN: CPT | Mod: ZL | Performed by: STUDENT IN AN ORGANIZED HEALTH CARE EDUCATION/TRAINING PROGRAM

## 2022-06-27 PROCEDURE — 93005 ELECTROCARDIOGRAM TRACING: CPT | Performed by: STUDENT IN AN ORGANIZED HEALTH CARE EDUCATION/TRAINING PROGRAM

## 2022-06-27 PROCEDURE — 82310 ASSAY OF CALCIUM: CPT | Mod: ZL | Performed by: STUDENT IN AN ORGANIZED HEALTH CARE EDUCATION/TRAINING PROGRAM

## 2022-06-27 PROCEDURE — 83036 HEMOGLOBIN GLYCOSYLATED A1C: CPT | Mod: ZL | Performed by: STUDENT IN AN ORGANIZED HEALTH CARE EDUCATION/TRAINING PROGRAM

## 2022-06-27 PROCEDURE — 99214 OFFICE O/P EST MOD 30 MIN: CPT | Performed by: STUDENT IN AN ORGANIZED HEALTH CARE EDUCATION/TRAINING PROGRAM

## 2022-06-27 PROCEDURE — 93010 ELECTROCARDIOGRAM REPORT: CPT | Performed by: STUDENT IN AN ORGANIZED HEALTH CARE EDUCATION/TRAINING PROGRAM

## 2022-06-27 PROCEDURE — 36415 COLL VENOUS BLD VENIPUNCTURE: CPT | Mod: ZL | Performed by: STUDENT IN AN ORGANIZED HEALTH CARE EDUCATION/TRAINING PROGRAM

## 2022-06-27 PROCEDURE — G0463 HOSPITAL OUTPT CLINIC VISIT: HCPCS

## 2022-06-27 ASSESSMENT — PAIN SCALES - GENERAL: PAINLEVEL: NO PAIN (0)

## 2022-06-27 NOTE — Clinical Note
Please route to Dr. Gio Mayo [facility] fax #[phone].  Please include lab results including his hemoglobin A1c from today.

## 2022-06-27 NOTE — TELEPHONE ENCOUNTER
----- Message from Quincy Belcher MD sent at 6/27/2022  2:45 PM CDT -----  Please call patient with results please call the patient and let him know that his hemoglobin A1c is 7.2 and he may undergo surgery.  I will fax the lab results and preop to his surgeon.  Please leave a message if he does not answer per patient.    Quincy Belcher MD on 6/27/2022 at 2:44 PM

## 2022-06-27 NOTE — PROGRESS NOTES
[facility] AND [facility]  [address]  [place] [address]  Phone: [phone]  Primary Provider: Nawaf Montanez  Pre-op Performing Provider: STEVEN MARRUFO      PREOPERATIVE EVALUATION:  Today's date: 6/27/2022    Steven Dennison is a 87 year old male who presents for a preoperative evaluation.    Surgical Information:  Surgery/Procedure: right reverse total shoulder replacement  Surgery Location: [facility]   Surgeon: Dr. Tanvir Mayo   Surgery Date: 7-11-22  Time of Surgery: TBD  Where patient plans to recover: At home alone  Fax number for surgical facility: [phone]    Type of Anesthesia Anticipated: General    Assessment & Plan     The proposed surgical procedure is considered INTERMEDIATE risk.    Preoperative examination  Patient is scheduled to undergo right reverse total shoulder replacement after a injury operating chainsaw.  He has no active cardiopulmonary symptoms.  He was given instructions to hold his glipizide metformin and irbesartan the morning of his procedure.  He is medically optimized to undergo surgery without further intervention.  - Hemoglobin A1c, BMP, CBC  - EKG 12-lead complete w/read - Clinics    Uncontrolled type 2 diabetes mellitus with hyperglycemia (H)  Hemoglobin A1c was last 8.0.  He has been working on reducing his blood sugars at home.  He is not yet on maximum glipizide or metformin.  If A1c is not less than 7.5 we will increase both metformin and glipizide to try and get his blood sugars down for surgery.    Chronic kidney disease, stage 2 (mild)  Noted, repeat BMP today.    Premature atrial contractions  Noted, EKG today        RECOMMENDATION:  APPROVAL GIVEN to proceed with proposed procedure pending review of diagnostic evaluation.    Addendum: Hemoglobin A1c 7.2 and hemoglobin normal at 14.5.  As long as his BMP returns normal he may undergo surgery without further evaluation.  EKG normal sinus with nonspecific idioventricular  delay.          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  Has followed with sports medicine MRI demonstrated chronic rotator cuff tear.  Plan for surgical intervention, however his hemoglobin A1c of 8.0 is precluding surgery.  Needs to have hemoglobin A1c less than 7.5.      Diabetes:   Not checking  Blood sugars should be better  Can't have surgery unless less than 7.5   Metformin 500mg BID  Glipizide TID 5mg.     No chest pain with exertion  No change in breathing.   No palpitations  No clotting or bleeding disorders.   No personal or family issues with anesthesia.  Has had the same procedure done on his left shoulder without convocation.    No flowsheet data found.      Review of Systems  CONSTITUTIONAL: NEGATIVE for fever, chills, change in weight  INTEGUMENTARY/SKIN: NEGATIVE for worrisome rashes, moles or lesions  EYES: NEGATIVE for vision changes or irritation  ENT/MOUTH: NEGATIVE for ear, mouth and throat problems  RESP: NEGATIVE for significant cough or SOB  CV: NEGATIVE for chest pain, palpitations or peripheral edema  GI: NEGATIVE for nausea, abdominal pain, heartburn, or change in bowel habits  : NEGATIVE for frequency, dysuria, or hematuria  MUSCULOSKELETAL: NEGATIVE for significant arthralgias or myalgia  NEURO: NEGATIVE for weakness, dizziness or paresthesias  ENDOCRINE: NEGATIVE for temperature intolerance, skin/hair changes  HEME: NEGATIVE for bleeding problems  PSYCHIATRIC: NEGATIVE for changes in mood or affect    Patient Active Problem List    Diagnosis Date Noted     Chronic kidney disease, stage 2 (mild) 11/10/2021     Priority: Medium     Erythrasma 11/10/2021     Priority: Medium     Uncontrolled hypertension 11/10/2021     Priority: Medium     Pain medication agreement-opioid/nonopioid 7-7-21 07/07/2021     Priority: Medium     Chronic, continuous use of opioids 07/07/2021     Priority: Medium     Flatulence 03/17/2021      Priority: Medium     Benign essential hypertension 11/03/2020     Priority: Medium     History of tobacco use 07/09/2020     Priority: Medium     History of bilateral cataract extraction 07/17/2018     Priority: Medium     Uncontrolled type 2 diabetes mellitus with hyperglycemia (H) 06/14/2018     Priority: Medium     Vitamin B12 deficiency 04/13/2018     Priority: Medium     Vitamin D deficiency 04/13/2018     Priority: Medium     Osteoarthrosis involving lower leg 01/24/2018     Priority: Medium     Overview:   IMO Update  Updated per 10/1/17 IMO import  Replacing diagnoses that were inactivated after the 10/1/2021 regulatory import.       Degeneration of lumbar or lumbosacral intervertebral disc 01/24/2018     Priority: Medium     Overview:   recurring       Controlled type 2 diabetes mellitus with diabetic polyneuropathy, without long-term current use of insulin (H) 12/08/2017     Priority: Medium     Osteopenia 09/27/2017     Priority: Medium     Acquired deformity of right foot 09/08/2017     Priority: Medium     Pre-ulcerative corn or callous 09/08/2017     Priority: Medium     Rotator cuff tendinitis 12/29/2016     Priority: Medium     Benign prostatic hyperplasia 07/19/2016     Priority: Medium     Overview:   Updated per 10/1/17 IMO import       Status post total replacement of left shoulder 07/19/2016     Priority: Medium     Ventricular bigeminy 07/19/2016     Priority: Medium     Biceps tendonitis on right 03/31/2016     Priority: Medium     Bunion of right foot 03/31/2016     Priority: Medium     Sleep difficulties 03/31/2016     Priority: Medium     Nocturia 05/01/2015     Priority: Medium     Intermittent low back pain 01/08/2015     Priority: Medium     Abdominal aortic ectasia (H) 06/18/2014     Priority: Medium     Erectile dysfunction 05/27/2014     Priority: Medium     Premature atrial contractions 01/30/2014     Priority: Medium     Raynaud's phenomenon without gangrene 01/30/2014      Priority: Medium     Mixed hyperlipidemia 06/03/2013     Priority: Medium      Past Medical History:   Diagnosis Date     Controlled type 2 diabetes mellitus with diabetic polyneuropathy, without long-term current use of insulin (H) 12/8/2017     Eczema 12/14/2016     Renal calculus 1/24/2018    Overview:  disease     Past Surgical History:   Procedure Laterality Date     APPENDECTOMY OPEN      Appendectomy at age 6 due to appendicitis     ARTHROSCOPY KNEE      left knee     COLONOSCOPY  01/01/2000 2000     COLONOSCOPY  01/01/2011 2011,adenomatous polyps, one with high grade dysplasia , next due 2014     OTHER SURGICAL HISTORY      ,HERNIA REPAIR,right inguinal     OTHER SURGICAL HISTORY      2003,[id number],ARTHROPLASTY,left total knee     OTHER SURGICAL HISTORY      1984,[id number],OTHER,left first/rib resection     OTHER SURGICAL HISTORY      1983,[id number],OTHER     OTHER SURGICAL HISTORY      02/21/14,[id number],CYSTOSCOPY W/ URETEROSCOPY W/ LITHOTRIPSY,Dr. Chelsi GREEN     OTHER SURGICAL HISTORY      ,HERNIA REPAIR,x 4     Current Outpatient Medications   Medication Sig Dispense Refill     amLODIPine (NORVASC) 5 MG tablet Take 0.5 tablets (2.5 mg) by mouth daily - for Hypertension and Raynaud's -- verify dosing as of 5/12/2022 180 tablet 1     Apple Cider Vinegar 300 MG TABS Take 900 mg by mouth 2 times daily       aspirin 81 MG EC tablet Take 81 mg by mouth       B Complex Vitamins (VITAMIN-B COMPLEX) TABS Take 1 tablet by mouth Take 1 tablet by mouth once daily. -- for Low B12 / B-Vitamins -- about 3 days weekly       blood glucose monitoring (NO BRAND SPECIFIED) meter device kit Use to test blood sugar 2 times daily. DX: E11.9 1 kit 0     blood glucose monitoring (PRODIGY TWIST TOP 28G) lancets As directed. Use for testing 2 times daily DX: E11.9 200 each 2     Blood Glucose Monitoring Suppl (FIFTY50 GLUCOSE METER 2.0) W/DEVICE KIT Dispense item covered by pt ins. 250.02 NIDDM type II, uncontrolled -  Test 2 times/day. Reason: Unstable diabetes       Cholecalciferol (VITAMIN D) 2000 units tablet Take 2,000 Units by mouth daily 100 tablet 3     cinnamon 500 MG CAPS Take by mouth daily       diltiazem ER (DILT-XR) 120 MG 24 hr capsule Take 1 capsule (120 mg) by mouth every evening - for Heart and Raynaud's 90 capsule 1     fluticasone (FLONASE) 50 MCG/ACT nasal spray INSTILL 2 SPRAYS IN EACH NOSTRIL Q NIGHT HS  3     gabapentin (NEURONTIN) 100 MG capsule Take 1 capsule (100 mg) by mouth At Bedtime --- Don't fill at this time, patient will call when needed --- 90 capsule 1     Garlic 1000 MG CAPS Take 1 capsule by mouth daily        glipiZIDE (GLUCOTROL) 5 MG tablet Take 1 tablet (5 mg) by mouth 3 times daily (with meals) 270 tablet 1     irbesartan-hydrochlorothiazide (AVALIDE) 150-12.5 MG tablet Take 0.5 tablets by mouth every morning -- Dose Reduced 5/12/2022       Krill Oil 500 MG CAPS Take 1 capsule by mouth daily       LUMIGAN 0.01 % SOLN ophthalmic solution INT 1 GTT IN OU HS  3     Magnesium Oxide 420 MG TABS Take 1 tablet by mouth daily       Melatonin 10 MG TABS tablet Take 1 tablet (10 mg) by mouth nightly as needed for sleep (--- take 2 to 3 hours prior to bedtime, to help initiate sleep)       metFORMIN (GLUCOPHAGE) 500 MG tablet Take 1 tablet (500 mg) by mouth 2 times daily (with meals) - for Diabetes 180 tablet 3     mupirocin (BACTROBAN) 2 % external ointment Apply topically 2 times daily To upper inner thigh/groin rash x2-4 weeks 120 g 3     ONETOUCH ULTRA test strip TESTING TWICE DAILY 200 strip 3     pravastatin (PRAVACHOL) 40 MG tablet Take 1 tablet (40 mg) by mouth At Bedtime For cholesterol / diabetes 90 tablet 1     tamsulosin (FLOMAX) 0.4 MG capsule Take 1 capsule (0.4 mg) by mouth every evening 90 capsule 1     tiZANidine (ZANAFLEX) 2 MG tablet TAKE 1 TO 2 TABLETS(2 TO 4 MG) BY MOUTH EVERY 6 HOURS AS NEEDED FOR MUSCLE SPASMS 120 tablet 3     vitamin B-12 (CYANOCOBALAMIN) 2500 MCG sublingual  "tablet Take 1 tablet (2,500 mcg) by mouth daily -- for low B12 levels and fatigue 100 tablet 3       Allergies   Allergen Reactions     Cephalexin Other (See Comments)     Other reaction(s):Pt Doesn't Remember  Pt does not remember      Clindamycin Other (See Comments)     Other reaction(s): Pt Doesn't Remember  Pt does not remember     Hydromorphone Other (See Comments)     Other reaction(s): Bradycardia     Amoxicillin Rash        Social History     Tobacco Use     Smoking status: Former Smoker     Packs/day: 1.00     Years: 10.00     Pack years: 10.00     Types: Cigarettes     Quit date: 1975     Years since quittin.5     Smokeless tobacco: Never Used   Substance Use Topics     Alcohol use: Yes     Alcohol/week: 5.0 standard drinks     Comment: occasional, maybe 2 a month     Family History   Problem Relation Age of Onset     Other - See Comments Father          at 74 of natural causes     Heart Disease Mother 69        Heart Disease, of MI     Diabetes Mother         Diabetes     Other - See Comments Mother         Stroke     Family History Negative Sister         Good Health     Family History Negative Sister         Good Health     Cancer Brother         Cancer     History   Drug Use No         Objective     /68 (BP Location: Right arm, Patient Position: Sitting, Cuff Size: Adult Regular)   Pulse 60   Resp 20   Ht 1.753 m (5' 9\")   Wt 83.5 kg (184 lb)   SpO2 98%   BMI 27.17 kg/m      Physical Exam    GENERAL APPEARANCE: healthy, alert and no distress     EYES: EOMI,  PERRL     HENT: ear canals and TM's normal and nose and mouth without ulcers or lesions     NECK: no adenopathy, no asymmetry, masses, or scars and thyroid normal to palpation     RESP: lungs clear to auscultation - no rales, rhonchi or wheezes     CV: regular rates and rhythm, normal S1 S2, no S3 or S4 and no murmur, click or rub     ABDOMEN:  soft, nontender, no HSM or masses and bowel sounds normal     MS: " extremities normal- no gross deformities noted, no evidence of inflammation in joints, FROM in all extremities.     SKIN: no suspicious lesions or rashes     NEURO: Normal strength and tone, sensory exam grossly normal, mentation intact and speech normal     PSYCH: mentation appears normal. and affect normal/bright     LYMPHATICS: No cervical adenopathy    Recent Labs   Lab Test 04/20/22  1410 01/19/22  1420   HGB 14.2 15.0   * 148*    139   POTASSIUM 4.1 4.7   CR 0.99 1.04   A1C 8.0* 9.1*        Diagnostics:  Labs pending at this time.  Results will be reviewed when available.   EKG ordered and interpreted by myself.    Revised Cardiac Risk Index (RCRI):  The patient has the following serious cardiovascular risks for perioperative complications:   - No serious cardiac risks = 0 points     RCRI Interpretation: 0 points: Class I (very low risk - 0.4% complication rate)           Signed Electronically by: Quincy Belcher MD  Copy of this evaluation report is provided to requesting physician.

## 2022-06-27 NOTE — NURSING NOTE
"Chief Complaint   Patient presents with     Pre-Op Exam     7-11-22  right reverse total shoulder replacement  Dr. Tanvir Mayo  [facility]  Fax # [phone]       Medication reconciliation completed.    FOOD SECURITY SCREENING QUESTIONS:    The next two questions are to help us understand your food security.  If you are feeling you need any assistance in this area, we have resources available to support you today.    Hunger Vital Signs:  Within the past 12 months we worried whether our food would run out before we got money to buy more. Never  Within the past 12 months the food we bought just didn't last and we didn't have money to get more. Never    Initial /68 (BP Location: Right arm, Patient Position: Sitting, Cuff Size: Adult Regular)   Pulse 60   Resp 20   Ht 1.753 m (5' 9\")   Wt 83.5 kg (184 lb)   SpO2 98%   BMI 27.17 kg/m   Estimated body mass index is 27.17 kg/m  as calculated from the following:    Height as of this encounter: 1.753 m (5' 9\").    Weight as of this encounter: 83.5 kg (184 lb).       Chasity Jay LPN .......  6/27/2022  1:54 PM  "

## 2022-06-27 NOTE — LETTER
June 27, 2022      Steven Dennison  [address] [address] [address]        Dear ,    We are writing to inform you of your test results.    Your lab results look good.  Your kidney function electrolytes and hemoglobin are normal.  Your hemoglobin A1c has decreased to 7.2.  No need to change medications at this time.  Keep up the good work and good luck with surgery.    Resulted Orders   Hemoglobin   Result Value Ref Range    Hemoglobin 14.5 13.3 - 17.7 g/dL   Basic Metabolic Panel   Result Value Ref Range    Sodium 138 134 - 144 mmol/L    Potassium 4.1 3.5 - 5.1 mmol/L    Chloride 106 98 - 107 mmol/L    Carbon Dioxide (CO2) 24 21 - 31 mmol/L    Anion Gap 8 3 - 14 mmol/L    Urea Nitrogen 33 (H) 7 - 25 mg/dL    Creatinine 1.03 0.70 - 1.30 mg/dL    Calcium 9.2 8.6 - 10.3 mg/dL    Glucose 109 (H) 70 - 105 mg/dL    GFR Estimate 71 >60 mL/min/1.73m2      Comment:      Effective December 21, 2021 eGFRcr in adults is calculated using the 2021 CKD-EPI creatinine equation which includes age and gender (Jaylene et al., NEJM, DOI: 10.1056/AYRZjk3947666)   Hemoglobin A1c   Result Value Ref Range    Hemoglobin A1C 7.2 (H) 4.0 - 6.2 %       If you have any questions or concerns, please call the clinic at the number listed above.       Sincerely,      Quincy Belcher MD

## 2022-06-28 NOTE — TELEPHONE ENCOUNTER
I did verify the patients dateof birth and last name.    I explained that there were ordersin [facility] chart to have labs done.    Patients states he was in yesterday and was already called wit the results.    Jerel Benson .......  6/28/2022  12:56 PM

## 2022-07-19 ENCOUNTER — TELEPHONE (OUTPATIENT)
Dept: INTERNAL MEDICINE | Facility: OTHER | Age: 87
End: 2022-07-19

## 2022-07-19 ENCOUNTER — TELEPHONE (OUTPATIENT)
Dept: LAB | Facility: OTHER | Age: 87
End: 2022-07-19

## 2022-07-19 DIAGNOSIS — E78.2 MIXED HYPERLIPIDEMIA: ICD-10-CM

## 2022-07-19 DIAGNOSIS — E11.42 CONTROLLED TYPE 2 DIABETES MELLITUS WITH DIABETIC POLYNEUROPATHY, WITHOUT LONG-TERM CURRENT USE OF INSULIN (H): Primary | ICD-10-CM

## 2022-07-19 NOTE — TELEPHONE ENCOUNTER
Patient states he just had surgery a week ago tomorrow for shoulder replacement, he is scheduled for a diabetic check this Thursday 7/21 he is states his blood sugar levels are all messed up from surgery and wants to know if DJS wants to see him still this week.     Please contact patient to let him know either way    Jaimie Smart on 7/19/2022 at 9:08 AM

## 2022-07-19 NOTE — TELEPHONE ENCOUNTER
Yes, keep the 7/21 appointment as scheduled.     Electronically signed by:  Nawaf Montanez MD  on July 19, 2022 1:46 PM

## 2022-07-19 NOTE — PROGRESS NOTES
Hello, pt has lab appt Thurs 7/21 but no orders are available. Please enter orders at earliest convenience. Thank you :)

## 2022-07-19 NOTE — TELEPHONE ENCOUNTER
After verifying last name and  patient notifed of the below information.   Mona Fernandez LPN on 2022 at 2:18 PM

## 2022-07-21 ENCOUNTER — LAB (OUTPATIENT)
Dept: LAB | Facility: OTHER | Age: 87
End: 2022-07-21
Attending: INTERNAL MEDICINE
Payer: COMMERCIAL

## 2022-07-21 ENCOUNTER — OFFICE VISIT (OUTPATIENT)
Dept: INTERNAL MEDICINE | Facility: OTHER | Age: 87
End: 2022-07-21
Attending: INTERNAL MEDICINE
Payer: COMMERCIAL

## 2022-07-21 VITALS
OXYGEN SATURATION: 97 % | HEART RATE: 79 BPM | HEIGHT: 69 IN | DIASTOLIC BLOOD PRESSURE: 74 MMHG | WEIGHT: 181.2 LBS | SYSTOLIC BLOOD PRESSURE: 134 MMHG | RESPIRATION RATE: 20 BRPM | BODY MASS INDEX: 26.84 KG/M2

## 2022-07-21 DIAGNOSIS — E53.8 VITAMIN B12 DEFICIENCY: ICD-10-CM

## 2022-07-21 DIAGNOSIS — E11.42 CONTROLLED TYPE 2 DIABETES MELLITUS WITH DIABETIC POLYNEUROPATHY, WITHOUT LONG-TERM CURRENT USE OF INSULIN (H): ICD-10-CM

## 2022-07-21 DIAGNOSIS — R35.1 NOCTURIA: ICD-10-CM

## 2022-07-21 DIAGNOSIS — I10 BENIGN ESSENTIAL HYPERTENSION: ICD-10-CM

## 2022-07-21 DIAGNOSIS — I49.1 PREMATURE ATRIAL CONTRACTIONS: ICD-10-CM

## 2022-07-21 DIAGNOSIS — E78.2 MIXED HYPERLIPIDEMIA: ICD-10-CM

## 2022-07-21 DIAGNOSIS — E11.42 CONTROLLED TYPE 2 DIABETES MELLITUS WITH DIABETIC POLYNEUROPATHY, WITHOUT LONG-TERM CURRENT USE OF INSULIN (H): Primary | ICD-10-CM

## 2022-07-21 DIAGNOSIS — I49.8 VENTRICULAR BIGEMINY: ICD-10-CM

## 2022-07-21 DIAGNOSIS — I73.00 RAYNAUD'S PHENOMENON WITHOUT GANGRENE: ICD-10-CM

## 2022-07-21 LAB
ALBUMIN SERPL-MCNC: 3.8 G/DL (ref 3.5–5.7)
ALBUMIN UR-MCNC: NEGATIVE MG/DL
ALP SERPL-CCNC: 63 U/L (ref 34–104)
ALT SERPL W P-5'-P-CCNC: 13 U/L (ref 7–52)
ANION GAP SERPL CALCULATED.3IONS-SCNC: 9 MMOL/L (ref 3–14)
APPEARANCE UR: CLEAR
AST SERPL W P-5'-P-CCNC: 11 U/L (ref 13–39)
BILIRUB SERPL-MCNC: 1 MG/DL (ref 0.3–1)
BILIRUB UR QL STRIP: NEGATIVE
BUN SERPL-MCNC: 23 MG/DL (ref 7–25)
CALCIUM SERPL-MCNC: 8.9 MG/DL (ref 8.6–10.3)
CHLORIDE BLD-SCNC: 102 MMOL/L (ref 98–107)
CHOLEST SERPL-MCNC: 141 MG/DL
CO2 SERPL-SCNC: 24 MMOL/L (ref 21–31)
COLOR UR AUTO: YELLOW
CREAT SERPL-MCNC: 1.08 MG/DL (ref 0.7–1.3)
ERYTHROCYTE [DISTWIDTH] IN BLOOD BY AUTOMATED COUNT: 13 % (ref 10–15)
FASTING STATUS PATIENT QL REPORTED: NORMAL
GFR SERPL CREATININE-BSD FRML MDRD: 67 ML/MIN/1.73M2
GLUCOSE BLD-MCNC: 271 MG/DL (ref 70–105)
GLUCOSE UR STRIP-MCNC: >1000 MG/DL
HBA1C MFR BLD: 7.4 % (ref 4–6.2)
HCT VFR BLD AUTO: 37.1 % (ref 40–53)
HDLC SERPL-MCNC: 32 MG/DL (ref 23–92)
HGB BLD-MCNC: 12.6 G/DL (ref 13.3–17.7)
HGB UR QL STRIP: NEGATIVE
KETONES UR STRIP-MCNC: ABNORMAL MG/DL
LDLC SERPL CALC-MCNC: 91 MG/DL
LEUKOCYTE ESTERASE UR QL STRIP: NEGATIVE
MCH RBC QN AUTO: 29.6 PG (ref 26.5–33)
MCHC RBC AUTO-ENTMCNC: 34 G/DL (ref 31.5–36.5)
MCV RBC AUTO: 87 FL (ref 78–100)
NITRATE UR QL: NEGATIVE
NONHDLC SERPL-MCNC: 109 MG/DL
PH UR STRIP: 5 [PH] (ref 5–9)
PLATELET # BLD AUTO: 219 10E3/UL (ref 150–450)
POTASSIUM BLD-SCNC: 4 MMOL/L (ref 3.5–5.1)
PROT SERPL-MCNC: 6.6 G/DL (ref 6.4–8.9)
RBC # BLD AUTO: 4.25 10E6/UL (ref 4.4–5.9)
SODIUM SERPL-SCNC: 135 MMOL/L (ref 134–144)
SP GR UR STRIP: 1.02 (ref 1–1.03)
TRIGL SERPL-MCNC: 91 MG/DL
TSH SERPL DL<=0.005 MIU/L-ACNC: 1.31 MU/L (ref 0.4–4)
UROBILINOGEN UR STRIP-MCNC: NORMAL MG/DL
WBC # BLD AUTO: 8.6 10E3/UL (ref 4–11)

## 2022-07-21 PROCEDURE — 99214 OFFICE O/P EST MOD 30 MIN: CPT | Performed by: INTERNAL MEDICINE

## 2022-07-21 PROCEDURE — 82040 ASSAY OF SERUM ALBUMIN: CPT | Mod: ZL

## 2022-07-21 PROCEDURE — 80061 LIPID PANEL: CPT | Mod: ZL

## 2022-07-21 PROCEDURE — G0463 HOSPITAL OUTPT CLINIC VISIT: HCPCS

## 2022-07-21 PROCEDURE — 81003 URINALYSIS AUTO W/O SCOPE: CPT | Mod: ZL

## 2022-07-21 PROCEDURE — 80053 COMPREHEN METABOLIC PANEL: CPT | Mod: ZL

## 2022-07-21 PROCEDURE — 96372 THER/PROPH/DIAG INJ SC/IM: CPT | Performed by: INTERNAL MEDICINE

## 2022-07-21 PROCEDURE — 83036 HEMOGLOBIN GLYCOSYLATED A1C: CPT | Mod: ZL

## 2022-07-21 PROCEDURE — 36415 COLL VENOUS BLD VENIPUNCTURE: CPT | Mod: ZL

## 2022-07-21 PROCEDURE — 85027 COMPLETE CBC AUTOMATED: CPT | Mod: ZL

## 2022-07-21 PROCEDURE — 82043 UR ALBUMIN QUANTITATIVE: CPT | Mod: ZL

## 2022-07-21 PROCEDURE — 250N000011 HC RX IP 250 OP 636: Performed by: INTERNAL MEDICINE

## 2022-07-21 PROCEDURE — 84443 ASSAY THYROID STIM HORMONE: CPT | Mod: ZL

## 2022-07-21 PROCEDURE — G0463 HOSPITAL OUTPT CLINIC VISIT: HCPCS | Mod: 25

## 2022-07-21 RX ORDER — DILTIAZEM HYDROCHLORIDE 120 MG/1
120 CAPSULE, EXTENDED RELEASE ORAL EVERY EVENING
Qty: 90 CAPSULE | Refills: 1 | Status: SHIPPED | OUTPATIENT
Start: 2022-07-21 | End: 2023-02-22

## 2022-07-21 RX ORDER — TAMSULOSIN HYDROCHLORIDE 0.4 MG/1
0.4 CAPSULE ORAL EVERY EVENING
Qty: 90 CAPSULE | Refills: 1 | Status: SHIPPED | OUTPATIENT
Start: 2022-07-21 | End: 2022-10-07

## 2022-07-21 RX ORDER — CYANOCOBALAMIN 1000 UG/ML
1000 INJECTION, SOLUTION INTRAMUSCULAR; SUBCUTANEOUS ONCE
Status: COMPLETED | OUTPATIENT
Start: 2022-07-21 | End: 2022-07-21

## 2022-07-21 RX ORDER — PRAVASTATIN SODIUM 40 MG
40 TABLET ORAL AT BEDTIME
Qty: 90 TABLET | Refills: 1 | Status: SHIPPED | OUTPATIENT
Start: 2022-07-21 | End: 2022-10-26

## 2022-07-21 RX ORDER — GLIPIZIDE 5 MG/1
5 TABLET ORAL
Qty: 270 TABLET | Refills: 1 | Status: SHIPPED | OUTPATIENT
Start: 2022-07-21 | End: 2023-01-12

## 2022-07-21 RX ORDER — GABAPENTIN 100 MG/1
100 CAPSULE ORAL AT BEDTIME
Qty: 90 CAPSULE | Refills: 1 | Status: SHIPPED | OUTPATIENT
Start: 2022-07-21 | End: 2022-10-26

## 2022-07-21 RX ORDER — OXYCODONE HYDROCHLORIDE 5 MG/1
TABLET ORAL
COMMUNITY
Start: 2022-07-14 | End: 2022-10-26

## 2022-07-21 RX ADMIN — CYANOCOBALAMIN 1000 MCG: 1000 INJECTION, SOLUTION INTRAMUSCULAR at 16:02

## 2022-07-21 ASSESSMENT — ENCOUNTER SYMPTOMS
FREQUENCY: 1
SLEEP DISTURBANCE: 1
COUGH: 0
PALPITATIONS: 1
BRUISES/BLEEDS EASILY: 0
MYALGIAS: 0
LIGHT-HEADEDNESS: 0
NAUSEA: 0
DYSURIA: 0
CONFUSION: 0
CHILLS: 0
HEMATURIA: 0
FATIGUE: 1
ARTHRALGIAS: 1
AGITATION: 0
DIZZINESS: 0
BACK PAIN: 1
ABDOMINAL PAIN: 0
VOMITING: 0
FEVER: 0
SHORTNESS OF BREATH: 1
WHEEZING: 0
DIARRHEA: 0
EYE PAIN: 0

## 2022-07-21 ASSESSMENT — PAIN SCALES - GENERAL: PAINLEVEL: NO PAIN (0)

## 2022-07-21 NOTE — PROGRESS NOTES
Assessment & Plan       ICD-10-CM    1. Controlled type 2 diabetes mellitus with diabetic polyneuropathy, without long-term current use of insulin (H)  E11.42 gabapentin (NEURONTIN) 100 MG capsule     glipiZIDE (GLUCOTROL) 5 MG tablet   2. Benign essential hypertension  I10 diltiazem ER (DILT-XR) 120 MG 24 hr capsule   3. Raynaud's phenomenon without gangrene  I73.00 diltiazem ER (DILT-XR) 120 MG 24 hr capsule   4. Premature atrial contractions  I49.1 diltiazem ER (DILT-XR) 120 MG 24 hr capsule   5. Ventricular bigeminy  I49.8 diltiazem ER (DILT-XR) 120 MG 24 hr capsule   6. Mixed hyperlipidemia  E78.2 pravastatin (PRAVACHOL) 40 MG tablet   7. Nocturia  R35.1 tamsulosin (FLOMAX) 0.4 MG capsule   8. Vitamin B12 deficiency  E53.8 cyanocobalamin injection 1,000 mcg     Patient presents for follow-up multiple issues.    Type 2 diabetes with diabetic polyneuropathy.  Ongoing.  States his neuropathy is worsening and he would like to make some changes with his gabapentin dosing.  Needs refills of glipizide.    Hypertension, blood pressure is currently well controlled.  Feels that his heart palpitations, PACs and PVCs as well as his Raynaud's phenomenon have all stabilized and improved with diltiazem.  Needs refills.    Mixed hyperlipidemia.  LDL is not at goal.  We may need to make medication changes to him prove his cholesterol levels if his lifestyle modifications or not adequate.  Needs refills of pravastatin.  Otherwise seems to be tolerating medication well without side effects.    Nocturia, has been a problem.  We discussed some treatment options.  Encouraged him to try limiting his liquids after supper.  Start trial of Flomax.    Vitamin B12 deficiency.  He would like to get a B12 shot today.    Encouraged regular exercise.     Return in about 3 months (around 10/21/2022) for Annual Medicare Wellness Visit, + Get Diabetic labs prior to clinic appointment.    Nawaf Montanez MD  [facility]  "    Michael Harris is a 87 year old accompanied by his friend, presenting for the following health issues:  Diabetes      History of Present Illness       Diabetes:   He presents for follow up of diabetes.   The patient has had a diabetic eye exam in the last 12 months. Eye exam performed on 03/2022. Location of last eye exam [facility].          Review of Systems   Constitutional: Positive for fatigue (mostly of feet and legs - stopped B12 supplement). Negative for chills and fever.   HENT: Negative for congestion and hearing loss.    Eyes: Negative for pain and visual disturbance.   Respiratory: Positive for shortness of breath (mild, stable.). Negative for cough and wheezing.    Cardiovascular: Positive for palpitations. Negative for chest pain.        Raynaud's syndrome, no ulcerations   Gastrointestinal: Negative for abdominal pain, diarrhea, nausea and vomiting.   Endocrine: Positive for cold intolerance. Negative for heat intolerance.        + Hyperglycemia - Glipizide increased to 5 mg TID on 3/15/2022 -blood sugars were improving, but high again since right shoulder surgery.   Genitourinary: Positive for frequency and urgency. Negative for dysuria and hematuria.        + Nocturia 4-5x nightly   Musculoskeletal: Positive for arthralgias (Right shoulder pains, if does too much or using in certain positions), back pain (+ intermittent low back pain) and gait problem (intermittent, due to left calf, limping). Negative for myalgias.   Skin: Negative for pallor and rash.   Allergic/Immunologic: Negative for immunocompromised state.   Neurological: Negative for dizziness and light-headedness.   Hematological: Does not bruise/bleed easily.   Psychiatric/Behavioral: Positive for sleep disturbance (+ insomnia). Negative for agitation and confusion.          Objective    /74 (BP Location: Left arm, Patient Position: Sitting, Cuff Size: Adult Large)   Pulse 79   Resp 20   Ht 1.759 m (5' 9.25\")   Wt " 82.2 kg (181 lb 3.2 oz)   SpO2 97%   BMI 26.57 kg/m    Body mass index is 26.57 kg/m .  Physical Exam  Constitutional:       General: He is not in acute distress.     Appearance: He is well-developed. He is not diaphoretic.   HENT:      Head: Normocephalic and atraumatic.   Eyes:      General: No scleral icterus.     Conjunctiva/sclera: Conjunctivae normal.   Cardiovascular:      Rate and Rhythm: Normal rate and regular rhythm.      Pulses: Normal pulses.   Pulmonary:      Effort: Pulmonary effort is normal.      Breath sounds: Normal breath sounds.   Abdominal:      Palpations: Abdomen is soft.      Tenderness: There is no abdominal tenderness.   Musculoskeletal:         General: No deformity.      Cervical back: Neck supple.      Right lower leg: Edema (trace) present.      Left lower leg: Edema (trace) present.      Comments: Wearing right shoulder immobilizer   Lymphadenopathy:      Cervical: No cervical adenopathy.   Skin:     General: Skin is warm and dry.      Coloration: Skin is not jaundiced.      Findings: No rash.   Neurological:      Mental Status: He is alert and oriented to person, place, and time. Mental status is at baseline.   Psychiatric:         Mood and Affect: Mood normal.         Behavior: Behavior normal.          Recent Labs   Lab Test 07/21/22  1437 06/27/22  1425 04/20/22  1410 01/19/22  1420 10/12/21  1022 07/07/21  0855 03/17/21  1131   A1C 7.4* 7.2* 8.0* 9.1*   < > 7.1* 7.4*   LDL 91  --  59 59   < > 86 85   HDL 32  --  33 32   < > 37 33   TRIG 91  --  111 143   < > 135 176*   ALT 13  --  12 13   < > 13 12   CR 1.08 1.03 0.99 1.04   < > 1.04 0.94   GFRESTIMATED 67 71 75 70   < > 68 76   GFRESTBLACK  --   --   --   --   --  82 >90   POTASSIUM 4.0 4.1 4.1 4.7   < > 4.1 4.1   TSH 1.31  --  1.61 1.85   < >  --   --     < > = values in this interval not displayed.   Hemoglobin A1c is well controlled.  LDL is high and not at goal.  Triglycerides and HDL are at goal.  ALT is normal.   Creatinine has worsened slightly.  Potassium and TSH are normal.              .  ..

## 2022-07-21 NOTE — PATIENT INSTRUCTIONS
Blood pressure is well controlled.   Diabetes is well controlled.     Medications refilled.   Labs are stable.     B12 shot today.     Results for orders placed or performed in visit on 07/21/22   Comprehensive metabolic panel     Status: Abnormal   Result Value Ref Range    Sodium 135 134 - 144 mmol/L    Potassium 4.0 3.5 - 5.1 mmol/L    Chloride 102 98 - 107 mmol/L    Carbon Dioxide (CO2) 24 21 - 31 mmol/L    Anion Gap 9 3 - 14 mmol/L    Urea Nitrogen 23 7 - 25 mg/dL    Creatinine 1.08 0.70 - 1.30 mg/dL    Calcium 8.9 8.6 - 10.3 mg/dL    Glucose 271 (H) 70 - 105 mg/dL    Alkaline Phosphatase 63 34 - 104 U/L    AST 11 (L) 13 - 39 U/L    ALT 13 7 - 52 U/L    Protein Total 6.6 6.4 - 8.9 g/dL    Albumin 3.8 3.5 - 5.7 g/dL    Bilirubin Total 1.0 0.3 - 1.0 mg/dL    GFR Estimate 67 >60 mL/min/1.73m2   Lipid Profile     Status: None   Result Value Ref Range    Cholesterol 141 <200 mg/dL    Triglycerides 91 <150 mg/dL    Direct Measure HDL 32 23 - 92 mg/dL    LDL Cholesterol Calculated 91 <=100 mg/dL    Non HDL Cholesterol 109 <130 mg/dL    Patient Fasting > 8hrs? Unknown     Narrative    Cholesterol  Desirable:  <200 mg/dL    Triglycerides  Normal:  Less than 150 mg/dL  Borderline High:  150-199 mg/dL  High:  200-499 mg/dL  Very High:  Greater than or equal to 500 mg/dL    Direct Measure HDL  Female:  Greater than or equal to 50 mg/dL   Male:  Greater than or equal to 40 mg/dL    LDL Cholesterol  Desirable:  <100mg/dL  Above Desirable:  100-129 mg/dL   Borderline High:  130-159 mg/dL   High:  160-189 mg/dL   Very High:  >= 190 mg/dL    Non HDL Cholesterol  Desirable:  130 mg/dL  Above Desirable:  130-159 mg/dL  Borderline High:  160-189 mg/dL  High:  190-219 mg/dL  Very High:  Greater than or equal to 220 mg/dL   CBC with platelets     Status: Abnormal   Result Value Ref Range    WBC Count 8.6 4.0 - 11.0 10e3/uL    RBC Count 4.25 (L) 4.40 - 5.90 10e6/uL    Hemoglobin 12.6 (L) 13.3 - 17.7 g/dL    Hematocrit 37.1 (L) 40.0 -  53.0 %    MCV 87 78 - 100 fL    MCH 29.6 26.5 - 33.0 pg    MCHC 34.0 31.5 - 36.5 g/dL    RDW 13.0 10.0 - 15.0 %    Platelet Count 219 150 - 450 10e3/uL   Hemoglobin A1c     Status: Abnormal   Result Value Ref Range    Hemoglobin A1C 7.4 (H) 4.0 - 6.2 %   TSH with free T4 reflex     Status: Normal   Result Value Ref Range    TSH 1.31 0.40 - 4.00 mU/L   UA reflex to Microscopic     Status: Abnormal   Result Value Ref Range    Color Urine Yellow Colorless, Straw, Light Yellow, Yellow    Appearance Urine Clear Clear    Glucose Urine >1000 (A) Negative mg/dL    Bilirubin Urine Negative Negative    Ketones Urine Trace (A) Negative mg/dL    Specific Gravity Urine 1.020 1.000 - 1.030    Blood Urine Negative Negative    pH Urine 5.0 5.0 - 9.0    Protein Albumin Urine Negative Negative mg/dL    Urobilinogen Urine Normal Normal, 2.0 mg/dL    Nitrite Urine Negative Negative    Leukocyte Esterase Urine Negative Negative    Narrative    Microscopic not indicated        Aspects of Diabetes:   Recent Labs   Lab Test 07/21/22  1437 06/27/22  1425 04/20/22  1410 01/19/22  1420 10/12/21  1022 07/07/21  0855 03/17/21  1131   A1C 7.4* 7.2* 8.0* 9.1*   < > 7.1* 7.4*   LDL 91  --  59 59   < > 86 85   HDL 32  --  33 32   < > 37 33   TRIG 91  --  111 143   < > 135 176*   ALT 13  --  12 13   < > 13 12   CR 1.08 1.03 0.99 1.04   < > 1.04 0.94   GFRESTIMATED 67 71 75 70   < > 68 76   GFRESTBLACK  --   --   --   --   --  82 >90   POTASSIUM 4.0 4.1 4.1 4.7   < > 4.1 4.1   TSH 1.31  --  1.61 1.85   < >  --   --     < > = values in this interval not displayed.      Hemoglobin A1c  Goal range is under 8%. Best is 6.5 to 7   Blood Pressure 134/74 Goal to keep less than 140/90   Tobacco  reports that he quit smoking about 47 years ago. His smoking use included cigarettes. He has a 10.00 pack-year smoking history. He has never used smokeless tobacco. Goal to abstain from tobacco   Aspirin or Plavix Anti-platelet therapy Aspirin or Plavix reduces risk  of heart disease and stroke  -- sometimes used with other blood thinners, depending on bleeding risk and risk factors.    ACE/ARB Specific blood pressure meds These medications can reduce risk of kidney disease   Cholesterol Statins (Lipitor, Crestor, vs others) Statins reduce risk of heart disease and stroke   Eye Exam -- Do Yearly -- Annual diabetic eye exam   Healthy weight Wt Readings from Last 3 Encounters:   07/21/22 82.2 kg (181 lb 3.2 oz)   06/27/22 83.5 kg (184 lb)   05/12/22 83.9 kg (185 lb)     Body mass index is 26.57 kg/m .  Goal BMI under 30, best is under 25.      -- Trying to exercise daily (goal at least 20 min/day) with moderate aerobic activity   -- Eat healthy (resources from ADA at http://www.diabetes.org/)   -- Taking good care of my feet. Consider seeing the Podiatrist   -- Check blood sugars as directed, record in log book and bring to every appointment    Insurance companies are grading you and I on your blood sugar control -- Goal is to get your A1c down to 7.9% or lower and NO Smoking!  -- Medicare and most insurance companies, will only cover Hemoglobin A1c labs to be rechecked every 91+ days.      Return for Diabetes labs and clinic follow-up appointment every 3 to 4 months.    Schedule lab only appointment --- A few days AFTER: 10/19/22   Schedule clinic appointment with Dr. Montanez -- Same day as labs, or 1-2 days later.

## 2022-07-21 NOTE — NURSING NOTE
"Chief Complaint   Patient presents with     Diabetes         Initial /74 (BP Location: Left arm, Patient Position: Sitting, Cuff Size: Adult Large)   Pulse 79   Resp 20   Ht 1.759 m (5' 9.25\")   Wt 82.2 kg (181 lb 3.2 oz)   SpO2 97%   BMI 26.57 kg/m   Estimated body mass index is 26.57 kg/m  as calculated from the following:    Height as of this encounter: 1.759 m (5' 9.25\").    Weight as of this encounter: 82.2 kg (181 lb 3.2 oz).       FOOD SECURITY SCREENING QUESTIONS:    The next two questions are to help us understand your food security.  If you are feeling you need any assistance in this area, we have resources available to support you today.    Hunger Vital Signs:  Within the past 12 months we worried whether our food would run out before we got money to buy more. Never  Within the past 12 months the food we bought just didn't last and we didn't have money to get more. Never    Advance Care Directive on file? Yes      Medication reconciliation complete.      Jerel Benson,on 7/21/2022 at 3:07 PM        "

## 2022-07-22 LAB
CREAT UR-MCNC: 99.9 MG/DL
MICROALBUMIN UR-MCNC: <12 MG/L
MICROALBUMIN/CREAT UR: NORMAL MG/G{CREAT}

## 2022-07-27 DIAGNOSIS — M12.811 ROTATOR CUFF ARTHROPATHY OF RIGHT SHOULDER: Primary | ICD-10-CM

## 2022-08-17 ENCOUNTER — HOSPITAL ENCOUNTER (OUTPATIENT)
Dept: PHYSICAL THERAPY | Facility: OTHER | Age: 87
Setting detail: THERAPIES SERIES
Discharge: HOME | End: 2022-08-17
Attending: INTERNAL MEDICINE
Payer: COMMERCIAL

## 2022-08-17 DIAGNOSIS — M12.811 ROTATOR CUFF ARTHROPATHY OF RIGHT SHOULDER: ICD-10-CM

## 2022-08-17 PROCEDURE — 97162 PT EVAL MOD COMPLEX 30 MIN: CPT | Mod: GP | Performed by: PHYSICAL THERAPIST

## 2022-08-17 PROCEDURE — 97110 THERAPEUTIC EXERCISES: CPT | Mod: GP,PO | Performed by: PHYSICAL THERAPIST

## 2022-08-18 NOTE — PROGRESS NOTES
08/17/22 1500   General Information   Type of Visit Initial OP Ortho PT Evaluation   Start of Care Date 08/17/22   Referring Physician Dr. Montanez   Patient/Family Goals Statement Be able to run a chainsaw, throw firewood and bowl by mid October   Orders Evaluate and Treat   Date of Order 07/27/22   Certification Required? Yes   Medical Diagnosis s/p right reverse total shoulder arthroplasty, Rotator cuff arthropathy of right shoulder   Surgical/Medical history reviewed Yes   Body Part(s)   Body Part(s) Shoulder   Presentation and Etiology   Pertinent history of current problem (include personal factors and/or comorbidities that impact the POC) States he had his right shoulder replaced on 7/13/22, then had a hematoma removed from it on 7/27/22. States he saw surgeon on 8/15 and was told he could remove his sling. States he is sleeping good about half the time, sleeping in recliner still, walking daily. Trying to lift his arm and reach behind his back. Can't do table slides yet but has been doing pendulums, wants to be able to bowl, run a chainsaw, throw firewood. States he hurt his right arm running a chainsaw for 2 hours straight and woke up the next morning and could not move his arm. Wants to be able to dance. Follows up with surgeon on 8/29/22. Right hand dominant. Presents without sling.   Impairments A. Pain;D. Decreased ROM;F. Decreased strength and endurance   Functional Limitations perform activities of daily living;perform desired leisure / sports activities;perform required work activities   Symptom Location Right anterior shoulder with some pain into biceps and lateral shoulder   How/Where did it occur With repetition/overuse   Onset date of current episode/exacerbation 07/13/22   Chronicity New   Pain rating (0-10 point scale) Best (/10);Worst (/10)   Best (/10) 2/10   Worst (/10) 8/10   Pain quality B. Dull   Frequency of pain/symptoms C. With activity   Pain/symptoms are: The same all the time    Pain/symptoms exacerbated by C. Lifting;H. Overhead reach;L. Work tasks   Pain/symptoms eased by D. Nothing   Progression of symptoms since onset: Improved   Prior Level of Function   Prior Level of Function-Mobility Independent   Prior Level of Function-ADLs Independent   Functional Level Prior Comment Reports he was bowling, dancing, running a chainsaw and throwing firewood without issues prior to injury   Current Level of Function   Patient role/employment history F. Retired   Living environment [place]   Fall Risk Screen   Fall screen completed by PT   Have you fallen 2 or more times in the past year? No   Have you fallen and had an injury in the past year? No   Is patient a fall risk? No   Abuse Screen (yes response referral indicated)   Feels Unsafe at Home or Work/School no   Feels Threatened by Someone no   Does Anyone Try to Keep You From Having Contact with Others or Doing Things Outside Your Home? no   Physical Signs of Abuse Present no   Patient needs abuse support services and resources No   Shoulder Objective Findings   Side (if bilateral, select both right and left) Right   Palpation Mildly tender to palpation along pectoralis muscle, mid and distal biceps as well as along incision   Integumentary  Incision clean and dry, no drainage, mesh dressing intact.   Posture Anterior shoulders and forward head posture   Right Shoulder Flexion PROM 90 degrees   Right Shoulder Abduction PROM 50 degrees in neutral ER   Right Shoulder ER PROM to neutral from bodyline   Right Shoulder IR PROM to stomach   Right Shoulder Flexion Strength not assessed due to post op   Planned Therapy Interventions   Planned Therapy Interventions joint mobilization;manual therapy;neuromuscular re-education;ROM;strengthening;stretching   Planned Therapy Interventions Comment TE, TA   Planned Modality Interventions   Planned Modality Interventions Cryotherapy;Electrical stimulation;Hot packs   Planned Modality Interventions  Comments vasopneumatic device   Clinical Impression   Criteria for Skilled Therapeutic Interventions Met yes, treatment indicated   PT Diagnosis right shoulder weakness, impaired ROM, pain   Influenced by the following impairments post op weakness and pain   Functional limitations due to impairments impaired functional right arm strength and ROM for ADL's   Clinical Presentation Evolving/Changing   Clinical Presentation Rationale clinical judgement, patient questionable on following protocol   Clinical Decision Making (Complexity) Moderate complexity   Therapy Frequency   (up to 16 visits)   Predicted Duration of Therapy Intervention (days/wks) 8 weeks   Risk & Benefits of therapy have been explained Yes   Patient, Family & other staff in agreement with plan of care Yes   Clinical Impression Comments Steven had a reverse total shoulder arthroplasty on 7/13/22, subsequently had a hematoma removed on 7/27/22, reports he is walking daily and has started to move his arm more, trying to lift it, reach behind his back. When PT guided Steven through surgical protocol recommending not attempting AROM especially IR Steven stated he was going to continue to perform this so that his shoulder 'works right' and he can reach into his back pocket. Long discussion and explanation regarding allowing healing time to prevent set backs and injuries-Steven did not verbalize agreement and PT could not entice him to do so. Concerns for patient pushing past protocol restrictions and injuring his right shoulder.   Education Assessment   Preferred Learning Style Listening;Demonstration;Pictures/video   Barriers to Learning No barriers   ORTHO GOALS   PT Ortho Eval Goals 1;2;3;4   Ortho Goal 1   Goal Identifier PROM   Goal Description Steven will have at least 110 degrees PROM right shoulder flexion to promote ease of dressing without difficulty.   Target Date 09/14/22   Ortho Goal 2   Goal Identifier AROM   Goal Description Steven will have 110 degrees  active right shoulder flexion with minimal compensation to facilitate ease with reaching overhead for dishes withotu difficulty.   Target Date 10/12/22   Ortho Goal 3   Goal Identifier strength   Goal Description Steven will demonstrate at least 4-/5 strength into right shoulder flexion for ability to lift and carry laundry/groceries without difficulty.   Target Date 10/12/22   Ortho Goal 4   Goal Identifier ROM   Goal Description Steven will be able to reach into back right pocket 3 of 4 trials without pain to facilitate ease with dressing.   Target Date 10/12/22   Total Evaluation Time   PT Eval, Moderate Complexity Minutes (08217) 30   Therapy Certification   Certification date from 08/17/22   Certification date to 10/12/22   Medical Diagnosis s/p right reverse total shoulder arthroplasty, Rotator cuff arthropathy of right shoulder

## 2022-08-18 NOTE — PROGRESS NOTES
[facility]    OUTPATIENT PHYSICAL THERAPY ORTHOPEDIC EVALUATION  PLAN OF TREATMENT FOR OUTPATIENT REHABILITATION  (COMPLETE FOR INITIAL CLAIMS ONLY)  Patient's Last Name, First Name, M.I.  YOB: 1935  JumaSteven  SATHYA    Provider s Name:  [facility]   Medical Record No.  [id number]   Start of Care Date:  08/17/22   Onset Date:  07/13/22   Type:     _X__PT   ___OT   ___SLP Medical Diagnosis:  s/p right reverse total shoulder arthroplasty, Rotator cuff arthropathy of right shoulder     PT Diagnosis:  right shoulder weakness, impaired ROM, pain   Visits from SOC:  1      _________________________________________________________________________________  Plan of Treatment/Functional Goals:  joint mobilization, manual therapy, neuromuscular re-education, ROM, strengthening, stretching  TE, TA  Cryotherapy, Electrical stimulation, Hot packs  vasopneumatic device  Goals  Goal Identifier: PROM  Goal Description: Steven will have at least 110 degrees PROM right shoulder flexion to promote ease of dressing without difficulty.  Target Date: 09/14/22    Goal Identifier: AROM  Goal Description: Steven will have 110 degrees active right shoulder flexion with minimal compensation to facilitate ease with reaching overhead for dishes withotu difficulty.  Target Date: 10/12/22    Goal Identifier: strength  Goal Description: Steven will demonstrate at least 4-/5 strength into right shoulder flexion for ability to lift and carry laundry/groceries without difficulty.  Target Date: 10/12/22    Goal Identifier: ROM  Goal Description: Steven will be able to reach into back right pocket 3 of 4 trials without pain to facilitate ease with dressing.  Target Date: 10/12/22            Therapy Frequency:   (up to 16 visits)  Predicted Duration of Therapy Intervention:  8 weeks    Cami Mcnair, PT                  I CERTIFY THE NEED FOR THESE SERVICES FURNISHED UNDER        THIS PLAN OF TREATMENT AND WHILE UNDER MY CARE     (Physician co-signature of this document indicates review and certification of the therapy plan).                     Certification Date From:  08/17/22   Certification Date To:  10/12/22    Referring Provider:  Dr. Montanez  Surgeon: Dr. Hilton    Initial Assessment        See Epic Evaluation Start of Care Date: 08/17/22

## 2022-08-19 ENCOUNTER — HOSPITAL ENCOUNTER (OUTPATIENT)
Dept: PHYSICAL THERAPY | Facility: OTHER | Age: 87
Setting detail: THERAPIES SERIES
Discharge: HOME | End: 2022-08-19
Attending: INTERNAL MEDICINE
Payer: COMMERCIAL

## 2022-08-19 PROCEDURE — 97110 THERAPEUTIC EXERCISES: CPT | Mod: GP | Performed by: PHYSICAL THERAPIST

## 2022-08-24 ENCOUNTER — HOSPITAL ENCOUNTER (OUTPATIENT)
Dept: PHYSICAL THERAPY | Facility: OTHER | Age: 87
Setting detail: THERAPIES SERIES
Discharge: HOME | End: 2022-08-24
Attending: INTERNAL MEDICINE
Payer: COMMERCIAL

## 2022-08-24 PROCEDURE — 97110 THERAPEUTIC EXERCISES: CPT | Mod: GP,PO | Performed by: PHYSICAL THERAPIST

## 2022-08-26 ENCOUNTER — HOSPITAL ENCOUNTER (OUTPATIENT)
Dept: PHYSICAL THERAPY | Facility: OTHER | Age: 87
Setting detail: THERAPIES SERIES
Discharge: HOME | End: 2022-08-26
Attending: INTERNAL MEDICINE
Payer: COMMERCIAL

## 2022-08-26 PROCEDURE — 97110 THERAPEUTIC EXERCISES: CPT | Mod: GP,PO | Performed by: PHYSICAL THERAPIST

## 2022-08-30 ENCOUNTER — HOSPITAL ENCOUNTER (OUTPATIENT)
Dept: PHYSICAL THERAPY | Facility: OTHER | Age: 87
Setting detail: THERAPIES SERIES
Discharge: HOME | End: 2022-08-30
Attending: INTERNAL MEDICINE
Payer: COMMERCIAL

## 2022-08-30 PROCEDURE — 97110 THERAPEUTIC EXERCISES: CPT | Mod: GP,CQ,PO

## 2022-09-01 ENCOUNTER — HOSPITAL ENCOUNTER (OUTPATIENT)
Dept: PHYSICAL THERAPY | Facility: OTHER | Age: 87
Setting detail: THERAPIES SERIES
Discharge: HOME | End: 2022-09-01
Attending: INTERNAL MEDICINE
Payer: COMMERCIAL

## 2022-09-01 PROCEDURE — 97110 THERAPEUTIC EXERCISES: CPT | Mod: GP,PO,CQ

## 2022-09-07 ENCOUNTER — HOSPITAL ENCOUNTER (OUTPATIENT)
Dept: PHYSICAL THERAPY | Facility: OTHER | Age: 87
Setting detail: THERAPIES SERIES
Discharge: HOME | End: 2022-09-07
Attending: INTERNAL MEDICINE
Payer: COMMERCIAL

## 2022-09-07 PROCEDURE — 97110 THERAPEUTIC EXERCISES: CPT | Mod: GP,PO | Performed by: PHYSICAL THERAPIST

## 2022-09-16 ENCOUNTER — HOSPITAL ENCOUNTER (OUTPATIENT)
Dept: PHYSICAL THERAPY | Facility: OTHER | Age: 87
Setting detail: THERAPIES SERIES
Discharge: HOME | End: 2022-09-16
Attending: INTERNAL MEDICINE
Payer: COMMERCIAL

## 2022-09-16 ENCOUNTER — TRANSFERRED RECORDS (OUTPATIENT)
Dept: HEALTH INFORMATION MANAGEMENT | Facility: OTHER | Age: 87
End: 2022-09-16

## 2022-09-16 LAB — RETINOPATHY: NEGATIVE

## 2022-09-16 PROCEDURE — 97110 THERAPEUTIC EXERCISES: CPT | Mod: GP,PO | Performed by: PHYSICAL THERAPIST

## 2022-10-05 DIAGNOSIS — I10 BENIGN ESSENTIAL HYPERTENSION: ICD-10-CM

## 2022-10-06 ENCOUNTER — HOSPITAL ENCOUNTER (OUTPATIENT)
Dept: PHYSICAL THERAPY | Facility: OTHER | Age: 87
Setting detail: THERAPIES SERIES
Discharge: HOME | End: 2022-10-06
Attending: INTERNAL MEDICINE
Payer: COMMERCIAL

## 2022-10-06 PROCEDURE — 97110 THERAPEUTIC EXERCISES: CPT | Mod: GP,PO,CQ

## 2022-10-07 ENCOUNTER — TELEPHONE (OUTPATIENT)
Dept: INTERNAL MEDICINE | Facility: OTHER | Age: 87
End: 2022-10-07

## 2022-10-07 ENCOUNTER — OFFICE VISIT (OUTPATIENT)
Dept: FAMILY MEDICINE | Facility: OTHER | Age: 87
End: 2022-10-07
Attending: NURSE PRACTITIONER
Payer: COMMERCIAL

## 2022-10-07 VITALS
HEIGHT: 69 IN | OXYGEN SATURATION: 96 % | SYSTOLIC BLOOD PRESSURE: 150 MMHG | WEIGHT: 182.8 LBS | DIASTOLIC BLOOD PRESSURE: 80 MMHG | BODY MASS INDEX: 27.08 KG/M2 | RESPIRATION RATE: 18 BRPM | HEART RATE: 63 BPM

## 2022-10-07 DIAGNOSIS — R39.9 UTI SYMPTOMS: Primary | ICD-10-CM

## 2022-10-07 DIAGNOSIS — R35.0 URINARY FREQUENCY: ICD-10-CM

## 2022-10-07 DIAGNOSIS — R35.1 NOCTURIA: ICD-10-CM

## 2022-10-07 LAB
ALBUMIN UR-MCNC: 20 MG/DL
APPEARANCE UR: CLEAR
BILIRUB UR QL STRIP: NEGATIVE
COLOR UR AUTO: ABNORMAL
GLUCOSE UR STRIP-MCNC: 100 MG/DL
HGB UR QL STRIP: NEGATIVE
HYALINE CASTS: 1 /LPF
KETONES UR STRIP-MCNC: NEGATIVE MG/DL
LEUKOCYTE ESTERASE UR QL STRIP: NEGATIVE
MUCOUS THREADS #/AREA URNS LPF: PRESENT /LPF
NITRATE UR QL: NEGATIVE
PH UR STRIP: 5 [PH] (ref 5–9)
RBC URINE: <1 /HPF
SP GR UR STRIP: 1.02 (ref 1–1.03)
UROBILINOGEN UR STRIP-MCNC: NORMAL MG/DL
WBC URINE: <1 /HPF

## 2022-10-07 PROCEDURE — 81001 URINALYSIS AUTO W/SCOPE: CPT | Mod: ZL

## 2022-10-07 PROCEDURE — 99213 OFFICE O/P EST LOW 20 MIN: CPT | Performed by: NURSE PRACTITIONER

## 2022-10-07 PROCEDURE — G0463 HOSPITAL OUTPT CLINIC VISIT: HCPCS

## 2022-10-07 RX ORDER — IRBESARTAN AND HYDROCHLOROTHIAZIDE 150; 12.5 MG/1; MG/1
1 TABLET, FILM COATED ORAL EVERY MORNING
Qty: 90 TABLET | Refills: 0 | Status: SHIPPED | OUTPATIENT
Start: 2022-10-07 | End: 2022-10-26

## 2022-10-07 RX ORDER — TAMSULOSIN HYDROCHLORIDE 0.4 MG/1
0.4 CAPSULE ORAL EVERY EVENING
Qty: 90 CAPSULE | Refills: 1 | Status: SHIPPED | OUTPATIENT
Start: 2022-10-07 | End: 2022-11-17

## 2022-10-07 ASSESSMENT — PAIN SCALES - GENERAL: PAINLEVEL: NO PAIN (0)

## 2022-10-07 NOTE — NURSING NOTE
Pt presents to clinic today for a possible UTI.   Patient states he has no symptoms besides when he sits for awhile and gets up to walk he immediately needs to urinate getting up.       FOOD SECURITY SCREENING QUESTIONS:    The next two questions are to help us understand your food security.  If you are feeling you need any assistance in this area, we have resources available to support you today.    Hunger Vital Signs:  Within the past 12 months we worried whether our food would run out before we got money to buy more. Never  Within the past 12 months the food we bought just didn't last and we didn't have money to get more. Never        Medication Reconciliation: complete  Imelda Grace, TAYLOR,LPN on 10/7/2022 at 2:58 PM

## 2022-10-07 NOTE — TELEPHONE ENCOUNTER
Contacted patient by phone. He agrees to come to [facility] this afternoon for evaluation of his urinary symptoms.  MARILIN MEJIA RN on 10/7/2022 at 11:54 AM

## 2022-10-07 NOTE — PATIENT INSTRUCTIONS
I refilled your prescription for TAMSULOSIN (FLOMAX). I recommend you restart taking this medication until your see Dr. Montanez as well as while you await urology appointment. This will likely improve your symptoms. And if it does, you probably do not need to see urology.     You do NOT have a urinary infection today.

## 2022-10-07 NOTE — TELEPHONE ENCOUNTER
"  Last Prescription Date: 10/5/22  Last Qty/Refills: 30 / 0  Last Office Visit: 7/21/22  Future Office Visit: 10/26/22     Requested Prescriptions   Pending Prescriptions Disp Refills     irbesartan-hydrochlorothiazide (AVALIDE) 150-12.5 MG tablet [Pharmacy Med Name: IRBESARTAN/HCTZ 150-12.5MG TABLETS] 90 tablet      Sig: TAKE 1 TABLET BY MOUTH EVERY MORNING       Angiotensin-II Receptors Passed - 10/5/2022  9:22 AM        Passed - Last blood pressure under 140/90 in past 12 months     BP Readings from Last 3 Encounters:   07/21/22 134/74   06/27/22 136/68   05/12/22 128/80                 Passed - Recent (12 mo) or future (30 days) visit within the authorizing provider's specialty     Patient has had an office visit with the authorizing provider or a provider within the authorizing providers department within the previous 12 mos or has a future within next 30 days. See \"Patient Info\" tab in inbasket, or \"Choose Columns\" in Meds & Orders section of the refill encounter.              Passed - Medication is active on med list        Passed - Patient is age 18 or older        Passed - Normal serum creatinine on file in past 12 months     Recent Labs   Lab Test 07/21/22  1437   CR 1.08       Ok to refill medication if creatinine is low          Passed - Normal serum potassium on file in past 12 months     Recent Labs   Lab Test 07/21/22  1437   POTASSIUM 4.0                   Diuretics (Including Combos) Protocol Passed - 10/5/2022  9:22 AM        Passed - Blood pressure under 140/90 in past 12 months     BP Readings from Last 3 Encounters:   07/21/22 134/74   06/27/22 136/68   05/12/22 128/80                 Passed - Recent (12 mo) or future (30 days) visit within the authorizing provider's specialty     Patient has had an office visit with the authorizing provider or a provider within the authorizing providers department within the previous 12 mos or has a future within next 30 days. See \"Patient Info\" tab in " "inbasket, or \"Choose Columns\" in Meds & Orders section of the refill encounter.              Passed - Medication is active on med list        Passed - Patient is age 18 or older        Passed - Normal serum creatinine on file in past 12 months     Recent Labs   Lab Test 07/21/22  1437   CR 1.08              Passed - Normal serum potassium on file in past 12 months     Recent Labs   Lab Test 07/21/22  1437   POTASSIUM 4.0                    Passed - Normal serum sodium on file in past 12 months     Recent Labs   Lab Test 07/21/22  1437                      "

## 2022-10-07 NOTE — TELEPHONE ENCOUNTER
Letter from patient in your in basket.    Patient is having increased urination issues.especially when getting up after sitting.    He is wondering is something is wrong?    Jerel Benson .......  10/7/2022  9:17 AM

## 2022-10-07 NOTE — PROGRESS NOTES
"ASSESSMENT/PLAN:    I have reviewed the nursing notes.  I have reviewed the findings, diagnosis, plan and need for follow up with the patient.    1. UTI symptoms  - UA reflex to Microscopic and Culture  Discussed urinalysis with patient labs today which is unremarkable and no evidence of urinary tract infection.  No antibiotics are indicated at this time. I was considering starting flomax, but then realized he is already prescribed this medication. When inquiring about this medication patient states that he does not take it and he cannot remember when he stopped.  He believes he has not taken this medication in many months or longer.  He says the reason for stopping is because someone \"told him that it was post poison.\"  I did refill this medication and encouraged him to resume taking it due to his worsening nocturia and urinary frequency which she is agreeable to trying it again.  If symptoms do not improve, recommend seeing urologist. Referral placed to see if there is anything that can be done to improve his symptoms aside from the above or further evaluation of his condition. Patient is agreeable to this.     2. Urinary frequency  3. Nocturia  - Adult Urology  Referral; Future  - tamsulosin (FLOMAX) 0.4 MG capsule; Take 1 capsule (0.4 mg) by mouth every evening  Dispense: 90 capsule; Refill: 1    Discussed warning signs/symptoms indicative of need to f/u    Follow up if symptoms persist or worsen or concerns    I explained my diagnostic considerations and recommendations to the patient, who voiced understanding and agreement with the treatment plan. All questions were answered. We discussed potential side effects of any prescribed or recommended therapies, as well as expectations for response to treatments.    Celina Smart NP  10/7/2022  3:08 PM    HPI:  Steven Dennison is a 87 year old male who presents to [facility] today for concerns urinary infection. He reports urinary frequency with nocturia " "that has been worsening in recent weeks. He thinks he urinated maybe 15 x in a 12 hour period a couple of days ago. Symptoms started months ago. He states that when he sits for a while and when he gets up to ambulate he immediately needs to urinate. No other symptoms are present including hematuria, dysuria, flank pain, nausea, vomiting, or fevers/chills.     Hx of 1 UTI in his past. Not recurrent.     He is prescribed flomax, but tells me he stopped it in the past because \"someone told him it was poison.\" He does not recall when he stopped taking it.     Review of systems otherwise unremarkable.     Past Medical History:   Diagnosis Date     Controlled type 2 diabetes mellitus with diabetic polyneuropathy, without long-term current use of insulin (H) 2017     Eczema 2016     Renal calculus 2018    Overview:  disease     Past Surgical History:   Procedure Laterality Date     APPENDECTOMY OPEN      Appendectomy at age 6 due to appendicitis     ARTHROSCOPY KNEE      left knee     COLONOSCOPY  2000     COLONOSCOPY  2011,adenomatous polyps, one with high grade dysplasia , next due      OTHER SURGICAL HISTORY      ,HERNIA REPAIR,right inguinal     OTHER SURGICAL HISTORY      ,[id number],ARTHROPLASTY,left total knee     OTHER SURGICAL HISTORY      ,902254,OTHER,left first/rib resection     OTHER SURGICAL HISTORY      ,[id number],OTHER     OTHER SURGICAL HISTORY      15,JXU969,CYSTOSCOPY W/ URETEROSCOPY W/ LITHOTRIPSY,Dr. Chelsi GREEN     OTHER SURGICAL HISTORY      ,HERNIA REPAIR,x 4     Social History     Tobacco Use     Smoking status: Former Smoker     Packs/day: 1.00     Years: 10.00     Pack years: 10.00     Types: Cigarettes     Quit date: 1975     Years since quittin.8     Smokeless tobacco: Never Used   Substance Use Topics     Alcohol use: Yes     Alcohol/week: 5.0 standard drinks     Comment: occasional, maybe 2 a month     Current " Outpatient Medications   Medication Sig Dispense Refill     amLODIPine (NORVASC) 5 MG tablet Take 0.5 tablets (2.5 mg) by mouth daily - for Hypertension and Raynaud's -- verify dosing as of 5/12/2022 180 tablet 1     Apple Cider Vinegar 300 MG TABS Take 900 mg by mouth 2 times daily       aspirin 81 MG EC tablet Take 81 mg by mouth       B Complex Vitamins (VITAMIN-B COMPLEX) TABS Take 1 tablet by mouth Take 1 tablet by mouth once daily. -- for Low B12 / B-Vitamins -- about 3 days weekly       blood glucose monitoring (NO BRAND SPECIFIED) meter device kit Use to test blood sugar 2 times daily. DX: E11.9 1 kit 0     blood glucose monitoring (PRODIGY TWIST TOP 28G) lancets As directed. Use for testing 2 times daily DX: E11.9 200 each 2     Blood Glucose Monitoring Suppl (FIFTY50 GLUCOSE METER 2.0) W/DEVICE KIT Dispense item covered by pt ins. 250.02 NIDDM type II, uncontrolled - Test 2 times/day. Reason: Unstable diabetes       Cholecalciferol (VITAMIN D) 2000 units tablet Take 2,000 Units by mouth daily 100 tablet 3     cinnamon 500 MG CAPS Take by mouth daily       diltiazem ER (DILT-XR) 120 MG 24 hr capsule Take 1 capsule (120 mg) by mouth every evening - for Heart and Raynaud's 90 capsule 1     fluticasone (FLONASE) 50 MCG/ACT nasal spray INSTILL 2 SPRAYS IN EACH NOSTRIL Q NIGHT HS  3     gabapentin (NEURONTIN) 100 MG capsule Take 1 capsule (100 mg) by mouth At Bedtime 90 capsule 1     Garlic 1000 MG CAPS Take 1 capsule by mouth daily        glipiZIDE (GLUCOTROL) 5 MG tablet Take 1 tablet (5 mg) by mouth 3 times daily (with meals) 270 tablet 1     irbesartan-hydrochlorothiazide (AVALIDE) 150-12.5 MG tablet Take 1 tablet by mouth every morning 90 tablet 0     Krill Oil 500 MG CAPS Take 1 capsule by mouth daily       LUMIGAN 0.01 % SOLN ophthalmic solution INT 1 GTT IN OU HS  3     Magnesium Oxide 420 MG TABS Take 1 tablet by mouth daily       Melatonin 10 MG TABS tablet Take 1 tablet (10 mg) by mouth nightly as  "needed for sleep (--- take 2 to 3 hours prior to bedtime, to help initiate sleep)       metFORMIN (GLUCOPHAGE) 500 MG tablet Take 1 tablet (500 mg) by mouth 2 times daily (with meals) - for Diabetes 180 tablet 3     mupirocin (BACTROBAN) 2 % external ointment Apply topically 2 times daily To upper inner thigh/groin rash x2-4 weeks 120 g 3     ONETOUCH ULTRA test strip TESTING TWICE DAILY 200 strip 3     oxyCODONE (ROXICODONE) 5 MG tablet TAKE 1 TO 2 TABLETS BY MOUTH EVERY 4 HOURS AS NEEDED FOR MODERATE TO SEVERE PAIN       pravastatin (PRAVACHOL) 40 MG tablet Take 1 tablet (40 mg) by mouth At Bedtime For cholesterol / diabetes 90 tablet 1     tamsulosin (FLOMAX) 0.4 MG capsule Take 1 capsule (0.4 mg) by mouth every evening 90 capsule 1     vitamin B-12 (CYANOCOBALAMIN) 2500 MCG sublingual tablet Take 1 tablet (2,500 mcg) by mouth daily -- for low B12 levels and fatigue 100 tablet 3     Allergies   Allergen Reactions     Cephalexin Other (See Comments)     Other reaction(s):Pt Doesn't Remember  Pt does not remember      Clindamycin Other (See Comments)     Other reaction(s): Pt Doesn't Remember  Pt does not remember     Hydromorphone Other (See Comments)     Other reaction(s): Bradycardia     Amoxicillin Rash     Past medical history, past surgical history, current medications and allergies reviewed and accurate to the best of my knowledge.      ROS:  Refer to HPI    BP (!) 150/80 (BP Location: Right arm, Patient Position: Sitting, Cuff Size: Adult Large)   Pulse 63   Resp 18   Ht 1.753 m (5' 9\")   Wt 82.9 kg (182 lb 12.8 oz)   SpO2 96%   BMI 26.99 kg/m      EXAM:  General Appearance: Well appearing 87 year old male, appropriate appearance for age. No acute distress   Respiratory: normal chest wall and respirations.  Normal effort.  Clear to auscultation bilaterally, no wheezing, crackles or rhonchi.  No increased work of breathing.  No cough appreciated.  Cardiac: RRR with no murmurs  :  No suprapubic " tenderness to palpation.  Absent CVA tenderness to palpation.    Musculoskeletal:  Equal movement of bilateral upper extremities.  Equal movement of bilateral lower extremities.  Normal gait.    Psychological: normal affect, alert, oriented, and pleasant.     Results for orders placed or performed in visit on 10/07/22   UA reflex to Microscopic and Culture     Status: Abnormal    Specimen: Urine, Midstream   Result Value Ref Range    Color Urine Light Yellow Colorless, Straw, Light Yellow, Yellow    Appearance Urine Clear Clear    Glucose Urine 100  (A) Negative mg/dL    Bilirubin Urine Negative Negative    Ketones Urine Negative Negative mg/dL    Specific Gravity Urine 1.025 1.000 - 1.030    Blood Urine Negative Negative    pH Urine 5.0 5.0 - 9.0    Protein Albumin Urine 20  (A) Negative mg/dL    Urobilinogen Urine Normal Normal, 2.0 mg/dL    Nitrite Urine Negative Negative    Leukocyte Esterase Urine Negative Negative    Mucus Urine Present (A) None Seen /LPF    RBC Urine <1 <=2 /HPF    WBC Urine <1 <=5 /HPF    Hyaline Casts Urine 1 <=2 /LPF    Narrative    Urine Culture not indicated

## 2022-10-07 NOTE — TELEPHONE ENCOUNTER
Recommend that [facility] to get evaluated, possible lab work if needed, as well as a urine sample, make sure he does not have a bladder infection.  -- He could do this today or tomorrow.    Otherwise, given the urinary symptoms... have him call the clinic and request an appointment with urology - Dr. Gagnon.    -- Urinary frequency.    Electronically signed by:  Nawaf Montanez MD  on October 7, 2022 10:15 AM

## 2022-10-25 NOTE — PROGRESS NOTES
[facility]    Outpatient Physical Therapy Discharge Note  Patient: Steven Dennison  : 1935    Beginning/End Dates of Reporting Period:  22 to 10/25/2022     Referring Provider: Dr. Montanez     Therapy Diagnosis: right shoulder weakness, impaired ROM, pain     Client Self Report: Steven would like today to be his last day of formal PT.     Objective Measurements:  Objective Measure: pain mid humerus along triceps per patient pointing, 0/10 at rest and 6/10 with trying wall slides  Details: DOS 22, 6 weeks=, 8 weeks=  Objective Measure: PROM right shoulder flexion to 132 22  Details: AAROM supine flexion 122 degrees 22  Objective Measure: Advised pt that if he injures his shoulder doing non recommended HEP PT is not liable and has updated surgeon regarding his lack of post op follow thru with protocol restrictions. Steven stated he wants to be bowling before he sees the doctor so will keep doing what he's doing.      Goals:  Goal Identifier PROM   Goal Description Steven will have at least 110 degrees PROM right shoulder flexion to promote ease of dressing without difficulty.   Target Date 22   Date Met  22   Progress (detail required for progress note):       Goal Identifier AROM   Goal Description Steven will have 110 degrees active right shoulder flexion with minimal compensation to facilitate ease with reaching overhead for dishes withotu difficulty.   Target Date 10/12/22   Date Met   not met    Progress (detail required for progress note):       Goal Identifier strength   Goal Description Steven will demonstrate at least 4-/5 strength into right shoulder flexion for ability to lift and carry laundry/groceries without difficulty.   Target Date 10/12/22   Date Met   not met    Progress (detail required for progress note):       Goal Identifier ROM   Goal Description Steven  "will be able to reach into back right pocket 3 of 4 trials without pain to facilitate ease with dressing.   Target Date 10/12/22   Date Met   not met   Progress (detail required for progress note):       HEP:  [id number], 8/24/22: [id number]    PT had contacted surgeon's office multiple times regarding concerns about patient not following protocol, he reported using 4# weights frequently and told PT multiple times that he was \"overdoing it\" at home but would not discontinue activities at home. Reinforced post op protocol frequently with him.       Plan:  Discharge from therapy.    Discharge:    Reason for Discharge: Patient chooses to discontinue therapy.    Equipment Issued: HEP    Discharge Plan: Patient to continue home program.  "

## 2022-10-26 ENCOUNTER — OFFICE VISIT (OUTPATIENT)
Dept: INTERNAL MEDICINE | Facility: OTHER | Age: 87
End: 2022-10-26
Attending: INTERNAL MEDICINE
Payer: COMMERCIAL

## 2022-10-26 ENCOUNTER — LAB (OUTPATIENT)
Dept: LAB | Facility: OTHER | Age: 87
End: 2022-10-26
Attending: INTERNAL MEDICINE
Payer: COMMERCIAL

## 2022-10-26 VITALS
RESPIRATION RATE: 20 BRPM | SYSTOLIC BLOOD PRESSURE: 138 MMHG | HEART RATE: 59 BPM | BODY MASS INDEX: 27.37 KG/M2 | OXYGEN SATURATION: 97 % | DIASTOLIC BLOOD PRESSURE: 72 MMHG | WEIGHT: 184.8 LBS | HEIGHT: 69 IN

## 2022-10-26 DIAGNOSIS — G47.9 SLEEP DIFFICULTIES: ICD-10-CM

## 2022-10-26 DIAGNOSIS — E78.2 MIXED HYPERLIPIDEMIA: ICD-10-CM

## 2022-10-26 DIAGNOSIS — Z71.85 VACCINE COUNSELING: ICD-10-CM

## 2022-10-26 DIAGNOSIS — I73.00 RAYNAUD'S PHENOMENON WITHOUT GANGRENE: ICD-10-CM

## 2022-10-26 DIAGNOSIS — F11.90 CHRONIC, CONTINUOUS USE OF OPIOIDS: ICD-10-CM

## 2022-10-26 DIAGNOSIS — G89.4 CHRONIC PAIN SYNDROME: ICD-10-CM

## 2022-10-26 DIAGNOSIS — E11.42 CONTROLLED TYPE 2 DIABETES MELLITUS WITH DIABETIC POLYNEUROPATHY, WITHOUT LONG-TERM CURRENT USE OF INSULIN (H): ICD-10-CM

## 2022-10-26 DIAGNOSIS — E11.42 CONTROLLED TYPE 2 DIABETES MELLITUS WITH DIABETIC POLYNEUROPATHY, WITHOUT LONG-TERM CURRENT USE OF INSULIN (H): Primary | ICD-10-CM

## 2022-10-26 DIAGNOSIS — Z00.00 ENCOUNTER FOR MEDICARE ANNUAL WELLNESS EXAM: ICD-10-CM

## 2022-10-26 DIAGNOSIS — R26.89 BALANCE PROBLEMS: ICD-10-CM

## 2022-10-26 DIAGNOSIS — I10 BENIGN ESSENTIAL HYPERTENSION: ICD-10-CM

## 2022-10-26 DIAGNOSIS — Z02.89 PAIN MEDICATION AGREEMENT: ICD-10-CM

## 2022-10-26 DIAGNOSIS — G57.93 NEUROPATHIC PAIN OF BOTH FEET: ICD-10-CM

## 2022-10-26 PROBLEM — L08.1 ERYTHRASMA: Status: RESOLVED | Noted: 2021-11-10 | Resolved: 2022-10-26

## 2022-10-26 PROBLEM — R14.3 FLATULENCE: Status: RESOLVED | Noted: 2021-03-17 | Resolved: 2022-10-26

## 2022-10-26 LAB
ALBUMIN SERPL-MCNC: 4.2 G/DL (ref 3.5–5.7)
ALBUMIN UR-MCNC: 50 MG/DL
ALP SERPL-CCNC: 73 U/L (ref 34–104)
ALT SERPL W P-5'-P-CCNC: 14 U/L (ref 7–52)
ANION GAP SERPL CALCULATED.3IONS-SCNC: 6 MMOL/L (ref 3–14)
APPEARANCE UR: CLEAR
AST SERPL W P-5'-P-CCNC: 14 U/L (ref 13–39)
BILIRUB SERPL-MCNC: 0.9 MG/DL (ref 0.3–1)
BILIRUB UR QL STRIP: NEGATIVE
BUN SERPL-MCNC: 20 MG/DL (ref 7–25)
CALCIUM SERPL-MCNC: 9.4 MG/DL (ref 8.6–10.3)
CHLORIDE BLD-SCNC: 105 MMOL/L (ref 98–107)
CHOLEST SERPL-MCNC: 162 MG/DL
CO2 SERPL-SCNC: 27 MMOL/L (ref 21–31)
COLOR UR AUTO: ABNORMAL
CREAT SERPL-MCNC: 0.9 MG/DL (ref 0.7–1.3)
CREAT UR-MCNC: 62 MG/DL
ERYTHROCYTE [DISTWIDTH] IN BLOOD BY AUTOMATED COUNT: 14.4 % (ref 10–15)
FASTING STATUS PATIENT QL REPORTED: NO
GFR SERPL CREATININE-BSD FRML MDRD: 83 ML/MIN/1.73M2
GLUCOSE BLD-MCNC: 92 MG/DL (ref 70–105)
GLUCOSE UR STRIP-MCNC: NEGATIVE MG/DL
HBA1C MFR BLD: 7.2 % (ref 4–6.2)
HCT VFR BLD AUTO: 45.9 % (ref 40–53)
HDLC SERPL-MCNC: 37 MG/DL (ref 23–92)
HGB BLD-MCNC: 15.6 G/DL (ref 13.3–17.7)
HGB UR QL STRIP: NEGATIVE
KETONES UR STRIP-MCNC: NEGATIVE MG/DL
LDLC SERPL CALC-MCNC: 102 MG/DL
LEUKOCYTE ESTERASE UR QL STRIP: NEGATIVE
MCH RBC QN AUTO: 29.3 PG (ref 26.5–33)
MCHC RBC AUTO-ENTMCNC: 34 G/DL (ref 31.5–36.5)
MCV RBC AUTO: 86 FL (ref 78–100)
MUCOUS THREADS #/AREA URNS LPF: PRESENT /LPF
NITRATE UR QL: NEGATIVE
NONHDLC SERPL-MCNC: 125 MG/DL
PH UR STRIP: 5.5 [PH] (ref 5–9)
PLATELET # BLD AUTO: 145 10E3/UL (ref 150–450)
POTASSIUM BLD-SCNC: 4.1 MMOL/L (ref 3.5–5.1)
PROT SERPL-MCNC: 6.9 G/DL (ref 6.4–8.9)
RBC # BLD AUTO: 5.33 10E6/UL (ref 4.4–5.9)
RBC URINE: <1 /HPF
SODIUM SERPL-SCNC: 138 MMOL/L (ref 134–144)
SP GR UR STRIP: 1.02 (ref 1–1.03)
TRIGL SERPL-MCNC: 117 MG/DL
TSH SERPL DL<=0.005 MIU/L-ACNC: 1.53 MU/L (ref 0.4–4)
UROBILINOGEN UR STRIP-MCNC: NORMAL MG/DL
WBC # BLD AUTO: 6.8 10E3/UL (ref 4–11)
WBC URINE: <1 /HPF

## 2022-10-26 PROCEDURE — G0439 PPPS, SUBSEQ VISIT: HCPCS | Performed by: INTERNAL MEDICINE

## 2022-10-26 PROCEDURE — 81003 URINALYSIS AUTO W/O SCOPE: CPT | Mod: ZL

## 2022-10-26 PROCEDURE — G0463 HOSPITAL OUTPT CLINIC VISIT: HCPCS

## 2022-10-26 PROCEDURE — 80061 LIPID PANEL: CPT | Mod: ZL

## 2022-10-26 PROCEDURE — 36415 COLL VENOUS BLD VENIPUNCTURE: CPT | Mod: ZL

## 2022-10-26 PROCEDURE — 82043 UR ALBUMIN QUANTITATIVE: CPT | Mod: ZL

## 2022-10-26 PROCEDURE — 83036 HEMOGLOBIN GLYCOSYLATED A1C: CPT | Mod: ZL

## 2022-10-26 PROCEDURE — 82040 ASSAY OF SERUM ALBUMIN: CPT | Mod: ZL

## 2022-10-26 PROCEDURE — 84443 ASSAY THYROID STIM HORMONE: CPT | Mod: ZL

## 2022-10-26 PROCEDURE — 80307 DRUG TEST PRSMV CHEM ANLYZR: CPT | Mod: ZL

## 2022-10-26 PROCEDURE — 85027 COMPLETE CBC AUTOMATED: CPT | Mod: ZL

## 2022-10-26 PROCEDURE — 99214 OFFICE O/P EST MOD 30 MIN: CPT | Mod: 25 | Performed by: INTERNAL MEDICINE

## 2022-10-26 PROCEDURE — 80053 COMPREHEN METABOLIC PANEL: CPT | Mod: ZL

## 2022-10-26 RX ORDER — CARBAMAZEPINE 100 MG/1
100 CAPSULE, EXTENDED RELEASE ORAL 2 TIMES DAILY
Qty: 60 CAPSULE | Refills: 3 | Status: SHIPPED | OUTPATIENT
Start: 2022-10-26 | End: 2022-11-17

## 2022-10-26 RX ORDER — GABAPENTIN 100 MG/1
100-300 CAPSULE ORAL AT BEDTIME
Qty: 270 CAPSULE | Refills: 1 | Status: SHIPPED | OUTPATIENT
Start: 2022-10-26 | End: 2022-11-17

## 2022-10-26 RX ORDER — OXYCODONE HYDROCHLORIDE 5 MG/1
5 TABLET ORAL DAILY PRN
Qty: 12 TABLET | Refills: 0 | Status: SHIPPED | OUTPATIENT
Start: 2022-10-26 | End: 2023-03-17

## 2022-10-26 ASSESSMENT — ANXIETY QUESTIONNAIRES
GAD7 TOTAL SCORE: 5
7. FEELING AFRAID AS IF SOMETHING AWFUL MIGHT HAPPEN: NOT AT ALL
GAD7 TOTAL SCORE: 5
7. FEELING AFRAID AS IF SOMETHING AWFUL MIGHT HAPPEN: NOT AT ALL
2. NOT BEING ABLE TO STOP OR CONTROL WORRYING: NOT AT ALL
8. IF YOU CHECKED OFF ANY PROBLEMS, HOW DIFFICULT HAVE THESE MADE IT FOR YOU TO DO YOUR WORK, TAKE CARE OF THINGS AT HOME, OR GET ALONG WITH OTHER PEOPLE?: NOT DIFFICULT AT ALL
6. BECOMING EASILY ANNOYED OR IRRITABLE: SEVERAL DAYS
4. TROUBLE RELAXING: MORE THAN HALF THE DAYS
1. FEELING NERVOUS, ANXIOUS, OR ON EDGE: NOT AT ALL
IF YOU CHECKED OFF ANY PROBLEMS ON THIS QUESTIONNAIRE, HOW DIFFICULT HAVE THESE PROBLEMS MADE IT FOR YOU TO DO YOUR WORK, TAKE CARE OF THINGS AT HOME, OR GET ALONG WITH OTHER PEOPLE: NOT DIFFICULT AT ALL
GAD7 TOTAL SCORE: 5
5. BEING SO RESTLESS THAT IT IS HARD TO SIT STILL: MORE THAN HALF THE DAYS
3. WORRYING TOO MUCH ABOUT DIFFERENT THINGS: NOT AT ALL

## 2022-10-26 ASSESSMENT — ENCOUNTER SYMPTOMS
BRUISES/BLEEDS EASILY: 0
AGITATION: 0
HEMATURIA: 0
DIZZINESS: 0
COUGH: 0
CONFUSION: 0
CHILLS: 0
DYSURIA: 0
VOMITING: 0
NAUSEA: 0
PALPITATIONS: 1
FATIGUE: 1
FREQUENCY: 1
ABDOMINAL PAIN: 0
SLEEP DISTURBANCE: 1
LIGHT-HEADEDNESS: 0
EYE PAIN: 0
DIARRHEA: 0
MYALGIAS: 0
ARTHRALGIAS: 1
BACK PAIN: 1
SHORTNESS OF BREATH: 1
WHEEZING: 0
FEVER: 0

## 2022-10-26 ASSESSMENT — PATIENT HEALTH QUESTIONNAIRE - PHQ9
SUM OF ALL RESPONSES TO PHQ QUESTIONS 1-9: 7
SUM OF ALL RESPONSES TO PHQ QUESTIONS 1-9: 7
10. IF YOU CHECKED OFF ANY PROBLEMS, HOW DIFFICULT HAVE THESE PROBLEMS MADE IT FOR YOU TO DO YOUR WORK, TAKE CARE OF THINGS AT HOME, OR GET ALONG WITH OTHER PEOPLE: NOT DIFFICULT AT ALL

## 2022-10-26 ASSESSMENT — PAIN SCALES - GENERAL: PAINLEVEL: MILD PAIN (3)

## 2022-10-26 NOTE — PATIENT INSTRUCTIONS
Patient Education   Personalized Prevention Plan  You are due for the preventive services outlined below.  Your care team is available to assist you in scheduling these services.  If you have already completed any of these items, please share that information with your care team to update in your medical record.  Health Maintenance Due   Topic Date Due    Zoster (Shingles) Vaccine (2 of 3) 02/16/2010    COVID-19 Vaccine (3 - Booster for Moderna series) 07/14/2021    Annual Wellness Visit  07/07/2022    TREATMENT AGREEMENT FOR CHRONIC PAIN MANAGEMENT  07/08/2022    URINE DRUG SCREEN  09/07/2022    Diabetic Foot Exam  10/12/2022     Preventive Health Recommendations  See your health care provider every year to  Review health changes.   Discuss preventive care.    Review your medicines if your doctor has prescribed any.  Talk with your health care provider about whether you should have a test to screen for prostate cancer (PSA).  Every 3 years, have a diabetes test (fasting glucose). If you are at risk for diabetes, you should have this test more often.  Every 5 years, have a cholesterol test. Have this test more often if you are at risk for high cholesterol or heart disease.   Every 10 years, have a colonoscopy. Or, have a yearly FIT test (stool test). These exams will check for colon cancer.  Talk to with your health care provider about screening for Abdominal Aortic Aneurysm if you have a family history of AAA or have a history of smoking.    Shots:   Get a flu shot each year.   Get a tetanus shot every 10 years.   Talk to your doctor about your pneumonia vaccines. There are now two you should receive - Pneumovax (PPSV 23) and Prevnar (PCV 13).  Talk to your pharmacist about a shingles vaccine.   Talk to your doctor about the hepatitis B vaccine.    Nutrition:   Eat at least 5 servings of fruits and vegetables each day.   Eat whole-grain bread, whole-wheat pasta and brown rice instead of white grains and rice.    Get adequate Calcium and Vitamin D.     Lifestyle  Exercise for at least 150 minutes a week (30 minutes a day, 5 days a week). This will help you control your weight and prevent disease.   Limit alcohol to one drink per day.   No smoking.   Wear sunscreen to prevent skin cancer.   See your dentist every six months for an exam and cleaning.   See your eye doctor every 1 to 2 years to screen for conditions such as glaucoma, macular degeneration and cataracts.    Personalized Prevention Plan  You are due for the preventive services outlined below.  Your care team is available to assist you in scheduling these services.  If you have already completed any of these items, please share that information with your care team to update in your medical record.  Health Maintenance   Topic Date Due    ZOSTER IMMUNIZATION (2 of 3) 02/16/2010    COVID-19 Vaccine (3 - Booster for Moderna series) 07/14/2021    MEDICARE ANNUAL WELLNESS VISIT  07/07/2022    TREATMENT AGREEMENT FOR CHRONIC PAIN MANAGEMENT  07/08/2022    URINE DRUG SCREEN  09/07/2022    DIABETIC FOOT EXAM  10/12/2022    A1C  04/26/2023    MICROALBUMIN  07/21/2023    FALL RISK ASSESSMENT  07/21/2023    EYE EXAM  09/16/2023    BMP  10/26/2023    LIPID  10/26/2023    LORENZO ASSESSMENT  10/26/2023    PHQ-9  10/26/2023    ADVANCE CARE PLANNING  10/26/2027    DTAP/TDAP/TD IMMUNIZATION (4 - Td or Tdap) 02/12/2030    PHQ-2 (once per calendar year)  Completed    Pneumococcal Vaccine: 65+ Years  Completed    URINALYSIS  Completed    IPV IMMUNIZATION  Aged Out    MENINGITIS IMMUNIZATION  Aged Out    INFLUENZA VACCINE  Discontinued       Depression and Suicide in Older Adults    Nearly 2 million older Americans have some type of depression. Some of them even take their own lives. Yet depression among older adults is often ignored. Learn the warning signs. You may help spare a loved one needless pain. You may also save a life.   What is depression?  Depression is a common and serious  "illness that affects the way you think and feel. It is not a normal part of aging, nor is it a sign of weakness, a character flaw, or something you can snap out of. Most people with depression need treatment to get better. The most common symptom is a feeling of deep sadness. People who are depressed also may seem tired and listless. And nothing seems to give them pleasure. It s normal to grieve or be sad sometimes. But sadness lessens or passes with time. Depression rarely goes away or improves on its own. A person with clinical depression can't \"snap out of it.\" Other symptoms of depression are:   Sleeping more or less than normal  Eating more or less than normal  Having headaches, stomachaches, or other pains that don t go away  Feeling nervous,  empty,  or worthless  Crying a great deal  Thinking or talking about suicide or death  Loss of interest in activities previously enjoyed  Social isolation  Feeling confused or forgetful  What causes it?  The causes of depression aren t fully known. But it is thought to result from a complex blend of these factors:   Biochemistry. Certain chemicals in the brain play a role.  Genes. Depression does run in families.  Life stress. Life stresses can also trigger depression in some people. Older adults often face many stressors, such as death of friends or a spouse, health problems, and financial concerns.  Chronic conditions. This includes conditions such as diabetes, heart disease, or cancer. These can cause symptoms of depression. Medicine side effects can cause changes in thoughts and behaviors.  How you can help  Often, depressed people may not want to ask for help. When they do, they may be ignored. Or, they may receive the wrong treatment. You can help by showing parents and older friends love and support. If they seem depressed, don t lecture the person, ignore the symptoms, or discount the symptoms as a  normal  part of aging -which they are not. Get involved, listen, " and show interest and support.   Help them understand that depression is a treatable illness. Tell them you can help them find the right treatment. Offer to go to their healthcare provider's appointment with them for support when the symptoms are discussed. With their approval, contact a local mental health center, social service agency, or hospital about services.   You can be an advocate for him or her at healthcare appointments. Many older adults have chronic illnesses that can cause symptoms of depression. Medicine side effects can change thoughts and behaviors. You can help make sure that the healthcare provider looks at all of these factors. He or she should refer your family member or friend to a mental healthcare provider when needed. in some cases, untreated depression can lead to a misdiagnosis. A person may be diagnosed with a brain disorder such as dementia. If the healthcare provider does not take the issue of depression seriously, help your family member or friend to find another provider.   Don't be afraid to ask  If you think an older person you care about could be suicidal, ask,  Have you thought about suicide?  Most people will tell you the truth. If they say  yes,  they may already have a plan for how and when they will attempt it. Find out as much as you can. The more detailed the plan, and the easier it is to carry out, the more danger the person is in right now. Tell the person you are there for them and do not want them to harm him or herself. Don't wait to get help for the person. Call the person's healthcare provider, local hospital, or emergency services.   To learn more  National Suicide Prevention Lifeline (crisis hotline) 461-302-HGLA (264-529-8751)  National [place] of Mental Uinxku108-176-3216sfu.[facility] Hospitalh.nih.gov  National [place] on Mental Pfpnsnl467-397-6366wce.jenna.org  Mental Health Muzxoby646-577-6156byo.nmha.org  National Suicide Dyjdfoo465-MLSEELJ (307-945-1843)    Call  911  Never leave the person alone. A person who is actively suicidal needs psychiatric care right away. They will need constant supervision. Never leave the person out of sight. Call 911 or the national 24-hour suicide crisis hotline at 800-273-talk (823.232.8917). You can also take the person to the closest emergency room.   [employer] last reviewed this educational content on 5/1/2020 2000-2021 [employer]. All rights reserved. This information is not intended as a substitute for professional medical care. Always follow your healthcare professional's instructions.           Controlled type 2 diabetes mellitus with diabetic polyneuropathy, without long-term current use of insulin (H)  START:   - carBAMazepine (CARBATROL) 100 MG 12 hr capsule; Take 1 capsule (100 mg) by mouth 2 times daily  Dispense: 60 capsule; Refill: 3    Dose increase:  - gabapentin (NEURONTIN) 100 MG capsule; Take 1-3 capsules (100-300 mg) by mouth At Bedtime  Dispense: 270 capsule; Refill: 1    Chronic, continuous use of opioids    Rx updated.   - oxyCODONE (ROXICODONE) 5 MG tablet; Take 1 tablet (5 mg) by mouth daily as needed for severe pain  Dispense: 12 tablet; Refill: 0  - Drug Confirmation Panel Urine with Creatinine; Future    Pain medication agreement - 10/26/2022  - oxyCODONE (ROXICODONE) 5 MG tablet; Take 1 tablet (5 mg) by mouth daily as needed for severe pain  Dispense: 12 tablet; Refill: 0    Chronic pain syndrome  - gabapentin (NEURONTIN) 100 MG capsule; Take 1-3 capsules (100-300 mg) by mouth At Bedtime  Dispense: 270 capsule; Refill: 1  - oxyCODONE (ROXICODONE) 5 MG tablet; Take 1 tablet (5 mg) by mouth daily as needed for severe pain  Dispense: 12 tablet; Refill: 0  - Drug Confirmation Panel Urine with Creatinine; Future    Return in approximately 3-4 week(s), or sooner as needed for follow-up with Dr. Montanez.  - Follow-up Hypertension and Neuropathy Pain    Clinic : [phone]  Appointment line:  [phone]

## 2022-10-26 NOTE — LETTER
Opioid / Opioid Plus Controlled Substance Agreement    This is an agreement between you and your provider about the safe and appropriate use of controlled substance/opioids prescribed by your care team. Controlled substances are medicines that can cause physical and mental dependence (abuse).    There are strict laws about having and using these medicines. We here at [facility] are committing to working with you in your efforts to get better. To support you in this work, we ll help you schedule regular office appointments for medicine refills. If we must cancel or change your appointment for any reason, we ll make sure you have enough medicine to last until your next appointment.     As a Provider, I will:    Listen carefully to your concerns and treat you with respect.     Recommend a treatment plan that I believe is in your best interest. This plan may involve therapies other than opioid pain medication.     Talk with you often about the possible benefits, and the risk of harm of any medicine that we prescribe for you.     Provide a plan on how to taper (discontinue or go off) using this medicine if the decision is made to stop its use.    As a Patient, I understand that opioid(s):     Are a controlled substance prescribed by my care team to help me function or work and manage my condition(s).     Are strong medicines and can cause serious side effects such as:    Drowsiness, which can seriously affect my driving ability    A lower breathing rate, enough to cause death    Harm to my thinking ability     Depression     Abuse of and addiction to this medicine    Need to be taken exactly as prescribed. Combining opioids with certain medicines or chemicals (such as illegal drugs, sedatives, sleeping pills, and benzodiazepines) can be dangerous or even fatal. If I stop opioids suddenly, I may have severe withdrawal symptoms.    Do not work for all types of pain nor for all patients. If they re not helpful, I may  be asked to stop them.        The risks, benefits and side effects of these medicine(s) were explained to me. I agree that:  1. I will take part in other treatments as advised by my care team. This may be psychiatry or counseling, physical therapy, behavioral therapy, group treatment or a referral to a specialist.     2. I will keep all my appointments. I understand that this is part of the monitoring of opioids. My care team may require an office visit for EVERY opioid/controlled substance refill. If I miss appointments or don t follow instructions, my care team may stop my medicine.    3. I will take my medicines as prescribed. I will not change the dose or schedule unless my care team tells me to. There will be no refills if I run out early.     4. I may be asked to come to the clinic and complete a urine drug test or complete a pill count at any time. If I don t give a urine sample or participate in a pill count, the care team may stop my medicine.    5. I will only receive prescriptions from this clinic for chronic pain. If I am treated by another provider for acute pain issues, I will tell them that I am taking opioid pain medication for chronic pain and that I have a treatment agreement with this provider. I will inform my [facility] care team within one business day if I am given a prescription for any pain medication by another healthcare provider. My [facility] care team can contact other providers and pharmacists about my use of any medicines.    6. It is up to me to make sure that I don t run out of my medicines on weekends or holidays. If my care team is willing to refill my opioid prescription without a visit, I must request refills only during office hours. Refills may take up to 3 business days to process. I will use one pharmacy to fill all my opioid and other controlled substance prescriptions. I will notify the clinic about any changes to my insurance or medication  availability.    7. I am responsible for my prescriptions. If the medicine/prescription is lost, stolen or destroyed, it will not be replaced. I also agree not to share controlled substance medicines with anyone.    8. I am aware I should not use any illegal or recreational drugs. I agree not to drink alcohol unless my care team says I can.       9. If I enroll in the [place] [facility] program, I will tell my care team prior to my next refill.     10. I will tell my care team right away if I become pregnant, have a new medical problem treated outside of my regular clinic, or have a change in my medications.    11. I understand that this medicine can affect my thinking, judgment and reaction time. Alcohol and drugs affect the brain and body, which can affect the safety of my driving. Being under the influence of alcohol or drugs can affect my decision-making, behaviors, personal safety, and the safety of others. Driving while impaired (DWI) can occur if a person is driving, operating, or in physical control of a car, motorcycle, boat, snowmobile, ATV, motorbike, off-road vehicle, or any other motor vehicle ([place] Statute 169A.20). I understand the risk if I choose to drive or operate any vehicle or machinery.    I understand that if I do not follow any of the conditions above, my prescriptions or treatment may be stopped or changed.          Opioids  What You Need to Know    What are opioids?   Opioids are pain medicines that must be prescribed by a doctor. They are also known as narcotics.     Examples are:   1. morphine (MS Contin, Dyana)  2. oxycodone (Oxycontin)  3. oxycodone and acetaminophen (Percocet)  4. hydrocodone and acetaminophen (Vicodin, Norco)   5. fentanyl patch (Duragesic)   6. hydromorphone (Dilaudid)   7. methadone  8. codeine (Tylenol #3)     What do opioids do well?   Opioids are best for severe short-term pain such as after a surgery or injury. They may work well for cancer pain. They may  help some people with long-lasting (chronic) pain.     What do opioids NOT do well?   Opioids never get rid of pain entirely, and they don t work well for most patients with chronic pain. Opioids don t reduce swelling, one of the causes of pain.                                    Other ways to manage chronic pain and improve function include:       Treat the health problem that may be causing pain    Anti-inflammation medicines, which reduce swelling and tenderness, such as ibuprofen (Advil, Motrin) or naproxen (Aleve)    Acetaminophen (Tylenol)    Antidepressants and anti-seizure medicines, especially for nerve pain    Topical treatments such as patches or creams    Injections or nerve blocks    Chiropractic or osteopathic treatment    Acupuncture, massage, deep breathing, meditation, visual imagery, aromatherapy    Use heat or ice at the pain site    Physical therapy     Exercise    Stop smoking    Take part in therapy       Risks and side effects     Talk to your doctor before you start or decide to keep taking opioids. Possible side effects include:      Lowering your breathing rate enough to cause death    Overdose, including death, especially if taking higher than prescribed doses    Worse depression symptoms; less pleasure in things you usually enjoy    Feeling tired or sluggish    Slower thoughts or cloudy thinking    Being more sensitive to pain over time; pain is harder to control    Trouble sleeping or restless sleep    Changes in hormone levels (for example, less testosterone)    Changes in sex drive or ability to have sex    Constipation    Unsafe driving    Itching and sweating    Dizziness    Nausea, throwing up and dry mouth    What else should I know about opioids?    Opioids may lead to dependence, tolerance, or addiction.      Dependence means that if you stop or reduce the medicine too quickly, you will have withdrawal symptoms. These include loose poop (diarrhea), jitters, flu-like symptoms,  nervousness and tremors. Dependence is not the same as addiction.                       Tolerance means needing higher doses over time to get the same effect. This may increase the chance of serious side effects.      Addiction is when people improperly use a substance that harms their body, their mind or their relations with others. Use of opiates can cause a relapse of addiction if you have a history of drug or alcohol abuse.      People who have used opioids for a long time may have a lower quality of life, worse depression, higher levels of pain and more visits to doctors.    You can overdose on opioids. Take these steps to lower your risk of overdose:    1. Recognize the signs:  Signs of overdose include decrease or loss of consciousness (blackout), slowed breathing, trouble waking up and blue lips. If someone is worried about overdose, they should call 911.    2. Talk to your doctor about Narcan (naloxone).   If you are at risk for overdose, you may be given a prescription for Narcan. This medicine very quickly reverses the effects of opioids.   If you overdose, a friend or family member can give you Narcan while waiting for the ambulance. They need to know the signs of overdose and how to give Narcan.     3. Don't use alcohol or street drugs.   Taking them with opioids can cause death.    4. Do not take any of these medicines unless your doctor says it s OK. Taking these with opioids can cause death:    Benzodiazepines, such as lorazepam (Ativan), alprazolam (Xanax) or diazepam (Valium)    Muscle relaxers, such as cyclobenzaprine (Flexeril)    Sleeping pills like zolpidem (Ambien)     Other opioids      How to keep you and other people safe while taking opioids:    1. Never share your opioids with others.  Opioid medicines are regulated by the Drug Enforcement Agency ([place]). Selling or sharing medications is a criminal act.    2. Be sure to store opioids in a secure place, locked up if possible. Young children  can easily swallow them and overdose.    3. When you are traveling with your medicines, keep them in the original bottles. If you use a pill box, be sure you also carry a copy of your medicine list from your clinic or pharmacy.    4. Safe disposal of opioids    Most pharmacies have places to get rid of medicine, called disposal kiosks. Medicine disposal options are also available in every [facility]. Search your county and  medication disposal  to find more options. You can find more details at:  [email or web address]     I agree that my provider, clinic care team, and pharmacy may work with any city, state or federal law enforcement agency that investigates the misuse, sale, or other diversion of my controlled medicine. I will allow my provider to discuss my care with, or share a copy of, this agreement with any other treating provider, pharmacy or emergency room where I receive care.    I have read this agreement and have asked questions about anything I did not understand.    _______________________________________________________  Patient Signature - Steven Dennison _____________________                   Date     _______________________________________________________  Provider Signature - Nawaf Montanez MD   _____________________                   Date     _______________________________________________________  Witness Signature (required if provider not present while patient signing)   _____________________                   Date

## 2022-10-26 NOTE — NURSING NOTE
"Chief Complaint   Patient presents with     Diabetes     Medicare Wellness Visit  Pain Management         Initial /72 (BP Location: Right arm, Patient Position: Right side, Cuff Size: Adult Regular)   Pulse 59   Resp 20   Ht 1.753 m (5' 9\")   Wt 83.8 kg (184 lb 12.8 oz)   SpO2 97%   BMI 27.29 kg/m   Estimated body mass index is 27.29 kg/m  as calculated from the following:    Height as of this encounter: 1.753 m (5' 9\").    Weight as of this encounter: 83.8 kg (184 lb 12.8 oz).       FOOD SECURITY SCREENING QUESTIONS:    The next two questions are to help us understand your food security.  If you are feeling you need any assistance in this area, we have resources available to support you today.    Hunger Vital Signs:  Within the past 12 months we worried whether our food would run out before we got money to buy more. Never  Within the past 12 months the food we bought just didn't last and we didn't have money to get more. Never    Advance Care Directive on file? no      Medication reconciliation complete.      Jerel Benson,on 10/26/2022 at 3:22 PM        "

## 2022-10-26 NOTE — PROGRESS NOTES
SUBJECTIVE:   Steven is a 87 year old who presents for Preventive Visit.    Patient has been advised of split billing requirements and indicates understanding: No  Are you in the first 12 months of your Medicare coverage?  No    History of Present Illness       Diabetes:   He presents for follow up of diabetes.  He is checking home blood glucose two times daily. He checks blood glucose before meals and at bedtime.  Blood glucose is sometimes over 200 and sometimes under 70. He is aware of hypoglycemia symptoms including shakiness, dizziness, weakness and confusion. He has no concerns regarding his diabetes at this time.  He is having numbness in feet. The patient has had a diabetic eye exam in the last 12 months. Eye exam performed on 09/23/2022. Location of last eye exam [facility].        He eats 0-1 servings of fruits and vegetables daily.He consumes 0 sweetened beverage(s) daily.He exercises with enough effort to increase his heart rate 9 or less minutes per day.  He exercises with enough effort to increase his heart rate 3 or less days per week.   He is taking medications regularly.    Today's PHQ-9         PHQ-9 Total Score: 7    PHQ-9 Q9 Thoughts of better off dead/self-harm past 2 weeks :   Not at all    How difficult have these problems made it for you to do your work, take care of things at home, or get along with other people: Not difficult at all  Today's LORENZO-7 Score: 5    Do you feel safe in your environment? No    Have you ever done Advance Care Planning? (For example, a Health Directive, POLST, or a discussion with a medical provider or your loved ones about your wishes): No, advance care planning information given to patient to review.  Patient declined advance care planning discussion at this time.    Fall risk- completed    Cognitive Screening   1) Repeat 3 items (Leader, Season, Table)    2) Clock draw: NORMAL  3) 3 item recall: Recalls 1 object   Results: NORMAL clock, 1-2 items recalled:  COGNITIVE IMPAIRMENT LESS LIKELY    Mini-CogTM Copyright ROCÍO Leiva. Licensed by the author for use in [facility]; reprinted with permission (chuckie@.[email or web address]). All rights reserved.      Do you have sleep apnea, excessive snoring or daytime drowsiness?: no    Reviewed and updated as needed this visit by clinical staff   Tobacco  Allergies  Meds  Problems  Med Hx  Surg Hx  Fam Hx  Soc   Hx        Reviewed and updated as needed this visit by Provider   Tobacco  Allergies  Meds  Problems  Med Hx  Surg Hx  Fam Hx         Social History     Tobacco Use     Smoking status: Former     Packs/day: 1.00     Years: 10.00     Pack years: 10.00     Types: Cigarettes     Quit date: 1975     Years since quittin.8     Smokeless tobacco: Never   Substance Use Topics     Alcohol use: Yes     Alcohol/week: 1.0 standard drink     Types: 1 Standard drinks or equivalent per week     Comment: occasional, maybe 2 a month     If you drink alcohol do you typically have >3 drinks per day or >7 drinks per week? No    Alcohol Use 10/26/2022   Prescreen: >3 drinks/day or >7 drinks/week? No     Current providers sharing in care for this patient include:   Patient Care Team:  Nawaf Montanez MD as PCP - General (Internal Medicine)  Nawaf Montanez MD as Assigned PCP  Keri Sal RN as Diabetes Educator (Diabetes Education)  Carlyle Gagnon MD as Assigned Surgical Provider    The following health maintenance items are reviewed in Epic and correct as of today:  Health Maintenance   Topic Date Due     ZOSTER IMMUNIZATION (2 of 3) 2010     COVID-19 Vaccine (3 - Booster for Moderna series) 2021     URINE DRUG SCREEN  2022     DIABETIC FOOT EXAM  10/12/2022     A1C  2023     FALL RISK ASSESSMENT  2023     EYE EXAM  2023     MEDICARE ANNUAL WELLNESS VISIT  10/26/2023     BMP  10/26/2023     LIPID  10/26/2023     MICROALBUMIN  10/26/2023     LORENZO ASSESSMENT  10/26/2023     PHQ-9   10/26/2023     TREATMENT AGREEMENT FOR CHRONIC PAIN MANAGEMENT  10/26/2023     ADVANCE CARE PLANNING  10/26/2027     DTAP/TDAP/TD IMMUNIZATION (4 - Td or Tdap) 02/12/2030     PHQ-2 (once per calendar year)  Completed     Pneumococcal Vaccine: 65+ Years  Completed     URINALYSIS  Completed     IPV IMMUNIZATION  Aged Out     MENINGITIS IMMUNIZATION  Aged Out     INFLUENZA VACCINE  Discontinued     Review of Systems   Constitutional: Positive for fatigue (mostly of feet and legs - stopped B12 supplement). Negative for chills and fever.   HENT: Negative for congestion and hearing loss.    Eyes: Negative for pain and visual disturbance.   Respiratory: Positive for shortness of breath (mild, stable.). Negative for cough and wheezing.    Cardiovascular: Positive for palpitations. Negative for chest pain.        Raynaud's syndrome, no ulcerations   Gastrointestinal: Negative for abdominal pain, diarrhea, nausea and vomiting.   Endocrine: Positive for cold intolerance. Negative for heat intolerance.        + Hyperglycemia - Glipizide increased to 5 mg TID on 3/15/2022 -blood sugars improved   Genitourinary: Positive for frequency and urgency. Negative for dysuria and hematuria.        + Nocturia 2-4x nightly   Musculoskeletal: Positive for arthralgias (Right shoulder pains, if does too much or using in certain positions), back pain (+ intermittent low back pain) and gait problem (+ Balance problems). Negative for myalgias.   Skin: Negative for pallor and rash.   Allergic/Immunologic: Negative for immunocompromised state.   Neurological: Negative for dizziness and light-headedness.        + Balance problems   Hematological: Does not bruise/bleed easily.   Psychiatric/Behavioral: Positive for sleep disturbance (+ insomnia and will wake due to foot pain). Negative for agitation and confusion.     OBJECTIVE:   /72 (BP Location: Right arm, Patient Position: Right side, Cuff Size: Adult Regular)   Pulse 59   Resp 20   Ht  "1.753 m (5' 9\")   Wt 83.8 kg (184 lb 12.8 oz)   SpO2 97%   BMI 27.29 kg/m   Estimated body mass index is 27.29 kg/m  as calculated from the following:    Height as of this encounter: 1.753 m (5' 9\").    Weight as of this encounter: 83.8 kg (184 lb 12.8 oz).  Physical Exam  Constitutional:       General: He is not in acute distress.     Appearance: He is well-developed. He is not diaphoretic.   HENT:      Head: Normocephalic and atraumatic.   Eyes:      General: No scleral icterus.     Conjunctiva/sclera: Conjunctivae normal.   Cardiovascular:      Rate and Rhythm: Normal rate and regular rhythm.      Pulses: Normal pulses.   Pulmonary:      Effort: Pulmonary effort is normal.      Breath sounds: Normal breath sounds.   Abdominal:      Palpations: Abdomen is soft.      Tenderness: There is no abdominal tenderness.   Musculoskeletal:         General: No deformity.      Cervical back: Neck supple.      Right lower leg: Edema (trace) present.      Left lower leg: Edema (trace) present.   Lymphadenopathy:      Cervical: No cervical adenopathy.   Skin:     General: Skin is warm and dry.      Coloration: Skin is not jaundiced.      Findings: No rash.   Neurological:      Mental Status: He is alert and oriented to person, place, and time. Mental status is at baseline.   Psychiatric:         Mood and Affect: Mood normal.         Behavior: Behavior normal.       Recent Labs   Lab Test 10/26/22  1357 07/21/22  1437 06/27/22  1425 04/20/22  1410   A1C 7.2* 7.4* 7.2* 8.0*   * 91  --  59   HDL 37 32  --  33   TRIG 117 91  --  111   ALT 14 13  --  12   CR 0.90 1.08 1.03 0.99   GFRESTIMATED 83 67 71 75   POTASSIUM 4.1 4.0 4.1 4.1   TSH 1.53 1.31  --  1.61   WBC 6.8 8.6  --  5.8   HGB 15.6 12.6* 14.5 14.2   * 219  --  145*   ALBUMIN 4.2 3.8  --  4.1   Hemoglobin A1c is well controlled.  LDL is high and not at goal.  HDL and triglycerides are at goal.  ALT is normal.  Creatinine has improved slightly.  Potassium is " normal.  TSH is normal.  Platelet count is low.  White blood cell count and hemoglobin are normal.  Albumin is normal.    ASSESSMENT / PLAN:       ICD-10-CM    1. Controlled type 2 diabetes mellitus with diabetic polyneuropathy, without long-term current use of insulin (H)  E11.42 carBAMazepine (CARBATROL) 100 MG 12 hr capsule     gabapentin (NEURONTIN) 100 MG capsule      2. Benign essential hypertension  I10       3. Balance problems  R26.89       4. Chronic, continuous use of opioids  F11.90 oxyCODONE (ROXICODONE) 5 MG tablet     Drug Confirmation Panel Urine with Creatinine      5. Pain medication agreement - 10/26/2022  Z02.89 oxyCODONE (ROXICODONE) 5 MG tablet      6. Neuropathic pain of both feet  G57.93 oxyCODONE (ROXICODONE) 5 MG tablet      7. Mixed hyperlipidemia  E78.2       8. Raynaud's phenomenon without gangrene  I73.00       9. Sleep difficulties  G47.9       10. Vaccine counseling  Z71.85       11. Encounter for Medicare annual wellness exam  Z00.00       12. Chronic pain syndrome  G89.4 gabapentin (NEURONTIN) 100 MG capsule     oxyCODONE (ROXICODONE) 5 MG tablet     Drug Confirmation Panel Urine with Creatinine      Patient presents for Medicare annual wellness visit as well as follow-up multiple issues.    Type 2 diabetes, blood sugars are currently well controlled however his neuropathy symptoms have been quite symptomatic and he is wondering about treatment options.  We discussed adjusting his gabapentin dosing as well as discussed consideration of a trial of carbamazepine to help with neuropathy pain.  He is interested in this.  Prescription sent to pharmacy.  May end up needing further dose adjustment pending clinical response.    Hypertension, blood pressure is currently well controlled.  Denies syncope or presyncope.  No changes for now.    Balance problems, this is been an ongoing issue, probably related to his neuropathy related problems associated with his diabetes.  Encouraged  "consideration of a cane or assistive device with walking to reduce risk of falls.    Chronic pain syndrome.  Chronic continuous opiate use.  Has chronic bilateral foot neuropathy pain.  Intermittently using 5 mg oxycodone.  Controlled substance agreement updated today.  Urine drug screen updated today.  Continues with oxycodone, gabapentin.  Start carbamazepine as noted.    Mixed hyperlipidemia.  Currently taking pravastatin.  Seems to be tolerating well without side effects.  Last LDL was high and not at goal.  If lifestyle modifications are not adequate to improve cholesterol levels we will need to consider medication dose changes.    Raynaud's phenomenon without gangrene.  Improved with use of diltiazem.  Seems to be tolerating well without side effects.  Needs refills.    Sleep difficulties, ongoing and mostly due to his foot pain.  Hopefully above medication changes will improve this.    Vaccine counseling completed.  Consider shingles vaccination.  COVID vaccination.    Patient has been advised of split billing requirements and indicates understanding: Yes      COUNSELING:  Reviewed preventive health counseling, as reflected in patient instructions  Special attention given to:       Regular exercise       Healthy diet/nutrition       Vision screening       Hearing screening       Dental care       Bladder control       Fall risk prevention       Immunizations    Estimated body mass index is 27.29 kg/m  as calculated from the following:    Height as of this encounter: 1.753 m (5' 9\").    Weight as of this encounter: 83.8 kg (184 lb 12.8 oz).    He reports that he quit smoking about 47 years ago. His smoking use included cigarettes. He has a 10.00 pack-year smoking history. He has never used smokeless tobacco.    Appropriate preventive services were discussed with this patient, including applicable screening as appropriate for cardiovascular disease, diabetes, osteopenia/osteoporosis, and glaucoma.  As " appropriate for age/gender, discussed screening for colorectal cancer, prostate cancer, breast cancer, and cervical cancer. Checklist reviewing preventive services available has been given to the patient.    Reviewed patients plan of care and provided an AVS. The Complex Care Plan (for patients with higher acuity and needing more deliberate coordination of services) for Steven meets the Care Plan requirement. This Care Plan has been established and reviewed with the Patient.    Counseling Resources:  ATP IV Guidelines  Pooled Cohorts Equation Calculator  Breast Cancer Risk Calculator  Breast Cancer: Medication to Reduce Risk  FRAX Risk Assessment  ICSI Preventive Guidelines  Dietary Guidelines for Americans, 2010  [employer] MyPlate  ASA Prophylaxis  Lung CA Screening    Nwaaf Montanez MD  [facility]    Identified Health Risks:Review current opioid prescriptions    For a patient with a current opioid prescription:    Reviewed potential Opioid Use Disorder (OUD) risk factors: Yes     Evaluate their pain severity and current treatment plan:     Provide information on non-opioid treatment options:    Refer to a specialist, as appropriate:    Get more information on pain management in the [facility] Pain Management Best Practices Inter-agency Task Force Report    Screen for potential Substance Use Disorders (SUDs)    Reviewed the patient s potential risk factors for SUDs: Yes     Refer to treatment or specialist, as appropriate:     A screening tool isn t required but you may use one:  Find more information in the National [place] on Drug Abuse Screening and Assessment Tools Chart  Pain History:  When did you first notice your pain? - More than 6 weeks   Have you seen this provider for your pain in the past? Yes   Where in your body do you have pain? Lower legs  Are you seeing anyone else for your pain? No    Last PHQ-9 10/26/2022   1.  Little interest or pleasure in doing things 3   2.  Feeling down,  depressed, or hopeless 0   3.  Trouble falling or staying asleep, or sleeping too much 1   4.  Feeling tired or having little energy 1   5.  Poor appetite or overeating 0   6.  Feeling bad about yourself 0   7.  Trouble concentrating 0   8.  Moving slowly or restless 2   Q9: Thoughts of better off dead/self-harm past 2 weeks 0   PHQ-9 Total Score 7   Difficulty at work, home, or with people -     LORENZO-7  10/26/2022   1. Feeling nervous, anxious, or on edge 0   2. Not being able to stop or control worrying 0   3. Worrying too much about different things 0   4. Trouble relaxing 2   5. Being so restless that it is hard to sit still 2   6. Becoming easily annoyed or irritable 1   7. Feeling afraid, as if something awful might happen 0   LORENZO-7 Total Score 5   If you checked any problems, how difficult have they made it for you to do your work, take care of things at home, or get along with other people? Not difficult at all     PEG Score 10/26/2022   PEG Total Score 6        Chronic Pain Follow Up:  Location of pain: lower legs  Analgesia/pain control:    - Recent changes:  Worse, unable to walk correctly   - Overall control: Tolerable with discomfort    - Current treatments:meds   Adherence:     - Do you ever take more pain medicine than prescribed? No    - When did you take your last dose of pain medicine?  this morning   Adverse effects: No   PDMP Review       Value Time User    State PDMP site checked  Yes 10/26/2022  4:19 PM Nawaf Montanez MD        Last CSA Agreement:   CSA -- Patient Level:     [Media Unavailable] Controlled Substance Agreement - Opioid - Scan on 10/26/2022  4:38 PM   [Media Unavailable] Controlled Substance Agreement - Opioid - Scan on 7/8/2021  8:24 AM       Last UDS: 10/26/2022       The patient's PHQ-9 score is consistent with mild depression. He was provided with information regarding depression and was advised to schedule a follow up appointment in 4 weeks to further address this issue.

## 2022-10-27 LAB
CREAT UR-MCNC: 62.9 MG/DL
MICROALBUMIN UR-MCNC: 327 MG/L
MICROALBUMIN/CREAT UR: 519.87 MG/G CR (ref 0–17)

## 2022-10-31 LAB — GABAPENTIN UR QL CFM: PRESENT

## 2022-11-03 DIAGNOSIS — I10 BENIGN ESSENTIAL HYPERTENSION: ICD-10-CM

## 2022-11-04 RX ORDER — IRBESARTAN AND HYDROCHLOROTHIAZIDE 150; 12.5 MG/1; MG/1
1 TABLET, FILM COATED ORAL EVERY MORNING
Qty: 90 TABLET | Refills: 0 | OUTPATIENT
Start: 2022-11-04

## 2022-11-04 NOTE — TELEPHONE ENCOUNTER
"[facility] sent Rx request for the following:    irbesartan-hydrochlorothiazide (AVALIDE) 150-12.5 MG tablet (Discontinued) 90 tablet 0 10/7/2022 10/26/2022 No   Sig - Route: Take 1 tablet by mouth every morning - Oral   Patient not taking: Reported on 10/26/2022          Called and spoke to Patient after verifying last name and date of birth. Unclear if medication was to be discontinued. Pt states, \"Just do whatever the doctor said. We are experimenting right now.\", however medication was discontinued, stating \"Patient reported not taking.\"    Routing to PCP for review. Sandra Ch RN .............. 11/4/2022  2:00 PM    "

## 2022-11-17 ENCOUNTER — OFFICE VISIT (OUTPATIENT)
Dept: INTERNAL MEDICINE | Facility: OTHER | Age: 87
End: 2022-11-17
Attending: INTERNAL MEDICINE
Payer: COMMERCIAL

## 2022-11-17 ENCOUNTER — OFFICE VISIT (OUTPATIENT)
Dept: UROLOGY | Facility: OTHER | Age: 87
End: 2022-11-17
Attending: NURSE PRACTITIONER
Payer: COMMERCIAL

## 2022-11-17 VITALS
RESPIRATION RATE: 20 BRPM | BODY MASS INDEX: 26.28 KG/M2 | HEIGHT: 70 IN | DIASTOLIC BLOOD PRESSURE: 74 MMHG | WEIGHT: 183.6 LBS | SYSTOLIC BLOOD PRESSURE: 128 MMHG | OXYGEN SATURATION: 98 % | HEART RATE: 62 BPM

## 2022-11-17 VITALS
HEART RATE: 62 BPM | DIASTOLIC BLOOD PRESSURE: 74 MMHG | WEIGHT: 183 LBS | RESPIRATION RATE: 16 BRPM | BODY MASS INDEX: 26.45 KG/M2 | OXYGEN SATURATION: 98 % | SYSTOLIC BLOOD PRESSURE: 128 MMHG

## 2022-11-17 DIAGNOSIS — R35.1 NOCTURIA: Primary | ICD-10-CM

## 2022-11-17 DIAGNOSIS — E11.42 CONTROLLED TYPE 2 DIABETES MELLITUS WITH DIABETIC POLYNEUROPATHY, WITHOUT LONG-TERM CURRENT USE OF INSULIN (H): Primary | ICD-10-CM

## 2022-11-17 DIAGNOSIS — R35.0 URINARY FREQUENCY: ICD-10-CM

## 2022-11-17 DIAGNOSIS — E53.8 VITAMIN B12 DEFICIENCY: ICD-10-CM

## 2022-11-17 DIAGNOSIS — G89.4 CHRONIC PAIN SYNDROME: ICD-10-CM

## 2022-11-17 LAB
ALBUMIN UR-MCNC: 50 MG/DL
APPEARANCE UR: CLEAR
BILIRUB UR QL STRIP: NEGATIVE
COLOR UR AUTO: YELLOW
GLUCOSE UR STRIP-MCNC: 100 MG/DL
HGB UR QL STRIP: NEGATIVE
KETONES UR STRIP-MCNC: ABNORMAL MG/DL
LEUKOCYTE ESTERASE UR QL STRIP: NEGATIVE
MUCOUS THREADS #/AREA URNS LPF: PRESENT /LPF
NITRATE UR QL: NEGATIVE
PH UR STRIP: 6 [PH] (ref 5–9)
RBC URINE: 1 /HPF
SP GR UR STRIP: 1.02 (ref 1–1.03)
UROBILINOGEN UR STRIP-MCNC: 2 MG/DL
WBC URINE: 1 /HPF

## 2022-11-17 PROCEDURE — 51798 US URINE CAPACITY MEASURE: CPT | Performed by: UROLOGY

## 2022-11-17 PROCEDURE — G0463 HOSPITAL OUTPT CLINIC VISIT: HCPCS

## 2022-11-17 PROCEDURE — 99214 OFFICE O/P EST MOD 30 MIN: CPT | Performed by: INTERNAL MEDICINE

## 2022-11-17 PROCEDURE — 99213 OFFICE O/P EST LOW 20 MIN: CPT | Performed by: UROLOGY

## 2022-11-17 PROCEDURE — G0463 HOSPITAL OUTPT CLINIC VISIT: HCPCS | Mod: 25,27

## 2022-11-17 PROCEDURE — 96372 THER/PROPH/DIAG INJ SC/IM: CPT | Performed by: INTERNAL MEDICINE

## 2022-11-17 PROCEDURE — 81001 URINALYSIS AUTO W/SCOPE: CPT | Mod: ZL | Performed by: UROLOGY

## 2022-11-17 PROCEDURE — 250N000011 HC RX IP 250 OP 636: Performed by: INTERNAL MEDICINE

## 2022-11-17 PROCEDURE — G0463 HOSPITAL OUTPT CLINIC VISIT: HCPCS | Mod: 25

## 2022-11-17 RX ORDER — CARBAMAZEPINE 100 MG/1
100 CAPSULE, EXTENDED RELEASE ORAL 2 TIMES DAILY
Qty: 60 CAPSULE | Refills: 3 | Status: SHIPPED | OUTPATIENT
Start: 2022-11-17 | End: 2023-02-22

## 2022-11-17 RX ORDER — OXYBUTYNIN CHLORIDE 5 MG/1
TABLET ORAL
Qty: 90 TABLET | Refills: 3 | Status: SHIPPED | OUTPATIENT
Start: 2022-11-17 | End: 2023-01-12

## 2022-11-17 RX ORDER — CYANOCOBALAMIN 1000 UG/ML
1000 INJECTION, SOLUTION INTRAMUSCULAR; SUBCUTANEOUS ONCE
Status: COMPLETED | OUTPATIENT
Start: 2022-11-17 | End: 2022-11-17

## 2022-11-17 RX ORDER — GABAPENTIN 300 MG/1
300 CAPSULE ORAL AT BEDTIME
Qty: 90 CAPSULE | Refills: 4 | Status: SHIPPED | OUTPATIENT
Start: 2022-11-17 | End: 2023-03-17

## 2022-11-17 RX ORDER — TAMSULOSIN HYDROCHLORIDE 0.4 MG/1
0.4 CAPSULE ORAL EVERY EVENING
Qty: 90 CAPSULE | Refills: 3 | Status: SHIPPED | OUTPATIENT
Start: 2022-11-17 | End: 2023-11-22

## 2022-11-17 RX ADMIN — CYANOCOBALAMIN 1000 MCG: 1000 INJECTION, SOLUTION INTRAMUSCULAR at 11:47

## 2022-11-17 ASSESSMENT — ENCOUNTER SYMPTOMS
ABDOMINAL PAIN: 0
ARTHRALGIAS: 1
CHILLS: 0
DIARRHEA: 0
SLEEP DISTURBANCE: 1
MYALGIAS: 0
FATIGUE: 1
HEMATURIA: 0
LIGHT-HEADEDNESS: 0
FREQUENCY: 1
CONFUSION: 0
COUGH: 0
BACK PAIN: 1
EYE PAIN: 0
BRUISES/BLEEDS EASILY: 0
VOMITING: 0
DIZZINESS: 0
PALPITATIONS: 1
WHEEZING: 0
JOINT SWELLING: 0
SHORTNESS OF BREATH: 1
DYSURIA: 0
AGITATION: 0
FEVER: 0
NAUSEA: 0

## 2022-11-17 ASSESSMENT — PATIENT HEALTH QUESTIONNAIRE - PHQ9
SUM OF ALL RESPONSES TO PHQ QUESTIONS 1-9: 1
10. IF YOU CHECKED OFF ANY PROBLEMS, HOW DIFFICULT HAVE THESE PROBLEMS MADE IT FOR YOU TO DO YOUR WORK, TAKE CARE OF THINGS AT HOME, OR GET ALONG WITH OTHER PEOPLE: NOT DIFFICULT AT ALL
SUM OF ALL RESPONSES TO PHQ QUESTIONS 1-9: 1

## 2022-11-17 ASSESSMENT — PAIN SCALES - GENERAL
PAINLEVEL: MILD PAIN (2)
PAINLEVEL: MILD PAIN (2)

## 2022-11-17 NOTE — PROGRESS NOTES
Type of Visit  EST    Chief Complaint  Nocturia    HPI  Mr. Dennison is a 87 year old male who follows up with [facility] with nocturia.    The patient denies dysuria and gross hematuria.  He was taking Flomax in the past but discontinued a few years ago.  He is unsure of the exact timing and rationale for discontinuing the medication.  Today he presents with nocturia with urinary frequency.  Occasionally he experiences a weak stream.  He has had progressively worsening symptoms for the past 2 years.      Review of Systems  I personally reviewed the ROS with the patient.    Nursing Notes:   Michelle Fuentes LPN  11/17/2022 11:15 AM  Addendum  Chief Complaint   Patient presents with     Follow Up     Urinary frequency   Patient presents to the clinic today for a follow up for urinary frequency     Review of Systems:    Weight loss:    No     Recent fever/chills:  No   Night sweats:   No  Current skin rash:  No   Recent hair loss:  No  Heat intolerance:  No   Cold intolerance:  No  Chest pain:   No   Palpitations:   No  Shortness of breath:  Yes   Wheezing:   No  Constipation:    Yes   Diarrhea:   No   Nausea:   No   Vomiting:   No   Kidney/side pain:  No   Back pain:   yes  Frequent headaches:  No   Dizziness:     No  Leg swelling:   No   Calf pain:    No      Post-Void Residual  A post-void residual was measured by ultrasonic bladder scanner.  0 mL  Michelle Fuentes LPN  11/17/2022 11:15 AM      Medication Reconciliation: completed   Michelle Fuentes LPN  11/17/2022 11:04 AM       Physical Exam  Vitals:    11/17/22 1108   BP: 128/74   BP Location: Right arm   Patient Position: Sitting   Cuff Size: Adult Large   Pulse: 62   Resp: 16   SpO2: 98%   Weight: 83 kg (183 lb)     Constitutional: No acute distress.  Alert and cooperative   Head: NCAT  Eyes: Conjunctivae normal  Cardiovascular: Regular rate.  Pulmonary/Chest: Respirations are even and non-labored bilaterally, no audible wheezing  Abdominal: Soft. No distension, tenderness,  masses or guarding.   Extremities: CONNIE x 4, Warm. No clubbing.  No cyanosis.    Skin: Pink, warm and dry.  No visible rashes noted.  Back:  No left CVA tenderness.  No right CVA tenderness.  Genitourinary:  Nonpalpable bladder    Labs   11/17/22 11:06   Color Urine Yellow   Appearance Urine Clear   Glucose Urine 100 !   Bilirubin Urine Negative   Ketones Urine Trace !   Specific Gravity Urine 1.020   pH Urine 6.0   Protein Albumin Urine 50 !   Urobilinogen mg/dL 2.0   Nitrite Urine Negative   Blood Urine Negative   Leukocyte Esterase Urine Negative   WBC Urine 1   RBC Urine 1   Mucus Urine Present !      09/07/21 13:58   PSA 3.05       Radiographic Studies  7/22/2014  CT a/p  IMPRESSION: At the time of the prior exam 2/20/2014 there was an obstructing up to 7 mm in diameter right mid-ureteral calculus present. This calculus is no longer seen and there is no evidence for hydronephrosis involving either kidney. The 3 mm in diameter right mid-renal calculus has not significantly changed, remaining nonobstructive. No new abnormalities are identified.    Post-Void Residual  A post-void residual was measured by ultrasonic bladder scanner.  0 mL today  86 mL (previously recorded)  45 mL (previously recorded)    Assessment & Plan  Mr. Dennison is a 87 y.o. male with progressively worsening urinary symptoms.  His primary bother is nocturia.  He has a very low PVR.  I recommended a trial of short acting anticholinergic medication just prior to bedtime.  I recommended not taking the medication throughout the day in order to minimize side effects.  He is in agreement.    Plan  Start oxybutynin IR 5mg PO at bedtime for nocturia  Continue Flomax 0.4 Mg once daily  Follow up in 1 year for refills

## 2022-11-17 NOTE — NURSING NOTE
Chief Complaint   Patient presents with     Follow Up     Urinary frequency   Patient presents to the clinic today for a follow up for urinary frequency     Review of Systems:    Weight loss:    No     Recent fever/chills:  No   Night sweats:   No  Current skin rash:  No   Recent hair loss:  No  Heat intolerance:  No   Cold intolerance:  No  Chest pain:   No   Palpitations:   No  Shortness of breath:  Yes   Wheezing:   No  Constipation:    Yes   Diarrhea:   No   Nausea:   No   Vomiting:   No   Kidney/side pain:  No   Back pain:   yes  Frequent headaches:  No   Dizziness:     No  Leg swelling:   No   Calf pain:    No      Post-Void Residual  A post-void residual was measured by ultrasonic bladder scanner.  0 mL  Michelle Fuentes LPN  11/17/2022 11:15 AM      Medication Reconciliation: completed   Michelle Fuentes LPN  11/17/2022 11:04 AM

## 2022-11-17 NOTE — NURSING NOTE
"Chief Complaint   Patient presents with     Hypertension     Diabetes     Neuropathy pain         Initial /74 (BP Location: Right arm, Patient Position: Right side, Cuff Size: Adult Regular)   Pulse 62   Resp 20   Ht 1.772 m (5' 9.75\")   Wt 83.3 kg (183 lb 9.6 oz)   SpO2 98%   BMI 26.53 kg/m   Estimated body mass index is 26.53 kg/m  as calculated from the following:    Height as of this encounter: 1.772 m (5' 9.75\").    Weight as of this encounter: 83.3 kg (183 lb 9.6 oz).       FOOD SECURITY SCREENING QUESTIONS:    The next two questions are to help us understand your food security.  If you are feeling you need any assistance in this area, we have resources available to support you today.    Hunger Vital Signs:  Within the past 12 months we worried whether our food would run out before we got money to buy more. Never  Within the past 12 months the food we bought just didn't last and we didn't have money to get more. Never    Advance Care Directive on file? no      Medication reconciliation complete.      Jerel Benson,on 11/17/2022 at 10:13 AM        "

## 2022-11-17 NOTE — PATIENT INSTRUCTIONS
Take gabapentin 300 mg at bedtime for neuropathy     Start 100 mg of carbamazepine twice daily     B12 shot today     Aspects of Diabetes:   Recent Labs   Lab Test 10/26/22  1357 07/21/22  1437 06/27/22  1425 04/20/22  1410   A1C 7.2* 7.4* 7.2* 8.0*   * 91  --  59   HDL 37 32  --  33   TRIG 117 91  --  111   ALT 14 13  --  12   CR 0.90 1.08 1.03 0.99   GFRESTIMATED 83 67 71 75   POTASSIUM 4.1 4.0 4.1 4.1   TSH 1.53 1.31  --  1.61   WBC 6.8 8.6  --  5.8   HGB 15.6 12.6* 14.5 14.2   * 219  --  145*   ALBUMIN 4.2 3.8  --  4.1      Hemoglobin A1c  Goal range is under 8%. Best is 6.5 to 7   Blood Pressure 128/74 Goal to keep less than 140/90   Tobacco  reports that he quit smoking about 47 years ago. His smoking use included cigarettes. He has a 10.00 pack-year smoking history. He has never used smokeless tobacco. Goal to abstain from tobacco   Aspirin or Plavix Anti-platelet therapy Aspirin or Plavix reduces risk of heart disease and stroke  -- sometimes used with other blood thinners, depending on bleeding risk and risk factors.    ACE/ARB Specific blood pressure meds These medications can reduce risk of kidney disease   Cholesterol Statins (Lipitor, Crestor, vs others) Statins reduce risk of heart disease and stroke   Eye Exam -- Do Yearly -- Annual diabetic eye exam   Healthy weight Wt Readings from Last 4 Encounters:   11/17/22 83.3 kg (183 lb 9.6 oz)   10/26/22 83.8 kg (184 lb 12.8 oz)   10/07/22 82.9 kg (182 lb 12.8 oz)   07/21/22 82.2 kg (181 lb 3.2 oz)      Body mass index is 26.53 kg/m .  Goal BMI under 30, best is under 25.      -- Trying to exercise daily (goal at least 20 min/day) with moderate aerobic activity   -- Eat healthy (resources from ADA at http://www.diabetes.org/)   -- Taking good care of my feet. Consider seeing the Podiatrist   -- Check blood sugars as directed, record in log book and bring to every appointment    Insurance companies are grading you and I on your blood sugar  control -- Goal is to get your A1c down to 7.9% or lower and NO Smoking!  -- Medicare and most insurance companies, will only cover Hemoglobin A1c labs to be rechecked every 91+ days.      Return for Diabetes labs and clinic follow-up appointment every 3 to 4 months.    Schedule lab only appointment --- A few days AFTER: 2/15/23   Schedule clinic appointment with Dr. Montanez -- Same day as labs, or 1-2 days later.

## 2022-11-17 NOTE — PROGRESS NOTES
Assessment & Plan   Steven Dennison is a 87 year old, presenting for the following health issues:      ICD-10-CM    1. Controlled type 2 diabetes mellitus with diabetic polyneuropathy, without long-term current use of insulin (H)  E11.42 carBAMazepine (CARBATROL) 100 MG 12 hr capsule     gabapentin (NEURONTIN) 300 MG capsule      2. Vitamin B12 deficiency  E53.8 cyanocobalamin injection 1,000 mcg      3. Chronic pain syndrome  G89.4 gabapentin (NEURONTIN) 300 MG capsule        Positions need assistance 5 1 patient presents for diabetes follow-up as well as follow-up multiple issues.    Type 2 diabetes with diabetic polyneuropathy.  Currently utilizes gabapentin for neuropathy pain.  We discussed a few options.  States that this is helpful in the evening but he is still having problems.  He would like to try something else for pain.  Start trial of carbamazepine 100 mg twice daily.  Continues on gabapentin.  Needs refills.      Vitamin B12 deficiency.  He would like a B12 shot today.    Chronic pain syndrome.  Has neuropathy pain as noted.  Continues with gabapentin.  Ongoing.  Needs refills.    Encouraged regular exercise.     Return in about 3 months (around 2/17/2023) for + Get Diabetic labs prior to clinic appointment, Diabetes Follow-up.    Nawaf Montanez MD  [facility]    Subjective   Steven is a 87 year old, presenting for the following health issues:  Hypertension and Diabetes (Neuropathy pain)      History of Present Illness       Diabetes:   He presents for follow up of diabetes.  He is checking home blood glucose two times daily. He checks blood glucose before meals.  Blood glucose is sometimes over 200 and never under 70. He is aware of hypoglycemia symptoms including weakness, other and shakiness. He has no concerns regarding his diabetes at this time.  He is having numbness in feet and redness, sores, or blisters on feet. The patient has had a diabetic eye exam in the last 12  months. Eye exam performed on 09/16/2022. Location of last eye exam [facility].        Hypertension: He presents for follow up of hypertension.  He does not check blood pressure  regularly outside of the clinic.  He follows a low salt diet.     Reason for visit:  Balance control due to neuropathy  Symptom onset:  More than a month  Symptoms include:  Leg pain  Symptom intensity:  Moderate  Symptom progression:  Staying the same  Had these symptoms before:  Yes    He eats 0-1 servings of fruits and vegetables daily.He consumes 0 sweetened beverage(s) daily.He exercises with enough effort to increase his heart rate 9 or less minutes per day.  He exercises with enough effort to increase his heart rate 3 or less days per week.   He is taking medications regularly.         Review of Systems   Constitutional: Positive for fatigue (mostly of feet and legs - has been takiong b12 supplements again). Negative for chills and fever.   HENT: Negative for congestion and hearing loss.    Eyes: Negative for pain and visual disturbance.   Respiratory: Positive for shortness of breath (mild, stable.). Negative for cough and wheezing.    Cardiovascular: Positive for palpitations. Negative for chest pain.        Raynaud's syndrome, no ulcerations   Gastrointestinal: Negative for abdominal pain, diarrhea, nausea and vomiting.   Endocrine: Negative for cold intolerance and heat intolerance.        + Hyperglycemia - Glipizide increased to 5 mg TID on 3/15/2022 -blood sugars improved   Genitourinary: Positive for frequency and urgency. Negative for dysuria and hematuria.        + Nocturia 2-4x nightly   Musculoskeletal: Positive for arthralgias (Right shoulder pains, if does too much or using in certain positions), back pain (+ intermittent low back pain) and gait problem (+ Balance problems). Negative for joint swelling and myalgias.   Skin: Negative for pallor and rash.   Allergic/Immunologic: Negative for immunocompromised  "state.   Neurological: Negative for dizziness and light-headedness.        + Balance problems   Hematological: Does not bruise/bleed easily.   Psychiatric/Behavioral: Positive for sleep disturbance (+ insomnia and will wake due to foot pain and nocturia). Negative for agitation and confusion.            Objective    /74 (BP Location: Right arm, Patient Position: Right side, Cuff Size: Adult Regular)   Pulse 62   Resp 20   Ht 1.772 m (5' 9.75\")   Wt 83.3 kg (183 lb 9.6 oz)   SpO2 98%   BMI 26.53 kg/m    Body mass index is 26.53 kg/m .  Physical Exam  Constitutional:       General: He is not in acute distress.     Appearance: He is well-developed. He is not diaphoretic.   HENT:      Head: Normocephalic and atraumatic.   Eyes:      General: No scleral icterus.     Conjunctiva/sclera: Conjunctivae normal.   Cardiovascular:      Rate and Rhythm: Normal rate and regular rhythm.      Pulses: Normal pulses.   Pulmonary:      Effort: Pulmonary effort is normal.      Breath sounds: Normal breath sounds.   Abdominal:      Palpations: Abdomen is soft.      Tenderness: There is no abdominal tenderness.   Musculoskeletal:         General: No deformity.      Cervical back: Neck supple.      Right lower leg: Edema (trace) present.      Left lower leg: Edema (trace) present.   Lymphadenopathy:      Cervical: No cervical adenopathy.   Skin:     General: Skin is warm and dry.      Coloration: Skin is not jaundiced.      Findings: No rash.      Comments: Venous stasis and scattered scabs bilaterally on legs   Neurological:      Mental Status: He is alert and oriented to person, place, and time. Mental status is at baseline.   Psychiatric:         Mood and Affect: Mood normal.         Behavior: Behavior normal.                    Pain History:  When did you first notice your pain? - More than 6 weeks   Have you seen this provider for your pain in the past? Yes   Where in your body do you have pain? Legs down  Are you seeing " anyone else for your pain? No    0956}    Last PHQ-9 11/17/2022   1.  Little interest or pleasure in doing things 0   2.  Feeling down, depressed, or hopeless 0   3.  Trouble falling or staying asleep, or sleeping too much 0   4.  Feeling tired or having little energy 1   5.  Poor appetite or overeating 0   6.  Feeling bad about yourself 0   7.  Trouble concentrating 0   8.  Moving slowly or restless 0   Q9: Thoughts of better off dead/self-harm past 2 weeks 0   PHQ-9 Total Score 1   Difficulty at work, home, or with people -     LORENZO-7  10/26/2022   1. Feeling nervous, anxious, or on edge 0   2. Not being able to stop or control worrying 0   3. Worrying too much about different things 0   4. Trouble relaxing 2   5. Being so restless that it is hard to sit still 2   6. Becoming easily annoyed or irritable 1   7. Feeling afraid, as if something awful might happen 0   LORENZO-7 Total Score 5   If you checked any problems, how difficult have they made it for you to do your work, take care of things at home, or get along with other people? Not difficult at all     PEG Score 10/26/2022 11/17/2022   PEG Total Score 6 9.33        Chronic Pain Follow Up:    Location of pain: legs down  Analgesia/pain control:    - Recent changes:  no    - Overall control: Tolerable with discomfort    - Current treatments: Gabapentin, stretching   Adherence:     - Do you ever take more pain medicine than prescribed? No    - When did you take your last dose of pain medicine?  Today, 8:30 a.m.   Adverse effects: No   PDMP Review       Value Time User    State PDMP site checked  Yes 10/26/2022  4:19 PM Nawaf Montanez MD        Last CSA Agreement:   CSA -- Patient Level:     [Media Unavailable] Controlled Substance Agreement - Opioid - Scan on 10/26/2022  4:38 PM   [Media Unavailable] Controlled Substance Agreement - Opioid - Scan on 7/8/2021  8:24 AM       Last UDS: 10/31/2022        Answers for HPI/ROS submitted by the patient on 11/17/2022  If  you checked off any problems, how difficult have these problems made it for you to do your work, take care of things at home, or get along with other people?: Not difficult at all  PHQ9 TOTAL SCORE: 1

## 2023-01-12 ENCOUNTER — VIRTUAL VISIT (OUTPATIENT)
Dept: INTERNAL MEDICINE | Facility: OTHER | Age: 88
End: 2023-01-12
Attending: INTERNAL MEDICINE
Payer: COMMERCIAL

## 2023-01-12 ENCOUNTER — NURSE TRIAGE (OUTPATIENT)
Dept: INTERNAL MEDICINE | Facility: OTHER | Age: 88
End: 2023-01-12

## 2023-01-12 DIAGNOSIS — I77.811 ABDOMINAL AORTIC ECTASIA (H): ICD-10-CM

## 2023-01-12 DIAGNOSIS — R35.1 NOCTURIA: ICD-10-CM

## 2023-01-12 DIAGNOSIS — E11.42 CONTROLLED TYPE 2 DIABETES MELLITUS WITH DIABETIC POLYNEUROPATHY, WITHOUT LONG-TERM CURRENT USE OF INSULIN (H): Primary | ICD-10-CM

## 2023-01-12 DIAGNOSIS — I10 BENIGN ESSENTIAL HYPERTENSION: ICD-10-CM

## 2023-01-12 DIAGNOSIS — N32.81 OVERACTIVE BLADDER: ICD-10-CM

## 2023-01-12 PROCEDURE — 99214 OFFICE O/P EST MOD 30 MIN: CPT | Mod: 95 | Performed by: INTERNAL MEDICINE

## 2023-01-12 PROCEDURE — G0463 HOSPITAL OUTPT CLINIC VISIT: HCPCS | Mod: TEL

## 2023-01-12 RX ORDER — BIMATOPROST 0.1 MG/ML
1 SOLUTION/ DROPS OPHTHALMIC AT BEDTIME
COMMUNITY
Start: 2023-01-05

## 2023-01-12 RX ORDER — GLIPIZIDE 5 MG/1
5-10 TABLET ORAL
Qty: 270 TABLET | Refills: 1 | Status: SHIPPED | OUTPATIENT
Start: 2023-01-12 | End: 2023-02-22

## 2023-01-12 RX ORDER — OXYBUTYNIN CHLORIDE 5 MG/1
5 TABLET ORAL 3 TIMES DAILY
Qty: 270 TABLET | Refills: 1 | Status: SHIPPED | OUTPATIENT
Start: 2023-01-12 | End: 2023-02-22

## 2023-01-12 ASSESSMENT — ENCOUNTER SYMPTOMS
DIZZINESS: 0
CHILLS: 0
HEMATURIA: 0
FREQUENCY: 1
SHORTNESS OF BREATH: 0
DYSURIA: 0
LIGHT-HEADEDNESS: 0
FEVER: 0

## 2023-01-12 NOTE — TELEPHONE ENCOUNTER
Patient would like to discuss glucose readings. He states his last reading was at 9 am and it is over 200 again. He had a shot in his back about 2 weeks ago and states that his glucose reading has been over 200 since then and it is hard to control. Please call when available    Ayana Patino on 1/12/2023 at 10:50 AM

## 2023-01-12 NOTE — TELEPHONE ENCOUNTER
"S-(situation): Hyperglycemia    B-(background): Had depo-medrol steroid shot with a provider in [place] on December 28th. Blood glucose readings have been between 200-240 for every twice daily reading since then. He takes his blood sugar before meals.    A-(assessment):   Last night he reports sweating more than usual while bowling. Denies nausea, vomiting, rapid breathing. Reports urine has not been dark, denies fever. States he feels fine overall and denies any additional symptoms. Last night he states he had ice cream but that he rarely has sweets like that.    Writer reviewed diabetic medications. Currently he takes glipzide 5mg three times daily. He stopped taking metformin 500mg twice daily approximately three weeks ago because it was \"making him lose control of his legs\". He reports that by stopping his metformin his leg symptoms have improved. He is also not taking diltiazem and reports his blood pressure has been 145/78 most recently but usually it is about 135 systolic. He is taking carbamazepine twice daily as prescribed.     R-(recommendations):   Patient states Dr. Montanez is aware that metformin caused leg problems and believes that Dr Montanez knows he is not taking it. Please advise, do you recommend adjusting medications at this time? Metformin is listed as an active medication. Patient was educated that steroids elevate blood sugar. Patient advised to reduce carbohydrate intake and increase fluids.     Amirah Pisano RN .............. 1/12/2023  11:32 AM      Reason for Disposition    Blood glucose 240 - 300 mg/dL (13.3 - 16.7 mmol/L)    Protocols used: DIABETES - HIGH BLOOD SUGAR-A-OH      "

## 2023-01-12 NOTE — PROGRESS NOTES
Assessment & Plan   Steven Dennison is a 87 year old, being evaluated via a billable telephone visit, presenting for the following health issues:      ICD-10-CM    1. Controlled type 2 diabetes mellitus with diabetic polyneuropathy, without long-term current use of insulin (H)  E11.42 glipiZIDE (GLUCOTROL) 5 MG tablet      2. Abdominal aortic ectasia (H)  I77.811       3. Benign essential hypertension  I10       4. Nocturia  R35.1 oxybutynin (DITROPAN) 5 MG tablet      5. Overactive bladder  N32.81 oxybutynin (DITROPAN) 5 MG tablet      Patient reports hyperglycemia since getting a back injection.  He was having problems with metformin and subsequently stopped taking it.  No side effects have resolved.  Has been taking glipizide, currently 5 mg 3 times daily with meals.  -Increase glipizide up to 10 mg 3 times daily with meals, as needed for high glucose levels.  Reduce dose once glucose levels improve.    Abdominal aortic ectasia.  Appears stable based on recent imaging.  Continue to monitor.    Hypertension, blood pressure is currently well controlled.  Monitor for now.  May need to make some additional medication changes if blood pressures are elevated.    Nocturia and overactive bladder symptoms.  Nighttime dose of oxybutynin has helped quite a bit but he is having a lot of urinary urgency and frequency at times.  Especially if he goes from sitting down to standing up.  He is wondering about other options.  Increase oxybutynin to twice daily dosing.  May need to increase to 3 times daily dosing if needed.  Continue to monitor.    Encouraged regular exercise.     Return for follow-up as needed with Dr. Montanez., Appointments: [phone].    Nawaf Montanez MD  [facility]       What phone number would you like to be contacted at? [phone]  How would you like to obtain your AVS? Mail a copy    Distant Location (provider location):  On-site    Subjective   Hypertension and  Diabetes      History of Present Illness       Diabetes:   He presents for follow up of diabetes.  He is checking home blood glucose two times daily. He checks blood glucose before meals.  Blood glucose is sometimes over 200 and never under 70. When his blood glucose is low, the patient is asymptomatic for confusion, blurred vision, lethargy and reports not feeling dizzy, shaky, or weak.  He is concerned about blood sugar frequently over 200.  He is not experiencing numbness or burning in feet, excessive thirst, blurry vision, weight changes or redness, sores or blisters on feet. Eye exam performed on 12/19/2022. Location of last eye exam [facility].        Hypertension: He presents for follow up of hypertension.  He does not check blood pressure  regularly outside of the clinic. Outpatient blood pressures have not been over 140/90. He does not follow a low salt diet.      Review of Systems   Constitutional: Negative for chills and fever.   Respiratory: Negative for shortness of breath.    Cardiovascular: Negative for chest pain.        Hypertension   Endocrine:        + hyperglycemia into 200s since back injection on 12/28/2022 -> Increase Glipizide to 5-10 mg TID with meals, as of 1/12/2023.   Genitourinary: Positive for frequency and urgency. Negative for dysuria and hematuria.        + nocturia and overactive bladder symptoms.  Oxybutynin at bedtime has helped evening symptoms, but not daytime symptoms. Would like to increase dose to BID or TID.    Neurological: Negative for dizziness and light-headedness.          Objective           Vitals:  No vitals were obtained today due to virtual visit.    Physical Exam   healthy, alert and no distress  PSYCH: Alert and oriented times 3; coherent speech, normal   rate and volume, able to articulate logical thoughts, able   to abstract reason, no tangential thoughts, no hallucinations   or delusions  His affect is normal  RESP: No cough, no audible wheezing, able  to talk in full sentences  Remainder of exam unable to be completed due to telephone visits          Phone call duration: 11 minutes

## 2023-01-12 NOTE — NURSING NOTE
"Chief Complaint   Patient presents with     Hypertension     Diabetes         Initial There were no vitals taken for this visit. Estimated body mass index is 26.45 kg/m  as calculated from the following:    Height as of 11/17/22: 1.772 m (5' 9.75\").    Weight as of 11/17/22: 83 kg (183 lb).       FOOD SECURITY SCREENING QUESTIONS:    The next two questions are to help us understand your food security.  If you are feeling you need any assistance in this area, we have resources available to support you today.    Hunger Vital Signs:  Within the past 12 months we worried whether our food would run out before we got money to buy more. Never  Within the past 12 months the food we bought just didn't last and we didn't have money to get more. Never    Advance Care Directive on file? no      Medication reconciliation complete.      Jerel Benson,on 1/12/2023 at 2:37 PM        "

## 2023-02-22 ENCOUNTER — LAB (OUTPATIENT)
Dept: LAB | Facility: OTHER | Age: 88
End: 2023-02-22
Attending: INTERNAL MEDICINE
Payer: COMMERCIAL

## 2023-02-22 ENCOUNTER — OFFICE VISIT (OUTPATIENT)
Dept: INTERNAL MEDICINE | Facility: OTHER | Age: 88
End: 2023-02-22
Attending: INTERNAL MEDICINE
Payer: COMMERCIAL

## 2023-02-22 VITALS
TEMPERATURE: 97 F | SYSTOLIC BLOOD PRESSURE: 132 MMHG | HEART RATE: 64 BPM | OXYGEN SATURATION: 98 % | WEIGHT: 177.8 LBS | DIASTOLIC BLOOD PRESSURE: 74 MMHG | BODY MASS INDEX: 26.33 KG/M2 | RESPIRATION RATE: 16 BRPM | HEIGHT: 69 IN

## 2023-02-22 DIAGNOSIS — M21.611 BUNION, RIGHT: ICD-10-CM

## 2023-02-22 DIAGNOSIS — I73.00 RAYNAUD'S PHENOMENON WITHOUT GANGRENE: ICD-10-CM

## 2023-02-22 DIAGNOSIS — E78.2 MIXED HYPERLIPIDEMIA: ICD-10-CM

## 2023-02-22 DIAGNOSIS — N18.2 CHRONIC KIDNEY DISEASE, STAGE 2 (MILD): ICD-10-CM

## 2023-02-22 DIAGNOSIS — I10 BENIGN ESSENTIAL HYPERTENSION: ICD-10-CM

## 2023-02-22 DIAGNOSIS — I49.1 PREMATURE ATRIAL CONTRACTIONS: ICD-10-CM

## 2023-02-22 DIAGNOSIS — E11.42 CONTROLLED TYPE 2 DIABETES MELLITUS WITH DIABETIC POLYNEUROPATHY, WITHOUT LONG-TERM CURRENT USE OF INSULIN (H): ICD-10-CM

## 2023-02-22 DIAGNOSIS — D69.6 THROMBOCYTOPENIA (H): ICD-10-CM

## 2023-02-22 DIAGNOSIS — E11.65 TYPE 2 DIABETES MELLITUS WITH HYPERGLYCEMIA, WITHOUT LONG-TERM CURRENT USE OF INSULIN (H): Primary | ICD-10-CM

## 2023-02-22 DIAGNOSIS — R35.1 NOCTURIA: ICD-10-CM

## 2023-02-22 DIAGNOSIS — N32.81 OVERACTIVE BLADDER: ICD-10-CM

## 2023-02-22 DIAGNOSIS — E11.40 PAINFUL DIABETIC NEUROPATHY (H): ICD-10-CM

## 2023-02-22 DIAGNOSIS — I49.8 VENTRICULAR BIGEMINY: ICD-10-CM

## 2023-02-22 LAB
ALBUMIN SERPL BCG-MCNC: 3.8 G/DL (ref 3.5–5.2)
ALBUMIN UR-MCNC: 70 MG/DL
ALP SERPL-CCNC: 113 U/L (ref 40–129)
ALT SERPL W P-5'-P-CCNC: 8 U/L (ref 10–50)
ANION GAP SERPL CALCULATED.3IONS-SCNC: 7 MMOL/L (ref 7–15)
APPEARANCE UR: CLEAR
AST SERPL W P-5'-P-CCNC: 14 U/L (ref 10–50)
BACTERIA #/AREA URNS HPF: ABNORMAL /HPF
BILIRUB SERPL-MCNC: 0.6 MG/DL
BILIRUB UR QL STRIP: NEGATIVE
BUN SERPL-MCNC: 19.3 MG/DL (ref 8–23)
CALCIUM SERPL-MCNC: 9 MG/DL (ref 8.8–10.2)
CHLORIDE SERPL-SCNC: 102 MMOL/L (ref 98–107)
CHOLEST SERPL-MCNC: 218 MG/DL
COLOR UR AUTO: YELLOW
CREAT SERPL-MCNC: 0.88 MG/DL (ref 0.67–1.17)
CREAT UR-MCNC: 149.9 MG/DL
DEPRECATED HCO3 PLAS-SCNC: 28 MMOL/L (ref 22–29)
ERYTHROCYTE [DISTWIDTH] IN BLOOD BY AUTOMATED COUNT: 14.3 % (ref 10–15)
GFR SERPL CREATININE-BSD FRML MDRD: 84 ML/MIN/1.73M2
GLUCOSE SERPL-MCNC: 131 MG/DL (ref 70–99)
GLUCOSE UR STRIP-MCNC: 300 MG/DL
HBA1C MFR BLD: 9.2 % (ref 4–6.2)
HCT VFR BLD AUTO: 46.6 % (ref 40–53)
HDLC SERPL-MCNC: 35 MG/DL
HGB BLD-MCNC: 15.7 G/DL (ref 13.3–17.7)
HGB UR QL STRIP: NEGATIVE
HOLD SPECIMEN: NORMAL
HYALINE CASTS: 1 /LPF
KETONES UR STRIP-MCNC: NEGATIVE MG/DL
LDLC SERPL CALC-MCNC: 153 MG/DL
LEUKOCYTE ESTERASE UR QL STRIP: ABNORMAL
MCH RBC QN AUTO: 28.9 PG (ref 26.5–33)
MCHC RBC AUTO-ENTMCNC: 33.7 G/DL (ref 31.5–36.5)
MCV RBC AUTO: 86 FL (ref 78–100)
MICROALBUMIN UR-MCNC: 478.9 MG/L
MICROALBUMIN/CREAT UR: 319.48 MG/G CR (ref 0–17)
MUCOUS THREADS #/AREA URNS LPF: PRESENT /LPF
NITRATE UR QL: NEGATIVE
NONHDLC SERPL-MCNC: 183 MG/DL
PH UR STRIP: 5.5 [PH] (ref 5–9)
PLATELET # BLD AUTO: 127 10E3/UL (ref 150–450)
POTASSIUM SERPL-SCNC: 4 MMOL/L (ref 3.4–5.3)
PROT SERPL-MCNC: 6.5 G/DL (ref 6.4–8.3)
RBC # BLD AUTO: 5.43 10E6/UL (ref 4.4–5.9)
RBC CAST: 1 /LPF
RBC URINE: <1 /HPF
SODIUM SERPL-SCNC: 137 MMOL/L (ref 136–145)
SP GR UR STRIP: 1.03 (ref 1–1.03)
SQUAMOUS EPITHELIAL: <1 /HPF
TRIGL SERPL-MCNC: 149 MG/DL
TSH SERPL DL<=0.005 MIU/L-ACNC: 1.86 UIU/ML (ref 0.3–4.2)
UROBILINOGEN UR STRIP-MCNC: 2 MG/DL
WBC # BLD AUTO: 6 10E3/UL (ref 4–11)
WBC URINE: 20 /HPF

## 2023-02-22 PROCEDURE — 81001 URINALYSIS AUTO W/SCOPE: CPT | Mod: ZL

## 2023-02-22 PROCEDURE — 84443 ASSAY THYROID STIM HORMONE: CPT | Mod: ZL

## 2023-02-22 PROCEDURE — 84478 ASSAY OF TRIGLYCERIDES: CPT | Mod: ZL

## 2023-02-22 PROCEDURE — 82570 ASSAY OF URINE CREATININE: CPT | Mod: ZL

## 2023-02-22 PROCEDURE — 83036 HEMOGLOBIN GLYCOSYLATED A1C: CPT | Mod: ZL

## 2023-02-22 PROCEDURE — 85027 COMPLETE CBC AUTOMATED: CPT | Mod: ZL

## 2023-02-22 PROCEDURE — G0463 HOSPITAL OUTPT CLINIC VISIT: HCPCS

## 2023-02-22 PROCEDURE — 36415 COLL VENOUS BLD VENIPUNCTURE: CPT | Mod: ZL

## 2023-02-22 PROCEDURE — 80053 COMPREHEN METABOLIC PANEL: CPT | Mod: ZL

## 2023-02-22 PROCEDURE — 99214 OFFICE O/P EST MOD 30 MIN: CPT | Performed by: INTERNAL MEDICINE

## 2023-02-22 RX ORDER — PROCHLORPERAZINE 25 MG/1
1 SUPPOSITORY RECTAL
Qty: 3 EACH | Refills: 5 | Status: SHIPPED | OUTPATIENT
Start: 2023-02-22 | End: 2023-03-17

## 2023-02-22 RX ORDER — PRAVASTATIN SODIUM 40 MG
40 TABLET ORAL DAILY
Qty: 90 TABLET | Refills: 1 | Status: SHIPPED | OUTPATIENT
Start: 2023-02-22 | End: 2023-03-17

## 2023-02-22 RX ORDER — PROCHLORPERAZINE 25 MG/1
1 SUPPOSITORY RECTAL ONCE
Qty: 1 EACH | Refills: 0 | Status: SHIPPED | OUTPATIENT
Start: 2023-02-22 | End: 2023-02-22

## 2023-02-22 RX ORDER — GLIPIZIDE 5 MG/1
5-10 TABLET ORAL
Qty: 270 TABLET | Refills: 1 | Status: SHIPPED | OUTPATIENT
Start: 2023-02-22 | End: 2023-05-24

## 2023-02-22 RX ORDER — DILTIAZEM HYDROCHLORIDE 120 MG/1
120 CAPSULE, EXTENDED RELEASE ORAL EVERY EVENING
Qty: 90 CAPSULE | Refills: 1 | Status: SHIPPED | OUTPATIENT
Start: 2023-02-22 | End: 2023-03-17

## 2023-02-22 RX ORDER — OXYBUTYNIN CHLORIDE 5 MG/1
5 TABLET ORAL 3 TIMES DAILY
Qty: 270 TABLET | Refills: 1 | Status: SHIPPED | OUTPATIENT
Start: 2023-02-22 | End: 2023-03-17

## 2023-02-22 RX ORDER — PROCHLORPERAZINE 25 MG/1
1 SUPPOSITORY RECTAL
Qty: 1 EACH | Refills: 1 | Status: SHIPPED | OUTPATIENT
Start: 2023-02-22 | End: 2023-03-17

## 2023-02-22 ASSESSMENT — ENCOUNTER SYMPTOMS
DIZZINESS: 0
WHEEZING: 0
ARTHRALGIAS: 1
FATIGUE: 1
COUGH: 0
CONFUSION: 0
VOMITING: 0
PALPITATIONS: 1
AGITATION: 0
MYALGIAS: 0
HEMATURIA: 0
DYSURIA: 0
JOINT SWELLING: 0
LIGHT-HEADEDNESS: 0
FREQUENCY: 1
DIARRHEA: 0
ABDOMINAL PAIN: 0
SLEEP DISTURBANCE: 1
EYE PAIN: 0
CHILLS: 0
SHORTNESS OF BREATH: 1
BRUISES/BLEEDS EASILY: 0
NAUSEA: 0
BACK PAIN: 1
FEVER: 0

## 2023-02-22 ASSESSMENT — PAIN SCALES - GENERAL: PAINLEVEL: NO PAIN (0)

## 2023-02-22 NOTE — NURSING NOTE
"Chief Complaint   Patient presents with     Diabetes         Initial /74 (BP Location: Right arm, Patient Position: Right side, Cuff Size: Adult Regular)   Pulse 64   Temp 97  F (36.1  C) (Temporal)   Resp 16   Ht 1.753 m (5' 9\")   Wt 80.6 kg (177 lb 12.8 oz)   SpO2 98%   BMI 26.26 kg/m   Estimated body mass index is 26.26 kg/m  as calculated from the following:    Height as of this encounter: 1.753 m (5' 9\").    Weight as of this encounter: 80.6 kg (177 lb 12.8 oz).       FOOD SECURITY SCREENING QUESTIONS:    The next two questions are to help us understand your food security.  If you are feeling you need any assistance in this area, we have resources available to support you today.    Hunger Vital Signs:  Within the past 12 months we worried whether our food would run out before we got money to buy more. Never  Within the past 12 months the food we bought just didn't last and we didn't have money to get more. Never    Advance Care Directive on file? yes      Medication reconciliation complete.      Jerel Benson,on 2/22/2023 at 2:59 PM        "

## 2023-02-22 NOTE — PROGRESS NOTES
Assessment & Plan   Steven Dennison is a 87 year old, presenting for the following health issues:      ICD-10-CM    1. Type 2 diabetes mellitus with hyperglycemia, without long-term current use of insulin (H)  E11.65 Continuous Blood Gluc  (DEXCOM G6 ) MARTI     Continuous Blood Gluc Transmit (DEXCOM G6 TRANSMITTER) MISC     Continuous Blood Gluc Sensor (DEXCOM G6 SENSOR) MISC     glipiZIDE (GLUCOTROL) 5 MG tablet     FOOT EXAM      2. Painful diabetic neuropathy (H)  E11.40 FOOT EXAM      3. Benign essential hypertension  I10 diltiazem ER (DILT-XR) 120 MG 24 hr capsule      4. Chronic kidney disease, stage 2 (mild)  N18.2       5. Mixed hyperlipidemia  E78.2 pravastatin (PRAVACHOL) 40 MG tablet      6. Raynaud's phenomenon without gangrene  I73.00 diltiazem ER (DILT-XR) 120 MG 24 hr capsule      7. Premature atrial contractions  I49.1 diltiazem ER (DILT-XR) 120 MG 24 hr capsule      8. Ventricular bigeminy  I49.8 diltiazem ER (DILT-XR) 120 MG 24 hr capsule      9. Nocturia  R35.1 oxybutynin (DITROPAN) 5 MG tablet      10. Overactive bladder  N32.81 oxybutynin (DITROPAN) 5 MG tablet      11. Bunion, right - Great Toe  M21.611       12. Thrombocytopenia (H)  D69.6       Patient presents for diabetes follow-up as well as follow-up multiple issues.    Type 2 diabetes with hyperglycemia.  Hemoglobin A1c is extremely high.  He has been taking glipizide to help with blood sugar management.  Has not been as active recently.  He was taking some type of sleeping pills and states that his blood sugars got very high with these.  He is not sure what they were.  He stopped taking them now for the last week or so and states that his blood sugars have been much better into the 140 range.  He would like to get a continuous glucose monitor because he wants better control of his blood sugars.  Dexcom G6 order sent to pharmacy.  Refill of glipizide sent to pharmacy.  Foot exam completed today.    Hypertension, blood  pressure is currently well controlled.  Doing well with current medication regimen.  Continues with diltiazem.  Needs refills.    Stage II chronic kidney disease.  Kidney function has improved compared to previous levels.  Encouraged NSAID avoidance.    Heart palpitations, noted to previously have PACs and ventricular bigeminy.  Also has Raynaud's phenomenon without gangrene.  Doing very well with diltiazem.  Needs refills.    Overactive bladder symptoms and nocturia.  Now getting up nightly once-twice rather than 3-4 times.  Seems to be doing well.  Still has some frequency during the day.  Otherwise tolerating oxybutynin without significant side effects.  Refills per    Thrombocytopenia.  New diagnosis.  He does take low-dose aspirin daily.  Monitor closely.  Has noted some increased bruising.    Does incidentally have a right great toe bunion.  Having more bilateral foot pain.    Encouraged regular exercise.     Return in about 3 months (around 5/22/2023) for - Labs every 91+ days, with DM Follow-up, Same Day or 1-2 days later with Dr. Montanez.    Nawaf Montanez MD  [facility] AND [facility]       I have evaluated this patient in-person today and I am prescribing continuous glucose monitor for the following reasons:    -The patient has a diagnosis of diabetes mellitus.  -The patient has been using a blood glucose monitor to test 4+ times a day.  -The patient's treatment regimen requires frequent adjustments due carb counting, and/or labile blood sugars.       Subjective   Diabetes      History of Present Illness       Diabetes:   He presents for follow up of diabetes.  He is checking home blood glucose two times daily. He checks blood glucose before meals.  Blood glucose is sometimes over 200 and never under 70. He is aware of hypoglycemia symptoms including other, shakiness and confusion. He is concerned about other.  He is having numbness in feet, excessive thirst and blurry vision. The patient has had a  "diabetic eye exam in the last 12 months. Eye exam performed on 09/16/2023. Location of last eye exam [facility].          Review of Systems   Constitutional: Positive for fatigue (mostly of feet and legs - has been takiong b12 supplements again). Negative for chills and fever.   HENT: Negative for congestion and hearing loss.    Eyes: Negative for pain and visual disturbance.   Respiratory: Positive for shortness of breath (mild, stable.). Negative for cough and wheezing.    Cardiovascular: Positive for palpitations. Negative for chest pain.        Raynaud's syndrome, no ulcerations   Gastrointestinal: Negative for abdominal pain, diarrhea, nausea and vomiting.   Endocrine: Negative for cold intolerance and heat intolerance.        Stopped sleeping pills, thinks they were causing glucose to go WAY up, over 200s.    Genitourinary: Positive for frequency and urgency. Negative for dysuria and hematuria.        + Nocturia 1-2x nightly, previously 3-4x.  + nocturia and overactive bladder symptoms.  Oxybutynin at bedtime has helped evening symptoms, but not daytime symptoms.   Musculoskeletal: Positive for arthralgias (Right shoulder pains, if does too much or using in certain positions), back pain (+ intermittent low back pain) and gait problem (+ Balance problems). Negative for joint swelling and myalgias.   Skin: Negative for pallor and rash.   Allergic/Immunologic: Negative for immunocompromised state.   Neurological: Negative for dizziness and light-headedness.        + Balance problems   Hematological: Does not bruise/bleed easily.   Psychiatric/Behavioral: Positive for sleep disturbance (+ insomnia and will wake due to foot pain and nocturia). Negative for agitation and confusion.          Objective    /74 (BP Location: Right arm, Patient Position: Right side, Cuff Size: Adult Regular)   Pulse 64   Temp 97  F (36.1  C) (Temporal)   Resp 16   Ht 1.753 m (5' 9\")   Wt 80.6 kg (177 lb 12.8 oz)   SpO2 " 98%   BMI 26.26 kg/m    Body mass index is 26.26 kg/m .  Physical Exam  Constitutional:       General: He is not in acute distress.     Appearance: He is well-developed. He is not diaphoretic.   HENT:      Head: Normocephalic and atraumatic.   Eyes:      General: No scleral icterus.     Conjunctiva/sclera: Conjunctivae normal.   Cardiovascular:      Rate and Rhythm: Normal rate and regular rhythm.      Pulses: Normal pulses.           Dorsalis pedis pulses are 2+ on the right side and 2+ on the left side.        Posterior tibial pulses are 2+ on the right side and 2+ on the left side.   Pulmonary:      Effort: Pulmonary effort is normal.      Breath sounds: Normal breath sounds.   Abdominal:      Palpations: Abdomen is soft.      Tenderness: There is no abdominal tenderness.   Musculoskeletal:         General: No deformity.      Cervical back: Neck supple.      Right lower leg: Edema (trace) present.      Left lower leg: Edema (trace) present.      Right foot: Bunion present.   Feet:      Right foot:      Protective Sensation: 10 sites tested. 10 sites sensed.      Skin integrity: Skin integrity normal.      Toenail Condition: Right toenails are abnormally thick. Fungal disease present.     Left foot:      Protective Sensation: 10 sites tested. 10 sites sensed.      Skin integrity: Skin integrity normal.      Toenail Condition: Left toenails are abnormally thick. Fungal disease present.  Lymphadenopathy:      Cervical: No cervical adenopathy.   Skin:     General: Skin is warm and dry.      Coloration: Skin is not jaundiced.      Findings: No rash.      Comments: Venous stasis and scattered scabs bilaterally on legs   Neurological:      Mental Status: He is alert and oriented to person, place, and time. Mental status is at baseline.   Psychiatric:         Mood and Affect: Mood normal.         Behavior: Behavior normal.         Recent Labs   Lab Test 02/22/23  1355 10/26/22  1357 07/21/22  1437   A1C 9.2* 7.2* 7.4*    * 102* 91   HDL 35* 37 32   TRIG 149 117 91   ALT 8* 14 13   CR 0.88 0.90 1.08   GFRESTIMATED 84 83 67   POTASSIUM 4.0 4.1 4.0   TSH 1.86 1.53 1.31   WBC 6.0 6.8 8.6   HGB 15.7 15.6 12.6*   * 145* 219   ALBUMIN 3.8 4.2 3.8     Hemoglobin A1c is high and not at goal.  LDL is high and not at goal.  Triglycerides are within goal range.  ALT, creatinine, potassium, TSH are stable.  CBC shows low platelet count, normal white blood cell count and hemoglobin.  Normal albumin.

## 2023-02-22 NOTE — PATIENT INSTRUCTIONS
Blood pressure is well controlled.     Diabetes and cholesterol need help.     -- Restart Pravastatin -- for cholesterol.     Medications refilled.   Labs are otherwise relatively stable.     Results for orders placed or performed in visit on 02/22/23   Comprehensive metabolic panel     Status: Abnormal   Result Value Ref Range    Sodium 137 136 - 145 mmol/L    Potassium 4.0 3.4 - 5.3 mmol/L    Chloride 102 98 - 107 mmol/L    Carbon Dioxide (CO2) 28 22 - 29 mmol/L    Anion Gap 7 7 - 15 mmol/L    Urea Nitrogen 19.3 8.0 - 23.0 mg/dL    Creatinine 0.88 0.67 - 1.17 mg/dL    Calcium 9.0 8.8 - 10.2 mg/dL    Glucose 131 (H) 70 - 99 mg/dL    Alkaline Phosphatase 113 40 - 129 U/L    AST 14 10 - 50 U/L    ALT 8 (L) 10 - 50 U/L    Protein Total 6.5 6.4 - 8.3 g/dL    Albumin 3.8 3.5 - 5.2 g/dL    Bilirubin Total 0.6 <=1.2 mg/dL    GFR Estimate 84 >60 mL/min/1.73m2   Lipid Profile     Status: Abnormal   Result Value Ref Range    Cholesterol 218 (H) <200 mg/dL    Triglycerides 149 <150 mg/dL    Direct Measure HDL 35 (L) >=40 mg/dL    LDL Cholesterol Calculated 153 (H) <=100 mg/dL    Non HDL Cholesterol 183 (H) <130 mg/dL    Narrative    Cholesterol  Desirable:  <200 mg/dL    Triglycerides  Normal:  Less than 150 mg/dL  Borderline High:  150-199 mg/dL  High:  200-499 mg/dL  Very High:  Greater than or equal to 500 mg/dL    Direct Measure HDL  Female:  Greater than or equal to 50 mg/dL   Male:  Greater than or equal to 40 mg/dL    LDL Cholesterol  Desirable:  <100mg/dL  Above Desirable:  100-129 mg/dL   Borderline High:  130-159 mg/dL   High:  160-189 mg/dL   Very High:  >= 190 mg/dL    Non HDL Cholesterol  Desirable:  130 mg/dL  Above Desirable:  130-159 mg/dL  Borderline High:  160-189 mg/dL  High:  190-219 mg/dL  Very High:  Greater than or equal to 220 mg/dL   Albumin Random Urine Quantitative with Creat Ratio     Status: Abnormal   Result Value Ref Range    Creatinine Urine mg/dL 149.9 mg/dL    Albumin Urine mg/L 478.9 mg/L     Albumin Urine mg/g Cr 319.48 (H) 0.00 - 17.00 mg/g Cr   CBC with platelets     Status: Abnormal   Result Value Ref Range    WBC Count 6.0 4.0 - 11.0 10e3/uL    RBC Count 5.43 4.40 - 5.90 10e6/uL    Hemoglobin 15.7 13.3 - 17.7 g/dL    Hematocrit 46.6 40.0 - 53.0 %    MCV 86 78 - 100 fL    MCH 28.9 26.5 - 33.0 pg    MCHC 33.7 31.5 - 36.5 g/dL    RDW 14.3 10.0 - 15.0 %    Platelet Count 127 (L) 150 - 450 10e3/uL   Hemoglobin A1c     Status: Abnormal   Result Value Ref Range    Hemoglobin A1C 9.2 (H) 4.0 - 6.2 %   TSH with free T4 reflex     Status: Normal   Result Value Ref Range    TSH 1.86 0.30 - 4.20 uIU/mL   UA reflex to Microscopic     Status: Abnormal   Result Value Ref Range    Color Urine Yellow Colorless, Straw, Light Yellow, Yellow    Appearance Urine Clear Clear    Glucose Urine 300 (A) Negative mg/dL    Bilirubin Urine Negative Negative    Ketones Urine Negative Negative mg/dL    Specific Gravity Urine 1.028 1.000 - 1.030    Blood Urine Negative Negative    pH Urine 5.5 5.0 - 9.0    Protein Albumin Urine 70 (A) Negative mg/dL    Urobilinogen Urine 2.0 Normal, 2.0 mg/dL    Nitrite Urine Negative Negative    Leukocyte Esterase Urine Moderate (A) Negative    Bacteria Urine Few (A) None Seen /HPF    RBC Urine <1 <=2 /HPF    WBC Urine 20 (H) <=5 /HPF    Squamous Epithelials Urine <1 <=1 /HPF    Mucus Urine Present (A) None Seen /LPF    Hyaline Casts Urine 1 <=2 /LPF    RBC Casts Urine 1 (H) None Seen /LPF   Extra Tube     Status: None    Narrative    The following orders were created for panel order Extra Tube.  Procedure                               Abnormality         Status                     ---------                               -----------         ------                     Extra Serum Separator Tu...[[id number]]                      Final result                 Please view results for these tests on the individual orders.   Extra Serum Separator Tube (SST)     Status: None   Result Value Ref Range     Hold Specimen [facility]         Aspects of Diabetes:   Recent Labs   Lab Test 02/22/23  1355 10/26/22  1357 07/21/22  1437   A1C 9.2* 7.2* 7.4*   * 102* 91   HDL 35* 37 32   TRIG 149 117 91   ALT 8* 14 13   CR 0.88 0.90 1.08   GFRESTIMATED 84 83 67   POTASSIUM 4.0 4.1 4.0   TSH 1.86 1.53 1.31   WBC 6.0 6.8 8.6   HGB 15.7 15.6 12.6*   * 145* 219   ALBUMIN 3.8 4.2 3.8      Hemoglobin A1c  Goal range is under 8%. Best is 6.5 to 7   Blood Pressure 132/74 Goal to keep less than 140/90   Tobacco  reports that he quit smoking about 48 years ago. His smoking use included cigarettes. He has a 10.00 pack-year smoking history. He has never used smokeless tobacco. Goal to abstain from tobacco   Aspirin or Plavix Anti-platelet therapy Aspirin or Plavix reduces risk of heart disease and stroke  -- sometimes used with other blood thinners, depending on bleeding risk and risk factors.    ACE/ARB Specific blood pressure meds These medications can reduce risk of kidney disease   Cholesterol Statins (Lipitor, Crestor, vs others) Statins reduce risk of heart disease and stroke   Eye Exam -- Do Yearly -- Annual diabetic eye exam   Healthy weight Wt Readings from Last 4 Encounters:   02/22/23 80.6 kg (177 lb 12.8 oz)   11/17/22 83 kg (183 lb)   11/17/22 83.3 kg (183 lb 9.6 oz)   10/26/22 83.8 kg (184 lb 12.8 oz)      Body mass index is 26.26 kg/m .  Goal BMI under 30, best is under 25.      -- Trying to exercise daily (goal at least 20 min/day) with moderate aerobic activity   -- Eat healthy (resources from ADA at http://www.diabetes.org/)   -- Taking good care of my feet. Consider seeing the Podiatrist   -- Check blood sugars as directed, record in log book and bring to every appointment    Insurance companies are grading you and I on your blood sugar control -- Goal is to get your A1c down to 7.9% or lower and NO Smoking!  -- Medicare and most insurance companies, will only cover Hemoglobin A1c labs to be rechecked every 91+  days.      Return for Diabetes labs and clinic follow-up appointment every 3 to 4 months.    Schedule lab only appointment --- A few days AFTER: 5/23/23   Schedule clinic appointment with Dr. Montanez -- Same day as labs, or 1-2 days later.

## 2023-02-23 PROBLEM — D69.6 THROMBOCYTOPENIA (H): Status: ACTIVE | Noted: 2023-02-23

## 2023-03-16 NOTE — PROGRESS NOTES
Assessment & Plan   Steven Dennison is a 87 year old accompanied by his friend, presenting for the following health issues:      ICD-10-CM    1. Hospital discharge follow-up  Z09       2. S/P drug eluting coronary stent placement - RCA x2 and LAD x1 - 3/14/2023 - [facility]  Z95.5 rosuvastatin (CRESTOR) 40 MG tablet     ticagrelor (BRILINTA) 90 MG tablet     ASPIRIN NOT PRESCRIBED (INTENTIONAL)     apixaban ANTICOAGULANT (ELIQUIS) 5 MG tablet      3. Coronary artery disease involving native coronary artery of native heart without angina pectoris  I25.10 rosuvastatin (CRESTOR) 40 MG tablet     ticagrelor (BRILINTA) 90 MG tablet     ASPIRIN NOT PRESCRIBED (INTENTIONAL)     apixaban ANTICOAGULANT (ELIQUIS) 5 MG tablet      4. Type 2 diabetes mellitus with hyperglycemia, without long-term current use of insulin (H)  E11.65 Continuous Blood Gluc  (DEXCOM G6 ) MARTI     Continuous Blood Gluc Sensor (DEXCOM G6 SENSOR) MISC     Continuous Blood Gluc Transmit (DEXCOM G6 TRANSMITTER) MISC      5. Benign essential hypertension  I10 diltiazem ER (DILT-XR) 120 MG 24 hr capsule      6. Chronic kidney disease, stage 2 (mild)  N18.2       7. Controlled type 2 diabetes mellitus with diabetic polyneuropathy, without long-term current use of insulin (H)  E11.42       8. Chronic pain syndrome  G89.4       9. Raynaud's phenomenon without gangrene  I73.00 diltiazem ER (DILT-XR) 120 MG 24 hr capsule      10. Premature atrial contractions  I49.1 diltiazem ER (DILT-XR) 120 MG 24 hr capsule      11. Ventricular bigeminy  I49.8 diltiazem ER (DILT-XR) 120 MG 24 hr capsule      12. Nocturia  R35.1 oxybutynin (DITROPAN) 5 MG tablet      13. Overactive bladder  N32.81 oxybutynin (DITROPAN) 5 MG tablet      14. Painful diabetic neuropathy (H)  E11.40 gabapentin (NEURONTIN) 300 MG capsule     gabapentin (NEURONTIN) 100 MG capsule      15. Thrombocytopenia (H)  D69.6       Patient presents for hospital follow-up.  Recently with  non-ST elevation MI, 3 drug-eluting stents placed during his hospitalization.  Hospitalized 3/12-3/15 2023.  He sent to [facility].  Numerous medication changes were made at discharge.  Now taking Crestor, Brilinta, apixaban.  Med list updated, refill sent to pharmacy.    Type 2 diabetes with hyperglycemia.  Blood sugars are high.  Was taking metformin but no longer using metformin.  Just using glipizide.  He declines insulin at this time.  May need to consider given his high blood sugar levels.  Recommend follow-up with diabetic education.    Hypertension, Raynaud's phenomenon, PACs and history of ventricular bigeminy.  Not taking diltiazem plus metoprolol.  Med list updated.    Nocturia with overactive bladder.  Ongoing issues.  Symptoms have improved slightly with use of oxybutynin.  He would like to make dose adjustment and increase dosing.  Updated prescription sent to pharmacy.    Painful diabetic neuropathy.  Using gabapentin 300 mg at bedtime.  Advised okay to start 100 mg during the day if needed.  Prescriptions updated.    Platelet count is low.  Monitor closely.    Encouraged weight loss and regular exercise.     MED REC REQUIRED  Post Medication Reconciliation Status: discharge medications reconciled and changed, per note/orders     Return in about 2 months (around 5/24/2023) for - Labs every 91+ days, with DM Follow-up, Same Day or 1-2 days later with Dr. Montanez.    Nawaf Montanez MD  [facility] AND [facility] F/U ([facility] 3/12-3/15 NSTEMI/)      HPI     This patient has diabetes.  Given his hyperglycemia, poorly controlled diabetes, recommend continuous glucose monitor.  Diabetic education referral placed.    For their safety, I have advised this patient to check their blood glucose level at least four times daily, including before meals, before bed, periodically after meals, if they believe they might be experiencing hypoglycemia, or if they are ill.     [facility]  Follow-up Visit:    Hospital/Nursing Home/IP Rehab Facility: [facility]  Date of Admission: 3/12/2023  Date of Discharge: 3/15/2023  Reason(s) for Admission: NSTEMI - s/p PCI and CINDI stent x3 -- 2 in RCA and 1 in LAD.     Angiogram report:    1. Severe multivessel coronary artery disease: proximal and distal RCA focal 80 80 atherosclerosis.   Distal small RPDA subtotal occlusion.   Distal LAD focal 90% atherosclerosis    2. PCI performed in proximal and distal RCA with placement of 4.0 X 15 mm Resolute Jony CINDI stents.   Distal rPDA occluded at end of procedure.   Will medically manage considering small size of distal vessel.    3. PCI performed in the distal LAD with placement of a 2.25 X 38 mm Synergy XD, post-dilated using a 2.5 mm NC balloon    4. Right CFA 6F sheath removed and perclose sutures deployed for hemostasis. Unable to obtain right radial access due to inability to advance wire.    Was your hospitalization related to COVID-19? No   Problems taking medications regularly:  None  Medication changes since discharge: None  Problems adhering to non-medication therapy:  None    Summary of hospitalization:  CareEverywhere information obtained and reviewed  Diagnostic Tests/Treatments reviewed.  Follow up needed: Cardiology.   Other Healthcare Providers Involved in Patient s Care:         Specialist appointment - Cardiology - [facility]  Update since discharge: stable.     Plan of care communicated with patient and friend     Plan of care communicated with patient and friend     Review of Systems   Constitutional: Positive for fatigue (mostly of feet and legs - has been takiong b12 supplements again). Negative for chills and fever.   HENT: Negative for congestion and hearing loss.    Eyes: Negative for pain and visual disturbance.   Respiratory: Positive for shortness of breath (mild, stable.). Negative for cough and wheezing.    Cardiovascular: Positive for palpitations. Negative for chest  pain (Doing better after stent x3 placement 3/14/2023 at [facility]).        Raynaud's syndrome, no ulcerations   Gastrointestinal: Negative for abdominal pain, diarrhea, nausea and vomiting.   Endocrine: Negative for cold intolerance and heat intolerance.        Stopped sleeping pills, thinks they were causing glucose to go WAY up, over 200s.    Genitourinary: Positive for frequency and urgency. Negative for dysuria and hematuria.        + Nocturia 1-2x nightly, previously 3-4x.  + nocturia and overactive bladder symptoms.  Oxybutynin at bedtime has helped evening symptoms, but not daytime symptoms.   Musculoskeletal: Positive for arthralgias (Right shoulder pains, if does too much or using in certain positions), back pain (+ intermittent low back pain) and gait problem (+ Balance problems). Negative for joint swelling and myalgias.   Skin: Negative for pallor and rash.   Allergic/Immunologic: Negative for immunocompromised state.   Neurological: Negative for dizziness and light-headedness.        + Balance problems   Hematological: Does not bruise/bleed easily.   Psychiatric/Behavioral: Positive for sleep disturbance (+ insomnia and will wake due to foot pain and nocturia). Negative for agitation and confusion.          Objective    /68   Pulse 57   Wt 82.3 kg (181 lb 6.4 oz)   SpO2 99%   BMI 26.79 kg/m    Body mass index is 26.79 kg/m .  Physical Exam  Constitutional:       General: He is not in acute distress.     Appearance: He is well-developed. He is not diaphoretic.   HENT:      Head: Normocephalic and atraumatic.   Eyes:      General: No scleral icterus.     Conjunctiva/sclera: Conjunctivae normal.   Cardiovascular:      Rate and Rhythm: Normal rate and regular rhythm.      Pulses: Normal pulses.   Pulmonary:      Effort: Pulmonary effort is normal.      Breath sounds: Normal breath sounds.   Abdominal:      Palpations: Abdomen is soft.      Tenderness: There is no abdominal tenderness.    Musculoskeletal:         General: No deformity.      Cervical back: Neck supple.      Right lower leg: Edema (trace) present.      Left lower leg: Edema (trace) present.   Lymphadenopathy:      Cervical: No cervical adenopathy.   Skin:     General: Skin is warm and dry.      Coloration: Skin is not jaundiced.      Findings: No rash.      Comments: Venous stasis and scattered scabs bilaterally on legs   Neurological:      Mental Status: He is alert and oriented to person, place, and time. Mental status is at baseline.   Psychiatric:         Mood and Affect: Mood normal.         Behavior: Behavior normal.          Recent Labs   Lab Test 02/22/23  1355 10/26/22  1357 07/21/22  1437   A1C 9.2* 7.2* 7.4*   * 102* 91   HDL 35* 37 32   TRIG 149 117 91   ALT 8* 14 13   CR 0.88 0.90 1.08   GFRESTIMATED 84 83 67   POTASSIUM 4.0 4.1 4.0   TSH 1.86 1.53 1.31   WBC 6.0 6.8 8.6   HGB 15.7 15.6 12.6*   * 145* 219   ALBUMIN 3.8 4.2 3.8     Hemoglobin A1c is high and not at goal.  LDL is high and not at goal.  Triglycerides were borderline high.  ALT normal.  Creatinine at baseline for potassium normal.  TSH normal.  Platelet count low at 127,000.  Albumin normal.              Answers for HPI/ROS submitted by the patient on 3/17/2023  If you checked off any problems, how difficult have these problems made it for you to do your work, take care of things at home, or get along with other people?: Not difficult at all  PHQ9 TOTAL SCORE: 4

## 2023-03-17 ENCOUNTER — OFFICE VISIT (OUTPATIENT)
Dept: INTERNAL MEDICINE | Facility: OTHER | Age: 88
End: 2023-03-17
Attending: INTERNAL MEDICINE
Payer: COMMERCIAL

## 2023-03-17 VITALS
SYSTOLIC BLOOD PRESSURE: 138 MMHG | HEART RATE: 57 BPM | BODY MASS INDEX: 26.79 KG/M2 | WEIGHT: 181.4 LBS | OXYGEN SATURATION: 99 % | DIASTOLIC BLOOD PRESSURE: 68 MMHG

## 2023-03-17 DIAGNOSIS — Z95.5 S/P DRUG ELUTING CORONARY STENT PLACEMENT: ICD-10-CM

## 2023-03-17 DIAGNOSIS — I10 BENIGN ESSENTIAL HYPERTENSION: ICD-10-CM

## 2023-03-17 DIAGNOSIS — I73.00 RAYNAUD'S PHENOMENON WITHOUT GANGRENE: ICD-10-CM

## 2023-03-17 DIAGNOSIS — I25.10 CORONARY ARTERY DISEASE INVOLVING NATIVE CORONARY ARTERY OF NATIVE HEART WITHOUT ANGINA PECTORIS: ICD-10-CM

## 2023-03-17 DIAGNOSIS — E11.40 PAINFUL DIABETIC NEUROPATHY (H): ICD-10-CM

## 2023-03-17 DIAGNOSIS — D69.6 THROMBOCYTOPENIA (H): ICD-10-CM

## 2023-03-17 DIAGNOSIS — R35.1 NOCTURIA: ICD-10-CM

## 2023-03-17 DIAGNOSIS — Z09 HOSPITAL DISCHARGE FOLLOW-UP: Primary | ICD-10-CM

## 2023-03-17 DIAGNOSIS — E11.42 CONTROLLED TYPE 2 DIABETES MELLITUS WITH DIABETIC POLYNEUROPATHY, WITHOUT LONG-TERM CURRENT USE OF INSULIN (H): ICD-10-CM

## 2023-03-17 DIAGNOSIS — N18.2 CHRONIC KIDNEY DISEASE, STAGE 2 (MILD): ICD-10-CM

## 2023-03-17 DIAGNOSIS — E11.65 TYPE 2 DIABETES MELLITUS WITH HYPERGLYCEMIA, WITHOUT LONG-TERM CURRENT USE OF INSULIN (H): ICD-10-CM

## 2023-03-17 DIAGNOSIS — I49.8 VENTRICULAR BIGEMINY: ICD-10-CM

## 2023-03-17 DIAGNOSIS — I49.1 PREMATURE ATRIAL CONTRACTIONS: ICD-10-CM

## 2023-03-17 DIAGNOSIS — G89.4 CHRONIC PAIN SYNDROME: ICD-10-CM

## 2023-03-17 DIAGNOSIS — N32.81 OVERACTIVE BLADDER: ICD-10-CM

## 2023-03-17 PROCEDURE — G0463 HOSPITAL OUTPT CLINIC VISIT: HCPCS | Performed by: INTERNAL MEDICINE

## 2023-03-17 PROCEDURE — G0463 HOSPITAL OUTPT CLINIC VISIT: HCPCS

## 2023-03-17 PROCEDURE — 99496 TRANSJ CARE MGMT HIGH F2F 7D: CPT | Performed by: INTERNAL MEDICINE

## 2023-03-17 PROCEDURE — 99214 OFFICE O/P EST MOD 30 MIN: CPT | Performed by: INTERNAL MEDICINE

## 2023-03-17 RX ORDER — ROSUVASTATIN CALCIUM 40 MG/1
40 TABLET, COATED ORAL DAILY
Qty: 90 TABLET | Refills: 4 | Status: SHIPPED | OUTPATIENT
Start: 2023-03-17 | End: 2023-11-22

## 2023-03-17 RX ORDER — DILTIAZEM HYDROCHLORIDE 120 MG/1
120 CAPSULE, EXTENDED RELEASE ORAL EVERY EVENING
Qty: 90 CAPSULE | Refills: 4 | Status: SHIPPED | OUTPATIENT
Start: 2023-03-17 | End: 2023-11-22

## 2023-03-17 RX ORDER — PROCHLORPERAZINE 25 MG/1
SUPPOSITORY RECTAL
Qty: 1 EACH | Refills: 1 | Status: SHIPPED | OUTPATIENT
Start: 2023-03-17 | End: 2023-11-22

## 2023-03-17 RX ORDER — OXYBUTYNIN CHLORIDE 5 MG/1
5 TABLET ORAL 3 TIMES DAILY
Qty: 270 TABLET | Refills: 4 | Status: SHIPPED | OUTPATIENT
Start: 2023-03-17 | End: 2023-08-03

## 2023-03-17 RX ORDER — GABAPENTIN 100 MG/1
100 CAPSULE ORAL 3 TIMES DAILY PRN
COMMUNITY
Start: 2023-03-17 | End: 2023-08-03

## 2023-03-17 RX ORDER — PROCHLORPERAZINE 25 MG/1
SUPPOSITORY RECTAL
Qty: 3 EACH | Refills: 5 | Status: SHIPPED | OUTPATIENT
Start: 2023-03-17 | End: 2023-11-22

## 2023-03-17 RX ORDER — ROSUVASTATIN CALCIUM 40 MG/1
TABLET, COATED ORAL
COMMUNITY
Start: 2023-03-15 | End: 2023-03-17

## 2023-03-17 RX ORDER — METOPROLOL SUCCINATE 25 MG/1
12.5 TABLET, EXTENDED RELEASE ORAL
COMMUNITY
Start: 2023-03-15 | End: 2023-11-22

## 2023-03-17 RX ORDER — PROCHLORPERAZINE 25 MG/1
SUPPOSITORY RECTAL
Qty: 1 EACH | Refills: 0 | Status: SHIPPED | OUTPATIENT
Start: 2023-03-17 | End: 2023-11-22

## 2023-03-17 RX ORDER — NITROGLYCERIN 0.4 MG/1
0.4 TABLET SUBLINGUAL
COMMUNITY
Start: 2023-03-15

## 2023-03-17 RX ORDER — GABAPENTIN 300 MG/1
300 CAPSULE ORAL AT BEDTIME
Qty: 90 CAPSULE | Refills: 4 | Status: SHIPPED | OUTPATIENT
Start: 2023-03-17 | End: 2023-11-22

## 2023-03-17 ASSESSMENT — ENCOUNTER SYMPTOMS
AGITATION: 0
HEMATURIA: 0
BRUISES/BLEEDS EASILY: 0
NAUSEA: 0
CONFUSION: 0
ABDOMINAL PAIN: 0
SLEEP DISTURBANCE: 1
BACK PAIN: 1
VOMITING: 0
COUGH: 0
EYE PAIN: 0
DYSURIA: 0
SHORTNESS OF BREATH: 1
FEVER: 0
FREQUENCY: 1
WHEEZING: 0
LIGHT-HEADEDNESS: 0
MYALGIAS: 0
FATIGUE: 1
CHILLS: 0
DIZZINESS: 0
PALPITATIONS: 1
ARTHRALGIAS: 1
DIARRHEA: 0
JOINT SWELLING: 0

## 2023-03-17 ASSESSMENT — PATIENT HEALTH QUESTIONNAIRE - PHQ9
SUM OF ALL RESPONSES TO PHQ QUESTIONS 1-9: 4
10. IF YOU CHECKED OFF ANY PROBLEMS, HOW DIFFICULT HAVE THESE PROBLEMS MADE IT FOR YOU TO DO YOUR WORK, TAKE CARE OF THINGS AT HOME, OR GET ALONG WITH OTHER PEOPLE: NOT DIFFICULT AT ALL
SUM OF ALL RESPONSES TO PHQ QUESTIONS 1-9: 4

## 2023-03-17 ASSESSMENT — PAIN SCALES - GENERAL: PAINLEVEL: NO PAIN (0)

## 2023-03-17 NOTE — PATIENT INSTRUCTIONS
Glad you are feeling better.     Medications refilled.     Aspects of Diabetes:   Recent Labs   Lab Test 02/22/23  1355 10/26/22  1357 07/21/22  1437   A1C 9.2* 7.2* 7.4*   * 102* 91   HDL 35* 37 32   TRIG 149 117 91   ALT 8* 14 13   CR 0.88 0.90 1.08   GFRESTIMATED 84 83 67   POTASSIUM 4.0 4.1 4.0   TSH 1.86 1.53 1.31   WBC 6.0 6.8 8.6   HGB 15.7 15.6 12.6*   * 145* 219   ALBUMIN 3.8 4.2 3.8      Hemoglobin A1c  Goal range is under 8%. Best is 6.5 to 7   Blood Pressure 138/68 Goal to keep less than 140/90   Tobacco  reports that he quit smoking about 48 years ago. His smoking use included cigarettes. He has a 10.00 pack-year smoking history. He has never used smokeless tobacco. Goal to abstain from tobacco   Aspirin or Plavix Anti-platelet therapy Aspirin or Plavix reduces risk of heart disease and stroke  -- sometimes used with other blood thinners, depending on bleeding risk and risk factors.    ACE/ARB Specific blood pressure meds These medications can reduce risk of kidney disease   Cholesterol Statins (Lipitor, Crestor, vs others) Statins reduce risk of heart disease and stroke   Eye Exam -- Do Yearly -- Annual diabetic eye exam   Healthy weight Wt Readings from Last 4 Encounters:   03/17/23 82.3 kg (181 lb 6.4 oz)   02/22/23 80.6 kg (177 lb 12.8 oz)   11/17/22 83 kg (183 lb)   11/17/22 83.3 kg (183 lb 9.6 oz)      Body mass index is 26.79 kg/m .  Goal BMI under 30, best is under 25.      -- Trying to exercise daily (goal at least 20 min/day) with moderate aerobic activity   -- Eat healthy (resources from ADA at http://www.diabetes.org/)   -- Taking good care of my feet. Consider seeing the Podiatrist   -- Check blood sugars as directed, record in log book and bring to every appointment    Insurance companies are grading you and I on your blood sugar control -- Goal is to get your A1c down to 7.9% or lower and NO Smoking!  -- Medicare and most insurance companies, will only cover Hemoglobin A1c  labs to be rechecked every 91+ days.      Return for Diabetes labs and clinic follow-up appointment every 3 to 4 months.

## 2023-03-17 NOTE — PROGRESS NOTES
Patient presents to clinic today for hospital follow up.    Medication Review: complete    BP (!) 140/68   Pulse 57   Wt 82.3 kg (181 lb 6.4 oz)   SpO2 99%   BMI 26.79 kg/m       FOOD SECURITY SCREENING QUESTIONS:  The next two questions are to help us understand your food security.  If you are feeling you need any assistance in this area, we have resources available to support you today.    Hunger Vital Signs:  Within the past 12 months we worried whether our food would run out before we got money to buy more. Never  Within the past 12 months the food we bought just didn't last and we didn't have money to get more. Never    Yasmeen Saunders CNA on 3/17/2023 at 8:35 AM      Answers for HPI/ROS submitted by the patient on 3/17/2023  If you checked off any problems, how difficult have these problems made it for you to do your work, take care of things at home, or get along with other people?: Not difficult at all  PHQ9 TOTAL SCORE: 4

## 2023-03-18 PROBLEM — E11.42 CONTROLLED TYPE 2 DIABETES MELLITUS WITH DIABETIC POLYNEUROPATHY, WITHOUT LONG-TERM CURRENT USE OF INSULIN (H): Status: ACTIVE | Noted: 2023-03-18

## 2023-03-24 ENCOUNTER — OFFICE VISIT (OUTPATIENT)
Dept: INTERNAL MEDICINE | Facility: OTHER | Age: 88
End: 2023-03-24
Attending: INTERNAL MEDICINE
Payer: COMMERCIAL

## 2023-03-24 ENCOUNTER — TELEPHONE (OUTPATIENT)
Dept: INTERNAL MEDICINE | Facility: OTHER | Age: 88
End: 2023-03-24
Payer: COMMERCIAL

## 2023-03-24 VITALS
HEIGHT: 69 IN | RESPIRATION RATE: 20 BRPM | HEART RATE: 82 BPM | SYSTOLIC BLOOD PRESSURE: 138 MMHG | WEIGHT: 180.8 LBS | DIASTOLIC BLOOD PRESSURE: 72 MMHG | BODY MASS INDEX: 26.78 KG/M2 | OXYGEN SATURATION: 99 %

## 2023-03-24 DIAGNOSIS — Z79.01 CHRONIC ANTICOAGULATION: ICD-10-CM

## 2023-03-24 DIAGNOSIS — S00.432A: ICD-10-CM

## 2023-03-24 DIAGNOSIS — H61.23 BILATERAL HEARING LOSS DUE TO CERUMEN IMPACTION: Primary | ICD-10-CM

## 2023-03-24 DIAGNOSIS — I48.0 PAROXYSMAL ATRIAL FIBRILLATION (H): ICD-10-CM

## 2023-03-24 PROCEDURE — G0463 HOSPITAL OUTPT CLINIC VISIT: HCPCS

## 2023-03-24 PROCEDURE — 99213 OFFICE O/P EST LOW 20 MIN: CPT | Performed by: INTERNAL MEDICINE

## 2023-03-24 RX ORDER — AMLODIPINE BESYLATE 5 MG/1
5 TABLET ORAL DAILY
COMMUNITY
End: 2023-11-22

## 2023-03-24 RX ORDER — TRAMADOL HYDROCHLORIDE 50 MG/1
50 TABLET ORAL EVERY 6 HOURS PRN
COMMUNITY
End: 2023-11-22

## 2023-03-24 RX ORDER — CYCLOBENZAPRINE HCL 10 MG
10 TABLET ORAL 3 TIMES DAILY PRN
COMMUNITY
End: 2023-11-22

## 2023-03-24 ASSESSMENT — PAIN SCALES - GENERAL: PAINLEVEL: NO PAIN (0)

## 2023-03-24 ASSESSMENT — PATIENT HEALTH QUESTIONNAIRE - PHQ9
10. IF YOU CHECKED OFF ANY PROBLEMS, HOW DIFFICULT HAVE THESE PROBLEMS MADE IT FOR YOU TO DO YOUR WORK, TAKE CARE OF THINGS AT HOME, OR GET ALONG WITH OTHER PEOPLE: NOT DIFFICULT AT ALL
SUM OF ALL RESPONSES TO PHQ QUESTIONS 1-9: 1
SUM OF ALL RESPONSES TO PHQ QUESTIONS 1-9: 1

## 2023-03-24 NOTE — TELEPHONE ENCOUNTER
Patient called and requested a call back from [facility] nurse regarding if he should come in to be seen due to finding traces of blood while trying to clean out his ears. Patient stated he could not hear well and attempted to clean his ears out of wax when he noticed the blood. Patient stated he still can not hear very well.    Julianna Taveras on 3/24/2023 at 10:36 AM

## 2023-03-24 NOTE — TELEPHONE ENCOUNTER
Patient was contacted and per Dr. Montanez an appointment was made for 3.24.2023 @ 4:20pm.  Gemma Marinelli on 3/24/2023 at 11:53 AM

## 2023-03-24 NOTE — NURSING NOTE
"Chief Complaint   Patient presents with     Blood on qtip when cleaning his ears         Initial /72 (BP Location: Right arm, Patient Position: Sitting, Cuff Size: Adult Regular)   Pulse 82   Resp 20   Ht 1.759 m (5' 9.25\")   Wt 82 kg (180 lb 12.8 oz)   SpO2 99%   BMI 26.51 kg/m   Estimated body mass index is 26.51 kg/m  as calculated from the following:    Height as of this encounter: 1.759 m (5' 9.25\").    Weight as of this encounter: 82 kg (180 lb 12.8 oz).       FOOD SECURITY SCREENING QUESTIONS:    The next two questions are to help us understand your food security.  If you are feeling you need any assistance in this area, we have resources available to support you today.    Hunger Vital Signs:  Within the past 12 months we worried whether our food would run out before we got money to buy more. Never  Within the past 12 months the food we bought just didn't last and we didn't have money to get more. Never    Advance Care Directive on file? no      Medication reconciliation complete.      Jerel Benson,on 3/24/2023 at 4:21 PM        "

## 2023-03-24 NOTE — PROGRESS NOTES
Assessment & Plan     ICD-10-CM    1. Bilateral hearing loss due to cerumen impaction  H61.23 Adult ENT  Referral      2. Traumatic hematoma of ear canal, left, initial encounter  S00.432A Adult ENT  Referral      3. Paroxysmal atrial fibrillation (H)  I48.0       4. Chronic anticoagulation - Eliquis  Z79.01       Patient was having problems with hearing and stuck a cotton swab in his ear and in one of his ears, he ended up having some blood on the cotton swab and subsequently decided to come in for evaluation.    He has a small traumatic hematoma within the left ear canal.  Significant cerumen impaction bilateral ear canals.  Needs to see ENT, likely needs suction for wax removal.  Barely touching the wax with a lighted curette caused grimacing and pulling away, subsequently unable to use alligator forceps due to softness of the wax as well as depth of the impaction.    Atrial fibrillation, paroxysmal.  Continues with Eliquis.  Easy bruising noted.  Heart rates currently controlled.    Encouraged regular exercise.     Return for follow-up as needed with Dr. Montanez., Appointments: [phone].    Nawaf Montanez MD  [facility] [place]     Michael Harris is a 87 year old, presenting for the following health issues:  Blood on qtip when cleaning his ears    Additional Questions 3/24/2023   Roomed by Jerel Skaggs LPN   Accompanied by n/a     History of Present Illness       Reason for visit:  Blood on qtip qwhjen cleaning ears  Symptom onset:  3-7 days ago  Symptom intensity:  Mild  Symptom progression:  Staying the same  Had these symptoms before:  No  What makes it worse:  Unsure  What makes it better:  Unsure    He eats 2-3 servings of fruits and vegetables daily.He consumes 0 sweetened beverage(s) daily.He exercises with enough effort to increase his heart rate 10 to 19 minutes per day.  He exercises with enough effort to increase his heart rate 7 days per week.   He is taking  "medications regularly.     Review of Systems   HENT: Positive for hearing loss.    Endocrine:        + Hyperglycemia > 200s   Neurological:        + Balance problems.   Hematological: Bruises/bleeds easily.            Objective    /72 (BP Location: Right arm, Patient Position: Sitting, Cuff Size: Adult Regular)   Pulse 82   Resp 20   Ht 1.759 m (5' 9.25\")   Wt 82 kg (180 lb 12.8 oz)   SpO2 99%   BMI 26.51 kg/m    Body mass index is 26.51 kg/m .  Physical Exam  HENT:      Right Ear: There is impacted cerumen.      Left Ear: There is impacted cerumen.      Ears:      Comments: Significant bilateral cerumen impaction with traumatic hematoma within the left ear canal.    Very soft, deeply impacted cerumen.  Unable to remove with lighted curette due to grimacing and pulling away.  Unable to grasp with alligator forceps due to depth and softness of the cerumen.  Neurological:      Mental Status: He is alert.        -- Recommend OTC use of peroxide or Debrox eardrops, needs ENT evaluation and earwax removal.            Answers for HPI/ROS submitted by the patient on 3/24/2023  If you checked off any problems, how difficult have these problems made it for you to do your work, take care of things at home, or get along with other people?: Not difficult at all  PHQ9 TOTAL SCORE: 1      "

## 2023-03-24 NOTE — PATIENT INSTRUCTIONS
Recommend using some Debrox eardrops once or twice daily.    Apply 1 drop of olive oil in the ear canal daily.  --Put your ear up in the air, place 1 drop of oil in the ear canal.  Then repeat on the other side.    No more Q-tips.    ENT (Otolaryngology) referral sent - Dr. Boyer (or her partner) in [place]  - they will call with date/time of appointment.

## 2023-03-28 PROBLEM — Z79.01 CHRONIC ANTICOAGULATION: Status: ACTIVE | Noted: 2023-03-28

## 2023-03-28 PROBLEM — I48.0 PAROXYSMAL ATRIAL FIBRILLATION (H): Status: ACTIVE | Noted: 2023-03-28

## 2023-03-28 PROBLEM — H61.23 BILATERAL HEARING LOSS DUE TO CERUMEN IMPACTION: Status: ACTIVE | Noted: 2023-03-28

## 2023-03-28 ASSESSMENT — ENCOUNTER SYMPTOMS: BRUISES/BLEEDS EASILY: 1

## 2023-03-31 DIAGNOSIS — E53.8 VITAMIN B12 DEFICIENCY: ICD-10-CM

## 2023-04-03 NOTE — TELEPHONE ENCOUNTER
[facility] sent Rx request for the following:    B-12 2500MCG SUBLINGUAL TABLETS  Last Prescription Date:   3/15/22  Last Fill Qty/Refills:         100, R-3    Last Office Visit:              3/24/23   Future Office visit:           5/24/23    Corrina Hansen RN on 4/3/2023 at 1:58 PM

## 2023-04-13 DIAGNOSIS — E11.42 CONTROLLED TYPE 2 DIABETES MELLITUS WITH DIABETIC POLYNEUROPATHY, WITHOUT LONG-TERM CURRENT USE OF INSULIN (H): ICD-10-CM

## 2023-04-17 ENCOUNTER — ALLIED HEALTH/NURSE VISIT (OUTPATIENT)
Dept: EDUCATION SERVICES | Facility: OTHER | Age: 88
End: 2023-04-17
Attending: INTERNAL MEDICINE
Payer: COMMERCIAL

## 2023-04-17 DIAGNOSIS — E11.65 TYPE 2 DIABETES MELLITUS WITH HYPERGLYCEMIA, WITHOUT LONG-TERM CURRENT USE OF INSULIN (H): ICD-10-CM

## 2023-04-17 PROCEDURE — 95249 CONT GLUC MNTR PT PROV EQP: CPT | Performed by: REGISTERED NURSE

## 2023-04-17 NOTE — PATIENT INSTRUCTIONS
Continuous Glucose Monitoring Personal Start      Congratulations! You have decided to utilize the DEXCOM G6 Continuous Glucose Monitoring (CGM) System (G6). We strongly believe in the use of the new technologies to better assist you in managing your diabetes. These technologies give both you and your provider a better understanding of your diabetes and current trends, which will enable you to improve control of your blood glucose levels.      Low and high alerts are set 70 mg/dL and 200 mg/dL. You inserted your sensor today at 11:30 am.  You should begin receiving sensor glucose readings in 2 hours after insertion.  Your sensor lasts 10 days and you will be prompted when the sensor is close to expiration and a new sensor can then be started.    [employer] is a team of trainers and certified  diabetes educators dedicated to helping you with  your new G6. They provide live, interactive support  and webinars to get you started and keep you  informed.     You may download your CGM information from your  at www.dexcom.com.  Choose the Dexcom Clarity software.   Because you have your data on your smart phone, you can download another tanisha, Dexcom Clarity.   Dexcom Clarity is the data tanisha and you can view your glucose ranges overtime.    Dexcom CLARITY software is an important part of your G6.  Clarity highlights your glucose patterns, trends and statistics. Share  with your clinic and monitor improvements between visits.    To use Dexcom CLARITY:    1. Log in at [employer].  Use your [employer] G6 login or create an account.    2. Get weekly notifications with Dexcom CLARITY Reports Tanisha.  Notifications available to Dexcom G6 users.       PLAN:      1. No need for calibrations if, at the time of insertion, you enter the sensor code into your tanisha or .  If you do not have a code or choose not to enter the code, you will need to calibrate the G6 tanisha/.  Follow calibration instructions  on your jazmine or  for sensor period (10 days).     2. Events - You are encouraged to enter events such as Carbohydrates (grams), Insulin (units), Exercise (intensity and duration), Health (illness, stress, high/low symptoms, and alcohol).     3. Battery - Charge  with  1 - 3 hours every 3 - 5 days or as  battery depletes.      4. Please follow up if any questions or concerns.    Dexcom Technical Support is available 24 hours a day/7 days a week at:       [employer]/support   [email or web address]   Toll free: [phone]      Keri Sal RN, BSN, [facility]  4/17/2023 12:19 PM

## 2023-04-17 NOTE — TELEPHONE ENCOUNTER
Routing refill request to provider for review/approval because:    LOV: 3/17/23    Zari Lino RN on 4/17/2023 at 11:14 AM

## 2023-04-17 NOTE — PROGRESS NOTES
Diabetes Self-Management Education & Support    Presents for:      Type of Service: In Person Visit    ASSESSMENT:    Steven was instructed in features of DEXCOM G6 CGMS.  He was instructed in programming events for meds, CHO, exercise and health such as stress, illness, high/low symptoms.  Patient will wear the sensor for 10 days and has downloaded Dexcom Clarity jazmine.       Patient demonstrated understanding by inserting his own sensor.  Sensor inserted without difficulty,  and smart device receiving data.  Helped patient program his  and smart phone.       Sensor was inserted with no resistance or bleeding at insertion site.    CGM-specific education:   Dexcom sensor: insertion technique, sensor site location and rotation, insulin administration in relation to sensor placement, Dexcom : setting alerts/alarms, Use of trends and graphs for pattern management and problem solving, Dexcom Mobile jazmine and Dexcom Clarity software         Pt verbalized understanding of concepts discussed and recommendations provided today.    Continue education with the following diabetes management concepts: Healthy Eating, Problem Solving and Reducing Risks    PLAN  Begin CGM and follow up for SG assessment, as needed.    Topics to cover at upcoming visits: Healthy Eating, Problem Solving and Reducing Risks    Follow-up: TBD per patient schedule.     See Care Plan for co-developed, patient-state behavior change goals.  AVS provided for patient today.    Education Materials Provided:  Dexcom folder with steps and diagrams for sensor/transmitter placement.      SUBJECTIVE/OBJECTIVE:     Cultural Influences/Ethnic Background:  Not  or     Diabetes Symptoms & Complications:    Patient Problem List and Family Medical History reviewed for relevant medical history, current medical status, and diabetes risk factors.    Vitals:  There were no vitals taken for this visit.  Estimated body mass index is 26.51  "kg/m  as calculated from the following:    Height as of 3/24/23: 1.759 m (5' 9.25\").    Weight as of 3/24/23: 82 kg (180 lb 12.8 oz).   Last 3 BP:   BP Readings from Last 3 Encounters:   03/24/23 138/72   03/17/23 138/68   02/22/23 132/74       History   Smoking Status     Former     Packs/day: 1.00     Years: 10.00     Types: Cigarettes     Quit date: 1/1/1975   Smokeless Tobacco     Never       Labs:  Lab Results   Component Value Date    A1C 9.2 02/22/2023    A1C 7.1 07/07/2021     Lab Results   Component Value Date     02/22/2023    GLC 92 10/26/2022     07/07/2021     Lab Results   Component Value Date     02/22/2023    LDL 86 07/07/2021     HDL Cholesterol   Date Value Ref Range Status   07/07/2021 37 23 - 92 mg/dL Final     Direct Measure HDL   Date Value Ref Range Status   02/22/2023 35 (L) >=40 mg/dL Final   ]  GFR Estimate   Date Value Ref Range Status   02/22/2023 84 >60 mL/min/1.73m2 Final     Comment:     eGFR calculated using 2021 CKD-EPI equation.   07/07/2021 68 >60 mL/min/[1.73_m2] Final     GFR Estimate If Black   Date Value Ref Range Status   07/07/2021 82 >60 mL/min/[1.73_m2] Final     Lab Results   Component Value Date    CR 0.88 02/22/2023    CR 1.04 07/07/2021     No results found for: MICROALBUMIN      Taking Medications:  Diabetes Medication(s)     Biguanides       metFORMIN (GLUCOPHAGE) 500 MG tablet    Take 500 mg by mouth 2 times daily (with meals) Take with food    Sulfonylureas       glipiZIDE (GLUCOTROL) 5 MG tablet    Take 1-2 tablets (5-10 mg) by mouth 3 times daily (with meals) -- Dose Increase 1/12/2023     Patient taking differently: Take 5-10 mg by mouth 2 times daily (with meals)            Healthy Coping:     Patient Activation Measure Survey Score:       View : No data to display.                  Care Plan and Education Provided:  Care Plan: Diabetes   Updates made by Keri Sal RN since 4/17/2023 12:00 AM      Problem: HbA1C Not In Goal       Goal: " Establish Regular Follow-Ups with PCP       Task: Discuss with PCP the recommended timing for patient's next follow up visit(s)    Responsible User: Keri Sal RN      Task: Discuss schedule for PCP visits with patient Completed 4/17/2023   Responsible User: Keri Sal RN      Goal: Get HbA1C Level in Goal       Task: Educate patient on diabetes education self-management topics Completed 4/17/2023   Responsible User: Keri Sal RN      Task: Educate patient on benefits of regular glucose monitoring Completed 4/17/2023   Responsible User: Keri Sal RN      Task: Refer patient to appropriate extended care team member, as needed (Medication Therapy Management, Behavioral Health, Physical Therapy, etc.)    Responsible User: Keri Sal RN      Task: Discuss diabetes treatment plan with patient Completed 4/17/2023   Responsible User: Keri Sal RN      Problem: Diabetes Self-Management Education Needed to Optimize Self-Care Behaviors       Goal: Understand diabetes pathophysiology and disease progression       Task: Provide education on diabetes pathophysiology and disease progression specfic to patient's diabetes type    Responsible User: Keri Sal RN      Goal: Healthy Eating - follow a healthy eating pattern for diabetes       Task: Provide education on portion control and consistency in amount, composition and timing of food intake    Responsible User: Keri Sal RN      Task: Provide education on managing carbohydrate intake (carbohydrate counting, plate planning method, etc.)    Responsible User: Keri Sal RN      Task: Provide education on weight management    Responsible User: Keri Sal RN      Task: Provide education on heart healthy eating    Responsible User: Keri Sal RN      Task: Provide education on eating out    Responsible User: Keri Sal RN      Task: Develop individualized healthy eating plan with patient    Responsible User:  Keri Sal RN      Goal: Being Active - get regular physical activity, working up to at least 150 minutes per week       Task: Provide education on relationship of activity to glucose and precautions to take if at risk for low glucose    Responsible User: Keri Sal RN      Task: Discuss barriers to physical activity with patient    Responsible User: Keri Sal RN      Task: Develop physical activity plan with patient    Responsible User: Keri Sal RN      Task: Explore community resources including walking groups, assistance programs, and home videos    Responsible User: Keri Sal RN      Goal: Monitoring - monitor glucose and ketones as directed       Task: Provide education on blood glucose monitoring (purpose, proper technique, frequency, glucose targets, interpreting results, when to use glucose control solution, sharps disposal)    Responsible User: Keri Sal RN      Task: Provide education on continuous glucose monitoring (sensor placement, use of jazmine or /reader, understanding glucose trends, alerts and alarms, differences between sensor glucose and blood glucose) Completed 4/17/2023   Responsible User: Keri Sal RN      Task: Provide education on ketone monitoring (when to monitor, frequency, etc.)    Responsible User: Keri Sal RN      Goal: Taking Medication - patient is consistently taking medications as directed       Task: Provide education on action of prescribed medication, including when to take and possible side effects    Responsible User: Keri Sal RN      Task: Provide education on insulin and injectable diabetes medications, including administration, storage, site selection and rotation for injection sites    Responsible User: Keri Sal RN      Task: Discuss barriers to medication adherence with patient and provide management technique ideas as appropriate    Responsible User: Keri Sal RN      Task: Provide  education on frequency and refill details of medications    Responsible User: Keri Sal RN      Goal: Problem Solving - know how to prevent and manage short-term diabetes complications       Task: Provide education on high blood glucose - causes, signs/symptoms, prevention and treatment    Responsible User: Keri Sal RN      Task: Provide education on low blood glucose - causes, signs/symptoms, prevention, treatment, carrying a carbohydrate source at all times, and medical identification    Responsible User: Keri Sal RN      Task: Provide education on safe travel with diabetes    Responsible User: Keri Sal RN      Task: Provide education on how to care for diabetes on sick days    Responsible User: Keri Sal RN      Task: Provide education on when to call a health care provider    Responsible User: Keri Sal RN      Goal: Reducing Risks - know how to prevent and treat long-term diabetes complications       Task: Provide education on major complications of diabetes, prevention, early diagnostic measures and treatment of complications    Responsible User: Keri Sal RN      Task: Provide education on recommended care for dental, eye and foot health    Responsible User: Keri Sal RN      Task: Provide education on Hemoglobin A1c - goals and relationship to blood glucose levels    Responsible User: Keri Sal RN      Task: Provide education on recommendations for heart health - lipid levels and goals, blood pressure and goals, and aspirin therapy, if indicated    Responsible User: Keri Sal RN      Task: Provide education on tobacco cessation    Responsible User: Keri Sal RN      Goal: Healthy Coping - use available resources to cope with the challenges of managing diabetes       Task: Discuss recognizing feelings about having diabetes    Responsible User: Keri Sal RN      Task: Provide education on the benefits of making appropriate  lifestyle changes Completed 4/17/2023   Responsible User: Keri Sal RN      Task: Provide education on benefits of utilizing support systems    Responsible User: Keri Sal RN      Task: Discuss methods for coping with stress    Responsible User: Keri Sal RN      Task: Provide education on when to seek professional counseling    Responsible User: Keri Sal RN Suzette Childs, RN, BSN, CDCES  4/17/2023 12:08 PM     Time Spent: 60 minutes  Encounter Type: Individual    Any diabetes medication dose changes were made via the CDE Protocol per the patient's primary care provider. A copy of this encounter was shared with the provider.

## 2023-04-18 RX ORDER — BLOOD SUGAR DIAGNOSTIC
STRIP MISCELLANEOUS
Qty: 200 STRIP | Refills: 3 | Status: SHIPPED | OUTPATIENT
Start: 2023-04-18 | End: 2023-05-24

## 2023-04-27 DIAGNOSIS — H91.93 DECREASED HEARING OF BOTH EARS: Primary | ICD-10-CM

## 2023-05-02 ENCOUNTER — OFFICE VISIT (OUTPATIENT)
Dept: OTOLARYNGOLOGY | Facility: OTHER | Age: 88
End: 2023-05-02
Attending: OTOLARYNGOLOGY
Payer: COMMERCIAL

## 2023-05-02 DIAGNOSIS — H61.23 BILATERAL IMPACTED CERUMEN: Primary | ICD-10-CM

## 2023-05-02 PROCEDURE — G0463 HOSPITAL OUTPT CLINIC VISIT: HCPCS

## 2023-05-02 NOTE — NURSING NOTE
"Chief Complaint   Patient presents with     Ear Problem     Clean ears       Initial There were no vitals taken for this visit. Estimated body mass index is 26.51 kg/m  as calculated from the following:    Height as of 3/24/23: 1.759 m (5' 9.25\").    Weight as of 3/24/23: 82 kg (180 lb 12.8 oz).  Medication Reconciliation: complete    Lurdes Jane LPN  "

## 2023-05-16 ENCOUNTER — OFFICE VISIT (OUTPATIENT)
Dept: OTOLARYNGOLOGY | Facility: OTHER | Age: 88
End: 2023-05-16
Attending: OTOLARYNGOLOGY
Payer: COMMERCIAL

## 2023-05-16 DIAGNOSIS — H61.23 BILATERAL IMPACTED CERUMEN: Primary | ICD-10-CM

## 2023-05-16 PROCEDURE — G0463 HOSPITAL OUTPT CLINIC VISIT: HCPCS

## 2023-05-16 NOTE — NURSING NOTE
Patient here to see ENT for follow up 2 week check after bilateral ear cleaning  Cassy Rowan LPN ..........5/16/2023 10:29 AM

## 2023-05-19 NOTE — PROGRESS NOTES
document embedded image  Patient Name: Steven Dennison    Address: [address] [place] [address]     YOB: 1935    [place] [address]    MR Number: [id number]    Phone: [phone]  PCP: Nawaf Montanez MD            Appointment Date: 05/16/23   Visit Provider: Catrachito Mccain MD    cc: Nawaf Montanez MD; ~    ENT Progress Note  Intake  Visit Reasons: 2 wk check after Bilateral ear cleaning    HPI  History of Present Illness  Chief complaint: Plugged ears    History  The patient is an 87-year-old man with dense cerumen impactions bilaterally right him on some drops and asked him come back today to try to clear his ear canals with suction.    Exam  He has dense cerumen impactions occluding his medial canals bilaterally.  I can not get the material but using suction.  Remainder of the head neck exam is unremarkable    Allergies    No Known Allergies Allergy (Unknown, Uncoded 07/01/04 06:27)    A&P  Assessment & Plan  (1) Impacted cerumen, bilateral:        Status: Acute        Code(s):  H61.23 - Impacted cerumen, bilateral  He will continue with his drops and see me in [place] were I can use a microscope and numb his ear canals if necessary to get the material cleared.               Catrachito Mccain MD    Filed: 05/17/23 [id number]    <Electronically signed by Catrachito Mccain MD> 05/17/23 [id number]

## 2023-05-24 ENCOUNTER — LAB (OUTPATIENT)
Dept: LAB | Facility: OTHER | Age: 88
End: 2023-05-24
Attending: INTERNAL MEDICINE
Payer: COMMERCIAL

## 2023-05-24 ENCOUNTER — OFFICE VISIT (OUTPATIENT)
Dept: INTERNAL MEDICINE | Facility: OTHER | Age: 88
End: 2023-05-24
Attending: INTERNAL MEDICINE
Payer: COMMERCIAL

## 2023-05-24 VITALS
SYSTOLIC BLOOD PRESSURE: 136 MMHG | HEART RATE: 59 BPM | RESPIRATION RATE: 20 BRPM | OXYGEN SATURATION: 98 % | DIASTOLIC BLOOD PRESSURE: 74 MMHG | BODY MASS INDEX: 25.95 KG/M2 | WEIGHT: 177 LBS

## 2023-05-24 DIAGNOSIS — E78.2 MIXED HYPERLIPIDEMIA: ICD-10-CM

## 2023-05-24 DIAGNOSIS — N18.2 CHRONIC KIDNEY DISEASE, STAGE 2 (MILD): ICD-10-CM

## 2023-05-24 DIAGNOSIS — Z79.01 CHRONIC ANTICOAGULATION: ICD-10-CM

## 2023-05-24 DIAGNOSIS — E11.42 CONTROLLED TYPE 2 DIABETES MELLITUS WITH DIABETIC POLYNEUROPATHY, WITHOUT LONG-TERM CURRENT USE OF INSULIN (H): Primary | ICD-10-CM

## 2023-05-24 DIAGNOSIS — E11.42 CONTROLLED TYPE 2 DIABETES MELLITUS WITH DIABETIC POLYNEUROPATHY, WITHOUT LONG-TERM CURRENT USE OF INSULIN (H): ICD-10-CM

## 2023-05-24 DIAGNOSIS — I10 BENIGN ESSENTIAL HYPERTENSION: ICD-10-CM

## 2023-05-24 DIAGNOSIS — I25.10 CORONARY ARTERY DISEASE INVOLVING NATIVE CORONARY ARTERY OF NATIVE HEART WITHOUT ANGINA PECTORIS: ICD-10-CM

## 2023-05-24 DIAGNOSIS — I48.0 PAROXYSMAL ATRIAL FIBRILLATION (H): ICD-10-CM

## 2023-05-24 LAB
ALBUMIN SERPL BCG-MCNC: 4.3 G/DL (ref 3.5–5.2)
ALBUMIN UR-MCNC: 10 MG/DL
ALP SERPL-CCNC: 98 U/L (ref 40–129)
ALT SERPL W P-5'-P-CCNC: 11 U/L (ref 10–50)
ANION GAP SERPL CALCULATED.3IONS-SCNC: 9 MMOL/L (ref 7–15)
APPEARANCE UR: CLEAR
AST SERPL W P-5'-P-CCNC: 14 U/L (ref 10–50)
BACTERIA #/AREA URNS HPF: ABNORMAL /HPF
BILIRUB SERPL-MCNC: 0.6 MG/DL
BILIRUB UR QL STRIP: NEGATIVE
BUN SERPL-MCNC: 20.8 MG/DL (ref 8–23)
CALCIUM SERPL-MCNC: 9 MG/DL (ref 8.8–10.2)
CHLORIDE SERPL-SCNC: 106 MMOL/L (ref 98–107)
CHOLEST SERPL-MCNC: 175 MG/DL
COLOR UR AUTO: ABNORMAL
CREAT SERPL-MCNC: 0.95 MG/DL (ref 0.67–1.17)
CREAT UR-MCNC: 42.8 MG/DL
DEPRECATED HCO3 PLAS-SCNC: 25 MMOL/L (ref 22–29)
ERYTHROCYTE [DISTWIDTH] IN BLOOD BY AUTOMATED COUNT: 14.1 % (ref 10–15)
GFR SERPL CREATININE-BSD FRML MDRD: 77 ML/MIN/1.73M2
GLUCOSE SERPL-MCNC: 155 MG/DL (ref 70–99)
GLUCOSE UR STRIP-MCNC: NEGATIVE MG/DL
HBA1C MFR BLD: 7 % (ref 4–6.2)
HCT VFR BLD AUTO: 47.9 % (ref 40–53)
HDLC SERPL-MCNC: 46 MG/DL
HGB BLD-MCNC: 15.9 G/DL (ref 13.3–17.7)
HGB UR QL STRIP: NEGATIVE
KETONES UR STRIP-MCNC: NEGATIVE MG/DL
LDLC SERPL CALC-MCNC: 105 MG/DL
LEUKOCYTE ESTERASE UR QL STRIP: NEGATIVE
MCH RBC QN AUTO: 28.9 PG (ref 26.5–33)
MCHC RBC AUTO-ENTMCNC: 33.2 G/DL (ref 31.5–36.5)
MCV RBC AUTO: 87 FL (ref 78–100)
MICROALBUMIN UR-MCNC: 121.2 MG/L
MICROALBUMIN/CREAT UR: 283.18 MG/G CR (ref 0–17)
NITRATE UR QL: NEGATIVE
NONHDLC SERPL-MCNC: 129 MG/DL
PH UR STRIP: 6 [PH] (ref 5–9)
PLATELET # BLD AUTO: 144 10E3/UL (ref 150–450)
POTASSIUM SERPL-SCNC: 4.5 MMOL/L (ref 3.4–5.3)
PROT SERPL-MCNC: 6.9 G/DL (ref 6.4–8.3)
RBC # BLD AUTO: 5.5 10E6/UL (ref 4.4–5.9)
RBC URINE: 1 /HPF
SODIUM SERPL-SCNC: 140 MMOL/L (ref 136–145)
SP GR UR STRIP: 1.01 (ref 1–1.03)
TRIGL SERPL-MCNC: 120 MG/DL
TSH SERPL DL<=0.005 MIU/L-ACNC: 1.53 UIU/ML (ref 0.3–4.2)
UROBILINOGEN UR STRIP-MCNC: NORMAL MG/DL
WBC # BLD AUTO: 6.1 10E3/UL (ref 4–11)
WBC URINE: 1 /HPF

## 2023-05-24 PROCEDURE — 99214 OFFICE O/P EST MOD 30 MIN: CPT | Performed by: INTERNAL MEDICINE

## 2023-05-24 PROCEDURE — 82570 ASSAY OF URINE CREATININE: CPT | Mod: ZL

## 2023-05-24 PROCEDURE — 85018 HEMOGLOBIN: CPT | Mod: ZL

## 2023-05-24 PROCEDURE — 80061 LIPID PANEL: CPT | Mod: ZL

## 2023-05-24 PROCEDURE — G0463 HOSPITAL OUTPT CLINIC VISIT: HCPCS

## 2023-05-24 PROCEDURE — 84443 ASSAY THYROID STIM HORMONE: CPT | Mod: ZL

## 2023-05-24 PROCEDURE — 81001 URINALYSIS AUTO W/SCOPE: CPT | Mod: ZL

## 2023-05-24 PROCEDURE — 36415 COLL VENOUS BLD VENIPUNCTURE: CPT | Mod: ZL

## 2023-05-24 PROCEDURE — 83036 HEMOGLOBIN GLYCOSYLATED A1C: CPT | Mod: ZL

## 2023-05-24 PROCEDURE — 80053 COMPREHEN METABOLIC PANEL: CPT | Mod: ZL

## 2023-05-24 RX ORDER — GLIPIZIDE 5 MG/1
5-10 TABLET ORAL 2 TIMES DAILY WITH MEALS
Qty: 360 TABLET | Refills: 1 | Status: SHIPPED | OUTPATIENT
Start: 2023-05-24 | End: 2023-11-22

## 2023-05-24 ASSESSMENT — ANXIETY QUESTIONNAIRES
5. BEING SO RESTLESS THAT IT IS HARD TO SIT STILL: NOT AT ALL
GAD7 TOTAL SCORE: 0
7. FEELING AFRAID AS IF SOMETHING AWFUL MIGHT HAPPEN: NOT AT ALL
GAD7 TOTAL SCORE: 0
GAD7 TOTAL SCORE: 0
7. FEELING AFRAID AS IF SOMETHING AWFUL MIGHT HAPPEN: NOT AT ALL
1. FEELING NERVOUS, ANXIOUS, OR ON EDGE: NOT AT ALL
IF YOU CHECKED OFF ANY PROBLEMS ON THIS QUESTIONNAIRE, HOW DIFFICULT HAVE THESE PROBLEMS MADE IT FOR YOU TO DO YOUR WORK, TAKE CARE OF THINGS AT HOME, OR GET ALONG WITH OTHER PEOPLE: NOT DIFFICULT AT ALL
8. IF YOU CHECKED OFF ANY PROBLEMS, HOW DIFFICULT HAVE THESE MADE IT FOR YOU TO DO YOUR WORK, TAKE CARE OF THINGS AT HOME, OR GET ALONG WITH OTHER PEOPLE?: NOT DIFFICULT AT ALL
6. BECOMING EASILY ANNOYED OR IRRITABLE: NOT AT ALL
3. WORRYING TOO MUCH ABOUT DIFFERENT THINGS: NOT AT ALL
2. NOT BEING ABLE TO STOP OR CONTROL WORRYING: NOT AT ALL
4. TROUBLE RELAXING: NOT AT ALL

## 2023-05-24 ASSESSMENT — ENCOUNTER SYMPTOMS
NAUSEA: 0
SLEEP DISTURBANCE: 1
DIARRHEA: 0
BACK PAIN: 1
BRUISES/BLEEDS EASILY: 0
ABDOMINAL PAIN: 0
FEVER: 0
FATIGUE: 1
AGITATION: 0
HEMATURIA: 0
EYE PAIN: 0
DYSURIA: 0
VOMITING: 0
CONFUSION: 0
DIZZINESS: 0
ARTHRALGIAS: 1
PALPITATIONS: 1
WHEEZING: 0
JOINT SWELLING: 0
CHILLS: 0
FREQUENCY: 1
COUGH: 0
LIGHT-HEADEDNESS: 0
MYALGIAS: 0
SHORTNESS OF BREATH: 1

## 2023-05-24 ASSESSMENT — PATIENT HEALTH QUESTIONNAIRE - PHQ9
SUM OF ALL RESPONSES TO PHQ QUESTIONS 1-9: 0
10. IF YOU CHECKED OFF ANY PROBLEMS, HOW DIFFICULT HAVE THESE PROBLEMS MADE IT FOR YOU TO DO YOUR WORK, TAKE CARE OF THINGS AT HOME, OR GET ALONG WITH OTHER PEOPLE: NOT DIFFICULT AT ALL
SUM OF ALL RESPONSES TO PHQ QUESTIONS 1-9: 0

## 2023-05-24 ASSESSMENT — PAIN SCALES - GENERAL: PAINLEVEL: NO PAIN (0)

## 2023-05-24 NOTE — PATIENT INSTRUCTIONS
Blood pressure is well controlled.   Diabetes is well controlled.     Medications refilled.   Labs are stable.     Results for orders placed or performed in visit on 05/24/23   Comprehensive metabolic panel     Status: Abnormal   Result Value Ref Range    Sodium 140 136 - 145 mmol/L    Potassium 4.5 3.4 - 5.3 mmol/L    Chloride 106 98 - 107 mmol/L    Carbon Dioxide (CO2) 25 22 - 29 mmol/L    Anion Gap 9 7 - 15 mmol/L    Urea Nitrogen 20.8 8.0 - 23.0 mg/dL    Creatinine 0.95 0.67 - 1.17 mg/dL    Calcium 9.0 8.8 - 10.2 mg/dL    Glucose 155 (H) 70 - 99 mg/dL    Alkaline Phosphatase 98 40 - 129 U/L    AST 14 10 - 50 U/L    ALT 11 10 - 50 U/L    Protein Total 6.9 6.4 - 8.3 g/dL    Albumin 4.3 3.5 - 5.2 g/dL    Bilirubin Total 0.6 <=1.2 mg/dL    GFR Estimate 77 >60 mL/min/1.73m2   Lipid Profile     Status: Abnormal   Result Value Ref Range    Cholesterol 175 <200 mg/dL    Triglycerides 120 <150 mg/dL    Direct Measure HDL 46 >=40 mg/dL    LDL Cholesterol Calculated 105 (H) <=100 mg/dL    Non HDL Cholesterol 129 <130 mg/dL    Narrative    Cholesterol  Desirable:  <200 mg/dL    Triglycerides  Normal:  Less than 150 mg/dL  Borderline High:  150-199 mg/dL  High:  200-499 mg/dL  Very High:  Greater than or equal to 500 mg/dL    Direct Measure HDL  Female:  Greater than or equal to 50 mg/dL   Male:  Greater than or equal to 40 mg/dL    LDL Cholesterol  Desirable:  <100mg/dL  Above Desirable:  100-129 mg/dL   Borderline High:  130-159 mg/dL   High:  160-189 mg/dL   Very High:  >= 190 mg/dL    Non HDL Cholesterol  Desirable:  130 mg/dL  Above Desirable:  130-159 mg/dL  Borderline High:  160-189 mg/dL  High:  190-219 mg/dL  Very High:  Greater than or equal to 220 mg/dL   CBC with platelets     Status: Abnormal   Result Value Ref Range    WBC Count 6.1 4.0 - 11.0 10e3/uL    RBC Count 5.50 4.40 - 5.90 10e6/uL    Hemoglobin 15.9 13.3 - 17.7 g/dL    Hematocrit 47.9 40.0 - 53.0 %    MCV 87 78 - 100 fL    MCH 28.9 26.5 - 33.0 pg     MCHC 33.2 31.5 - 36.5 g/dL    RDW 14.1 10.0 - 15.0 %    Platelet Count 144 (L) 150 - 450 10e3/uL   Hemoglobin A1c     Status: Abnormal   Result Value Ref Range    Hemoglobin A1C 7.0 (H) 4.0 - 6.2 %   TSH with free T4 reflex     Status: Normal   Result Value Ref Range    TSH 1.53 0.30 - 4.20 uIU/mL   Albumin Random Urine Quantitative with Creat Ratio     Status: Abnormal   Result Value Ref Range    Creatinine Urine mg/dL 42.8 mg/dL    Albumin Urine mg/L 121.2 mg/L    Albumin Urine mg/g Cr 283.18 (H) 0.00 - 17.00 mg/g Cr   UA reflex to Microscopic     Status: Abnormal   Result Value Ref Range    Color Urine Light Yellow Colorless, Straw, Light Yellow, Yellow    Appearance Urine Clear Clear    Glucose Urine Negative Negative mg/dL    Bilirubin Urine Negative Negative    Ketones Urine Negative Negative mg/dL    Specific Gravity Urine 1.012 1.000 - 1.030    Blood Urine Negative Negative    pH Urine 6.0 5.0 - 9.0    Protein Albumin Urine 10 (A) Negative mg/dL    Urobilinogen Urine Normal Normal, 2.0 mg/dL    Nitrite Urine Negative Negative    Leukocyte Esterase Urine Negative Negative    Bacteria Urine Few (A) None Seen /HPF    RBC Urine 1 <=2 /HPF    WBC Urine 1 <=5 /HPF      Aspects of Diabetes:   Recent Labs   Lab Test 05/24/23  1352 02/22/23  1355 10/26/22  1357   A1C 7.0* 9.2* 7.2*   * 153* 102*   HDL 46 35* 37   TRIG 120 149 117   ALT 11 8* 14   CR 0.95 0.88 0.90   GFRESTIMATED 77 84 83   POTASSIUM 4.5 4.0 4.1   TSH 1.53 1.86 1.53   WBC 6.1 6.0 6.8   HGB 15.9 15.7 15.6   * 127* 145*   ALBUMIN 4.3 3.8 4.2      Hemoglobin A1c  Goal range is under 8%. Best is 6.5 to 7   Blood Pressure 136/74 Goal to keep less than 140/90   Tobacco  reports that he quit smoking about 48 years ago. His smoking use included cigarettes. He has a 10.00 pack-year smoking history. He has never used smokeless tobacco. Goal to abstain from tobacco   Aspirin or Plavix Anti-platelet therapy Aspirin or Plavix reduces risk of heart  disease and stroke  -- sometimes used with other blood thinners, depending on bleeding risk and risk factors.    ACE/ARB Specific blood pressure meds These medications can reduce risk of kidney disease   Cholesterol Statins (Lipitor, Crestor, vs others) Statins reduce risk of heart disease and stroke   Eye Exam -- Do Yearly -- Annual diabetic eye exam   Healthy weight Wt Readings from Last 4 Encounters:   05/24/23 80.3 kg (177 lb)   03/24/23 82 kg (180 lb 12.8 oz)   03/17/23 82.3 kg (181 lb 6.4 oz)   02/22/23 80.6 kg (177 lb 12.8 oz)      Body mass index is 25.95 kg/m .  Goal BMI under 30, best is under 25.      -- Trying to exercise daily (goal at least 20 min/day) with moderate aerobic activity   -- Eat healthy (resources from ADA at http://www.diabetes.org/)   -- Taking good care of my feet. Consider seeing the Podiatrist   -- Check blood sugars as directed, record in log book and bring to every appointment    Insurance companies are grading you and I on your blood sugar control -- Goal is to get your A1c down to 7.9% or lower and NO Smoking!  -- Medicare and most insurance companies, will only cover Hemoglobin A1c labs to be rechecked every 91+ days.      Return for Diabetes labs and clinic follow-up appointment every 3 to 4 months.    Schedule lab only appointment --- A few days AFTER: 8/22/23   Schedule clinic appointment with Dr. Montanez -- Same day as labs, or 1-2 days later.

## 2023-05-24 NOTE — PROGRESS NOTES
Assessment & Plan      ICD-10-CM    1. Controlled type 2 diabetes mellitus with diabetic polyneuropathy, without long-term current use of insulin (H)  E11.42 glipiZIDE (GLUCOTROL) 5 MG tablet      2. Benign essential hypertension  I10       3. Mixed hyperlipidemia  E78.2       4. Chronic kidney disease, stage 2 (mild)  N18.2       5. Coronary artery disease involving native coronary artery of native heart without angina pectoris  I25.10       6. Paroxysmal atrial fibrillation (H)  I48.0       7. Chronic anticoagulation - Eliquis  Z79.01       Patient presents for diabetes follow-up as well as follow-up multiple issues.    Type 2 diabetes, now well controlled.  Continues to do well with glipizide, metformin.  Since starting use of Dexcom G6 has been able to get his blood sugars under control.  Needs medication refills.    Stage II chronic kidney disease, kidney function has been slowly declining.  Encouraged NSAID avoidance.    Paroxysmal atrial fibrillation and Coronary disease, appears stable.  Heart rates have been controlled.  Denies exertional angina.  No changes for now.  Continues with Crestor, metoprolol, Eliquis, diltiazem.      HYPERTENSION - Ongoing. Blood pressure is currently well controlled.  Medication side effects: None. Denies syncope or presyncope.  No changes for now. Medication list reviewed/updated. Refills completed as needed.      MIXED HYPERLIPIDEMIA.  Ongoing. LDL is at goal: No. Triglycerides are at goal: Yes.  Hopefully lifestyle modifications will improve cholesterol levels, otherwise we will need to consider additional medication dose adjustments or medication changes.  Medication side effects reported: Yes - Legs are tired.   Continue current medications for now - Crestor 40 mg daily. Medication list reviewed/updated. Refills completed as needed.  Recent Labs   Lab Test 05/24/23  1352 02/22/23  1355   CHOL 175 218*   HDL 46 35*   * 153*   TRIG 120 149       Encouraged regular  exercise.     Return in about 3 months (around 8/24/2023) for - Labs every 91+ days, with DM Follow-up, Same Day or 1-2 days later with Dr. Montanez.    Nawaf Montanez MD  [facility]     Patient currently uses a Dexcom G6 CGM for continuous monitoring of glucose.   Patient injects or boluses insulin 3 or more times daily before breakfast, before lunch, before dinner, and bedtime.  Patient requires frequent adjustments to their insulin treatment regimen on the basis of therapeutic CGM testing results.     Michael Harris is a 88 year old, presenting for the following health issues:  Diabetes        5/24/2023     3:26 PM   Additional Questions   Roomed by Jerel SALES / TAYLOR   Accompanied by n/a     History of Present Illness       Diabetes:   He presents for follow up of diabetes.  He is checking home blood glucose with a continuous glucose monitor.  Blood glucose is sometimes over 200 and never under 70. He is aware of hypoglycemia symptoms including shakiness, dizziness, other and weakness. He has no concerns regarding his diabetes at this time.  He is having numbness in feet, burning in feet and blurry vision. The patient has had a diabetic eye exam in the last 12 months. Eye exam performed on 09/16/2022. Location of last eye exam [facility].        He eats 0-1 servings of fruits and vegetables daily.He consumes 1 sweetened beverage(s) daily.He exercises with enough effort to increase his heart rate 10 to 19 minutes per day.  He exercises with enough effort to increase his heart rate 7 days per week.   He is taking medications regularly.    Today's PHQ-9         PHQ-9 Total Score: 0    PHQ-9 Q9 Thoughts of better off dead/self-harm past 2 weeks :   Not at all    How difficult have these problems made it for you to do your work, take care of things at home, or get along with other people: Not difficult at all  Today's LORENZO-7 Score: 0    Review of Systems   Constitutional: Positive for  fatigue (mostly of feet and legs - has been taking b12 supplements again). Negative for chills and fever.   HENT: Negative for congestion and hearing loss.    Eyes: Negative for pain and visual disturbance.   Respiratory: Positive for shortness of breath (mild, stable.). Negative for cough and wheezing.    Cardiovascular: Positive for palpitations. Negative for chest pain (Doing better after stent x3 placement 3/14/2023 at [facility]).        Raynaud's syndrome, no ulcerations   Gastrointestinal: Negative for abdominal pain, diarrhea, nausea and vomiting.   Endocrine: Negative for cold intolerance and heat intolerance.        Dexcom G6 doing well   Genitourinary: Positive for frequency and urgency. Negative for dysuria and hematuria.        + Nocturia 1-2x nightly, previously 3-4x.  + nocturia and overactive bladder symptoms.  Oxybutynin at bedtime has helped evening symptoms, but not daytime symptoms.   Musculoskeletal: Positive for arthralgias (Right shoulder pains, if does too much or using in certain positions), back pain (+ intermittent low back pain) and gait problem (+ Balance problems). Negative for joint swelling and myalgias.   Skin: Negative for pallor and rash.   Allergic/Immunologic: Negative for immunocompromised state.   Neurological: Negative for dizziness and light-headedness.        + Balance problems   Hematological: Does not bruise/bleed easily.   Psychiatric/Behavioral: Positive for sleep disturbance (+ insomnia and will wake due to foot pain and nocturia). Negative for agitation and confusion.          Objective    /74 (BP Location: Right arm, Patient Position: Sitting, Cuff Size: Adult Regular)   Pulse 59   Resp 20   Wt 80.3 kg (177 lb)   SpO2 98%   BMI 25.95 kg/m    Body mass index is 25.95 kg/m .  Physical Exam  Constitutional:       General: He is not in acute distress.     Appearance: Normal appearance. He is well-developed. He is not diaphoretic.   HENT:      Head:  Normocephalic and atraumatic.   Eyes:      General: No scleral icterus.     Conjunctiva/sclera: Conjunctivae normal.   Cardiovascular:      Rate and Rhythm: Normal rate and regular rhythm.      Pulses: Normal pulses.   Pulmonary:      Effort: Pulmonary effort is normal.      Breath sounds: Normal breath sounds.   Abdominal:      Palpations: Abdomen is soft.      Tenderness: There is no abdominal tenderness.   Musculoskeletal:         General: No deformity.      Cervical back: Neck supple.      Right lower leg: Edema (trace) present.      Left lower leg: Edema (trace) present.   Lymphadenopathy:      Cervical: No cervical adenopathy.   Skin:     General: Skin is warm and dry.      Coloration: Skin is not jaundiced.      Findings: No rash.      Comments: Venous stasis and scattered scabs bilaterally on legs   Neurological:      Mental Status: He is alert. Mental status is at baseline.   Psychiatric:         Mood and Affect: Mood normal.         Behavior: Behavior normal.          Recent Labs   Lab Test 05/24/23  1352 02/22/23  1355 10/26/22  1357   A1C 7.0* 9.2* 7.2*   * 153* 102*   HDL 46 35* 37   TRIG 120 149 117   ALT 11 8* 14   CR 0.95 0.88 0.90   GFRESTIMATED 77 84 83   POTASSIUM 4.5 4.0 4.1   TSH 1.53 1.86 1.53   WBC 6.1 6.0 6.8   HGB 15.9 15.7 15.6   * 127* 145*   ALBUMIN 4.3 3.8 4.2     Hemoglobin A1c is well controlled.  LDL is high and not at goal.  HDL and triglycerides are at goal.  ALT normal.  Creatinine has worsened slightly.  Potassium normal.  TSH normal.  CBC shows normal white count, hemoglobin level.  Platelet count is low.  Albumin normal.

## 2023-05-24 NOTE — NURSING NOTE
"Chief Complaint   Patient presents with     Diabetes         Initial /74 (BP Location: Right arm, Patient Position: Sitting, Cuff Size: Adult Regular)   Pulse 59   Resp 20   Wt 80.3 kg (177 lb)   SpO2 98%   BMI 25.95 kg/m   Estimated body mass index is 25.95 kg/m  as calculated from the following:    Height as of 3/24/23: 1.759 m (5' 9.25\").    Weight as of this encounter: 80.3 kg (177 lb).       FOOD SECURITY SCREENING QUESTIONS:    The next two questions are to help us understand your food security.  If you are feeling you need any assistance in this area, we have resources available to support you today.    Hunger Vital Signs:  Within the past 12 months we worried whether our food would run out before we got money to buy more. Never  Within the past 12 months the food we bought just didn't last and we didn't have money to get more. Never    Advance Care Directive on file? yes      Medication reconciliation complete.      Jerel Shabazz,on 5/24/2023 at 3:32 PM        "

## 2023-06-07 ENCOUNTER — TRANSFERRED RECORDS (OUTPATIENT)
Dept: HEALTH INFORMATION MANAGEMENT | Facility: OTHER | Age: 88
End: 2023-06-07
Payer: COMMERCIAL

## 2023-06-09 ENCOUNTER — TRANSFERRED RECORDS (OUTPATIENT)
Dept: HEALTH INFORMATION MANAGEMENT | Facility: OTHER | Age: 88
End: 2023-06-09
Payer: COMMERCIAL

## 2023-06-09 LAB — RETINOPATHY: NEGATIVE

## 2023-07-20 ENCOUNTER — TELEPHONE (OUTPATIENT)
Dept: EDUCATION SERVICES | Facility: OTHER | Age: 88
End: 2023-07-20
Payer: COMMERCIAL

## 2023-07-20 NOTE — TELEPHONE ENCOUNTER
Patient received a new transmitter, but could not get his phone to recognize new transmitter.    Helped patient, walking him through the G6 jazmine, to locate transmitter number and update transmitter to new number.    Patient placed his new sensor, attached transmitter and started his sensor.  CGM is now in sensor warm up mode.  Patient appreciated the help, but is interested in updating to Dexcom G7 CGM system.    Encouraged patient to call [facility] when he has two G6 sensors left.  Will then reach out to PCP for orders and update pharmacy to switch from Dexcom G6 to G7 CGM system.    Patient states understanding.    Keri Sal RN, BSN, [facility]  7/20/2023 1:58 PM

## 2023-08-01 DIAGNOSIS — R35.1 NOCTURIA: ICD-10-CM

## 2023-08-01 DIAGNOSIS — N32.81 OVERACTIVE BLADDER: ICD-10-CM

## 2023-08-01 DIAGNOSIS — E11.40 PAINFUL DIABETIC NEUROPATHY (H): ICD-10-CM

## 2023-08-02 RX ORDER — OXYBUTYNIN CHLORIDE 5 MG/1
TABLET ORAL
Qty: 270 TABLET | Refills: 4 | OUTPATIENT
Start: 2023-08-02

## 2023-08-02 NOTE — TELEPHONE ENCOUNTER
[facility] sent Rx request for the following:   Requested Prescriptions   Pending Prescriptions Disp Refills    gabapentin (NEURONTIN) 100 MG capsule [Pharmacy Med Name: GABAPENTIN 100MG CAPSULES] 270 capsule      Sig: TAKE 1 TO 3 CAPSULES(100  MG) BY MOUTH AT BEDTIME   Last Prescription Date:      Disp Refills Start End AUGUSTIN   gabapentin (NEURONTIN) 100 MG capsule   3/17/2023  --   Sig - Route: Take 1 capsule (100 mg) by mouth 3 times daily as needed (- Nerve pain - During the day) - Oral   Class: Historical with different instructions than requested.        Last Office Visit:              3/24/23   Future Office visit:             Next 5 appointments (look out 90 days)      Oct 25, 2023  1:20 PM  SHORT with Nawaf Montanez MD  [facility] ([facility] ) [address]  [place] [address]  [phone]          Sandra Ch RN .............. 8/2/2023  2:59 PM

## 2023-08-02 NOTE — TELEPHONE ENCOUNTER
[facility] sent Rx request for the following:    Redundant refill request refused: Too soon:   Disp Refills Start End AUGUSTIN   oxybutynin (DITROPAN) 5 MG tablet 270 tablet 4 3/17/2023  No   Sig - Route: Take 1 tablet (5 mg) by mouth 3 times daily --- Start Twice daily - increase to 3x daily if needed - for urination control - Oral   Sent to pharmacy as: Oxybutynin Chloride 5 MG Oral Tablet (DITROPAN)   Class: E-Prescribe   Order: [id number]   E-Prescribing Status: Receipt confirmed by pharmacy (3/17/2023  9:07 AM CDT)     [id number] - [place], [place] - [address]     Sandra Ch RN .............. 8/2/2023  2:57 PM

## 2023-08-02 NOTE — TELEPHONE ENCOUNTER
Please address if patient is still using these medications and what dose he is taking.  Route back after.    Electronically signed by:  Nawaf Montanez MD  on August 2, 2023 3:31 PM

## 2023-08-03 ENCOUNTER — TELEPHONE (OUTPATIENT)
Dept: INTERNAL MEDICINE | Facility: OTHER | Age: 88
End: 2023-08-03
Payer: COMMERCIAL

## 2023-08-03 DIAGNOSIS — R35.1 NOCTURIA: ICD-10-CM

## 2023-08-03 DIAGNOSIS — N32.81 OVERACTIVE BLADDER: ICD-10-CM

## 2023-08-03 RX ORDER — GABAPENTIN 100 MG/1
CAPSULE ORAL
Qty: 270 CAPSULE | Refills: 0 | Status: SHIPPED | OUTPATIENT
Start: 2023-08-03 | End: 2023-08-03

## 2023-08-03 RX ORDER — OXYBUTYNIN CHLORIDE 5 MG/1
5 TABLET ORAL 3 TIMES DAILY
Qty: 270 TABLET | Refills: 4 | Status: SHIPPED | OUTPATIENT
Start: 2023-08-03 | End: 2024-09-04

## 2023-08-03 RX ORDER — GABAPENTIN 100 MG/1
CAPSULE ORAL
Qty: 90 CAPSULE | Refills: 0 | Status: SHIPPED | OUTPATIENT
Start: 2023-08-03 | End: 2023-11-22

## 2023-08-03 NOTE — TELEPHONE ENCOUNTER
Patient called and he verified his last name and . He states he takes Gabapentin 100 mg, 1 tablet in the morning, and Gabapentin 300 mg, 1 tablet at bedtime. New Gabapentin 100 mg tablet order pending. Pt also requested writer call [facility] to confirm that they have active prescriptions for Gabapentin 300 mg and Oxybutynin. He is out of the Oxybutynin. Per staff at [facility], he has active refills of Gabapentin 100 mg and verbalized plan to get ready for . The insurance tells pharmacy that Oxybutynin can't be filled until . Staff ran it through as out of pocket and it would be $63.00 for a 30-day supply, or $25.54 with Syd Gabriel at [facility] requesting call back with plan before filling. Attempted reaching Pt. Call dropped x3. Called and spoke to Patient after verifying last name and date of birth. Pt will wait to fill Oxybutynin on 819. Sandra Ch RN .............. 8/3/2023  2:59 PM

## 2023-09-08 ENCOUNTER — TELEPHONE (OUTPATIENT)
Dept: EDUCATION SERVICES | Facility: OTHER | Age: 88
End: 2023-09-08
Payer: COMMERCIAL

## 2023-09-08 DIAGNOSIS — E11.65 TYPE 2 DIABETES MELLITUS WITH HYPERGLYCEMIA, WITHOUT LONG-TERM CURRENT USE OF INSULIN (H): Primary | ICD-10-CM

## 2023-09-08 NOTE — TELEPHONE ENCOUNTER
Patient needing supplies for Dex Com G7. Out of some supplies.       Lucita Genao on 9/8/2023 at 12:20 PM

## 2023-09-11 RX ORDER — ACYCLOVIR 400 MG/1
TABLET ORAL
Qty: 3 EACH | Refills: 5 | Status: SHIPPED | OUTPATIENT
Start: 2023-09-11 | End: 2023-11-22

## 2023-09-11 RX ORDER — ACYCLOVIR 400 MG/1
TABLET ORAL
Qty: 1 EACH | Refills: 0 | Status: SHIPPED | OUTPATIENT
Start: 2023-09-11 | End: 2023-11-22

## 2023-09-11 NOTE — TELEPHONE ENCOUNTER
Patient currently using Dexcom G6 CGM system and is out of G6 sensors.  He would like to update system to Dexcom G7 CGM.    Current prerequisites for insurance coverage of CGM:  1.  Patient has diabetes:  YES  2.  Patient is utilizing an insulin pump or administers 1 or more injections of insulin per day:  YES.        Patient meets 2 of 2 required criteria for insurance coverage of CGM.    If accepted as written, please sign pending orders.    Thank you,    Keri Sal RN, BSN, [facility]  9/11/2023 8:50 AM

## 2023-09-25 ENCOUNTER — TELEPHONE (OUTPATIENT)
Dept: PHYSICAL THERAPY | Facility: OTHER | Age: 88
End: 2023-09-25
Payer: COMMERCIAL

## 2023-09-25 NOTE — TELEPHONE ENCOUNTER
Patient presented to counter with questions about his G7 jana. Please contact patient    Winter Agustin on 9/25/2023 at 2:27 PM

## 2023-10-11 ENCOUNTER — ALLIED HEALTH/NURSE VISIT (OUTPATIENT)
Dept: EDUCATION SERVICES | Facility: OTHER | Age: 88
End: 2023-10-11
Payer: COMMERCIAL

## 2023-10-11 DIAGNOSIS — E11.42 CONTROLLED TYPE 2 DIABETES MELLITUS WITH DIABETIC POLYNEUROPATHY, WITHOUT LONG-TERM CURRENT USE OF INSULIN (H): Primary | ICD-10-CM

## 2023-10-11 PROCEDURE — G0108 DIAB MANAGE TRN  PER INDIV: HCPCS | Performed by: REGISTERED NURSE

## 2023-10-11 NOTE — PROGRESS NOTES
Diabetes Self-Management Education & Support    Presents for: Personal CGM Start    Type of Service: In Person Visit    Assessment Type:   Patient completed homework (online training and/or Getting started with CGM guide)? : No  Sensor started:: Started today in clinic  CGM Initial Settings: Dexcom  Dexcom Initial Settings: Low Glucose Alarm: On (fixed at 55 mg/dL and can't be changed), High Glucose Alert, Low Glucose Alert    Sensor was inserted with no resistance or bleeding at insertion site.    CGM-specific education:   Dexcom sensor: insertion technique, sensor site location and rotation, insulin administration in relation to sensor placement, Use of trends and graphs for pattern management and problem solving, Dexcom Mobile jazmine, and Dexcom Clarity software         ASSESSMENT:   Patient was instructed in features of DEXCOM G7 CGMS.  He was instructed in programming events for meds, CHO, exercise and health such as stress, illness, high/low symptoms.  Patient will wear the sensor for 10 days and if he would like, can view data on Clarity jazmine at home.       Patient demonstrated understanding by inserting his own sensor.  Sensor inserted without difficulty, cell phone receiving data.  Helped patient program his phone apps.         Pt verbalized understanding of concepts discussed and recommendations provided today.    Continue education with the following diabetes management concepts: Healthy Eating, Being Active, Problem Solving, and Reducing Risks    PLAN    Begin using Dexcom G7 CGM system and follow up if any questions or concerns.  Topics to cover at upcoming visits: Healthy Eating, Problem Solving, and Reducing Risks    Follow-up: TBD per patient schedule.     See Care Plan for co-developed, patient-state behavior change goals.  AVS provided for patient today.    Education Materials Provided:  Dexcom G7 Set up flyer      SUBJECTIVE/OBJECTIVE:  Presents for: Personal CGM Start  Accompanied by: Self  Diabetes  "education in the past 24mo: Yes  Focus of Visit: CGM  Type of CGM visit: Personal CGM Start  Diabetes type: Type 2  Disease course: Stable  How confident are you filling out medical forms by yourself:: A little bit  Diabetes management related comments/concerns: yes  Cultural Influences/Ethnic Background:  Not  or       Diabetes Symptoms & Complications:  Fatigue: Yes  Neuropathy: Yes  Polydipsia: No  Autonomic neuropathy: No  CVA: No  Heart disease: Yes    Patient Problem List and Family Medical History reviewed for relevant medical history, current medical status, and diabetes risk factors.    Vitals:  There were no vitals taken for this visit.  Estimated body mass index is 25.95 kg/m  as calculated from the following:    Height as of 3/24/23: 1.759 m (5' 9.25\").    Weight as of 5/24/23: 80.3 kg (177 lb).   Last 3 BP:   BP Readings from Last 3 Encounters:   05/24/23 136/74   03/24/23 138/72   03/17/23 138/68       History   Smoking Status    Former    Packs/day: 1.00    Years: 10.00    Types: Cigarettes    Quit date: 1/1/1975   Smokeless Tobacco    Never       Labs:  Lab Results   Component Value Date    A1C 7.0 05/24/2023    A1C 7.1 07/07/2021     Lab Results   Component Value Date     05/24/2023    GLC 92 10/26/2022     07/07/2021     Lab Results   Component Value Date     05/24/2023    LDL 86 07/07/2021     HDL Cholesterol   Date Value Ref Range Status   07/07/2021 37 23 - 92 mg/dL Final     Direct Measure HDL   Date Value Ref Range Status   05/24/2023 46 >=40 mg/dL Final   ]  GFR Estimate   Date Value Ref Range Status   05/24/2023 77 >60 mL/min/1.73m2 Final     Comment:     eGFR calculated using 2021 CKD-EPI equation.   07/07/2021 68 >60 mL/min/[1.73_m2] Final     GFR Estimate If Black   Date Value Ref Range Status   07/07/2021 82 >60 mL/min/[1.73_m2] Final     Lab Results   Component Value Date    CR 0.95 05/24/2023    CR 1.04 07/07/2021     No results found for: " "\"MICROALBUMIN\"    Healthy Eating:  Healthy Eating Assessed Today: No  Cultural/Islam diet restrictions?: No  Meal planning/habits: Carb counting    Being Active:  Days per week of moderate to strenuous exercise (like a brisk walk): (P) 7  On average, minutes per day of exercise at this level: (P) 10  Exercise Minutes per Week: (P) 70  Barrier to exercise: Time    Monitoring:  Blood Glucose Meter: Accu-chek, Unknown  Times checking blood sugar at home (number): 3    Taking Medications:  Diabetes Medication(s)       Sulfonylureas       glipiZIDE (GLUCOTROL) 5 MG tablet    Take 1-2 tablets (5-10 mg) by mouth 2 times daily (with meals)            Current Treatments: Diet       Reducing Risks:  CAD Risks: Male sex, Stress    Healthy Coping:  Informal Support system:: Friends  Support resources: Offerings in Clinic Communities  Patient Activation Measure Survey Score:       No data to display                  Care Plan and Education Provided:  There are no care plans that you recently modified to display for this patient.      Keri Sal RN, BSN, [facility]  10/11/2023 2:36 PM     Time Spent: 60 minutes  Encounter Type: Individual    Any diabetes medication dose changes were made via the CDE Protocol per the patient's primary care provider. A copy of this encounter was shared with the provider.  "

## 2023-10-11 NOTE — PATIENT INSTRUCTIONS
Continuous Glucose Monitoring Personal Start      Congratulations! You have decided to utilize the DEXCOM G7 Continuous Glucose Monitoring (CGM) System (G7). We strongly believe in the use of the new technologies to better assist you in managing your diabetes. These technologies give both you and your provider a better understanding of your diabetes and current trends, which will enable you to improve control of your blood glucose levels.      Low and high alerts are set 70 mg/dL and 200 mg/dL. You inserted your sensor today at 2:50 pm.  You should begin receiving sensor glucose readings 30 minutes after insertion.  Your sensor lasts 10 days and you will be prompted when the sensor is close to expiration and a new sensor can then be started.  You will have a 12-hour maryjane period to begin a new sensor.    [employer] is a team of trainers and certified  diabetes educators dedicated to helping you with  your new G7. They provide live, interactive support  and webinars to get you started and keep you  informed.     You may download your CGM information from your  at [email or web address].com.  Choose the Dexcom Clarity software.     Dexcom CLARITY software is an important part of your G7.  Clarity highlights your glucose patterns, trends and statistics. Share  with your clinic and monitor improvements between visits.    To use Dexcom CLARITY:    1. Log in at [employer].  Use your [employer]Phizzbo[employer] login or create an account.    2. Get weekly notifications with Dexcom CLARITY Reports Tanisha.  Notifications available to Dexcom G7 users.       PLAN:      1. Begin using your Dexcom G7 CGM system.  Sensor period lasts 10 days with a 12-hour maryjane period before new sensor will need to be placed and started by entering the four-digit pairing code found on the back of the applicator.     2. Events - You are encouraged to enter events such as Blood Glucose, Carbohydrates (grams), and Insulin fast-acting or long-acting  (units).     3. Battery - Charge  with  1 - 3 hours every 3 - 5 days or as  battery depletes.      4. Please follow up if any questions or concerns.    Dexcom Technical Support is available 24 hours a day/7 days a week at:       [email or web address]   [email or web address]   Toll free:  [phone]      Keri Sal RN, BSN, [facility]  10/11/2023 2:02 PM

## 2023-10-16 NOTE — TELEPHONE ENCOUNTER
Patient scheduled visit, please see note on 10/11/23, 2:00 pm.    Keri Sal RN, BSN, [facility]  10/16/2023 3:36 PM

## 2023-10-25 DIAGNOSIS — E11.65 TYPE 2 DIABETES MELLITUS WITH HYPERGLYCEMIA, WITHOUT LONG-TERM CURRENT USE OF INSULIN (H): ICD-10-CM

## 2023-10-31 RX ORDER — PROCHLORPERAZINE 25 MG/1
SUPPOSITORY RECTAL
OUTPATIENT
Start: 2023-10-31

## 2023-10-31 NOTE — TELEPHONE ENCOUNTER
Edis sent Rx request for the following:      Requested Prescriptions   Pending Prescriptions Disp Refills    Continuous Blood Gluc Transmit (DEXCOM G6 TRANSMITTER) MISC [Pharmacy Med Name: DEXCOM G6 TRANSMITTER]       Sig: CHANGE EVERY 3 MONTHS       Diabetic Supplies Protocol Passed - 10/25/2023 11:13 AM   Last Prescription Date:   3/17/23  Last Fill Qty/Refills:         1, R-1    Last Office Visit:              5/24/23   Future Office visit:           11/22/23    Patient now has the G7, per note on 10/11/23.  Corrina Hansen RN on 10/31/2023 at 11:21 AM

## 2023-11-01 ENCOUNTER — VIRTUAL VISIT (OUTPATIENT)
Dept: EDUCATION SERVICES | Facility: OTHER | Age: 88
End: 2023-11-01
Attending: INTERNAL MEDICINE
Payer: COMMERCIAL

## 2023-11-01 DIAGNOSIS — E11.42 CONTROLLED TYPE 2 DIABETES MELLITUS WITH DIABETIC POLYNEUROPATHY, WITHOUT LONG-TERM CURRENT USE OF INSULIN (H): Primary | ICD-10-CM

## 2023-11-01 NOTE — PROGRESS NOTES
"Patient would like to visit IN PERSON for Dexcom G7 review.  He has scheduled a visit 12/4/2023, 11:00 am and shares he will follow up next month.    Encouraged patient to use a back up system to SMBG until his visit, as needed.  Patient will consider, but shares \"I haven't had any low symptoms lately.\"    Keri Sal RN, BSN, [facility] [place][note 2]  11/1/2023 11:15 AM   "

## 2023-11-16 ENCOUNTER — TELEPHONE (OUTPATIENT)
Dept: INTERNAL MEDICINE | Facility: OTHER | Age: 88
End: 2023-11-16
Payer: COMMERCIAL

## 2023-11-16 DIAGNOSIS — E11.65 TYPE 2 DIABETES MELLITUS WITH HYPERGLYCEMIA, WITHOUT LONG-TERM CURRENT USE OF INSULIN (H): Primary | ICD-10-CM

## 2023-11-16 DIAGNOSIS — E78.2 MIXED HYPERLIPIDEMIA: ICD-10-CM

## 2023-11-16 NOTE — TELEPHONE ENCOUNTER
Has upcoming appointment 11/22/23 doesn't say what for what. Diabetic labs ? Please advise. Eda Moran LPN ....................11/16/2023  4:24 PM

## 2023-11-21 DIAGNOSIS — E11.40 PAINFUL DIABETIC NEUROPATHY (H): ICD-10-CM

## 2023-11-21 NOTE — TELEPHONE ENCOUNTER
Attempted to call patient.  No answer.  Left message for patient stating the below information per Ye.      Chasity Jay LPN....6/27/2022 3:12 PM     I attest my time as attending is greater than 50% of the total combined time spent on qualifying patient care activities by the PA/NP and attending.

## 2023-11-22 ENCOUNTER — LAB (OUTPATIENT)
Dept: LAB | Facility: OTHER | Age: 88
End: 2023-11-22
Payer: COMMERCIAL

## 2023-11-22 ENCOUNTER — OFFICE VISIT (OUTPATIENT)
Dept: INTERNAL MEDICINE | Facility: OTHER | Age: 88
End: 2023-11-22
Attending: INTERNAL MEDICINE
Payer: COMMERCIAL

## 2023-11-22 VITALS
BODY MASS INDEX: 25.48 KG/M2 | WEIGHT: 178 LBS | RESPIRATION RATE: 20 BRPM | OXYGEN SATURATION: 97 % | HEIGHT: 70 IN | SYSTOLIC BLOOD PRESSURE: 132 MMHG | HEART RATE: 68 BPM | TEMPERATURE: 98 F | DIASTOLIC BLOOD PRESSURE: 70 MMHG

## 2023-11-22 DIAGNOSIS — Z95.5 S/P DRUG ELUTING CORONARY STENT PLACEMENT: ICD-10-CM

## 2023-11-22 DIAGNOSIS — D69.6 THROMBOCYTOPENIA (H): ICD-10-CM

## 2023-11-22 DIAGNOSIS — E53.8 VITAMIN B12 DEFICIENCY: ICD-10-CM

## 2023-11-22 DIAGNOSIS — G57.93 NEUROPATHIC PAIN OF BOTH FEET: ICD-10-CM

## 2023-11-22 DIAGNOSIS — E78.2 MIXED HYPERLIPIDEMIA: ICD-10-CM

## 2023-11-22 DIAGNOSIS — I49.8 VENTRICULAR BIGEMINY: ICD-10-CM

## 2023-11-22 DIAGNOSIS — I10 BENIGN ESSENTIAL HYPERTENSION: ICD-10-CM

## 2023-11-22 DIAGNOSIS — G89.4 CHRONIC PAIN SYNDROME: ICD-10-CM

## 2023-11-22 DIAGNOSIS — E11.40 PAINFUL DIABETIC NEUROPATHY (H): ICD-10-CM

## 2023-11-22 DIAGNOSIS — Z79.01 CHRONIC ANTICOAGULATION: ICD-10-CM

## 2023-11-22 DIAGNOSIS — E11.42 CONTROLLED TYPE 2 DIABETES MELLITUS WITH DIABETIC POLYNEUROPATHY, WITHOUT LONG-TERM CURRENT USE OF INSULIN (H): Primary | ICD-10-CM

## 2023-11-22 DIAGNOSIS — Z71.85 VACCINE COUNSELING: ICD-10-CM

## 2023-11-22 DIAGNOSIS — N18.2 CHRONIC KIDNEY DISEASE, STAGE 2 (MILD): ICD-10-CM

## 2023-11-22 DIAGNOSIS — I25.10 CORONARY ARTERY DISEASE INVOLVING NATIVE CORONARY ARTERY OF NATIVE HEART WITHOUT ANGINA PECTORIS: ICD-10-CM

## 2023-11-22 DIAGNOSIS — I48.0 PAROXYSMAL ATRIAL FIBRILLATION (H): ICD-10-CM

## 2023-11-22 DIAGNOSIS — E11.65 TYPE 2 DIABETES MELLITUS WITH HYPERGLYCEMIA, WITHOUT LONG-TERM CURRENT USE OF INSULIN (H): ICD-10-CM

## 2023-11-22 DIAGNOSIS — Z00.00 ENCOUNTER FOR MEDICARE ANNUAL WELLNESS EXAM: ICD-10-CM

## 2023-11-22 DIAGNOSIS — I49.1 PREMATURE ATRIAL CONTRACTIONS: ICD-10-CM

## 2023-11-22 DIAGNOSIS — I73.00 RAYNAUD'S PHENOMENON WITHOUT GANGRENE: ICD-10-CM

## 2023-11-22 DIAGNOSIS — E55.9 VITAMIN D DEFICIENCY: ICD-10-CM

## 2023-11-22 LAB
ALBUMIN SERPL BCG-MCNC: 3.8 G/DL (ref 3.5–5.2)
ALBUMIN UR-MCNC: 10 MG/DL
ALP SERPL-CCNC: 116 U/L (ref 40–150)
ALT SERPL W P-5'-P-CCNC: 8 U/L (ref 0–70)
ANION GAP SERPL CALCULATED.3IONS-SCNC: 11 MMOL/L (ref 7–15)
APPEARANCE UR: CLEAR
AST SERPL W P-5'-P-CCNC: 13 U/L (ref 0–45)
BILIRUB SERPL-MCNC: 0.6 MG/DL
BILIRUB UR QL STRIP: NEGATIVE
BUN SERPL-MCNC: 22.5 MG/DL (ref 8–23)
CALCIUM SERPL-MCNC: 8.8 MG/DL (ref 8.8–10.2)
CHLORIDE SERPL-SCNC: 103 MMOL/L (ref 98–107)
CHOLEST SERPL-MCNC: 197 MG/DL
COLOR UR AUTO: ABNORMAL
CREAT SERPL-MCNC: 0.95 MG/DL (ref 0.67–1.17)
CREAT UR-MCNC: 105.8 MG/DL
DEPRECATED HCO3 PLAS-SCNC: 21 MMOL/L (ref 22–29)
EGFRCR SERPLBLD CKD-EPI 2021: 77 ML/MIN/1.73M2
ERYTHROCYTE [DISTWIDTH] IN BLOOD BY AUTOMATED COUNT: 13.9 % (ref 10–15)
GLUCOSE SERPL-MCNC: 289 MG/DL (ref 70–99)
GLUCOSE UR STRIP-MCNC: 500 MG/DL
HBA1C MFR BLD: 8.3 % (ref 4–6.2)
HCT VFR BLD AUTO: 44.4 % (ref 40–53)
HDLC SERPL-MCNC: 37 MG/DL
HGB BLD-MCNC: 15 G/DL (ref 13.3–17.7)
HGB UR QL STRIP: NEGATIVE
HYALINE CASTS: 1 /LPF
KETONES UR STRIP-MCNC: NEGATIVE MG/DL
LDLC SERPL CALC-MCNC: 110 MG/DL
LEUKOCYTE ESTERASE UR QL STRIP: NEGATIVE
MCH RBC QN AUTO: 29.9 PG (ref 26.5–33)
MCHC RBC AUTO-ENTMCNC: 33.8 G/DL (ref 31.5–36.5)
MCV RBC AUTO: 89 FL (ref 78–100)
MICROALBUMIN UR-MCNC: 41 MG/L
MICROALBUMIN/CREAT UR: 38.75 MG/G CR (ref 0–17)
MUCOUS THREADS #/AREA URNS LPF: PRESENT /LPF
NITRATE UR QL: NEGATIVE
NONHDLC SERPL-MCNC: 160 MG/DL
PH UR STRIP: 5.5 [PH] (ref 5–9)
PLATELET # BLD AUTO: 169 10E3/UL (ref 150–450)
POTASSIUM SERPL-SCNC: 4.6 MMOL/L (ref 3.4–5.3)
PROT SERPL-MCNC: 6.6 G/DL (ref 6.4–8.3)
RBC # BLD AUTO: 5.01 10E6/UL (ref 4.4–5.9)
RBC URINE: <1 /HPF
SODIUM SERPL-SCNC: 135 MMOL/L (ref 135–145)
SP GR UR STRIP: 1.02 (ref 1–1.03)
TRIGL SERPL-MCNC: 251 MG/DL
TSH SERPL DL<=0.005 MIU/L-ACNC: 1.9 UIU/ML (ref 0.3–4.2)
UROBILINOGEN UR STRIP-MCNC: NORMAL MG/DL
WBC # BLD AUTO: 7.3 10E3/UL (ref 4–11)
WBC URINE: 2 /HPF

## 2023-11-22 PROCEDURE — 83718 ASSAY OF LIPOPROTEIN: CPT | Mod: ZL

## 2023-11-22 PROCEDURE — 80053 COMPREHEN METABOLIC PANEL: CPT | Mod: ZL

## 2023-11-22 PROCEDURE — 83036 HEMOGLOBIN GLYCOSYLATED A1C: CPT | Mod: ZL

## 2023-11-22 PROCEDURE — G0439 PPPS, SUBSEQ VISIT: HCPCS | Performed by: INTERNAL MEDICINE

## 2023-11-22 PROCEDURE — 85027 COMPLETE CBC AUTOMATED: CPT | Mod: ZL

## 2023-11-22 PROCEDURE — 84443 ASSAY THYROID STIM HORMONE: CPT | Mod: ZL

## 2023-11-22 PROCEDURE — G0463 HOSPITAL OUTPT CLINIC VISIT: HCPCS | Performed by: INTERNAL MEDICINE

## 2023-11-22 PROCEDURE — 81001 URINALYSIS AUTO W/SCOPE: CPT | Mod: ZL

## 2023-11-22 PROCEDURE — 99214 OFFICE O/P EST MOD 30 MIN: CPT | Mod: 25 | Performed by: INTERNAL MEDICINE

## 2023-11-22 PROCEDURE — 36415 COLL VENOUS BLD VENIPUNCTURE: CPT | Mod: ZL

## 2023-11-22 PROCEDURE — 82570 ASSAY OF URINE CREATININE: CPT | Mod: ZL

## 2023-11-22 RX ORDER — GABAPENTIN 300 MG/1
300 CAPSULE ORAL AT BEDTIME
Qty: 90 CAPSULE | Refills: 4 | Status: SHIPPED | OUTPATIENT
Start: 2023-11-22

## 2023-11-22 RX ORDER — GABAPENTIN 100 MG/1
CAPSULE ORAL
Qty: 90 CAPSULE | Refills: 4 | Status: SHIPPED | OUTPATIENT
Start: 2023-11-22

## 2023-11-22 RX ORDER — ROSUVASTATIN CALCIUM 40 MG/1
40 TABLET, COATED ORAL DAILY
Qty: 90 TABLET | Refills: 4 | Status: SHIPPED | OUTPATIENT
Start: 2023-11-22 | End: 2024-02-28

## 2023-11-22 RX ORDER — GABAPENTIN 300 MG/1
300 CAPSULE ORAL AT BEDTIME
Qty: 90 CAPSULE | Refills: 4 | Status: SHIPPED | OUTPATIENT
Start: 2023-11-22 | End: 2024-05-29

## 2023-11-22 RX ORDER — ACYCLOVIR 400 MG/1
1 TABLET ORAL DAILY
Qty: 1 EACH | Refills: 0 | Status: SHIPPED | OUTPATIENT
Start: 2023-11-22

## 2023-11-22 RX ORDER — GLIPIZIDE 5 MG/1
5-10 TABLET ORAL 2 TIMES DAILY WITH MEALS
Qty: 360 TABLET | Refills: 4 | Status: SHIPPED | OUTPATIENT
Start: 2023-11-22 | End: 2024-05-29

## 2023-11-22 RX ORDER — ACYCLOVIR 400 MG/1
1 TABLET ORAL
Qty: 3 EACH | Refills: 11 | Status: SHIPPED | OUTPATIENT
Start: 2023-11-22

## 2023-11-22 RX ORDER — OXYCODONE HYDROCHLORIDE 5 MG/1
5 TABLET ORAL DAILY PRN
Qty: 12 TABLET | Refills: 0 | Status: SHIPPED | OUTPATIENT
Start: 2023-11-22

## 2023-11-22 RX ORDER — DILTIAZEM HYDROCHLORIDE 120 MG/1
120 CAPSULE, EXTENDED RELEASE ORAL EVERY EVENING
Qty: 90 CAPSULE | Refills: 4 | Status: SHIPPED | OUTPATIENT
Start: 2023-11-22

## 2023-11-22 ASSESSMENT — ENCOUNTER SYMPTOMS
JOINT SWELLING: 0
HEARTBURN: 0
COUGH: 0
CHILLS: 0
SHORTNESS OF BREATH: 1
SORE THROAT: 0
NERVOUS/ANXIOUS: 0
CONSTIPATION: 0
EYE PAIN: 0
FEVER: 0
NAUSEA: 0
FREQUENCY: 1
PARESTHESIAS: 0
HEMATURIA: 0
MYALGIAS: 0
HEMATOCHEZIA: 0
DIARRHEA: 0
HEADACHES: 0
ARTHRALGIAS: 0
DIZZINESS: 0
WEAKNESS: 0
PALPITATIONS: 0
ABDOMINAL PAIN: 0
DYSURIA: 0

## 2023-11-22 ASSESSMENT — PATIENT HEALTH QUESTIONNAIRE - PHQ9
10. IF YOU CHECKED OFF ANY PROBLEMS, HOW DIFFICULT HAVE THESE PROBLEMS MADE IT FOR YOU TO DO YOUR WORK, TAKE CARE OF THINGS AT HOME, OR GET ALONG WITH OTHER PEOPLE: NOT DIFFICULT AT ALL
SUM OF ALL RESPONSES TO PHQ QUESTIONS 1-9: 2
SUM OF ALL RESPONSES TO PHQ QUESTIONS 1-9: 2

## 2023-11-22 ASSESSMENT — ANXIETY QUESTIONNAIRES
1. FEELING NERVOUS, ANXIOUS, OR ON EDGE: NOT AT ALL
GAD7 TOTAL SCORE: 0
6. BECOMING EASILY ANNOYED OR IRRITABLE: NOT AT ALL
GAD7 TOTAL SCORE: 0
2. NOT BEING ABLE TO STOP OR CONTROL WORRYING: NOT AT ALL
5. BEING SO RESTLESS THAT IT IS HARD TO SIT STILL: NOT AT ALL
4. TROUBLE RELAXING: NOT AT ALL
7. FEELING AFRAID AS IF SOMETHING AWFUL MIGHT HAPPEN: NOT AT ALL
3. WORRYING TOO MUCH ABOUT DIFFERENT THINGS: NOT AT ALL
IF YOU CHECKED OFF ANY PROBLEMS ON THIS QUESTIONNAIRE, HOW DIFFICULT HAVE THESE PROBLEMS MADE IT FOR YOU TO DO YOUR WORK, TAKE CARE OF THINGS AT HOME, OR GET ALONG WITH OTHER PEOPLE: NOT DIFFICULT AT ALL

## 2023-11-22 ASSESSMENT — PAIN SCALES - GENERAL: PAINLEVEL: NO PAIN (0)

## 2023-11-22 ASSESSMENT — ACTIVITIES OF DAILY LIVING (ADL): CURRENT_FUNCTION: NO ASSISTANCE NEEDED

## 2023-11-22 ASSESSMENT — PAIN SCALES - PAIN ENJOYMENT GENERAL ACTIVITY SCALE (PEG)
INTERFERED_GENERAL_ACTIVITY: 0 - DOES NOT INTERFERE
AVG_PAIN_PASTWEEK: 0 - NO PAIN

## 2023-11-22 NOTE — PATIENT INSTRUCTIONS
Blood pressure is well controlled.   Diabetes needs help -- A1c is high.     To help with weight loss and improve blood sugar control....    -- Try to avoid Carbohydrates as much as possible -- breads, pasta, baked goods, cakes, oatmeal, cold cereal, potatoes.   -- Eat more lean meats, proteins, eggs, nuts, vegetables.    -- Start or Continue regular daily exercise.     Get out and exercise, bike ride, walk for 10 to 15 minutes after each meal -- this can significantly lowers the spike in blood sugar after eating.       Medications refilled.   Labs are otherwise relatively stable.     Results for orders placed or performed in visit on 11/22/23   Urine Macroscopic with reflex to Microscopic     Status: Abnormal   Result Value Ref Range    Color Urine Light Yellow Colorless, Straw, Light Yellow, Yellow    Appearance Urine Clear Clear    Glucose Urine 500 (A) Negative mg/dL    Bilirubin Urine Negative Negative    Ketones Urine Negative Negative mg/dL    Specific Gravity Urine 1.019 1.000 - 1.030    Blood Urine Negative Negative    pH Urine 5.5 5.0 - 9.0    Protein Albumin Urine 10 (A) Negative mg/dL    Urobilinogen Urine Normal Normal, 2.0 mg/dL    Nitrite Urine Negative Negative    Leukocyte Esterase Urine Negative Negative    RBC Urine <1 <=2 /HPF    WBC Urine 2 <=5 /HPF    Mucus Urine Present (A) None Seen /LPF    Hyaline Casts Urine 1 <=2 /LPF   TSH with free T4 reflex     Status: Normal   Result Value Ref Range    TSH 1.90 0.30 - 4.20 uIU/mL   Hemoglobin A1c     Status: Abnormal   Result Value Ref Range    Hemoglobin A1C 8.3 (H) 4.0 - 6.2 %   CBC with platelets     Status: Normal   Result Value Ref Range    WBC Count 7.3 4.0 - 11.0 10e3/uL    RBC Count 5.01 4.40 - 5.90 10e6/uL    Hemoglobin 15.0 13.3 - 17.7 g/dL    Hematocrit 44.4 40.0 - 53.0 %    MCV 89 78 - 100 fL    MCH 29.9 26.5 - 33.0 pg    MCHC 33.8 31.5 - 36.5 g/dL    RDW 13.9 10.0 - 15.0 %    Platelet Count 169 150 - 450 10e3/uL   Albumin Random Urine  Quantitative with Creat Ratio     Status: Abnormal   Result Value Ref Range    Creatinine Urine mg/dL 105.8 mg/dL    Albumin Urine mg/L 41.0 mg/L    Albumin Urine mg/g Cr 38.75 (H) 0.00 - 17.00 mg/g Cr   Lipid Profile     Status: Abnormal   Result Value Ref Range    Cholesterol 197 <200 mg/dL    Triglycerides 251 (H) <150 mg/dL    Direct Measure HDL 37 (L) >=40 mg/dL    LDL Cholesterol Calculated 110 (H) <=100 mg/dL    Non HDL Cholesterol 160 (H) <130 mg/dL    Narrative    Cholesterol  Desirable:  <200 mg/dL    Triglycerides  Normal:  Less than 150 mg/dL  Borderline High:  150-199 mg/dL  High:  200-499 mg/dL  Very High:  Greater than or equal to 500 mg/dL    Direct Measure HDL  Female:  Greater than or equal to 50 mg/dL   Male:  Greater than or equal to 40 mg/dL    LDL Cholesterol  Desirable:  <100mg/dL  Above Desirable:  100-129 mg/dL   Borderline High:  130-159 mg/dL   High:  160-189 mg/dL   Very High:  >= 190 mg/dL    Non HDL Cholesterol  Desirable:  130 mg/dL  Above Desirable:  130-159 mg/dL  Borderline High:  160-189 mg/dL  High:  190-219 mg/dL  Very High:  Greater than or equal to 220 mg/dL   Comprehensive metabolic panel     Status: Abnormal   Result Value Ref Range    Sodium 135 135 - 145 mmol/L    Potassium 4.6 3.4 - 5.3 mmol/L    Carbon Dioxide (CO2) 21 (L) 22 - 29 mmol/L    Anion Gap 11 7 - 15 mmol/L    Urea Nitrogen 22.5 8.0 - 23.0 mg/dL    Creatinine 0.95 0.67 - 1.17 mg/dL    GFR Estimate 77 >60 mL/min/1.73m2    Calcium 8.8 8.8 - 10.2 mg/dL    Chloride 103 98 - 107 mmol/L    Glucose 289 (H) 70 - 99 mg/dL    Alkaline Phosphatase 116 40 - 150 U/L    AST 13 0 - 45 U/L    ALT 8 0 - 70 U/L    Protein Total 6.6 6.4 - 8.3 g/dL    Albumin 3.8 3.5 - 5.2 g/dL    Bilirubin Total 0.6 <=1.2 mg/dL      Aspects of Diabetes:   Recent Labs   Lab Test 11/22/23  1413 05/24/23  1352 02/22/23  1355   A1C 8.3* 7.0* 9.2*   * 105* 153*   HDL 37* 46 35*   TRIG 251* 120 149   ALT 8 11 8*   CR 0.95 0.95 0.88   GFRESTIMATED  77 77 84   POTASSIUM 4.6 4.5 4.0   TSH 1.90 1.53 1.86   WBC 7.3 6.1 6.0   HGB 15.0 15.9 15.7    144* 127*   ALBUMIN 3.8 4.3 3.8        Hemoglobin A1c  Goal range is under 8%. Best is 6.5 to 7   Blood Pressure 132/70 Goal to keep less than 140/90   Tobacco  reports that he quit smoking about 48 years ago. His smoking use included cigarettes. He has a 10.00 pack-year smoking history. He has never used smokeless tobacco. Goal to abstain from tobacco   Aspirin or Plavix Anti-platelet therapy Aspirin or Plavix reduces risk of heart disease and stroke  -- sometimes used with other blood thinners, depending on bleeding risk and risk factors.    ACE/ARB Specific blood pressure meds These medications can reduce risk of kidney disease   Cholesterol Statins (Lipitor, Crestor, vs others) Statins reduce risk of heart disease and stroke   Eye Exam -- Do Yearly -- Annual diabetic eye exam   Healthy weight Wt Readings from Last 4 Encounters:   11/22/23 80.7 kg (178 lb)   05/24/23 80.3 kg (177 lb)   03/24/23 82 kg (180 lb 12.8 oz)   03/17/23 82.3 kg (181 lb 6.4 oz)      Body mass index is 25.72 kg/m .  Goal BMI under 30, best is under 25.      -- Trying to exercise daily (goal at least 20 min/day) with moderate aerobic activity   -- Eat healthy (resources from ADA at http://www.diabetes.org/)   -- Taking good care of my feet. Consider seeing the Podiatrist   -- Check blood sugars as directed, record in log book and bring to every appointment    Insurance companies are grading you and I on your blood sugar control -- Goal is to get your A1c down to 7.9% or lower and NO Smoking!  -- Medicare and most insurance companies, will only cover Hemoglobin A1c labs to be rechecked every 91+ days.      Return for Diabetes labs and clinic follow-up appointment every 3 to 4 months.    Schedule lab only appointment --- A few days AFTER: 2/20/24   Schedule clinic appointment with Dr. Montanez -- Same day as labs, or 1-2 days later.    Patient  Education   Personalized Prevention Plan  You are due for the preventive services outlined below.  Your care team is available to assist you in scheduling these services.  If you have already completed any of these items, please share that information with your care team to update in your medical record.  Health Maintenance Due   Topic Date Due     RSV VACCINE (Pregnancy & 60+) (1 - 1-dose 60+ series) Never done     Zoster (Shingles) Vaccine (2 of 3) 02/16/2010     URINE DRUG SCREEN  10/26/2023     CONTROLLED SUBSTANCE AGREEMENT FOR CHRONIC PAIN MANAGEMENT  10/26/2023     Preventive Health Recommendations  See your health care provider every year to  Review health changes.   Discuss preventive care.    Review your medicines if your doctor has prescribed any.  Talk with your health care provider about whether you should have a test to screen for prostate cancer (PSA).  Every 3 years, have a diabetes test (fasting glucose). If you are at risk for diabetes, you should have this test more often.  Every 5 years, have a cholesterol test. Have this test more often if you are at risk for high cholesterol or heart disease.   Every 10 years, have a colonoscopy. Or, have a yearly FIT test (stool test). These exams will check for colon cancer.  Talk to with your health care provider about screening for Abdominal Aortic Aneurysm if you have a family history of AAA or have a history of smoking.    Shots:   Get a flu shot each year.   Get a tetanus shot every 10 years.   Talk to your doctor about your pneumonia vaccines. There are now two you should receive - Pneumovax (PPSV 23) and Prevnar (PCV 13).  Talk to your pharmacist about a shingles vaccine.   Talk to your doctor about the hepatitis B vaccine.    Nutrition:   Eat at least 5 servings of fruits and vegetables each day.   Eat whole-grain bread, whole-wheat pasta and brown rice instead of white grains and rice.   Get adequate Calcium and Vitamin D.     Lifestyle  Exercise for  at least 150 minutes a week (30 minutes a day, 5 days a week). This will help you control your weight and prevent disease.   Limit alcohol to one drink per day.   No smoking.   Wear sunscreen to prevent skin cancer.   See your dentist every six months for an exam and cleaning.   See your eye doctor every 1 to 2 years to screen for conditions such as glaucoma, macular degeneration and cataracts.    Personalized Prevention Plan  You are due for the preventive services outlined below.  Your care team is available to assist you in scheduling these services.  If you have already completed any of these items, please share that information with your care team to update in your medical record.  Health Maintenance   Topic Date Due     RSV VACCINE (Pregnancy & 60+) (1 - 1-dose 60+ series) Never done     ZOSTER IMMUNIZATION (2 of 3) 02/16/2010     URINE DRUG SCREEN  10/26/2023     CONTROLLED SUBSTANCE AGREEMENT FOR CHRONIC PAIN MANAGEMENT  10/26/2023     DIABETIC FOOT EXAM  02/23/2024     A1C  05/22/2024     EYE EXAM  06/09/2024     MEDICARE ANNUAL WELLNESS VISIT  11/22/2024     BMP  11/22/2024     LIPID  11/22/2024     MICROALBUMIN  11/22/2024     LORENZO ASSESSMENT  11/22/2024     FALL RISK ASSESSMENT  11/22/2024     PHQ-9  11/22/2024     ADVANCE CARE PLANNING  11/22/2028     DTAP/TDAP/TD IMMUNIZATION (4 - Td or Tdap) 02/12/2030     PHQ-2 (once per calendar year)  Completed     Pneumococcal Vaccine: 65+ Years  Completed     URINALYSIS  Completed     IPV IMMUNIZATION  Aged Out     HPV IMMUNIZATION  Aged Out     MENINGITIS IMMUNIZATION  Aged Out     RSV MONOCLONAL ANTIBODY  Aged Out     INFLUENZA VACCINE  Discontinued     COVID-19 Vaccine  Discontinued       Learning About Dietary Guidelines  What are the Dietary Guidelines for Americans?     Dietary Guidelines for Americans provide tips for eating well and staying healthy. This helps reduce the risk for long-term (chronic) diseases.  These guidelines recommend that you:  Eat and  drink the right amount for you. The [place] government's food guide is called MyPlate. It can help you make your own well-balanced eating plan.  Try to balance your eating with your activity. This helps you stay at a healthy weight.  Drink alcohol in moderation, if at all.  Limit foods high in salt, saturated fat, trans fat, and added sugar.  These guidelines are from the U.[place]. Department of Agriculture and the U.S[place] Department of Health and Human Services. They are updated every 5 years.  What is MyPlate?  MyPlate is the [place] government's food guide. It can help you make your own well-balanced eating plan. A balanced eating plan means that you eat enough, but not too much, and that your food gives you the nutrients you need to stay healthy.  MyPlate focuses on eating plenty of whole grains, fruits, and vegetables, and on limiting fat and sugar. It is available online at www.ChooseMyPlate.gov.  How can you get started?  If you're trying to eat healthier, you can slowly change your eating habits over time. You don't have to make big changes all at once. Start by adding one or two healthy foods to your meals each day.  Grains  Choose whole-grain breads and cereals and whole-wheat pasta and whole-grain crackers.  Vegetables  Eat a variety of vegetables every day. They have lots of nutrients and are part of a heart-healthy diet.  Fruits  Eat a variety of fruits every day. Fruits contain lots of nutrients. Choose fresh fruit instead of fruit juice.  Protein foods  Choose fish and lean poultry more often. Eat red meat and fried meats less often. Dried beans, tofu, and nuts are also good sources of protein.  Dairy  Choose low-fat or fat-free products from this food group. If you have problems digesting milk, try eating cheese or yogurt instead.  Fats and oils  Limit fats and oils if you're trying to cut calories. Choose healthy fats when you cook. These include canola oil and olive oil.  Where can you learn more?  Go to  "https://www.[employer].net/patiented  Enter D676 in the search box to learn more about \"Learning About Dietary Guidelines.\"  Current as of: February 28, 2023               Content Version: 13.8 2006-2023 [employer].   Care instructions adapted under license by your healthcare professional. If you have questions about a medical condition or this instruction, always ask your healthcare professional. [employer] disclaims any warranty or liability for your use of this information.      Hearing Loss: Care Instructions  Overview     Hearing loss is a sudden or slow decrease in how well you hear. It can range from slight to profound. Permanent hearing loss can occur with aging. It also can happen when you are exposed long-term to loud noise. Examples include listening to loud music, riding motorcycles, or being around other loud machines.  Hearing loss can affect your work and home life. It can make you feel lonely or depressed. You may feel that you have lost your independence. But hearing aids and other devices can help you hear better and feel connected to others.  Follow-up care is a key part of your treatment and safety. Be sure to make and go to all appointments, and call your doctor if you are having problems. It's also a good idea to know your test results and keep a list of the medicines you take.  How can you care for yourself at home?  Avoid loud noises whenever possible. This helps keep your hearing from getting worse.  Always wear hearing protection around loud noises.  Wear a hearing aid as directed.  A professional can help you pick a hearing aid that will work best for you.  You can also get hearing aids over the counter for mild to moderate hearing loss.  Have hearing tests as your doctor suggests. They can show whether your hearing has changed. Your hearing aid may need to be adjusted.  Use other devices as needed. These may include:  Telephone amplifiers and hearing aids " "that can connect to a television, stereo, radio, or microphone.  Devices that use lights or vibrations. These alert you to the doorbell, a ringing telephone, or a baby monitor.  Television closed-captioning. This shows the words at the bottom of the screen. Most new TVs can do this.  TTY (text telephone). This lets you type messages back and forth on the telephone instead of talking or listening. These devices are also called TDD. When messages are typed on the keyboard, they are sent over the phone line to a receiving TTY. The message is shown on a monitor.  Use text messaging, social media, and email if it is hard for you to communicate by telephone.  Try to learn a listening technique called speechreading. It is not lipreading. You pay attention to people's gestures, expressions, posture, and tone of voice. These clues can help you understand what a person is saying. Face the person you are talking to, and have them face you. Make sure the lighting is good. You need to see the other person's face clearly.  Think about counseling if you need help to adjust to your hearing loss.  When should you call for help?  Watch closely for changes in your health, and be sure to contact your doctor if:    You think your hearing is getting worse.     You have new symptoms, such as dizziness or nausea.   Where can you learn more?  Go to https://www.[employer].net/patiented  Enter R798 in the search box to learn more about \"Hearing Loss: Care Instructions.\"  Current as of: February 28, 2023               Content Version: 13.8    9515-4208 [employer].   Care instructions adapted under license by your healthcare professional. If you have questions about a medical condition or this instruction, always ask your healthcare professional. [employer] disclaims any warranty or liability for your use of this information.      Bladder Training: Care Instructions  Your Care Instructions     Bladder training is " used to treat urge incontinence and stress incontinence. Urge incontinence means that the need to urinate comes on so fast that you can't get to a toilet in time. Stress incontinence means that you leak urine because of pressure on your bladder. For example, it may happen when you laugh, cough, or lift something heavy.  Bladder training can increase how long you can wait before you have to urinate. It can also help your bladder hold more urine. And it can give you better control over the urge to urinate.  It is important to remember that bladder training takes a few weeks to a few months to make a difference. You may not see results right away, but don't give up.  Follow-up care is a key part of your treatment and safety. Be sure to make and go to all appointments, and call your doctor if you are having problems. It's also a good idea to know your test results and keep a list of the medicines you take.  How can you care for yourself at home?  Work with your doctor to come up with a bladder training program that is right for you. You may use one or more of the following methods.  Delayed urination  In the beginning, try to keep from urinating for 5 minutes after you first feel the need to go.  While you wait, take deep, slow breaths to relax. Kegel exercises can also help you delay the need to go to the bathroom.  After some practice, when you can easily wait 5 minutes to urinate, try to wait 10 minutes before you urinate.  Slowly increase the waiting period until you are able to control when you have to urinate.  Scheduled urination  Empty your bladder when you first wake up in the morning.  Schedule times throughout the day when you will urinate.  Start by going to the bathroom every hour, even if you don't need to go.  Slowly increase the time between trips to the bathroom.  When you have found a schedule that works well for you, keep doing it.  If you wake up during the night and have to urinate, do it. Apply  "your schedule to waking hours only.  Kegel exercises  These tighten and strengthen pelvic muscles, which can help you control the flow of urine. (If doing these exercises causes pain, stop doing them and talk with your doctor.) To do Kegel exercises:  Squeeze your muscles as if you were trying not to pass gas. Or squeeze your muscles as if you were stopping the flow of urine. Your belly, legs, and buttocks shouldn't move.  Hold the squeeze for 3 seconds, then relax for 5 to 10 seconds.  Start with 3 seconds, then add 1 second each week until you are able to squeeze for 10 seconds.  Repeat the exercise 10 times a session. Do 3 to 8 sessions a day.  When should you call for help?  Watch closely for changes in your health, and be sure to contact your doctor if:    Your incontinence is getting worse.     You do not get better as expected.   Where can you learn more?  Go to https://www.[employer]/patiented  Enter V684 in the search box to learn more about \"Bladder Training: Care Instructions.\"  Current as of: February 28, 2023               Content Version: 13.8    8097-9852 [employer].   Care instructions adapted under license by your healthcare professional. If you have questions about a medical condition or this instruction, always ask your healthcare professional. [employer] disclaims any warranty or liability for your use of this information.      Preventing Falls: Care Instructions  Injuries and health problems such as trouble walking or poor eyesight can increase your risk of falling. So can some medicines. But there are things you can do to help prevent falls. You can exercise to get stronger. You can also arrange your home to make it safer.    Talk to your doctor about the medicines you take. Ask if any of them increase the risk of falls and whether they can be changed or stopped.   Try to exercise regularly. It can help improve your strength and balance. This can help lower your " "risk of falling.     Practice fall safety and prevention.    Wear low-heeled shoes that fit well and give your feet good support. Talk to your doctor if you have foot problems that make this hard.  Carry a cellphone or wear a medical alert device that you can use to call for help.  Use stepladders instead of chairs to reach high objects. Don't climb if you're at risk for falls. Ask for help, if needed.  Wear the correct eyeglasses, if you need them.    Make your home safer.    Remove rugs, cords, clutter, and furniture from walkways.  Keep your house well lit. Use night-lights in hallways and bathrooms.  Install and use sturdy handrails on stairways.  Wear nonskid footwear, even inside. Don't walk barefoot or in socks without shoes.    Be safe outside.    Use handrails, curb cuts, and ramps whenever possible.  Keep your hands free by using a shoulder bag or backpack.  Try to walk in well-lit areas. Watch out for uneven ground, changes in pavement, and debris.  Be careful in the winter. Walk on the grass or gravel when sidewalks are slippery. Use de-icer on steps and walkways. Add non-slip devices to shoes.    Put grab bars and nonskid mats in your shower or tub and near the toilet. Try to use a shower chair or bath bench when bathing.   Get into a tub or shower by putting in your weaker leg first. Get out with your strong side first. Have a phone or medical alert device in the bathroom with you.   Where can you learn more?  Go to https://www.[employer].net/patiented  Enter G117 in the search box to learn more about \"Preventing Falls: Care Instructions.\"  Current as of: July 18, 2023               Content Version: 13.8    3106-9930 [employer].   Care instructions adapted under license by your healthcare professional. If you have questions about a medical condition or this instruction, always ask your healthcare professional. [employer] disclaims any warranty or liability for your use of " this information.      How to Get Up Safely After a Fall: Care Instructions  Overview     If you have injuries, health problems, or other reasons that may make it easy for you to fall at home, it is a good idea to learn how to get up safely after a fall. Learning how to get up correctly can help you avoid making an injury worse.  Also, knowing what to do if you cannot get up can help you stay safe until help arrives.  Follow-up care is a key part of your treatment and safety. Be sure to make and go to all appointments, and call your doctor if you are having problems. It's also a good idea to know your test results and keep a list of the medicines you take.  How can you care for yourself after a fall?  If you think you can get up  First lie still for a few minutes and think about how you feel. If your body feels okay and you think you can get up safely, follow the rest of the steps below:  Look for a chair or other piece of furniture that is close to you.  Roll onto your side and rest. Roll by turning your head in the direction you want to roll, move your shoulder and arm, then hip and leg in the same direction.  Lie still for a moment to let your blood pressure adjust.  Slowly push your upper body up, lift your head, and take a moment to rest.  Slowly get up on your hands and knees, and crawl to the chair or other stable piece of furniture.  Put your hands on the chair.  Move one foot forward, and place it flat on the floor. Your other leg should be bent with the knee on the floor.  Rise slowly, turn your body, and sit in the chair. Stay seated for a bit and think about how you feel. Call for help. Even if you feel okay, let someone know what happened to you. You might not know that you have a serious injury.  If you cannot get up  If you think you are injured after a fall or you cannot get up, try not to panic.  Call out for help.  If you have a phone within reach or you have an emergency call device, use it to  "call for help.  If you do not have a phone within reach, try to slide yourself toward it. If you cannot get to the phone, try to slide toward a door or window or a place where you think you can be heard.  Pender or use an object to make noise so someone might hear you.  If you can reach something that you can use for a pillow, place it under your head. Try to stay warm by covering yourself with a blanket or clothing while you wait for help.  When should you call for help?   Call 911 anytime you think you may need emergency care. For example, call if:    You passed out (lost consciousness).     You cannot get up after a fall.     You have severe pain.   Call your doctor now or seek immediate medical care if:    You have new or worse pain.     You are dizzy or lightheaded.     You hit your head.   Watch closely for changes in your health, and be sure to contact your doctor if:    You do not get better as expected.   Where can you learn more?  Go to [email or web address]://[email or web address].net/patiented  Enter G513 in the search box to learn more about \"How to Get Up Safely After a Fall: Care Instructions.\"  Current as of: November 13, 2022               Content Version: 13.8    [id number] [employer].   Care instructions adapted under license by your healthcare professional. If you have questions about a medical condition or this instruction, always ask your healthcare professional. [employer] disclaims any warranty or liability for your use of this information.         "

## 2023-11-22 NOTE — NURSING NOTE
Dexcom G6 Sensor applied to left abdomen without difficulty.  PN: [id number]  Lot: [id number]  Expires: 1/31/24  Sensor Number: [id number]  Pamphlet given to patient. Instructions reviewed with patient. Patient will remove sensor on 12/2/23 after 4:00 PM and bring to his 12/4/23 appointment with Keri Sal, Diabetic Educator. Karen Villatoro RN on 11/22/2023 at 4:03 PM

## 2023-11-22 NOTE — PROGRESS NOTES
Assessment & Plan     ICD-10-CM    1. Controlled type 2 diabetes mellitus with diabetic polyneuropathy, without long-term current use of insulin (H)  E11.42 glipiZIDE (GLUCOTROL) 5 MG tablet     Continuous Blood Gluc  (DEXCOM G7 ) MARTI     Continuous Blood Gluc Sensor (DEXCOM G7 SENSOR) MISC      2. Benign essential hypertension  I10 diltiazem ER (DILT-XR) 120 MG 24 hr capsule      3. Coronary artery disease involving native coronary artery of native heart without angina pectoris  I25.10 apixaban ANTICOAGULANT (ELIQUIS) 5 MG tablet     rosuvastatin (CRESTOR) 40 MG tablet     ticagrelor (BRILINTA) 90 MG tablet      4. Chronic kidney disease, stage 2 (mild)  N18.2       5. Mixed hyperlipidemia  E78.2       6. Paroxysmal atrial fibrillation (H)  I48.0       7. Chronic anticoagulation - Eliquis  Z79.01       8. Thrombocytopenia (H24)  D69.6       9. Vitamin B12 deficiency  E53.8       10. Vitamin D deficiency  E55.9       11. S/P drug eluting coronary stent placement - RCA x2 and LAD x1 - 3/14/2023 - [facility]  Z95.5 apixaban ANTICOAGULANT (ELIQUIS) 5 MG tablet     rosuvastatin (CRESTOR) 40 MG tablet     ticagrelor (BRILINTA) 90 MG tablet      12. Raynaud's phenomenon without gangrene  I73.00 diltiazem ER (DILT-XR) 120 MG 24 hr capsule      13. Premature atrial contractions  I49.1 diltiazem ER (DILT-XR) 120 MG 24 hr capsule      14. Ventricular bigeminy  I49.8 diltiazem ER (DILT-XR) 120 MG 24 hr capsule      15. Painful diabetic neuropathy (H)  E11.40 gabapentin (NEURONTIN) 100 MG capsule     gabapentin (NEURONTIN) 300 MG capsule      16. Neuropathic pain of both feet  G57.93 oxyCODONE (ROXICODONE) 5 MG tablet      17. Chronic pain syndrome  G89.4 oxyCODONE (ROXICODONE) 5 MG tablet      18. Type 2 diabetes mellitus with hyperglycemia, without long-term current use of insulin (H)  E11.65       19. Vaccine counseling  Z71.85       20. Encounter for Medicare annual wellness exam  Z00.00          Patient presents for Medicare annual wellness visit as well as follow-up multiple issues.    Coronary disease, appears stable.  History of stent placement in the past.  Seems to be doing well with current medications.  Denies exertional angina.  Doing well with current medication regimen.  Continues with diltiazem, Crestor, Brilinta, Eliquis.  Needs refills.    Thrombocytopenia, chronic.  Closely monitor.  Continues with Brilinta, Eliquis.  Easy bruising, no excessive bleeding issues.    Vitamin D and B12 deficiency.  Ongoing.  Continues with oral replacement.  Encouraged ongoing dosing.    Raynaud's phenomenon without gangrene, also has PACs and history of ventricular bigeminy.  Currently using diltiazem which has been helpful and effective.  Needs refills.    Painful diabetic neuropathy.  Ongoing.  Continues with gabapentin, has found helpful and effective.  Needs refills.    HYPERTENSION - Ongoing. Blood pressure is currently well controlled.  Medication side effects: None. Denies syncope or presyncope.  No changes for now. Continue current medications.   Medication list reviewed/updated. Refills completed as needed.      MIXED HYPERLIPIDEMIA.  Ongoing. LDL is at goal: No. Triglycerides are at goal: No.  Hopefully lifestyle modifications will improve cholesterol levels, otherwise we will need to consider additional medication dose adjustments or medication changes.  Medication side effects reported: None. - recently stopped Crestor -- recommend restart.  Rx refilled.     Recent Labs   Lab Test 11/22/23  1413 05/24/23  1352   CHOL 197 175   HDL 37* 46   * 105*   TRIG 251* 120        Paroxysmal Atrial Fibrillation, chronic, ongoing.  Continues with Eliquis for oral anticoagulation.  Heart rates controlled with Diltiazem.  Tolerating well.  Denies excessive bruising or bleeding issues.  Medication list reviewed/updated. Refills completed as needed.     Chronic Kidney Disease, Stage 2 (GFR 60-89), chronic,  ongoing.  Kidney function has been slowly declining.  Encouraged NSAID avoidance.       Vaccine counseling completed.  Encouraged routine / annual vaccinations.    Type 2 Diabetes Mellitus, with nephropathy, with neuropathy, Reports ongoing/previous: numbness and burning.  Blood sugar control has been poor with moderate hyperglycemia and multiple hypoglycemic episodes. Doing well with diet, oral agents, and exercise.  Medication list reviewed/updated. Refills completed as needed.    Complicating factors include but are not limited to: hypertension, hyperlipidemia, and chronic kidney disease.     Recent Labs   Lab Test 11/22/23  1413 05/24/23  1352 02/22/23  1355   A1C 8.3* 7.0* 9.2*   * 105* 153*   HDL 37* 46 35*   TRIG 251* 120 149   ALT 8 11 8*   CR 0.95 0.95 0.88   GFRESTIMATED 77 77 84   POTASSIUM 4.6 4.5 4.0   TSH 1.90 1.53 1.86   WBC 7.3 6.1 6.0   HGB 15.0 15.9 15.7    144* 127*   ALBUMIN 3.8 4.3 3.8     Hemoglobin A1c is high not at goal.  LDL and triglycerides are high not at goal.  ALT normal.  Creatinine is at baseline and potassium normal.  TSH normal.  CBC normal.  Albumin normal.        Patient needs to monitor blood sugar more closely, having had multiple episodes of hypoglycemic events, BLOOD GLUCOSE RESULTS LESS THAN 54 MG/DL, with oral medications.  -Recommend Dexcom for continuous glucose monitoring.    I have evaluated this patient in-person today and I am prescribing continuous glucose monitor for the following reasons:  -The patient has a diagnosis of diabetes mellitus.  -The patient's medication regimen requires frequent adjustments due to hypoglycemia / Low blood sugars.   -Multiple episodes of hypoglycemic events, BLOOD GLUCOSE RESULTS LESS THAN 54 MG/DL      Return in about 3 months (around 2/22/2024) for - Labs every 91+ days, with DM Follow-up, Same Day or 1-2 days later with Dr. Montanez.    Nawaf Montanez MD  [facility] [place]     SUBJECTIVE:   Steven is a 88  "year old, presenting for the following:  Medicare Wellness Visit and Diabetes        11/22/2023     2:38 PM   Additional Questions   Roomed by Jerel Skaggs LPN   Accompanied by n/a       Are you in the first 12 months of your Medicare coverage?  No    Healthy Habits:     In general, how would you rate your overall health?  Good    Frequency of exercise:  6-7 days/week    Duration of exercise:  15-30 minutes    Do you usually eat at least 4 servings of fruit and vegetables a day, include whole grains    & fiber and avoid regularly eating high fat or \"junk\" foods?  No    Taking medications regularly:  Yes    Barriers to taking medications:  Not applicable    Medication side effects:  Not applicable    Ability to successfully perform activities of daily living:  No assistance needed    Home Safety:  No safety concerns identified    Hearing Impairment:  Difficulty following a conversation in a noisy restaurant or crowded room and need to ask people to speak up or repeat themselves    In the past 6 months, have you been bothered by leaking of urine? Yes    In general, how would you rate your overall mental or emotional health?  Good    Additional concerns today:  Yes  History of Present Illness       Diabetes:   He presents for follow up of diabetes.   He is checking home blood glucose with a continuous glucose monitor.     Blood glucose is sometimes over 200 and never under 70. He is aware of hypoglycemia symptoms including shakiness, weakness and other.      He is having blurry vision.  The patient has had a diabetic eye exam in the last 12 months. Eye exam performed on 06/09/2023   also upcoming the first week in December 2023. Location of last eye exam [facility].       He is not taking prescribed medications regularly due to Not applicable.      Today's PHQ-9 Score:       11/22/2023     2:27 PM   PHQ-9 SCORE   PHQ-9 Total Score MyChart 2 (Minimal depression)   PHQ-9 Total Score 2           Have you ever done " Advance Care Planning? (For example, a Health Directive, POLST, or a discussion with a medical provider or your loved ones about your wishes): Yes, advance care planning is on file.     Fall risk  Fallen 2 or more times in the past year?: Yes  Any fall with injury in the past year?: No    Cognitive Screening   1) Repeat 3 items (Leader, Season, Table)    2) Clock draw: NORMAL  3) 3 item recall: Recalls NO objects   Results: 0 items recalled: PROBABLE COGNITIVE IMPAIRMENT, **INFORM PROVIDER**    Mini-CogTM Copyright [employer]. Licensed by the author for use in [facility]; reprinted with permission (chuckie@[facility]). All rights reserved.      Do you have sleep apnea, excessive snoring or daytime drowsiness? : no    Reviewed and updated as needed this visit by clinical staff   Tobacco  Allergies  Meds  Problems  Med Hx  Surg Hx  Fam Hx  Soc   Hx        Reviewed and updated as needed this visit by Provider   Tobacco  Allergies  Meds  Problems  Med Hx  Surg Hx  Fam Hx         Social History     Tobacco Use    Smoking status: Former     Packs/day: 1.00     Years: 10.00     Additional pack years: 0.00     Total pack years: 10.00     Types: Cigarettes     Quit date: 1975     Years since quittin.9    Smokeless tobacco: Never   Substance Use Topics    Alcohol use: Yes     Alcohol/week: 1.0 standard drink of alcohol     Types: 1 Standard drinks or equivalent per week     Comment: maybe once a month             2023     2:32 PM   Alcohol Use   Prescreen: >3 drinks/day or >7 drinks/week? Not Applicable     Do you have a current opioid prescription? No  Do you use any other controlled substances or medications that are not prescribed by a provider? None    Current providers sharing in care for this patient include:   Patient Care Team:  Nawaf Montanez MD as PCP - General (Internal Medicine)  Nawaf Montanez MD as Assigned PCP  Keri Sal RN as Diabetes Educator (Diabetes  Education)  Carlyle Gagnon MD as Assigned Surgical Provider    The following health maintenance items are reviewed in Epic and correct as of today:  Health Maintenance   Topic Date Due    RSV VACCINE (Pregnancy & 60+) (1 - 1-dose 60+ series) Never done    ZOSTER IMMUNIZATION (2 of 3) 02/16/2010    URINE DRUG SCREEN  10/26/2023    CONTROLLED SUBSTANCE AGREEMENT FOR CHRONIC PAIN MANAGEMENT  10/26/2023    DIABETIC FOOT EXAM  02/23/2024    A1C  05/22/2024    EYE EXAM  06/09/2024    MEDICARE ANNUAL WELLNESS VISIT  11/22/2024    BMP  11/22/2024    LIPID  11/22/2024    MICROALBUMIN  11/22/2024    LORENZO ASSESSMENT  11/22/2024    FALL RISK ASSESSMENT  11/22/2024    PHQ-9  11/22/2024    ADVANCE CARE PLANNING  11/22/2028    DTAP/TDAP/TD IMMUNIZATION (4 - Td or Tdap) 02/12/2030    PHQ-2 (once per calendar year)  Completed    Pneumococcal Vaccine: 65+ Years  Completed    URINALYSIS  Completed    IPV IMMUNIZATION  Aged Out    HPV IMMUNIZATION  Aged Out    MENINGITIS IMMUNIZATION  Aged Out    RSV MONOCLONAL ANTIBODY  Aged Out    INFLUENZA VACCINE  Discontinued    COVID-19 Vaccine  Discontinued     Review of Systems   Constitutional:  Positive for fatigue (mostly of feet and legs - has been taking b12 supplements again). Negative for chills and fever.   HENT:  Positive for hearing loss. Negative for congestion, ear pain and sore throat.    Eyes:  Positive for visual disturbance. Negative for pain.   Respiratory:  Positive for shortness of breath. Negative for cough and wheezing.    Cardiovascular:  Negative for chest pain, palpitations and peripheral edema.        Raynaud's syndrome, no ulcerations   Gastrointestinal:  Negative for abdominal pain, constipation, diarrhea, heartburn, hematochezia, nausea and vomiting.   Endocrine: Negative for cold intolerance and heat intolerance.        + Hypoglycemia episodes with glucose less than 54   Genitourinary:  Positive for frequency, impotence and urgency. Negative for dysuria, genital  "sores, hematuria and penile discharge.        + Nocturia 1-2x nightly, previously 3-4x.  + nocturia and overactive bladder symptoms.  Oxybutynin at bedtime has helped evening symptoms, but not daytime symptoms.   Musculoskeletal:  Positive for back pain (+ intermittent low back pain) and gait problem (+ Balance problems). Negative for arthralgias, joint swelling and myalgias.   Skin:  Negative for pallor and rash.   Allergic/Immunologic: Negative for immunocompromised state.   Neurological:  Negative for dizziness, weakness, light-headedness, headaches and paresthesias.        + Balance problems   Hematological:  Does not bruise/bleed easily.   Psychiatric/Behavioral:  Positive for sleep disturbance (+ insomnia and will wake due to foot pain and nocturia). Negative for agitation, confusion and mood changes. The patient is not nervous/anxious.      OBJECTIVE:   /70 (BP Location: Right arm, Patient Position: Sitting, Cuff Size: Adult Regular)   Pulse 68   Temp 98  F (36.7  C) (Temporal)   Resp 20   Ht 1.772 m (5' 9.75\")   Wt 80.7 kg (178 lb)   SpO2 97%   BMI 25.72 kg/m   Estimated body mass index is 25.72 kg/m  as calculated from the following:    Height as of this encounter: 1.772 m (5' 9.75\").    Weight as of this encounter: 80.7 kg (178 lb).  Physical Exam  Constitutional:       General: He is not in acute distress.     Appearance: Normal appearance. He is well-developed. He is not diaphoretic.   HENT:      Head: Normocephalic and atraumatic.   Eyes:      General: No scleral icterus.     Conjunctiva/sclera: Conjunctivae normal.   Cardiovascular:      Rate and Rhythm: Normal rate and regular rhythm.      Pulses: Normal pulses.   Pulmonary:      Effort: Pulmonary effort is normal.      Breath sounds: Normal breath sounds.   Abdominal:      Palpations: Abdomen is soft.      Tenderness: There is no abdominal tenderness.   Musculoskeletal:         General: No deformity.      Cervical back: Neck supple.      " "Right lower leg: Edema (trace) present.      Left lower leg: Edema (trace) present.   Lymphadenopathy:      Cervical: No cervical adenopathy.   Skin:     General: Skin is warm and dry.      Coloration: Skin is not jaundiced.      Findings: No rash.      Comments: Venous stasis and scattered scabs bilaterally on legs   Neurological:      Mental Status: He is alert. Mental status is at baseline.   Psychiatric:         Mood and Affect: Mood normal.         Behavior: Behavior normal.         Patient has been advised of split billing requirements and indicates understanding: Yes      COUNSELING:  Reviewed preventive health counseling, as reflected in patient instructions  Special attention given to:       Regular exercise       Healthy diet/nutrition       Vision screening       Hearing screening       Dental care       Bladder control       Fall risk prevention       Immunizations    BMI:   Estimated body mass index is 25.72 kg/m  as calculated from the following:    Height as of this encounter: 1.772 m (5' 9.75\").    Weight as of this encounter: 80.7 kg (178 lb).         He reports that he quit smoking about 48 years ago. His smoking use included cigarettes. He has a 10.00 pack-year smoking history. He has never used smokeless tobacco.      Appropriate preventive services were discussed with this patient, including applicable screening as appropriate for fall prevention, nutrition, physical activity, Tobacco-use cessation, weight loss and cognition.  Checklist reviewing preventive services available has been given to the patient.    Reviewed patients plan of care and provided an AVS. The Complex Care Plan (for patients with higher acuity and needing more deliberate coordination of services) for Steven meets the Care Plan requirement. This Care Plan has been established and reviewed with the Patient.          Nawaf Montanez MD  [facility]    Identified Health Risks:  I have reviewed Opioid Use " Disorder and Substance Use Disorder risk factors and made any needed referrals.   Answers submitted by the patient for this visit:  Patient Health Questionnaire (Submitted on 11/22/2023)  If you checked off any problems, how difficult have these problems made it for you to do your work, take care of things at home, or get along with other people?: Not difficult at all  PHQ9 TOTAL SCORE: 2  LORENZO-7 (Submitted on 11/22/2023)  LORENZO 7 TOTAL SCORE: 0  The patient was counseled and encouraged to consider modifying their diet and eating habits. He was provided with information on recommended healthy diet options.  The patient was provided with written information regarding signs of hearing loss.  Information on urinary incontinence and treatment options given to patient.  He is at risk for falling and has been provided with information to reduce the risk of falling at home.

## 2023-11-22 NOTE — TELEPHONE ENCOUNTER
GABAPENTIN 300MG CAPSULES   Last Written Prescription Date:  8/3/23  Last Fill Quantity: 90,   # refills: 0  Last Office Visit: today 11/22/23  Future Office visit:       Routing refill request to provider for review/approval because:  Drug not on the G, P or [facility] refill protocol or controlled substance. Unable to complete prescription refill per RNMedication Refill Policy.................... Aruna Bejarano RN ....................  11/22/2023   2:54 PM

## 2023-11-22 NOTE — NURSING NOTE
"Chief Complaint   Patient presents with    Medicare Wellness Visit    Diabetes       Initial /70 (BP Location: Right arm, Patient Position: Sitting, Cuff Size: Adult Regular)   Pulse 68   Temp 98  F (36.7  C) (Temporal)   Resp 20   Ht 1.772 m (5' 9.75\")   Wt 80.7 kg (178 lb)   SpO2 97%   BMI 25.72 kg/m   Estimated body mass index is 25.72 kg/m  as calculated from the following:    Height as of this encounter: 1.772 m (5' 9.75\").    Weight as of this encounter: 80.7 kg (178 lb).  Medication Review: complete    The next two questions are to help us understand your food security.  If you are feeling you need any assistance in this area, we have resources available to support you today.          11/22/2023   SDOH- Food Insecurity   Within the past 12 months, did you worry that your food would run out before you got money to buy more? N   Within the past 12 months, did the food you bought just not last and you didn t have money to get more? N         Health Care Directive:  Patient has a Health Care Directive on file      Jerel Shabazz      "

## 2023-11-25 ASSESSMENT — ENCOUNTER SYMPTOMS
FATIGUE: 1
BACK PAIN: 1
WHEEZING: 0
BRUISES/BLEEDS EASILY: 0
VOMITING: 0
LIGHT-HEADEDNESS: 0
SLEEP DISTURBANCE: 1
AGITATION: 0
CONFUSION: 0

## 2023-12-04 ENCOUNTER — ALLIED HEALTH/NURSE VISIT (OUTPATIENT)
Dept: EDUCATION SERVICES | Facility: OTHER | Age: 88
End: 2023-12-04
Attending: INTERNAL MEDICINE
Payer: COMMERCIAL

## 2023-12-04 DIAGNOSIS — E11.42 CONTROLLED TYPE 2 DIABETES MELLITUS WITH DIABETIC POLYNEUROPATHY, WITHOUT LONG-TERM CURRENT USE OF INSULIN (H): Primary | ICD-10-CM

## 2023-12-04 PROCEDURE — 95249 CONT GLUC MNTR PT PROV EQP: CPT | Performed by: REGISTERED NURSE

## 2023-12-04 NOTE — PROGRESS NOTES
"Diabetes Self-Management Education & Support    Presents for: Personal CGM Start    Type of Service: In Person Visit  Patient brought professional CGM transmitter in for download.  Results show hyperglycemia, but patient notes, \"I am not sure this is accurate.\"  Discussed accuracy of CGM vs SMBG.  Patient does NOT currently SMBG, but would like to get a new kit \"to see if the CGM is close.\"  Discussed Medicare and glucose monitoring.  Patient will continue to receive CGM supplies through Medicare, but will purchase a generic meter for a back up system.  List given for supplies needed.  Patient states he used to SMBG and does remember how to do this, \"I just need a new kit.\"    Professional CGM Report:    -----------------------------  Dexcom Clarity  -----------------------------  Steven Dennison  YOB: 1935  Generated at: Tue, Dec 4, 2023 11:15 AM CST  Reporting period: Tue Nov 22, 2023 - Mon Dec 4, 2023  -----------------------------  Glucose Details  Average glucose: 304 mg/dL  Standard deviation: 54 mg/dL  GMI: N/A  -----------------------------  Time in Range  Very High: 83%  High: 16%  In Range: 1%  Low: 0%  Very Low: 0%    Target Range   mg/dL    -----------------------------  CGM Details  Sensor usage: 71%  Days with CGM data: 10/10      Patient completed homework (online training and/or Getting started with CGM guide)? : No  Sensor started:: Started today in clinic  CGM Initial Settings: Dexcom (Updating from Dexcom G6 to G7.)  Dexcom Initial Settings: Low Glucose Alarm: On (fixed at 55 mg/dL and can't be changed), High Glucose Alert, Low Glucose Alert, Fall Alert, Out of Range Alert, Low Snooze Alert    Sensor was inserted with no resistance or bleeding at insertion site.    CGM-specific education:   Dexcom sensor: insertion technique, sensor site location and rotation, insulin administration in relation to sensor placement, Dexcom : setting alerts/alarms, Use of trends and " "graphs for pattern management and problem solving, Dexcom Mobile jazmine, and Dexcom Clarity software         ASSESSMENT:   Patient was instructed in features of DEXCOM G7 CGMS.  He was instructed in programming events for glucose, CHO, and exercise..  Patient will wear the sensor for 10 days and if he would like, can download at home using Dexcom Clarity software found at [email or web address].       Patient also requests setting up smart phone device with apps [employer] and Clarity to view glucose on his smart device.     Patient demonstrated understanding by inserting his own sensor.  Sensor inserted without difficulty,  receiving data.  Helped patient program his .         Pt verbalized understanding of concepts discussed and recommendations provided today.    Continue education with the following diabetes management concepts: Healthy Eating, Taking Medication, Problem Solving, and Reducing Risks    PLAN    Due to average  mg/dL with recent Profressional CGM review, recommend patient begin basal insulin, but patient is NOT willing to begin insulin at this time.  Patient states he has low glucose when he participates in bowling on Thursday evenings \"and I even have to drink juice.\"      Reviewed bowling-day last Thursday and report shows does not show low glucose, but patient would like to SMBG and test his blood sugar \"to make sure this is accurate.\"      Encouraged patient to test glucose, as needed, and follow up for CGM review in one week to discuss treatment plan to help bring glucose to target, decreasing risk factors of uncontrolled diabetes.      Topics to cover at upcoming visits: Problem Solving and Reducing Risks    Follow-up: Phone call follow up in one week.  Patient shares he will call next Monday or Tuesday.    See Care Plan for co-developed, patient-state behavior change goals.  AVS provided for patient today.    Education Materials Provided:  Dexcom G7 CGM Quick Guide with step-by-step " "instructions.      SUBJECTIVE/OBJECTIVE:  Presents for: Personal CGM Start  Accompanied by: Self  Diabetes education in the past 24mo: Yes  Focus of Visit: CGM  Type of CGM visit: Personal CGM Start  Diabetes type: Type 2  Disease course: Stable  How confident are you filling out medical forms by yourself:: A little bit  Diabetes management related comments/concerns: yes  Cultural Influences/Ethnic Background:  Not  or     Diabetes Symptoms & Complications:  Fatigue: Yes  Neuropathy: Yes  Polydipsia: No  Autonomic neuropathy: No  CVA: No  Heart disease: Yes    Patient Problem List and Family Medical History reviewed for relevant medical history, current medical status, and diabetes risk factors.    Vitals:  There were no vitals taken for this visit.  Estimated body mass index is 25.72 kg/m  as calculated from the following:    Height as of 11/22/23: 1.772 m (5' 9.75\").    Weight as of 11/22/23: 80.7 kg (178 lb).   Last 3 BP:   BP Readings from Last 3 Encounters:   11/22/23 132/70   05/24/23 136/74   03/24/23 138/72       History   Smoking Status    Former    Packs/day: 1.00    Years: 10.00    Types: Cigarettes    Quit date: 1/1/1975   Smokeless Tobacco    Never       Labs:  Lab Results   Component Value Date    A1C 8.3 11/22/2023    A1C 7.1 07/07/2021     Lab Results   Component Value Date     11/22/2023    GLC 92 10/26/2022     07/07/2021     Lab Results   Component Value Date     11/22/2023    LDL 86 07/07/2021     HDL Cholesterol   Date Value Ref Range Status   07/07/2021 37 23 - 92 mg/dL Final     Direct Measure HDL   Date Value Ref Range Status   11/22/2023 37 (L) >=40 mg/dL Final   ]  GFR Estimate   Date Value Ref Range Status   11/22/2023 77 >60 mL/min/1.73m2 Final   07/07/2021 68 >60 mL/min/[1.73_m2] Final     GFR Estimate If Black   Date Value Ref Range Status   07/07/2021 82 >60 mL/min/[1.73_m2] Final     Lab Results   Component Value Date    CR 0.95 11/22/2023    CR 1.04 " "07/07/2021     No results found for: \"MICROALBUMIN\"    Healthy Eating:  Healthy Eating Assessed Today: No  Cultural/Lutheran diet restrictions?: No  Meal planning/habits: Carb counting    Being Active:  Days per week of moderate to strenuous exercise (like a brisk walk): 7  On average, minutes per day of exercise at this level: 10  Exercise Minutes per Week: 70  Barrier to exercise: Time    Monitoring:  Blood Glucose Meter: Accu-chek, Unknown  Times checking blood sugar at home (number): 3      Taking Medications:  Diabetes Medication(s)       Sulfonylureas       glipiZIDE (GLUCOTROL) 5 MG tablet    Take 1-2 tablets (5-10 mg) by mouth 2 times daily (with meals)            Current Treatments: Diet      Reducing Risks:  CAD Risks: Male sex, Stress    Healthy Coping:  Informal Support system:: Friends  Support resources: Offerings in Clinic Communities  Patient Activation Measure Survey Score:       No data to display                  Care Plan and Education Provided:  Care Plan: Diabetes   Updates made by Keri Sal RN since 12/4/2023 12:00 AM        Problem: Diabetes Self-Management Education Needed to Optimize Self-Care Behaviors         Goal: Healthy Eating - follow a healthy eating pattern for diabetes         Task: Provide education on managing carbohydrate intake (carbohydrate counting, plate planning method, etc.) Completed 12/4/2023   Responsible User: Keri Sal RN        Goal: Reducing Risks - know how to prevent and treat long-term diabetes complications         Task: Provide education on major complications of diabetes, prevention, early diagnostic measures and treatment of complications Completed 12/4/2023   Responsible User: Keri Sal RN        Task: Provide education on Hemoglobin A1c - goals and relationship to blood glucose levels Completed 12/4/2023   Responsible User: Keri Sal RN        Task: Provide education on recommendations for heart health - lipid levels and goals, " blood pressure and goals, and aspirin therapy, if indicated Completed 12/4/2023   Responsible User: Keri Sal RN Suzette Childs, RN, BSN, CDCES  12/4/2023 12:32 PM     Time Spent: 80 minutes  Encounter Type: Individual    Any diabetes medication dose changes were made via the CDE Protocol per the patient's primary care provider. A copy of this encounter was shared with the provider.

## 2023-12-04 NOTE — PATIENT INSTRUCTIONS
Continuous Glucose Monitoring Personal Start      Congratulations! You have decided to utilize the DEXCOM G7 Continuous Glucose Monitoring (CGM) System (G7). We strongly believe in the use of the new technologies to better assist you in managing your diabetes. These technologies give both you and your provider a better understanding of your diabetes and current trends, which will enable you to improve control of your blood glucose levels.      Low and high alerts are set 70 mg/dL and 300 mg/dL. You inserted your sensor today at 11:45 am.  You should begin receiving sensor glucose readings 30 minutes after insertion.  Your sensor lasts 10 days and you will be prompted when the sensor is close to expiration and a new sensor can then be started.  You will have a 12-hour maryjane period to begin a new sensor.    [employer] is a team of trainers and certified  diabetes educators dedicated to helping you with  your new G7. They provide live, interactive support  and webinars to get you started and keep you  informed.     You may download your CGM information from your  at www.[email or web address].com.  Choose the Dexcom Clarity software.     Dexcom CLARITY software is an important part of your G7.  Clarity highlights your glucose patterns, trends and statistics. Share  with your clinic and monitor improvements between visits.    To use Dexcom CLARITY:    1. Log in at [employer].  Use your [employer] login or create an account.    2. Get weekly notifications with Dexcom CLARITY Reports Tanisha.  Notifications available to Dexcom G7 users.       PLAN:      1. Begin using your Dexcom G7 CGM system.  Sensor period lasts 10 days with a 12-hour maryjane period before new sensor will need to be placed and started by entering the four-digit pairing code found on the back of the applicator.     2. Events - You are encouraged to enter events such as Blood Glucose, Carbohydrates (grams), and Insulin fast-acting or long-acting  (units).     3. Battery - Charge  with  1 - 3 hours every 3 - 5 days or as  battery depletes.      4. Please follow up if any questions or concerns.    Dexcom Technical Support is available 24 hours a day/7 days a week at:       [email or web address]   [email or web address]   Toll free:  [phone]      Keri Sal RN, BSN, [facility]– [place]  12/4/2023 12:41 PM

## 2024-01-24 DIAGNOSIS — R35.0 URINARY FREQUENCY: ICD-10-CM

## 2024-01-24 DIAGNOSIS — R35.1 NOCTURIA: Primary | ICD-10-CM

## 2024-01-24 RX ORDER — TAMSULOSIN HYDROCHLORIDE 0.4 MG/1
0.4 CAPSULE ORAL EVERY EVENING
OUTPATIENT
Start: 2024-01-24

## 2024-01-24 NOTE — TELEPHONE ENCOUNTER
Carolyn,    I have never seen this evan.  He is a previous patient of Dr. Gagnon.  He is not scheduled with me either.    His family doctor will have to renew his tamsulosin.  I am unable to do so at this time until he sees and establishes with me.  We let him know.  Thanks Alex

## 2024-01-25 RX ORDER — TAMSULOSIN HYDROCHLORIDE 0.4 MG/1
0.4 CAPSULE ORAL EVERY EVENING
Qty: 90 CAPSULE | Refills: 0 | Status: SHIPPED | OUTPATIENT
Start: 2024-01-25 | End: 2024-02-28

## 2024-01-25 NOTE — TELEPHONE ENCOUNTER
[facility] sent Rx request for the following:      Requested Prescriptions   Pending Prescriptions Disp Refills    tamsulosin (FLOMAX) 0.4 MG capsule       Sig: Take 1 capsule (0.4 mg) by mouth every evening       Alpha Blockers Failed - 1/25/2024  3:02 PM        Failed - Recent (12 mo) or future (30 days) visit within the authorizing provider's specialty        Failed - Medication is active on med list     Last Prescription Date:     11/22/23 1519 Discontinue Nawaf Montanez MD      tamsulosin (FLOMAX) 0.4 MG capsule (Discontinued) 90 capsule 3 11/17/2022 11/22/2023 No   Sig - Route: Take 1 capsule (0.4 mg) by mouth every evening - Oral     Discontinuation not discussed in OV.    Last Office Visit:              11/22/23   Future Office visit:             Next 5 appointments (look out 90 days)      Feb 28, 2024  2:40 PM  SHORT with Nawaf Montanez MD  [facility] ([facility] ) [address]  [place] [address]  [phone]          Per LOV note:  Return in about 3 months (around 2/22/2024) for - Labs every 91+ days, with DM Follow-up, Same Day or 1-2 days later with Dr. Montanez.     Unable to complete prescription refill per RN Medication Refill Policy.     Sandra Ch RN .............. 1/25/2024  3:05 PM

## 2024-02-28 ENCOUNTER — OFFICE VISIT (OUTPATIENT)
Dept: INTERNAL MEDICINE | Facility: OTHER | Age: 89
End: 2024-02-28
Attending: INTERNAL MEDICINE
Payer: COMMERCIAL

## 2024-02-28 ENCOUNTER — LAB (OUTPATIENT)
Dept: LAB | Facility: OTHER | Age: 89
End: 2024-02-28
Attending: INTERNAL MEDICINE
Payer: COMMERCIAL

## 2024-02-28 VITALS
WEIGHT: 175 LBS | DIASTOLIC BLOOD PRESSURE: 72 MMHG | HEART RATE: 79 BPM | RESPIRATION RATE: 16 BRPM | HEIGHT: 69 IN | SYSTOLIC BLOOD PRESSURE: 138 MMHG | BODY MASS INDEX: 25.92 KG/M2 | OXYGEN SATURATION: 98 %

## 2024-02-28 DIAGNOSIS — Z79.01 CHRONIC ANTICOAGULATION: ICD-10-CM

## 2024-02-28 DIAGNOSIS — E11.65 TYPE 2 DIABETES MELLITUS WITH HYPERGLYCEMIA, WITHOUT LONG-TERM CURRENT USE OF INSULIN (H): ICD-10-CM

## 2024-02-28 DIAGNOSIS — I77.811 ABDOMINAL AORTIC ECTASIA (H): ICD-10-CM

## 2024-02-28 DIAGNOSIS — E53.8 VITAMIN B12 DEFICIENCY: ICD-10-CM

## 2024-02-28 DIAGNOSIS — I48.0 PAROXYSMAL ATRIAL FIBRILLATION (H): ICD-10-CM

## 2024-02-28 DIAGNOSIS — E11.42 CONTROLLED TYPE 2 DIABETES MELLITUS WITH DIABETIC POLYNEUROPATHY, WITHOUT LONG-TERM CURRENT USE OF INSULIN (H): Primary | ICD-10-CM

## 2024-02-28 DIAGNOSIS — R13.10 FOOD STICKS ON SWALLOWING: ICD-10-CM

## 2024-02-28 DIAGNOSIS — E78.2 MIXED HYPERLIPIDEMIA: ICD-10-CM

## 2024-02-28 DIAGNOSIS — R35.1 NOCTURIA: ICD-10-CM

## 2024-02-28 DIAGNOSIS — D69.6 THROMBOCYTOPENIA (H): ICD-10-CM

## 2024-02-28 DIAGNOSIS — G89.4 CHRONIC PAIN SYNDROME: ICD-10-CM

## 2024-02-28 DIAGNOSIS — R82.71 BACTERIA IN URINE: ICD-10-CM

## 2024-02-28 DIAGNOSIS — K21.9 GASTROESOPHAGEAL REFLUX DISEASE, UNSPECIFIED WHETHER ESOPHAGITIS PRESENT: ICD-10-CM

## 2024-02-28 DIAGNOSIS — I25.10 CORONARY ARTERY DISEASE INVOLVING NATIVE CORONARY ARTERY OF NATIVE HEART WITHOUT ANGINA PECTORIS: ICD-10-CM

## 2024-02-28 DIAGNOSIS — N18.2 CHRONIC KIDNEY DISEASE, STAGE 2 (MILD): ICD-10-CM

## 2024-02-28 DIAGNOSIS — R35.0 URINARY FREQUENCY: ICD-10-CM

## 2024-02-28 DIAGNOSIS — Z95.5 S/P DRUG ELUTING CORONARY STENT PLACEMENT: ICD-10-CM

## 2024-02-28 DIAGNOSIS — I10 BENIGN ESSENTIAL HYPERTENSION: ICD-10-CM

## 2024-02-28 DIAGNOSIS — G57.93 NEUROPATHIC PAIN OF BOTH FEET: ICD-10-CM

## 2024-02-28 LAB
ALBUMIN SERPL BCG-MCNC: 4.1 G/DL (ref 3.5–5.2)
ALBUMIN UR-MCNC: 20 MG/DL
ALP SERPL-CCNC: 81 U/L (ref 40–150)
ALT SERPL W P-5'-P-CCNC: 9 U/L (ref 0–70)
ANION GAP SERPL CALCULATED.3IONS-SCNC: 9 MMOL/L (ref 7–15)
APPEARANCE UR: CLEAR
AST SERPL W P-5'-P-CCNC: 12 U/L (ref 0–45)
BACTERIA #/AREA URNS HPF: ABNORMAL /HPF
BILIRUB SERPL-MCNC: 1 MG/DL
BILIRUB UR QL STRIP: NEGATIVE
BUN SERPL-MCNC: 18.7 MG/DL (ref 8–23)
CALCIUM SERPL-MCNC: 9.2 MG/DL (ref 8.8–10.2)
CHLORIDE SERPL-SCNC: 104 MMOL/L (ref 98–107)
CHOLEST SERPL-MCNC: 195 MG/DL
COLOR UR AUTO: ABNORMAL
CREAT SERPL-MCNC: 1.08 MG/DL (ref 0.67–1.17)
CREAT UR-MCNC: 90 MG/DL
DEPRECATED HCO3 PLAS-SCNC: 26 MMOL/L (ref 22–29)
EGFRCR SERPLBLD CKD-EPI 2021: 66 ML/MIN/1.73M2
ERYTHROCYTE [DISTWIDTH] IN BLOOD BY AUTOMATED COUNT: 14.6 % (ref 10–15)
FASTING STATUS PATIENT QL REPORTED: NO
GLUCOSE SERPL-MCNC: 198 MG/DL (ref 70–99)
GLUCOSE UR STRIP-MCNC: 70 MG/DL
HBA1C MFR BLD: 7.6 % (ref 4–6.2)
HCT VFR BLD AUTO: 47.6 % (ref 40–53)
HDLC SERPL-MCNC: 39 MG/DL
HGB BLD-MCNC: 15.9 G/DL (ref 13.3–17.7)
HGB UR QL STRIP: NEGATIVE
KETONES UR STRIP-MCNC: NEGATIVE MG/DL
LDLC SERPL CALC-MCNC: 116 MG/DL
LEUKOCYTE ESTERASE UR QL STRIP: ABNORMAL
MCH RBC QN AUTO: 29 PG (ref 26.5–33)
MCHC RBC AUTO-ENTMCNC: 33.4 G/DL (ref 31.5–36.5)
MCV RBC AUTO: 87 FL (ref 78–100)
MICROALBUMIN UR-MCNC: 140.6 MG/L
MICROALBUMIN/CREAT UR: 156.22 MG/G CR (ref 0–17)
MUCOUS THREADS #/AREA URNS LPF: PRESENT /LPF
NITRATE UR QL: NEGATIVE
NONHDLC SERPL-MCNC: 156 MG/DL
PH UR STRIP: 5.5 [PH] (ref 5–9)
PLATELET # BLD AUTO: 149 10E3/UL (ref 150–450)
POTASSIUM SERPL-SCNC: 4.2 MMOL/L (ref 3.4–5.3)
PROT SERPL-MCNC: 6.7 G/DL (ref 6.4–8.3)
RBC # BLD AUTO: 5.48 10E6/UL (ref 4.4–5.9)
RBC URINE: 1 /HPF
SODIUM SERPL-SCNC: 139 MMOL/L (ref 135–145)
SP GR UR STRIP: 1.02 (ref 1–1.03)
TRIGL SERPL-MCNC: 198 MG/DL
TSH SERPL DL<=0.005 MIU/L-ACNC: 1.94 UIU/ML (ref 0.3–4.2)
UROBILINOGEN UR STRIP-MCNC: NORMAL MG/DL
WBC # BLD AUTO: 6.2 10E3/UL (ref 4–11)
WBC URINE: 7 /HPF

## 2024-02-28 PROCEDURE — G0463 HOSPITAL OUTPT CLINIC VISIT: HCPCS

## 2024-02-28 PROCEDURE — 81003 URINALYSIS AUTO W/O SCOPE: CPT | Mod: ZL

## 2024-02-28 PROCEDURE — 36415 COLL VENOUS BLD VENIPUNCTURE: CPT | Mod: ZL

## 2024-02-28 PROCEDURE — 84443 ASSAY THYROID STIM HORMONE: CPT | Mod: ZL

## 2024-02-28 PROCEDURE — 87086 URINE CULTURE/COLONY COUNT: CPT | Mod: ZL | Performed by: INTERNAL MEDICINE

## 2024-02-28 PROCEDURE — 250N000011 HC RX IP 250 OP 636: Performed by: INTERNAL MEDICINE

## 2024-02-28 PROCEDURE — 96372 THER/PROPH/DIAG INJ SC/IM: CPT | Performed by: INTERNAL MEDICINE

## 2024-02-28 PROCEDURE — 83036 HEMOGLOBIN GLYCOSYLATED A1C: CPT | Mod: ZL

## 2024-02-28 PROCEDURE — 80053 COMPREHEN METABOLIC PANEL: CPT | Mod: ZL

## 2024-02-28 PROCEDURE — 99214 OFFICE O/P EST MOD 30 MIN: CPT | Performed by: INTERNAL MEDICINE

## 2024-02-28 PROCEDURE — 82570 ASSAY OF URINE CREATININE: CPT | Mod: ZL

## 2024-02-28 PROCEDURE — G0463 HOSPITAL OUTPT CLINIC VISIT: HCPCS | Mod: 25

## 2024-02-28 PROCEDURE — 80061 LIPID PANEL: CPT | Mod: ZL

## 2024-02-28 PROCEDURE — 85041 AUTOMATED RBC COUNT: CPT | Mod: ZL

## 2024-02-28 RX ORDER — CEFTRIAXONE SODIUM 1 G
1 VIAL (EA) INJECTION ONCE
Status: COMPLETED | OUTPATIENT
Start: 2024-02-28 | End: 2024-02-28

## 2024-02-28 RX ORDER — TAMSULOSIN HYDROCHLORIDE 0.4 MG/1
0.4 CAPSULE ORAL EVERY EVENING
Qty: 90 CAPSULE | Refills: 4 | Status: SHIPPED | OUTPATIENT
Start: 2024-02-28 | End: 2024-04-26

## 2024-02-28 RX ADMIN — CEFTRIAXONE SODIUM 1 G: 1 INJECTION, POWDER, FOR SOLUTION INTRAMUSCULAR; INTRAVENOUS at 15:57

## 2024-02-28 ASSESSMENT — ENCOUNTER SYMPTOMS
FATIGUE: 1
HEADACHES: 0
SORE THROAT: 0
ABDOMINAL PAIN: 0
NAUSEA: 0
BRUISES/BLEEDS EASILY: 0
JOINT SWELLING: 0
PALPITATIONS: 0
NERVOUS/ANXIOUS: 0
WHEEZING: 0
HEMATURIA: 0
CONFUSION: 0
BACK PAIN: 1
PARESTHESIAS: 0
VOMITING: 0
CONSTIPATION: 0
COUGH: 0
FREQUENCY: 1
AGITATION: 0
HEARTBURN: 0
DIZZINESS: 0
SLEEP DISTURBANCE: 1
DYSURIA: 0
HEMATOCHEZIA: 0
WEAKNESS: 0
DIARRHEA: 0
MYALGIAS: 0
SHORTNESS OF BREATH: 1
FEVER: 0
LIGHT-HEADEDNESS: 0
ARTHRALGIAS: 0
CHILLS: 0
EYE PAIN: 0

## 2024-02-28 ASSESSMENT — PAIN SCALES - PAIN ENJOYMENT GENERAL ACTIVITY SCALE (PEG)
AVG_PAIN_PASTWEEK: 2
INTERFERED_GENERAL_ACTIVITY: 1
PEG_TOTALSCORE: 1
INTERFERED_ENJOYMENT_LIFE: 0

## 2024-02-28 ASSESSMENT — PATIENT HEALTH QUESTIONNAIRE - PHQ9
SUM OF ALL RESPONSES TO PHQ QUESTIONS 1-9: 2
10. IF YOU CHECKED OFF ANY PROBLEMS, HOW DIFFICULT HAVE THESE PROBLEMS MADE IT FOR YOU TO DO YOUR WORK, TAKE CARE OF THINGS AT HOME, OR GET ALONG WITH OTHER PEOPLE: NOT DIFFICULT AT ALL

## 2024-02-28 ASSESSMENT — ANXIETY QUESTIONNAIRES
GAD7 TOTAL SCORE: 0
7. FEELING AFRAID AS IF SOMETHING AWFUL MIGHT HAPPEN: NOT AT ALL
GAD7 TOTAL SCORE: 0
8. IF YOU CHECKED OFF ANY PROBLEMS, HOW DIFFICULT HAVE THESE MADE IT FOR YOU TO DO YOUR WORK, TAKE CARE OF THINGS AT HOME, OR GET ALONG WITH OTHER PEOPLE?: NOT DIFFICULT AT ALL

## 2024-02-28 NOTE — PROGRESS NOTES
Assessment & Plan   Problem List Items Addressed This Visit          Nervous and Auditory    Controlled type 2 diabetes mellitus with diabetic polyneuropathy, without long-term current use of insulin (H) - Primary       Digestive    Vitamin B12 deficiency    Relevant Medications    cyanocobalamin 2500 MCG SUBL sublingual tablet    Gastroesophageal reflux disease, unspecified whether esophagitis present       Endocrine    Mixed hyperlipidemia       Circulatory    Abdominal aortic ectasia (H24)    Benign essential hypertension    Coronary artery disease involving native coronary artery of native heart without angina pectoris    Paroxysmal atrial fibrillation (H)       Urinary    Chronic kidney disease, stage 2 (mild)       Hematologic    Thrombocytopenia (H24)       Other    Nocturia    Relevant Medications    tamsulosin (FLOMAX) 0.4 MG capsule    S/P drug eluting coronary stent placement - RCA x2 and LAD x1 - 3/14/2023 - [facility]    Chronic anticoagulation - Eliquis    Food sticks on swallowing - if trying to swallow too much food - better with smaller bites     Other Visit Diagnoses       Neuropathic pain of both feet        Chronic pain syndrome        Urinary frequency        Relevant Medications    tamsulosin (FLOMAX) 0.4 MG capsule    Bacteria in urine        Relevant Medications    cefTRIAXone (ROCEPHIN) in lidocaine 1% (PF) for IM administration 1 g (Completed)    Other Relevant Orders    Urine Culture (Completed)           Patient presents for diabetes follow-up, as well as follow up multiple issues.    Coronary artery disease, appears stable.  Has history drug-eluting stent placement back in March 2023.  Doing well with current medication regimen including diltiazem, Eliquis, Brilinta.  Refill sent to pharmacy.    Thrombocytopenia, ongoing.  Noted with lab work today as well as back in May 2023.  Does have easy bruising, denies excessive bleeding issues.  Continue close monitoring.  He does take oral  anticoagulation, Eliquis.    Neuropathic pain of both feet.  Has diabetes neuropathy.  More numbness than pain at this time.  Continue to monitor.    Food sticks with swallowing.  Has reflux, declines further evaluation at this time.  Follow-up as needed for new or worsening symptoms.    Nocturia and urinary frequency, bacteria noted on urinalysis.  Culture pending.  Rocephin 1 g IM administered in clinic.  -Culture returned with preliminary results showing Enterococcus infection.  -Oral antibiotic initiation after culture results.    Hopefully antibiotic therapy for his Enterococcus UTI will help his urinary frequency and nocturia.    Vitamin B12 deficiency, Ongoing.  Continues with sublingual B12 tablets.  Needs refills.    Abdominal aortic ectasia, noted with previous ultrasound.  Appears stable.  Continue to monitor.    HYPERTENSION - Ongoing. Blood pressure is currently well controlled.  Medication side effects: None. Denies syncope or presyncope.  Continue current medications.   Medication list reviewed/updated. Refills completed as needed.      MIXED HYPERLIPIDEMIA.  Ongoing. LDL is at goal: No. Triglycerides are at goal: No.  Hopefully lifestyle modifications will improve cholesterol levels, otherwise will consider additional medication dose adjustments or medication changes.  Medication side effects reported: Yes - Myalgias with Crestor .     Recent Labs   Lab Test 02/28/24  1437 11/22/23  1413   CHOL 195 197   HDL 39* 37*   * 110*   TRIG 198* 251*        Paroxysmal Atrial Fibrillation, chronic, ongoing.  Continues with apixaban (ELIQUIS) for oral anticoagulation.  Heart rates are controlled with diltiazem.  Tolerating well.  Denies excessive bruising or bleeding issues.  Medication list reviewed/updated. Refills completed as needed.     Chronic Kidney Disease, Stage 2 (GFR 60-89), chronic, ongoing.  Kidney function had been slowly declining.  Encourage NSAID avoidance.       Vaccine counseling  completed.  Encourage routine / annual vaccinations.    Type 2 Diabetes Mellitus, with nephropathy, with neuropathy, Reports ongoing/previous: numbness.  Blood sugar control has been good with mild hyperglycemia. Doing well with diet and oral agents.  Medication list reviewed/updated. Refills completed as needed.    Complicating factors include but are not limited to: hypertension, hyperlipidemia, neuropathy, chronic kidney disease, and CAD/PVD.     Recent Labs   Lab Test 02/28/24  1437 11/22/23  1413 05/24/23  1352   A1C 7.6* 8.3* 7.0*   * 110* 105*   HDL 39* 37* 46   TRIG 198* 251* 120   ALT 9 8 11   CR 1.08 0.95 0.95   GFRESTIMATED 66 77 77   POTASSIUM 4.2 4.6 4.5   TSH 1.94 1.90 1.53   WBC 6.2 7.3 6.1   HGB 15.9 15.0 15.9   * 169 144*   ALBUMIN 4.1 3.8 4.3     Results for orders placed or performed in visit on 02/28/24   Urine Culture     Status: Abnormal (Preliminary result)    Specimen: Urine, Midstream   Result Value Ref Range    Culture >100,000 CFU/mL Enterococcus species (A)    Results for orders placed or performed in visit on 02/28/24   Urine Macroscopic with reflex to Microscopic     Status: Abnormal   Result Value Ref Range    Color Urine Light Yellow Colorless, Straw, Light Yellow, Yellow    Appearance Urine Clear Clear    Glucose Urine 70 (A) Negative mg/dL    Bilirubin Urine Negative Negative    Ketones Urine Negative Negative mg/dL    Specific Gravity Urine 1.016 1.000 - 1.030    Blood Urine Negative Negative    pH Urine 5.5 5.0 - 9.0    Protein Albumin Urine 20 (A) Negative mg/dL    Urobilinogen Urine Normal Normal, 2.0 mg/dL    Nitrite Urine Negative Negative    Leukocyte Esterase Urine Small (A) Negative    Bacteria Urine Many (A) None Seen /HPF    RBC Urine 1 <=2 /HPF    WBC Urine 7 (H) <=5 /HPF    Mucus Urine Present (A) None Seen /LPF   TSH with free T4 reflex     Status: Normal   Result Value Ref Range    TSH 1.94 0.30 - 4.20 uIU/mL   Hemoglobin A1c     Status: Abnormal    Result Value Ref Range    Hemoglobin A1C 7.6 (H) 4.0 - 6.2 %   CBC with platelets     Status: Abnormal   Result Value Ref Range    WBC Count 6.2 4.0 - 11.0 10e3/uL    RBC Count 5.48 4.40 - 5.90 10e6/uL    Hemoglobin 15.9 13.3 - 17.7 g/dL    Hematocrit 47.6 40.0 - 53.0 %    MCV 87 78 - 100 fL    MCH 29.0 26.5 - 33.0 pg    MCHC 33.4 31.5 - 36.5 g/dL    RDW 14.6 10.0 - 15.0 %    Platelet Count 149 (L) 150 - 450 10e3/uL   Albumin Random Urine Quantitative with Creat Ratio     Status: Abnormal   Result Value Ref Range    Creatinine Urine mg/dL 90.0 mg/dL    Albumin Urine mg/L 140.6 mg/L    Albumin Urine mg/g Cr 156.22 (H) 0.00 - 17.00 mg/g Cr   Lipid Profile     Status: Abnormal   Result Value Ref Range    Cholesterol 195 <200 mg/dL    Triglycerides 198 (H) <150 mg/dL    Direct Measure HDL 39 (L) >=40 mg/dL    LDL Cholesterol Calculated 116 (H) <=100 mg/dL    Non HDL Cholesterol 156 (H) <130 mg/dL    Patient Fasting > 8hrs? No     Narrative    Cholesterol  Desirable:  <200 mg/dL    Triglycerides  Normal:  Less than 150 mg/dL  Borderline High:  150-199 mg/dL  High:  200-499 mg/dL  Very High:  Greater than or equal to 500 mg/dL    Direct Measure HDL  Female:  Greater than or equal to 50 mg/dL   Male:  Greater than or equal to 40 mg/dL    LDL Cholesterol  Desirable:  <100mg/dL  Above Desirable:  100-129 mg/dL   Borderline High:  130-159 mg/dL   High:  160-189 mg/dL   Very High:  >= 190 mg/dL    Non HDL Cholesterol  Desirable:  130 mg/dL  Above Desirable:  130-159 mg/dL  Borderline High:  160-189 mg/dL  High:  190-219 mg/dL  Very High:  Greater than or equal to 220 mg/dL   Comprehensive metabolic panel     Status: Abnormal   Result Value Ref Range    Sodium 139 135 - 145 mmol/L    Potassium 4.2 3.4 - 5.3 mmol/L    Carbon Dioxide (CO2) 26 22 - 29 mmol/L    Anion Gap 9 7 - 15 mmol/L    Urea Nitrogen 18.7 8.0 - 23.0 mg/dL    Creatinine 1.08 0.67 - 1.17 mg/dL    GFR Estimate 66 >60 mL/min/1.73m2    Calcium 9.2 8.8 - 10.2 mg/dL     Chloride 104 98 - 107 mmol/L    Glucose 198 (H) 70 - 99 mg/dL    Alkaline Phosphatase 81 40 - 150 U/L    AST 12 0 - 45 U/L    ALT 9 0 - 70 U/L    Protein Total 6.7 6.4 - 8.3 g/dL    Albumin 4.1 3.5 - 5.2 g/dL    Bilirubin Total 1.0 <=1.2 mg/dL      Random glucose elevated at 198.  Chemistry panel otherwise normal.  Cholesterol levels are high and not at goal.  TSH is normal.  Hemoglobin A1c is well-controlled platelet count is low but hemoglobin and white blood cell count are normal.  Urinalysis is abnormal with many bacteria.  Urine protein levels are high.  Enterococcus species growing in preliminary urine culture results.      + Due to urinary frequency, bacteria in the urine....   - culture pending.   - antibiotic shot today. -- Rocephin.                Return in about 3 months (around 5/28/2024) for - Labs every 91+ days, with DM Follow-up, Same Day or 1-2 days later with Dr. Montanez.      Nawaf Montanez MD  [facility]    Review of Systems   Constitutional:  Positive for fatigue (mostly of feet and legs - has been taking b12 supplements again). Negative for chills and fever.   HENT:  Positive for hearing loss. Negative for congestion, ear pain and sore throat.    Eyes:  Positive for visual disturbance. Negative for pain.   Respiratory:  Positive for shortness of breath. Negative for cough and wheezing.    Cardiovascular:  Negative for chest pain, palpitations and peripheral edema.        Raynaud's syndrome, no ulcerations   Gastrointestinal:  Negative for abdominal pain, constipation, diarrhea, heartburn, hematochezia, nausea and vomiting.   Endocrine: Negative for cold intolerance and heat intolerance.        + Hypoglycemia episodes with glucose less than 54   Genitourinary:  Positive for frequency, impotence and urgency. Negative for dysuria, genital sores, hematuria and penile discharge.        + Nocturia 1-2x nightly, previously 3-4x.  + nocturia and overactive bladder  symptoms.  Oxybutynin at bedtime has helped evening symptoms, but not daytime symptoms.   Musculoskeletal:  Positive for back pain (+ intermittent low back pain) and gait problem (+ Balance problems - difficulty walking on uneven ground). Negative for arthralgias, joint swelling and myalgias.   Skin:  Negative for pallor and rash.   Allergic/Immunologic: Negative for immunocompromised state.   Neurological:  Negative for dizziness, weakness, light-headedness, headaches and paresthesias.        + Balance problems   Hematological:  Does not bruise/bleed easily.   Psychiatric/Behavioral:  Positive for sleep disturbance (+ insomnia and will wake due to foot pain and nocturia). Negative for agitation, confusion and mood changes. The patient is not nervous/anxious.        Patient currently uses a Dexcom G7 CGM for continuous monitoring of glucose.   Patient injects or boluses insulin 3 or more times daily before breakfast, before lunch, before dinner, and bedtime.  Patient requires frequent adjustments to their insulin treatment regimen on the basis of therapeutic CGM testing results.       Michael Harris is a 88 year old, presenting for the following health issues:  DM check        2/28/2024     3:09 PM   Additional Questions   Roomed by GEORGIANA Berry   Accompanied by ZOIE     History of Present Illness       Diabetes:   He presents for follow up of diabetes.   He is checking home blood glucose with a continuous glucose monitor.   He checks blood glucose before and after meals.  Blood glucose is sometimes over 200 and never under 70. He is aware of hypoglycemia symptoms including shakiness, weakness and dizziness.   He is concerned about blood sugar frequently over 200.   He is having numbness in feet.  The patient has had a diabetic eye exam in the last 12 months. Eye exam performed on 6/09/2023. Location of last eye exam [facility].        Reason for visit:  Check sugar count  Symptom onset:  More than a month    He eats 0-1  "servings of fruits and vegetables daily.He consumes 0 sweetened beverage(s) daily.He exercises with enough effort to increase his heart rate 20 to 29 minutes per day.  He exercises with enough effort to increase his heart rate 7 days per week.   He is taking medications regularly.                     Objective    /72 (BP Location: Right arm, Patient Position: Sitting, Cuff Size: Adult Regular)   Pulse 79   Resp 16   Ht 1.753 m (5' 9\")   Wt 79.4 kg (175 lb)   SpO2 98%   BMI 25.84 kg/m    Body mass index is 25.84 kg/m .  Physical Exam  Constitutional:       General: He is not in acute distress.     Appearance: Normal appearance. He is well-developed. He is not diaphoretic.   HENT:      Head: Normocephalic and atraumatic.   Eyes:      General: No scleral icterus.     Conjunctiva/sclera: Conjunctivae normal.   Cardiovascular:      Rate and Rhythm: Normal rate and regular rhythm.      Pulses: Normal pulses.   Pulmonary:      Effort: Pulmonary effort is normal.      Breath sounds: Normal breath sounds.   Abdominal:      Palpations: Abdomen is soft.      Tenderness: There is no abdominal tenderness.   Musculoskeletal:         General: No deformity.      Cervical back: Neck supple.      Right lower leg: Edema (trace) present.      Left lower leg: Edema (trace) present.   Lymphadenopathy:      Cervical: No cervical adenopathy.   Skin:     General: Skin is warm and dry.      Coloration: Skin is not jaundiced.      Findings: No rash.      Comments: Venous stasis and scattered scabs bilaterally on legs   Neurological:      Mental Status: He is alert. Mental status is at baseline.   Psychiatric:         Mood and Affect: Mood normal.         Behavior: Behavior normal.                    Signed Electronically by: Nawaf Montanez MD    "

## 2024-02-28 NOTE — PATIENT INSTRUCTIONS
Blood pressure is well controlled.   Diabetes is well controlled.     Medications refilled.   Labs are stable.       + Due to urinary frequency, bacteria in the urine....   - culture pending.   - antibiotic shot today. -- Rocephin.         Results for orders placed or performed in visit on 02/28/24   Urine Macroscopic with reflex to Microscopic     Status: Abnormal   Result Value Ref Range    Color Urine Light Yellow Colorless, Straw, Light Yellow, Yellow    Appearance Urine Clear Clear    Glucose Urine 70 (A) Negative mg/dL    Bilirubin Urine Negative Negative    Ketones Urine Negative Negative mg/dL    Specific Gravity Urine 1.016 1.000 - 1.030    Blood Urine Negative Negative    pH Urine 5.5 5.0 - 9.0    Protein Albumin Urine 20 (A) Negative mg/dL    Urobilinogen Urine Normal Normal, 2.0 mg/dL    Nitrite Urine Negative Negative    Leukocyte Esterase Urine Small (A) Negative    Bacteria Urine Many (A) None Seen /HPF    RBC Urine 1 <=2 /HPF    WBC Urine 7 (H) <=5 /HPF    Mucus Urine Present (A) None Seen /LPF   TSH with free T4 reflex     Status: Normal   Result Value Ref Range    TSH 1.94 0.30 - 4.20 uIU/mL   Hemoglobin A1c     Status: Abnormal   Result Value Ref Range    Hemoglobin A1C 7.6 (H) 4.0 - 6.2 %   CBC with platelets     Status: Abnormal   Result Value Ref Range    WBC Count 6.2 4.0 - 11.0 10e3/uL    RBC Count 5.48 4.40 - 5.90 10e6/uL    Hemoglobin 15.9 13.3 - 17.7 g/dL    Hematocrit 47.6 40.0 - 53.0 %    MCV 87 78 - 100 fL    MCH 29.0 26.5 - 33.0 pg    MCHC 33.4 31.5 - 36.5 g/dL    RDW 14.6 10.0 - 15.0 %    Platelet Count 149 (L) 150 - 450 10e3/uL   Albumin Random Urine Quantitative with Creat Ratio     Status: Abnormal   Result Value Ref Range    Creatinine Urine mg/dL 90.0 mg/dL    Albumin Urine mg/L 140.6 mg/L    Albumin Urine mg/g Cr 156.22 (H) 0.00 - 17.00 mg/g Cr   Lipid Profile     Status: Abnormal   Result Value Ref Range    Cholesterol 195 <200 mg/dL    Triglycerides 198 (H) <150 mg/dL     Direct Measure HDL 39 (L) >=40 mg/dL    LDL Cholesterol Calculated 116 (H) <=100 mg/dL    Non HDL Cholesterol 156 (H) <130 mg/dL    Patient Fasting > 8hrs? No     Narrative    Cholesterol  Desirable:  <200 mg/dL    Triglycerides  Normal:  Less than 150 mg/dL  Borderline High:  150-199 mg/dL  High:  200-499 mg/dL  Very High:  Greater than or equal to 500 mg/dL    Direct Measure HDL  Female:  Greater than or equal to 50 mg/dL   Male:  Greater than or equal to 40 mg/dL    LDL Cholesterol  Desirable:  <100mg/dL  Above Desirable:  100-129 mg/dL   Borderline High:  130-159 mg/dL   High:  160-189 mg/dL   Very High:  >= 190 mg/dL    Non HDL Cholesterol  Desirable:  130 mg/dL  Above Desirable:  130-159 mg/dL  Borderline High:  160-189 mg/dL  High:  190-219 mg/dL  Very High:  Greater than or equal to 220 mg/dL   Comprehensive metabolic panel     Status: Abnormal   Result Value Ref Range    Sodium 139 135 - 145 mmol/L    Potassium 4.2 3.4 - 5.3 mmol/L    Carbon Dioxide (CO2) 26 22 - 29 mmol/L    Anion Gap 9 7 - 15 mmol/L    Urea Nitrogen 18.7 8.0 - 23.0 mg/dL    Creatinine 1.08 0.67 - 1.17 mg/dL    GFR Estimate 66 >60 mL/min/1.73m2    Calcium 9.2 8.8 - 10.2 mg/dL    Chloride 104 98 - 107 mmol/L    Glucose 198 (H) 70 - 99 mg/dL    Alkaline Phosphatase 81 40 - 150 U/L    AST 12 0 - 45 U/L    ALT 9 0 - 70 U/L    Protein Total 6.7 6.4 - 8.3 g/dL    Albumin 4.1 3.5 - 5.2 g/dL    Bilirubin Total 1.0 <=1.2 mg/dL          Aspects of Diabetes:   Recent Labs   Lab Test 02/28/24  1437 11/22/23  1413 05/24/23  1352   A1C 7.6* 8.3* 7.0*   * 110* 105*   HDL 39* 37* 46   TRIG 198* 251* 120   ALT 9 8 11   CR 1.08 0.95 0.95   GFRESTIMATED 66 77 77   POTASSIUM 4.2 4.6 4.5   TSH 1.94 1.90 1.53   WBC 6.2 7.3 6.1   HGB 15.9 15.0 15.9   * 169 144*   ALBUMIN 4.1 3.8 4.3      Hemoglobin A1c  Goal range is under 8%. Best is 6.5 to 7   Blood Pressure 138/72 Goal to keep less than 140/90   Tobacco  reports that he quit smoking about 49  years ago. His smoking use included cigarettes. He started smoking about 69 years ago. He has a 10 pack-year smoking history. He has never used smokeless tobacco. Goal to abstain from tobacco   Aspirin or Plavix Anti-platelet therapy Aspirin or Plavix reduces risk of heart disease and stroke  -- sometimes used with other blood thinners, depending on bleeding risk and risk factors.    ACE/ARB Specific blood pressure meds These medications can reduce risk of kidney disease   Cholesterol Statins (Lipitor, Crestor, vs others) Statins reduce risk of heart disease and stroke   Eye Exam -- Do Yearly -- Annual diabetic eye exam   Healthy weight Wt Readings from Last 4 Encounters:   02/28/24 79.4 kg (175 lb)   11/22/23 80.7 kg (178 lb)   05/24/23 80.3 kg (177 lb)   03/24/23 82 kg (180 lb 12.8 oz)      Body mass index is 25.84 kg/m .  Goal BMI under 30, best is under 25.      -- Trying to exercise daily (goal at least 20 min/day) with moderate aerobic activity   -- Eat healthy (resources from ADA at http://www.diabetes.org/)   -- Taking good care of my feet. Consider seeing the Podiatrist   -- Check blood sugars as directed, record in log book and bring to every appointment    Insurance companies are grading you and I on your blood sugar control -- Goal is to get your A1c down to 7.9% or lower and NO Smoking!  -- Medicare and most insurance companies, will only cover Hemoglobin A1c labs to be rechecked every 91+ days.      Return for Diabetes labs and clinic follow-up appointment every 3 to 4 months.    Schedule lab only appointment --- A few days AFTER: 5/28/24   Schedule clinic appointment with Dr. Montanez -- Same day as labs, or 1-2 days later.

## 2024-02-28 NOTE — NURSING NOTE
"Chief Complaint   Patient presents with    DM check       Initial BP (!) 174/78 (BP Location: Right arm, Patient Position: Sitting, Cuff Size: Adult Regular)   Pulse 79   Resp 16   Ht 1.753 m (5' 9\")   Wt 79.4 kg (175 lb)   SpO2 98%   BMI 25.84 kg/m   Estimated body mass index is 25.84 kg/m  as calculated from the following:    Height as of this encounter: 1.753 m (5' 9\").    Weight as of this encounter: 79.4 kg (175 lb).  Medication Review: complete    The next two questions are to help us understand your food security.  If you are feeling you need any assistance in this area, we have resources available to support you today.          2/28/2024   SDOH- Food Insecurity   Within the past 12 months, did you worry that your food would run out before you got money to buy more? N   Within the past 12 months, did the food you bought just not last and you didn t have money to get more? Pt Declined         Health Care Directive:  Patient has a Health Care Directive on file      JESSE FREITAS RN      "

## 2024-03-01 LAB — BACTERIA UR CULT: ABNORMAL

## 2024-03-13 NOTE — ADDENDUM NOTE
Encounter addended by: Jodi Lozano, PT on: 9/4/2019 11:46 AM   Actions taken: Episode resolved, Sign clinical note No indicators present

## 2024-03-28 DIAGNOSIS — R35.0 URINARY FREQUENCY: ICD-10-CM

## 2024-03-28 DIAGNOSIS — R35.1 NOCTURIA: ICD-10-CM

## 2024-04-02 RX ORDER — TAMSULOSIN HYDROCHLORIDE 0.4 MG/1
CAPSULE ORAL
Qty: 90 CAPSULE | Refills: 4 | OUTPATIENT
Start: 2024-04-02

## 2024-04-02 NOTE — TELEPHONE ENCOUNTER
[facility] sent Rx request for the following:      Redundant refill request refused: Too soon:  tamsulosin (FLOMAX) 0.4 MG capsule 90 capsule 4 2/28/2024 -- No   Sig - Route: Take 1 capsule (0.4 mg) by mouth every evening - Oral   Sent to pharmacy as: Tamsulosin HCl 0.4 MG Oral Capsule (FLOMAX)   Class: E-Prescribe   Order: [id number]   E-Prescribing Status: Receipt confirmed by pharmacy (2/28/2024  3:39 PM CST)     Printout Tracking    External Result Report     Pharmacy    [id number] - [place], [place] - [address]     Sandra Ch RN .............. 4/2/2024  10:51 AM

## 2024-04-18 ENCOUNTER — MEDICAL CORRESPONDENCE (OUTPATIENT)
Dept: HEALTH INFORMATION MANAGEMENT | Facility: OTHER | Age: 89
End: 2024-04-18
Payer: COMMERCIAL

## 2024-04-26 ENCOUNTER — TELEPHONE (OUTPATIENT)
Dept: SURGERY | Facility: OTHER | Age: 89
End: 2024-04-26
Payer: COMMERCIAL

## 2024-04-26 DIAGNOSIS — R35.1 NOCTURIA: ICD-10-CM

## 2024-04-26 DIAGNOSIS — R35.0 URINARY FREQUENCY: ICD-10-CM

## 2024-04-26 RX ORDER — TAMSULOSIN HYDROCHLORIDE 0.4 MG/1
0.4 CAPSULE ORAL EVERY EVENING
Qty: 90 CAPSULE | Refills: 4 | Status: SHIPPED | OUTPATIENT
Start: 2024-02-28 | End: 2024-09-04

## 2024-04-26 NOTE — TELEPHONE ENCOUNTER
Re-faxed Rx as previously prescribed with start date adjusted to reflect. Karen Villatoro RN on 4/26/2024 at 1:21 PM    Last Office Visit:              2/28/24 Tarik   Future Office visit:           5/29/24 Tarik    Requested Prescriptions   Pending Prescriptions Disp Refills    tamsulosin (FLOMAX) 0.4 MG capsule 90 capsule 4     Sig: Take 1 capsule (0.4 mg) by mouth every evening       Alpha Blockers Passed - 4/26/2024  1:20 PM        Passed - Blood pressure under 140/90 in past 12 months     BP Readings from Last 3 Encounters:   02/28/24 138/72   11/22/23 132/70   05/24/23 136/74       No data recorded            Passed - Recent (12 mo) or future (30 days) visit within the authorizing provider's specialty     The patient must have completed an in-person or virtual visit within the past 12 months or has a future visit scheduled within the next 90 days with the authorizing provider s specialty.  Urgent care and e-visits do not quality as an office visit for this protocol.          Passed - Patient does not have Tadalafil, Vardenafil, or Sildenafil on their medication list        Passed - Medication is active on med list        Passed - Patient is 18 years of age or older           Last Prescription Date:   2/28/24  Last Fill Qty/Refills:         90 / 4  [facility]

## 2024-04-26 NOTE — TELEPHONE ENCOUNTER
[facility] Referral    Patient has [facility] referral for an [facility]GD with a diagnosis of dysphagia unspecified type.  Referral is scanned under Media in patient's chart.    Please advise.    Thank you,  Cheryl Norris on 4/26/2024 at 1:34 PM

## 2024-04-29 ENCOUNTER — TELEPHONE (OUTPATIENT)
Dept: SURGERY | Facility: OTHER | Age: 89
End: 2024-04-29

## 2024-04-29 NOTE — TELEPHONE ENCOUNTER
Screening Questions for the Scheduling of Screening Colonoscopies   (If Colonoscopy is diagnostic, Provider should review the chart before scheduling.)  Are you younger than 45 or older than 80?  YES  Do you take aspirin or fish oil?  NO (if yes, tell patient to stop 1 week prior to Colonoscopy)  Do you take warfarin (Coumadin), clopidogrel (Plavix), apixaban (Eliquis), dabigatram (Pradaxa), rivaroxaban (Xarelto) or any blood thinner? BRILINTA  Do you take Ozempic? NO  Do you use oxygen or a CPAP at home?  NO  Do you have kidney disease? NO  Are you on dialysis? NO  Have you had a stroke or heart attack in the last year? NSTEMI 03/11/2023  Have you had a stent in your heart or any blood vessel in the last year? YES, 03/11/2023  Have you had a transplant of any organ? NO  Have you had a colonoscopy or upper endoscopy (EGD) before? NO  Date of scheduled EGD. 06/25/2024  [facility]  Per notes on [facility] referral patient was instructed to hold his glipizide the morning of the procedure.  It also states he is going to ask his cardiologist about stopping his Brilinta and going on a daily aspirin instead.

## 2024-05-29 ENCOUNTER — OFFICE VISIT (OUTPATIENT)
Dept: INTERNAL MEDICINE | Facility: OTHER | Age: 89
End: 2024-05-29
Attending: INTERNAL MEDICINE
Payer: COMMERCIAL

## 2024-05-29 ENCOUNTER — LAB (OUTPATIENT)
Dept: LAB | Facility: OTHER | Age: 89
End: 2024-05-29
Attending: INTERNAL MEDICINE
Payer: COMMERCIAL

## 2024-05-29 VITALS
HEART RATE: 60 BPM | OXYGEN SATURATION: 97 % | WEIGHT: 174 LBS | BODY MASS INDEX: 25.7 KG/M2 | RESPIRATION RATE: 16 BRPM | SYSTOLIC BLOOD PRESSURE: 128 MMHG | DIASTOLIC BLOOD PRESSURE: 64 MMHG

## 2024-05-29 DIAGNOSIS — E11.42 CONTROLLED TYPE 2 DIABETES MELLITUS WITH DIABETIC POLYNEUROPATHY, WITHOUT LONG-TERM CURRENT USE OF INSULIN (H): ICD-10-CM

## 2024-05-29 DIAGNOSIS — Z71.85 VACCINE COUNSELING: ICD-10-CM

## 2024-05-29 DIAGNOSIS — R13.10 DYSPHAGIA, UNSPECIFIED TYPE: ICD-10-CM

## 2024-05-29 DIAGNOSIS — I10 BENIGN ESSENTIAL HYPERTENSION: Primary | ICD-10-CM

## 2024-05-29 DIAGNOSIS — E78.2 MIXED HYPERLIPIDEMIA: ICD-10-CM

## 2024-05-29 DIAGNOSIS — D68.69 HYPERCOAGULABLE STATE DUE TO PAROXYSMAL ATRIAL FIBRILLATION (H): ICD-10-CM

## 2024-05-29 DIAGNOSIS — D69.6 THROMBOCYTOPENIA (H): ICD-10-CM

## 2024-05-29 DIAGNOSIS — E11.65 TYPE 2 DIABETES MELLITUS WITH HYPERGLYCEMIA, WITHOUT LONG-TERM CURRENT USE OF INSULIN (H): ICD-10-CM

## 2024-05-29 DIAGNOSIS — Z79.01 CHRONIC ANTICOAGULATION: ICD-10-CM

## 2024-05-29 DIAGNOSIS — R13.10 FOOD STICKS ON SWALLOWING: ICD-10-CM

## 2024-05-29 DIAGNOSIS — I25.10 CORONARY ARTERY DISEASE INVOLVING NATIVE CORONARY ARTERY OF NATIVE HEART WITHOUT ANGINA PECTORIS: ICD-10-CM

## 2024-05-29 DIAGNOSIS — I48.0 HYPERCOAGULABLE STATE DUE TO PAROXYSMAL ATRIAL FIBRILLATION (H): ICD-10-CM

## 2024-05-29 DIAGNOSIS — I48.0 PAROXYSMAL ATRIAL FIBRILLATION (H): ICD-10-CM

## 2024-05-29 DIAGNOSIS — N18.2 CKD (CHRONIC KIDNEY DISEASE) STAGE 2, GFR 60-89 ML/MIN: ICD-10-CM

## 2024-05-29 LAB
ALBUMIN SERPL BCG-MCNC: 4.1 G/DL (ref 3.5–5.2)
ALBUMIN UR-MCNC: NEGATIVE MG/DL
ALP SERPL-CCNC: 80 U/L (ref 40–150)
ALT SERPL W P-5'-P-CCNC: 8 U/L (ref 0–70)
ANION GAP SERPL CALCULATED.3IONS-SCNC: 8 MMOL/L (ref 7–15)
APPEARANCE UR: CLEAR
AST SERPL W P-5'-P-CCNC: 11 U/L (ref 0–45)
BACTERIA #/AREA URNS HPF: ABNORMAL /HPF
BILIRUB SERPL-MCNC: 0.8 MG/DL
BILIRUB UR QL STRIP: NEGATIVE
BUN SERPL-MCNC: 21.8 MG/DL (ref 8–23)
CALCIUM SERPL-MCNC: 9.3 MG/DL (ref 8.8–10.2)
CHLORIDE SERPL-SCNC: 105 MMOL/L (ref 98–107)
CHOLEST SERPL-MCNC: 166 MG/DL
COLOR UR AUTO: ABNORMAL
CREAT SERPL-MCNC: 1.17 MG/DL (ref 0.67–1.17)
CREAT UR-MCNC: 96.7 MG/DL
DEPRECATED HCO3 PLAS-SCNC: 28 MMOL/L (ref 22–29)
EGFRCR SERPLBLD CKD-EPI 2021: 60 ML/MIN/1.73M2
ERYTHROCYTE [DISTWIDTH] IN BLOOD BY AUTOMATED COUNT: 14.5 % (ref 10–15)
FASTING STATUS PATIENT QL REPORTED: NO
FASTING STATUS PATIENT QL REPORTED: NO
GLUCOSE SERPL-MCNC: 168 MG/DL (ref 70–99)
GLUCOSE UR STRIP-MCNC: 50 MG/DL
HBA1C MFR BLD: 7.1 % (ref 4–6.2)
HCT VFR BLD AUTO: 48.4 % (ref 40–53)
HDLC SERPL-MCNC: 42 MG/DL
HGB BLD-MCNC: 15.8 G/DL (ref 13.3–17.7)
HGB UR QL STRIP: NEGATIVE
HYALINE CASTS: 1 /LPF
KETONES UR STRIP-MCNC: NEGATIVE MG/DL
LDLC SERPL CALC-MCNC: 95 MG/DL
LEUKOCYTE ESTERASE UR QL STRIP: ABNORMAL
MCH RBC QN AUTO: 28.9 PG (ref 26.5–33)
MCHC RBC AUTO-ENTMCNC: 32.6 G/DL (ref 31.5–36.5)
MCV RBC AUTO: 89 FL (ref 78–100)
MICROALBUMIN UR-MCNC: 43.9 MG/L
MICROALBUMIN/CREAT UR: 45.4 MG/G CR (ref 0–17)
MUCOUS THREADS #/AREA URNS LPF: PRESENT /LPF
NITRATE UR QL: NEGATIVE
NONHDLC SERPL-MCNC: 124 MG/DL
PH UR STRIP: 5.5 [PH] (ref 5–9)
PLATELET # BLD AUTO: 141 10E3/UL (ref 150–450)
POTASSIUM SERPL-SCNC: 4.6 MMOL/L (ref 3.4–5.3)
PROT SERPL-MCNC: 6.8 G/DL (ref 6.4–8.3)
RBC # BLD AUTO: 5.46 10E6/UL (ref 4.4–5.9)
RBC URINE: 1 /HPF
SODIUM SERPL-SCNC: 141 MMOL/L (ref 135–145)
SP GR UR STRIP: 1.02 (ref 1–1.03)
TRIGL SERPL-MCNC: 147 MG/DL
TSH SERPL DL<=0.005 MIU/L-ACNC: 1.96 UIU/ML (ref 0.3–4.2)
UROBILINOGEN UR STRIP-MCNC: NORMAL MG/DL
WBC # BLD AUTO: 6.5 10E3/UL (ref 4–11)
WBC URINE: 14 /HPF

## 2024-05-29 PROCEDURE — 83718 ASSAY OF LIPOPROTEIN: CPT | Mod: ZL

## 2024-05-29 PROCEDURE — 81001 URINALYSIS AUTO W/SCOPE: CPT | Mod: ZL

## 2024-05-29 PROCEDURE — G2211 COMPLEX E/M VISIT ADD ON: HCPCS | Performed by: INTERNAL MEDICINE

## 2024-05-29 PROCEDURE — G0463 HOSPITAL OUTPT CLINIC VISIT: HCPCS

## 2024-05-29 PROCEDURE — 83036 HEMOGLOBIN GLYCOSYLATED A1C: CPT | Mod: ZL

## 2024-05-29 PROCEDURE — 85014 HEMATOCRIT: CPT | Mod: ZL

## 2024-05-29 PROCEDURE — 82043 UR ALBUMIN QUANTITATIVE: CPT | Mod: ZL

## 2024-05-29 PROCEDURE — 82040 ASSAY OF SERUM ALBUMIN: CPT | Mod: ZL

## 2024-05-29 PROCEDURE — 84443 ASSAY THYROID STIM HORMONE: CPT | Mod: ZL

## 2024-05-29 PROCEDURE — 36415 COLL VENOUS BLD VENIPUNCTURE: CPT | Mod: ZL

## 2024-05-29 PROCEDURE — 99214 OFFICE O/P EST MOD 30 MIN: CPT | Performed by: INTERNAL MEDICINE

## 2024-05-29 PROCEDURE — 84450 TRANSFERASE (AST) (SGOT): CPT | Mod: ZL

## 2024-05-29 RX ORDER — LISINOPRIL 5 MG/1
5 TABLET ORAL DAILY
Qty: 90 TABLET | Refills: 4 | Status: SHIPPED | OUTPATIENT
Start: 2024-05-29

## 2024-05-29 RX ORDER — ROSUVASTATIN CALCIUM 10 MG/1
10 TABLET, COATED ORAL DAILY
Qty: 90 TABLET | Refills: 4 | Status: SHIPPED | OUTPATIENT
Start: 2024-05-29

## 2024-05-29 RX ORDER — RESPIRATORY SYNCYTIAL VIRUS VACCINE 120MCG/0.5
0.5 KIT INTRAMUSCULAR ONCE
Qty: 0.5 ML | Refills: 0 | Status: SHIPPED | OUTPATIENT
Start: 2024-05-29 | End: 2024-05-29

## 2024-05-29 RX ORDER — ASPIRIN 81 MG/1
81 TABLET ORAL
COMMUNITY
Start: 2024-05-22 | End: 2024-05-29

## 2024-05-29 RX ORDER — LISINOPRIL 5 MG/1
1 TABLET ORAL DAILY
COMMUNITY
Start: 2024-05-22 | End: 2024-05-29

## 2024-05-29 RX ORDER — ROSUVASTATIN CALCIUM 10 MG/1
1 TABLET, COATED ORAL DAILY
COMMUNITY
Start: 2024-05-22 | End: 2024-05-29

## 2024-05-29 RX ORDER — ASPIRIN 81 MG/1
81 TABLET ORAL DAILY
Qty: 100 TABLET | Refills: 4 | Status: SHIPPED | OUTPATIENT
Start: 2024-05-29

## 2024-05-29 ASSESSMENT — ENCOUNTER SYMPTOMS
PALPITATIONS: 0
DIARRHEA: 0
WEAKNESS: 0
SHORTNESS OF BREATH: 1
FREQUENCY: 1
HEMATOCHEZIA: 0
DYSURIA: 0
SORE THROAT: 0
WHEEZING: 0
BRUISES/BLEEDS EASILY: 0
AGITATION: 0
HEMATURIA: 0
MYALGIAS: 0
LIGHT-HEADEDNESS: 0
SLEEP DISTURBANCE: 1
HEADACHES: 0
COUGH: 0
NAUSEA: 0
ABDOMINAL PAIN: 0
CHILLS: 0
CONSTIPATION: 0
PARESTHESIAS: 0
NERVOUS/ANXIOUS: 0
DIZZINESS: 0
JOINT SWELLING: 0
FATIGUE: 1
VOMITING: 0
BACK PAIN: 1
EYE PAIN: 0
HEARTBURN: 0
CONFUSION: 0
FEVER: 0
ARTHRALGIAS: 0

## 2024-05-29 ASSESSMENT — ANXIETY QUESTIONNAIRES
7. FEELING AFRAID AS IF SOMETHING AWFUL MIGHT HAPPEN: NOT AT ALL
5. BEING SO RESTLESS THAT IT IS HARD TO SIT STILL: NOT AT ALL
1. FEELING NERVOUS, ANXIOUS, OR ON EDGE: NEARLY EVERY DAY
GAD7 TOTAL SCORE: 4
GAD7 TOTAL SCORE: 4
4. TROUBLE RELAXING: NOT AT ALL
IF YOU CHECKED OFF ANY PROBLEMS ON THIS QUESTIONNAIRE, HOW DIFFICULT HAVE THESE PROBLEMS MADE IT FOR YOU TO DO YOUR WORK, TAKE CARE OF THINGS AT HOME, OR GET ALONG WITH OTHER PEOPLE: NOT DIFFICULT AT ALL
7. FEELING AFRAID AS IF SOMETHING AWFUL MIGHT HAPPEN: NOT AT ALL
8. IF YOU CHECKED OFF ANY PROBLEMS, HOW DIFFICULT HAVE THESE MADE IT FOR YOU TO DO YOUR WORK, TAKE CARE OF THINGS AT HOME, OR GET ALONG WITH OTHER PEOPLE?: NOT DIFFICULT AT ALL
GAD7 TOTAL SCORE: 4
3. WORRYING TOO MUCH ABOUT DIFFERENT THINGS: NOT AT ALL
6. BECOMING EASILY ANNOYED OR IRRITABLE: SEVERAL DAYS
2. NOT BEING ABLE TO STOP OR CONTROL WORRYING: NOT AT ALL

## 2024-05-29 ASSESSMENT — PAIN SCALES - PAIN ENJOYMENT GENERAL ACTIVITY SCALE (PEG)
AVG_PAIN_PASTWEEK: 1
PEG_TOTALSCORE: INCOMPLETE
INTERFERED_ENJOYMENT_LIFE: 1
AVG_PAIN_PASTWEEK: 1
INTERFERED_ENJOYMENT_LIFE: 1

## 2024-05-29 ASSESSMENT — PATIENT HEALTH QUESTIONNAIRE - PHQ9
SUM OF ALL RESPONSES TO PHQ QUESTIONS 1-9: 2
SUM OF ALL RESPONSES TO PHQ QUESTIONS 1-9: 2
10. IF YOU CHECKED OFF ANY PROBLEMS, HOW DIFFICULT HAVE THESE PROBLEMS MADE IT FOR YOU TO DO YOUR WORK, TAKE CARE OF THINGS AT HOME, OR GET ALONG WITH OTHER PEOPLE: NOT DIFFICULT AT ALL

## 2024-05-29 ASSESSMENT — PAIN SCALES - GENERAL: PAINLEVEL: NO PAIN (0)

## 2024-05-29 NOTE — NURSING NOTE
"Chief Complaint   Patient presents with    Diabetes     Follow up       Initial /64   Pulse 60   Resp 16   Wt 78.9 kg (174 lb)   SpO2 97%   BMI 25.70 kg/m   Estimated body mass index is 25.7 kg/m  as calculated from the following:    Height as of 2/28/24: 1.753 m (5' 9\").    Weight as of this encounter: 78.9 kg (174 lb).  Medication Review: complete    The next two questions are to help us understand your food security.  If you are feeling you need any assistance in this area, we have resources available to support you today.          2/28/2024   SDOH- Food Insecurity   Within the past 12 months, did you worry that your food would run out before you got money to buy more? N   Within the past 12 months, did the food you bought just not last and you didn t have money to get more? Pt Declined         Health Care Directive:  Patient has a Health Care Directive on file      Jessenia Lieberman LPN      "

## 2024-05-29 NOTE — H&P (VIEW-ONLY)
Assessment & Plan     ICD-10-CM    1. Benign essential hypertension  I10 lisinopril (ZESTRIL) 5 MG tablet      2. Coronary artery disease involving native coronary artery of native heart without angina pectoris  I25.10 rosuvastatin (CRESTOR) 10 MG tablet     aspirin 81 MG EC tablet     lisinopril (ZESTRIL) 5 MG tablet     empagliflozin (JARDIANCE) 10 MG TABS tablet      3. Mixed hyperlipidemia  E78.2 rosuvastatin (CRESTOR) 10 MG tablet      4. Controlled type 2 diabetes mellitus with diabetic polyneuropathy, without long-term current use of insulin (H)  E11.42 empagliflozin (JARDIANCE) 10 MG TABS tablet      5. Paroxysmal atrial fibrillation (H)  I48.0       6. Hypercoagulable state due to paroxysmal atrial fibrillation (H)  D68.69     I48.0       7. Chronic anticoagulation - Eliquis  Z79.01       8. Vaccine counseling  Z71.85 respiratory syncytial virus vaccine, bivalent (ABRYSVO) injection     zoster vaccine recombinant adjuvanted (SHINGRIX) injection      9. Dysphagia, unspecified type  R13.10 XR Esophagram      10. Food sticks on swallowing - if trying to swallow too much food - better with smaller bites  R13.10 XR Esophagram      11. CKD (chronic kidney disease) stage 2, GFR 60-89 ml/min  N18.2       12. Thrombocytopenia (H24)  D69.6          Patient presents for follow-up multiple issues.    Coronary disease, following with cardiology.  Had numerous medication changes recently.  They recommend getting updated ankle-brachial indexes.  Recommend starting low-dose lisinopril, starting low-dose Crestor.  Had problems with high-dose Crestor in the past.  No longer taking Brilinta, now back on low-dose aspirin plus Eliquis.  Stop glipizide.  Start Jardiance.  Multiple med list changes made, refill sent to pharmacy.    Painful diabetic polyneuropathy, more numbness and pain at this time.  Continue to monitor.  Meds changed today.    Vaccines are due.  Orders placed    Dysphagia with food sticking.  Ongoing and  worsening.  Recommend esophageal x-ray.  Orders placed.  Does have upper endoscopy scheduled in approximately 1 month.    HYPERTENSION - Ongoing. Blood pressure is currently well controlled.  Medication side effects: None. Denies syncope or presyncope.  Continue current medications.   Medication list reviewed/updated. Refills completed as needed.      MIXED HYPERLIPIDEMIA.  Ongoing. LDL is at goal: No. Triglycerides are at goal: Yes.  Hopefully lifestyle modifications will improve cholesterol levels, otherwise will consider additional medication dose adjustments or medication changes.  -- Start Crestor.     Recent Labs   Lab Test 05/29/24  1307 02/28/24  1437   CHOL 166 195   HDL 42 39*   LDL 95 116*   TRIG 147 198*        Atrial Fibrillation - Type: Paroxysmal Atrial Fibrillation, chronic, ongoing.  + Hypercoagulable state due to atrial fibrillation.  Continues with apixaban (ELIQUIS) for oral anticoagulation.  Heart rates are controlled with diltiazem.  Tolerating well.  Denies excessive bleeding issues.  Easy bruising. Medication list reviewed/updated. Refills completed as needed.     Chronic Kidney Disease, Stage 2 (GFR 60-89), chronic, ongoing.  Kidney function had been slowly declining.  Encourage NSAID avoidance.       Vaccine counseling completed.  Encourage routine / annual vaccinations.    Type 2 Diabetes Mellitus, with nephropathy, with neuropathy, Reports ongoing/previous: numbness and burning.    Blood sugar control has been good with minimal hyperglycemia.   + Still with intermittent hypoglycemia.   Otherwise - Doing well with diet, oral agents, and exercise.  Medication list reviewed/updated. Refills completed as needed.    Complicating factors include but are not limited to: hypertension, hyperlipidemia, neuropathy, chronic kidney disease, and CAD/PVD.     Recent Labs   Lab Test 05/29/24  1307 02/28/24  1437 11/22/23  1413   A1C 7.1* 7.6* 8.3*   LDL 95 116* 110*   HDL 42 39* 37*   TRIG 147 198* 251*    ALT 8 9 8   CR 1.17 1.08 0.95   GFRESTIMATED 60* 66 77   POTASSIUM 4.6 4.2 4.6   TSH 1.96 1.94 1.90   WBC 6.5 6.2 7.3   HGB 15.8 15.9 15.0   * 149* 169   ALBUMIN 4.1 4.1 3.8     Hemoglobin A1c has improved.  LDL is high and not at goal.  HDL and triglycerides are at goal.  LT normal.  Creatinine is at baseline.  Potassium normal.  TSH normal.  CBC shows normal white blood cell count and hemoglobin levels.  Platelet count is low and slowly trending down.  Albumin normal.       The longitudinal plan of care for the diagnosis(es)/condition(s) as documented were addressed during this visit. Due to the added complexity in care, I will continue to support Steven in the subsequent management and with ongoing continuity of care.        Stop ticagrelor/Brilinta.    Start aspirin 81 mg daily plus the Eliquis 5 mg twice daily.    Start Crestor 10 mg daily.  Even if taken only 2 or 3 times weekly will be helpful for your cholesterol.    Stop glipizide.    Start Jardiance 10 mg daily to help with kidneys and heart and blood sugar.    Start lisinopril 5 mg daily to help with blood pressure and heart.     Esophageal xray ordered.   EGD scheduled for end of June.       This patient has been using and benefiting from using Dexcom  continuous glucose monitoring system. I therefore recommended Dexcom  continuous glucose monitoring as part of their comprehensive diabetes treatment plan, in order to help them:    lower their HbA1c to target and/or maintain their HbA1c at target    to alert patient prior to acute complications of hyper/hypoglycemia    to detect hypoglycemia, and/or to reduce hypoglycemia    increase their short term and long-term safety    improve quality of life    start/continue/intensify level of physical activity safely    improve insulin sensitivity by reducing hypoglycemia unawareness.             BMI  Estimated body mass index is 25.7 kg/m  as calculated from the following:    Height as of 2/28/24: 1.753 m  "(5' 9\").    Weight as of this encounter: 78.9 kg (174 lb).           Return in about 3 months (around 8/29/2024) for - Labs every 91+ days, with DM Follow-up, Same Day or 1-2 days later with Dr. Montanez.      Nawaf Montanez MD  [facility]    Review of Systems   Constitutional:  Positive for fatigue (mostly of feet and legs - has been taking b12 supplements again). Negative for chills and fever.   HENT:  Positive for hearing loss. Negative for congestion, ear pain and sore throat.    Eyes:  Positive for visual disturbance. Negative for pain.   Respiratory:  Positive for shortness of breath. Negative for cough and wheezing.    Cardiovascular:  Negative for chest pain, palpitations and peripheral edema.        Raynaud's syndrome, no ulcerations   Gastrointestinal:  Negative for abdominal pain, constipation, diarrhea, heartburn, hematochezia, nausea and vomiting.        + Difficulty swallowing, food sticks   Endocrine: Negative for cold intolerance and heat intolerance.        + Hypoglycemia episodes with glucose less than 54   Genitourinary:  Positive for frequency, impotence and urgency. Negative for dysuria, genital sores, hematuria and penile discharge.        + Nocturia 1-2x nightly, previously 3-4x.  + nocturia and overactive bladder symptoms.  Oxybutynin at bedtime has helped evening symptoms, but not daytime symptoms.   Musculoskeletal:  Positive for back pain (+ intermittent low back pain) and gait problem (+ Balance problems - difficulty walking on uneven ground). Negative for arthralgias, joint swelling and myalgias.   Skin:  Negative for pallor and rash.   Allergic/Immunologic: Negative for immunocompromised state.   Neurological:  Negative for dizziness, weakness, light-headedness, headaches and paresthesias.        + Balance problems   Hematological:  Does not bruise/bleed easily.   Psychiatric/Behavioral:  Positive for sleep disturbance (+ insomnia and will wake due to foot pain and " nocturia). Negative for agitation, confusion and mood changes. The patient is not nervous/anxious.        Michael Harris is a 89 year old, presenting for the following health issues:  Diabetes (Follow up)        5/29/2024     1:29 PM   Additional Questions   Roomed by Jessenia Lieberman LPN     History of Present Illness       Reason for visit:  Check up    He eats 0-1 servings of fruits and vegetables daily.He consumes 0 sweetened beverage(s) daily.He exercises with enough effort to increase his heart rate 30 to 60 minutes per day.  He exercises with enough effort to increase his heart rate 6 days per week.   He is taking medications regularly.         Diabetes Follow-up    How often are you checking your blood sugar? Continuous glucose monitor  What time of day are you checking your blood sugars (select all that apply)?  Not applicable  Have you had any blood sugars above 200?  Yes several  Have you had any blood sugars below 70?  Yes a few times  What symptoms do you notice when your blood sugar is low?  Shaky, Weak, and Other: sweaty  What concerns do you have today about your diabetes? Other: blood sugar fluctuations   Do you have any of these symptoms? (Select all that apply)  Burning in feet and Blurry vision  Have you had a diabetic eye exam in the last 12 months? Yes- Date of last eye exam: 06/12/2023,  Location: [facility]        BP Readings from Last 2 Encounters:   05/29/24 128/64   02/28/24 138/72     Hemoglobin A1C (%)   Date Value   05/29/2024 7.1 (H)   02/28/2024 7.6 (H)   07/07/2021 7.1 (H)   03/17/2021 7.4 (H)     LDL Cholesterol Calculated (mg/dL)   Date Value   05/29/2024 95   02/28/2024 116 (H)   07/07/2021 86   03/17/2021 85                   Objective    /64   Pulse 60   Resp 16   Wt 78.9 kg (174 lb)   SpO2 97%   BMI 25.70 kg/m    Body mass index is 25.7 kg/m .  Physical Exam  Constitutional:       General: He is not in acute distress.     Appearance: Normal appearance. He is  well-developed. He is not diaphoretic.   HENT:      Head: Normocephalic and atraumatic.   Eyes:      General: No scleral icterus.     Conjunctiva/sclera: Conjunctivae normal.   Cardiovascular:      Rate and Rhythm: Normal rate and regular rhythm.      Pulses: Normal pulses.   Pulmonary:      Effort: Pulmonary effort is normal.      Breath sounds: Normal breath sounds.   Abdominal:      Palpations: Abdomen is soft.      Tenderness: There is no abdominal tenderness.   Musculoskeletal:         General: No deformity.      Cervical back: Neck supple.      Right lower leg: Edema (trace) present.      Left lower leg: Edema (trace) present.   Lymphadenopathy:      Cervical: No cervical adenopathy.   Skin:     General: Skin is warm and dry.      Coloration: Skin is not jaundiced.      Findings: No rash.      Comments: Venous stasis and scattered scabs bilaterally on legs   Neurological:      Mental Status: He is alert. Mental status is at baseline.   Psychiatric:         Mood and Affect: Mood normal.         Behavior: Behavior normal.                    Signed Electronically by: Nawaf Montanez MD

## 2024-05-29 NOTE — PATIENT INSTRUCTIONS
Stop ticagrelor/Brilinta.    Start aspirin 81 mg daily plus the Eliquis 5 mg twice daily.    Start Crestor 10 mg daily.  Even if taken only 2 or 3 times weekly will be helpful --- for your cholesterol.    Stop glipizide.    Start Jardiance 10 mg daily to help with kidneys and heart and blood sugar.    Start lisinopril 5 mg daily to help with blood pressure and heart.        Esophageal xray ordered  - they will call with date/time of appointment.        Blood pressure is well controlled.     Medications refilled.   Labs are stable.     Results for orders placed or performed in visit on 05/29/24   Comprehensive metabolic panel     Status: Abnormal   Result Value Ref Range    Sodium 141 135 - 145 mmol/L    Potassium 4.6 3.4 - 5.3 mmol/L    Carbon Dioxide (CO2) 28 22 - 29 mmol/L    Anion Gap 8 7 - 15 mmol/L    Urea Nitrogen 21.8 8.0 - 23.0 mg/dL    Creatinine 1.17 0.67 - 1.17 mg/dL    GFR Estimate 60 (L) >60 mL/min/1.73m2    Calcium 9.3 8.8 - 10.2 mg/dL    Chloride 105 98 - 107 mmol/L    Glucose 168 (H) 70 - 99 mg/dL    Alkaline Phosphatase 80 40 - 150 U/L    AST 11 0 - 45 U/L    ALT 8 0 - 70 U/L    Protein Total 6.8 6.4 - 8.3 g/dL    Albumin 4.1 3.5 - 5.2 g/dL    Bilirubin Total 0.8 <=1.2 mg/dL    Patient Fasting > 8hrs? No    Lipid Profile     Status: None   Result Value Ref Range    Cholesterol 166 <200 mg/dL    Triglycerides 147 <150 mg/dL    Direct Measure HDL 42 >=40 mg/dL    LDL Cholesterol Calculated 95 <=100 mg/dL    Non HDL Cholesterol 124 <130 mg/dL    Patient Fasting > 8hrs? No     Narrative    Cholesterol  Desirable:  <200 mg/dL    Triglycerides  Normal:  Less than 150 mg/dL  Borderline High:  150-199 mg/dL  High:  200-499 mg/dL  Very High:  Greater than or equal to 500 mg/dL    Direct Measure HDL  Female:  Greater than or equal to 50 mg/dL   Male:  Greater than or equal to 40 mg/dL    LDL Cholesterol  Desirable:  <100mg/dL  Above Desirable:  100-129 mg/dL   Borderline High:  130-159 mg/dL   High:  160-189  mg/dL   Very High:  >= 190 mg/dL    Non HDL Cholesterol  Desirable:  130 mg/dL  Above Desirable:  130-159 mg/dL  Borderline High:  160-189 mg/dL  High:  190-219 mg/dL  Very High:  Greater than or equal to 220 mg/dL   CBC with platelets     Status: Abnormal   Result Value Ref Range    WBC Count 6.5 4.0 - 11.0 10e3/uL    RBC Count 5.46 4.40 - 5.90 10e6/uL    Hemoglobin 15.8 13.3 - 17.7 g/dL    Hematocrit 48.4 40.0 - 53.0 %    MCV 89 78 - 100 fL    MCH 28.9 26.5 - 33.0 pg    MCHC 32.6 31.5 - 36.5 g/dL    RDW 14.5 10.0 - 15.0 %    Platelet Count 141 (L) 150 - 450 10e3/uL   Hemoglobin A1c     Status: Abnormal   Result Value Ref Range    Hemoglobin A1C 7.1 (H) 4.0 - 6.2 %   TSH with free T4 reflex     Status: Normal   Result Value Ref Range    TSH 1.96 0.30 - 4.20 uIU/mL   Albumin Random Urine Quantitative with Creat Ratio     Status: Abnormal   Result Value Ref Range    Creatinine Urine mg/dL 96.7 mg/dL    Albumin Urine mg/L 43.9 mg/L    Albumin Urine mg/g Cr 45.40 (H) 0.00 - 17.00 mg/g Cr   Urine Macroscopic with reflex to Microscopic     Status: Abnormal   Result Value Ref Range    Color Urine Light Yellow Colorless, Straw, Light Yellow, Yellow    Appearance Urine Clear Clear    Glucose Urine 50 (A) Negative mg/dL    Bilirubin Urine Negative Negative    Ketones Urine Negative Negative mg/dL    Specific Gravity Urine 1.018 1.000 - 1.030    Blood Urine Negative Negative    pH Urine 5.5 5.0 - 9.0    Protein Albumin Urine Negative Negative mg/dL    Urobilinogen Urine Normal Normal, 2.0 mg/dL    Nitrite Urine Negative Negative    Leukocyte Esterase Urine Moderate (A) Negative    Bacteria Urine Many (A) None Seen /HPF    RBC Urine 1 <=2 /HPF    WBC Urine 14 (H) <=5 /HPF    Mucus Urine Present (A) None Seen /LPF    Hyaline Casts Urine 1 <=2 /LPF        Aspects of Diabetes:   Recent Labs   Lab Test 05/29/24  1307 02/28/24  1437 11/22/23  1413   A1C 7.1* 7.6* 8.3*   LDL 95 116* 110*   HDL 42 39* 37*   TRIG 147 198* 251*   ALT 8  9 8   CR 1.17 1.08 0.95   GFRESTIMATED 60* 66 77   POTASSIUM 4.6 4.2 4.6   TSH 1.96 1.94 1.90   WBC 6.5 6.2 7.3   HGB 15.8 15.9 15.0   * 149* 169   ALBUMIN 4.1 4.1 3.8      Hemoglobin A1c  Goal range is under 8%. Best is 6.5 to 7   Blood Pressure 128/64 Goal to keep less than 140/90   Tobacco  reports that he quit smoking about 49 years ago. His smoking use included cigarettes. He started smoking about 69 years ago. He has a 20 pack-year smoking history. He has been exposed to tobacco smoke. He has never used smokeless tobacco. Goal to abstain from tobacco   Aspirin or Plavix Anti-platelet therapy Aspirin or Plavix reduces risk of heart disease and stroke  -- sometimes used with other blood thinners, depending on bleeding risk and risk factors.    ACE/ARB Specific blood pressure meds These medications can reduce risk of kidney disease   Cholesterol Statins (Lipitor, Crestor, vs others) Statins reduce risk of heart disease and stroke   Eye Exam -- Do Yearly -- Annual diabetic eye exam   Healthy weight Wt Readings from Last 4 Encounters:   05/29/24 78.9 kg (174 lb)   02/28/24 79.4 kg (175 lb)   11/22/23 80.7 kg (178 lb)   05/24/23 80.3 kg (177 lb)      Body mass index is 25.7 kg/m .  Goal BMI under 30, best is under 25.      -- Trying to exercise daily (goal at least 20 min/day) with moderate aerobic activity   -- Eat healthy (resources from ADA at http://www.diabetes.org/)   -- Taking good care of my feet. Consider seeing the Podiatrist   -- Check blood sugars as directed, record in log book and bring to every appointment    Insurance companies are grading you and I on your blood sugar control -- Goal is to get your A1c down to 7.9% or lower and NO Smoking!  -- Medicare and most insurance companies, will only cover Hemoglobin A1c labs to be rechecked every 91+ days.      Return for Diabetes labs and clinic follow-up appointment every 3 to 4 months.    Schedule lab only appointment --- A few days AFTER: 8/27/24    Schedule clinic appointment with Dr. Montanez -- Same day as labs, or 1-2 days later.

## 2024-05-29 NOTE — PROGRESS NOTES
Assessment & Plan     ICD-10-CM    1. Benign essential hypertension  I10 lisinopril (ZESTRIL) 5 MG tablet      2. Coronary artery disease involving native coronary artery of native heart without angina pectoris  I25.10 rosuvastatin (CRESTOR) 10 MG tablet     aspirin 81 MG EC tablet     lisinopril (ZESTRIL) 5 MG tablet     empagliflozin (JARDIANCE) 10 MG TABS tablet      3. Mixed hyperlipidemia  E78.2 rosuvastatin (CRESTOR) 10 MG tablet      4. Controlled type 2 diabetes mellitus with diabetic polyneuropathy, without long-term current use of insulin (H)  E11.42 empagliflozin (JARDIANCE) 10 MG TABS tablet      5. Paroxysmal atrial fibrillation (H)  I48.0       6. Hypercoagulable state due to paroxysmal atrial fibrillation (H)  D68.69     I48.0       7. Chronic anticoagulation - Eliquis  Z79.01       8. Vaccine counseling  Z71.85 respiratory syncytial virus vaccine, bivalent (ABRYSVO) injection     zoster vaccine recombinant adjuvanted (SHINGRIX) injection      9. Dysphagia, unspecified type  R13.10 XR Esophagram      10. Food sticks on swallowing - if trying to swallow too much food - better with smaller bites  R13.10 XR Esophagram      11. CKD (chronic kidney disease) stage 2, GFR 60-89 ml/min  N18.2       12. Thrombocytopenia (H24)  D69.6          Patient presents for follow-up multiple issues.    Coronary disease, following with cardiology.  Had numerous medication changes recently.  They recommend getting updated ankle-brachial indexes.  Recommend starting low-dose lisinopril, starting low-dose Crestor.  Had problems with high-dose Crestor in the past.  No longer taking Brilinta, now back on low-dose aspirin plus Eliquis.  Stop glipizide.  Start Jardiance.  Multiple med list changes made, refill sent to pharmacy.    Painful diabetic polyneuropathy, more numbness and pain at this time.  Continue to monitor.  Meds changed today.    Vaccines are due.  Orders placed    Dysphagia with food sticking.  Ongoing and  worsening.  Recommend esophageal x-ray.  Orders placed.  Does have upper endoscopy scheduled in approximately 1 month.    HYPERTENSION - Ongoing. Blood pressure is currently well controlled.  Medication side effects: None. Denies syncope or presyncope.  Continue current medications.   Medication list reviewed/updated. Refills completed as needed.      MIXED HYPERLIPIDEMIA.  Ongoing. LDL is at goal: No. Triglycerides are at goal: Yes.  Hopefully lifestyle modifications will improve cholesterol levels, otherwise will consider additional medication dose adjustments or medication changes.  -- Start Crestor.     Recent Labs   Lab Test 05/29/24  1307 02/28/24  1437   CHOL 166 195   HDL 42 39*   LDL 95 116*   TRIG 147 198*        Atrial Fibrillation - Type: Paroxysmal Atrial Fibrillation, chronic, ongoing.  + Hypercoagulable state due to atrial fibrillation.  Continues with apixaban (ELIQUIS) for oral anticoagulation.  Heart rates are controlled with diltiazem.  Tolerating well.  Denies excessive bleeding issues.  Easy bruising. Medication list reviewed/updated. Refills completed as needed.     Chronic Kidney Disease, Stage 2 (GFR 60-89), chronic, ongoing.  Kidney function had been slowly declining.  Encourage NSAID avoidance.       Vaccine counseling completed.  Encourage routine / annual vaccinations.    Type 2 Diabetes Mellitus, with nephropathy, with neuropathy, Reports ongoing/previous: numbness and burning.    Blood sugar control has been good with minimal hyperglycemia.   + Still with intermittent hypoglycemia.   Otherwise - Doing well with diet, oral agents, and exercise.  Medication list reviewed/updated. Refills completed as needed.    Complicating factors include but are not limited to: hypertension, hyperlipidemia, neuropathy, chronic kidney disease, and CAD/PVD.     Recent Labs   Lab Test 05/29/24  1307 02/28/24  1437 11/22/23  1413   A1C 7.1* 7.6* 8.3*   LDL 95 116* 110*   HDL 42 39* 37*   TRIG 147 198* 251*    ALT 8 9 8   CR 1.17 1.08 0.95   GFRESTIMATED 60* 66 77   POTASSIUM 4.6 4.2 4.6   TSH 1.96 1.94 1.90   WBC 6.5 6.2 7.3   HGB 15.8 15.9 15.0   * 149* 169   ALBUMIN 4.1 4.1 3.8     Hemoglobin A1c has improved.  LDL is high and not at goal.  HDL and triglycerides are at goal.  LT normal.  Creatinine is at baseline.  Potassium normal.  TSH normal.  CBC shows normal white blood cell count and hemoglobin levels.  Platelet count is low and slowly trending down.  Albumin normal.       The longitudinal plan of care for the diagnosis(es)/condition(s) as documented were addressed during this visit. Due to the added complexity in care, I will continue to support Steven in the subsequent management and with ongoing continuity of care.        Stop ticagrelor/Brilinta.    Start aspirin 81 mg daily plus the Eliquis 5 mg twice daily.    Start Crestor 10 mg daily.  Even if taken only 2 or 3 times weekly will be helpful for your cholesterol.    Stop glipizide.    Start Jardiance 10 mg daily to help with kidneys and heart and blood sugar.    Start lisinopril 5 mg daily to help with blood pressure and heart.     Esophageal xray ordered.   EGD scheduled for end of June.       This patient has been using and benefiting from using Dexcom  continuous glucose monitoring system. I therefore recommended Dexcom  continuous glucose monitoring as part of their comprehensive diabetes treatment plan, in order to help them:    lower their HbA1c to target and/or maintain their HbA1c at target    to alert patient prior to acute complications of hyper/hypoglycemia    to detect hypoglycemia, and/or to reduce hypoglycemia    increase their short term and long-term safety    improve quality of life    start/continue/intensify level of physical activity safely    improve insulin sensitivity by reducing hypoglycemia unawareness.             BMI  Estimated body mass index is 25.7 kg/m  as calculated from the following:    Height as of 2/28/24: 1.753 m  "(5' 9\").    Weight as of this encounter: 78.9 kg (174 lb).           Return in about 3 months (around 8/29/2024) for - Labs every 91+ days, with DM Follow-up, Same Day or 1-2 days later with Dr. Montanez.      Nawaf Montanez MD  [facility] AND [facility]    Review of Systems   Constitutional:  Positive for fatigue (mostly of feet and legs - has been taking b12 supplements again). Negative for chills and fever.   HENT:  Positive for hearing loss. Negative for congestion, ear pain and sore throat.    Eyes:  Positive for visual disturbance. Negative for pain.   Respiratory:  Positive for shortness of breath. Negative for cough and wheezing.    Cardiovascular:  Negative for chest pain, palpitations and peripheral edema.        Raynaud's syndrome, no ulcerations   Gastrointestinal:  Negative for abdominal pain, constipation, diarrhea, heartburn, hematochezia, nausea and vomiting.        + Difficulty swallowing, food sticks   Endocrine: Negative for cold intolerance and heat intolerance.        + Hypoglycemia episodes with glucose less than 54   Genitourinary:  Positive for frequency, impotence and urgency. Negative for dysuria, genital sores, hematuria and penile discharge.        + Nocturia 1-2x nightly, previously 3-4x.  + nocturia and overactive bladder symptoms.  Oxybutynin at bedtime has helped evening symptoms, but not daytime symptoms.   Musculoskeletal:  Positive for back pain (+ intermittent low back pain) and gait problem (+ Balance problems - difficulty walking on uneven ground). Negative for arthralgias, joint swelling and myalgias.   Skin:  Negative for pallor and rash.   Allergic/Immunologic: Negative for immunocompromised state.   Neurological:  Negative for dizziness, weakness, light-headedness, headaches and paresthesias.        + Balance problems   Hematological:  Does not bruise/bleed easily.   Psychiatric/Behavioral:  Positive for sleep disturbance (+ insomnia and will wake due to foot pain and " nocturia). Negative for agitation, confusion and mood changes. The patient is not nervous/anxious.        Michael Harris is a 89 year old, presenting for the following health issues:  Diabetes (Follow up)        5/29/2024     1:29 PM   Additional Questions   Roomed by Jessenia Lieberman LPN     History of Present Illness       Reason for visit:  Check up    He eats 0-1 servings of fruits and vegetables daily.He consumes 0 sweetened beverage(s) daily.He exercises with enough effort to increase his heart rate 30 to 60 minutes per day.  He exercises with enough effort to increase his heart rate 6 days per week.   He is taking medications regularly.         Diabetes Follow-up    How often are you checking your blood sugar? Continuous glucose monitor  What time of day are you checking your blood sugars (select all that apply)?  Not applicable  Have you had any blood sugars above 200?  Yes several  Have you had any blood sugars below 70?  Yes a few times  What symptoms do you notice when your blood sugar is low?  Shaky, Weak, and Other: sweaty  What concerns do you have today about your diabetes? Other: blood sugar fluctuations   Do you have any of these symptoms? (Select all that apply)  Burning in feet and Blurry vision  Have you had a diabetic eye exam in the last 12 months? Yes- Date of last eye exam: 06/12/2023,  Location: [place] [facility]        BP Readings from Last 2 Encounters:   05/29/24 128/64   02/28/24 138/72     Hemoglobin A1C (%)   Date Value   05/29/2024 7.1 (H)   02/28/2024 7.6 (H)   07/07/2021 7.1 (H)   03/17/2021 7.4 (H)     LDL Cholesterol Calculated (mg/dL)   Date Value   05/29/2024 95   02/28/2024 116 (H)   07/07/2021 86   03/17/2021 85                   Objective    /64   Pulse 60   Resp 16   Wt 78.9 kg (174 lb)   SpO2 97%   BMI 25.70 kg/m    Body mass index is 25.7 kg/m .  Physical Exam  Constitutional:       General: He is not in acute distress.     Appearance: Normal appearance. He is  well-developed. He is not diaphoretic.   HENT:      Head: Normocephalic and atraumatic.   Eyes:      General: No scleral icterus.     Conjunctiva/sclera: Conjunctivae normal.   Cardiovascular:      Rate and Rhythm: Normal rate and regular rhythm.      Pulses: Normal pulses.   Pulmonary:      Effort: Pulmonary effort is normal.      Breath sounds: Normal breath sounds.   Abdominal:      Palpations: Abdomen is soft.      Tenderness: There is no abdominal tenderness.   Musculoskeletal:         General: No deformity.      Cervical back: Neck supple.      Right lower leg: Edema (trace) present.      Left lower leg: Edema (trace) present.   Lymphadenopathy:      Cervical: No cervical adenopathy.   Skin:     General: Skin is warm and dry.      Coloration: Skin is not jaundiced.      Findings: No rash.      Comments: Venous stasis and scattered scabs bilaterally on legs   Neurological:      Mental Status: He is alert. Mental status is at baseline.   Psychiatric:         Mood and Affect: Mood normal.         Behavior: Behavior normal.                    Signed Electronically by: Nawaf Montanez MD

## 2024-06-03 ENCOUNTER — HOSPITAL ENCOUNTER (OUTPATIENT)
Dept: GENERAL RADIOLOGY | Facility: OTHER | Age: 89
Discharge: HOME | End: 2024-06-03
Attending: INTERNAL MEDICINE | Admitting: INTERNAL MEDICINE
Payer: COMMERCIAL

## 2024-06-03 DIAGNOSIS — R13.10 DYSPHAGIA, UNSPECIFIED TYPE: ICD-10-CM

## 2024-06-03 DIAGNOSIS — R13.10 FOOD STICKS ON SWALLOWING: ICD-10-CM

## 2024-06-03 PROCEDURE — 74220 X-RAY XM ESOPHAGUS 1CNTRST: CPT

## 2024-06-03 PROCEDURE — 250N000013 HC RX MED GY IP 250 OP 250 PS 637: Performed by: INTERNAL MEDICINE

## 2024-06-03 RX ADMIN — ANTACID/ANTIFLATULENT 4 G: 380; 550; 10; 10 GRANULE, EFFERVESCENT ORAL at 15:53

## 2024-06-25 ENCOUNTER — HOSPITAL ENCOUNTER (OUTPATIENT)
Facility: OTHER | Age: 89
Discharge: HOME | End: 2024-06-25
Attending: SURGERY | Admitting: SURGERY
Payer: COMMERCIAL

## 2024-06-25 ENCOUNTER — ANESTHESIA (OUTPATIENT)
Dept: SURGERY | Facility: OTHER | Age: 89
End: 2024-06-25
Payer: COMMERCIAL

## 2024-06-25 ENCOUNTER — ANESTHESIA EVENT (OUTPATIENT)
Dept: SURGERY | Facility: OTHER | Age: 89
End: 2024-06-25
Payer: COMMERCIAL

## 2024-06-25 VITALS
BODY MASS INDEX: 25.77 KG/M2 | DIASTOLIC BLOOD PRESSURE: 95 MMHG | WEIGHT: 174 LBS | RESPIRATION RATE: 12 BRPM | HEIGHT: 69 IN | OXYGEN SATURATION: 97 % | TEMPERATURE: 96.4 F | SYSTOLIC BLOOD PRESSURE: 147 MMHG | HEART RATE: 51 BPM

## 2024-06-25 DIAGNOSIS — I25.10 CORONARY ARTERY DISEASE INVOLVING NATIVE CORONARY ARTERY OF NATIVE HEART WITHOUT ANGINA PECTORIS: ICD-10-CM

## 2024-06-25 DIAGNOSIS — Z95.5 S/P DRUG ELUTING CORONARY STENT PLACEMENT: ICD-10-CM

## 2024-06-25 DIAGNOSIS — K25.9 GASTRIC ULCER WITHOUT HEMORRHAGE OR PERFORATION, UNSPECIFIED CHRONICITY: ICD-10-CM

## 2024-06-25 DIAGNOSIS — K25.9 GASTRIC ULCER WITHOUT HEMORRHAGE OR PERFORATION, UNSPECIFIED CHRONICITY: Primary | ICD-10-CM

## 2024-06-25 LAB — GLUCOSE BLDC GLUCOMTR-MCNC: 110 MG/DL (ref 70–99)

## 2024-06-25 PROCEDURE — 88312 SPECIAL STAINS GROUP 1: CPT

## 2024-06-25 PROCEDURE — 250N000009 HC RX 250

## 2024-06-25 PROCEDURE — 88342 IMHCHEM/IMCYTCHM 1ST ANTB: CPT

## 2024-06-25 PROCEDURE — 82962 GLUCOSE BLOOD TEST: CPT

## 2024-06-25 PROCEDURE — 258N000003 HC RX IP 258 OP 636: Mod: JZ | Performed by: SURGERY

## 2024-06-25 PROCEDURE — 43239 EGD BIOPSY SINGLE/MULTIPLE: CPT | Performed by: SURGERY

## 2024-06-25 PROCEDURE — 999N000010 HC STATISTIC ANES STAT CODE-CRNA PER MINUTE: Performed by: SURGERY

## 2024-06-25 PROCEDURE — 250N000011 HC RX IP 250 OP 636

## 2024-06-25 PROCEDURE — 88305 TISSUE EXAM BY PATHOLOGIST: CPT

## 2024-06-25 PROCEDURE — 99100 ANES PT EXTEME AGE<1 YR&>70: CPT

## 2024-06-25 PROCEDURE — 43239 EGD BIOPSY SINGLE/MULTIPLE: CPT

## 2024-06-25 PROCEDURE — 88341 IMHCHEM/IMCYTCHM EA ADD ANTB: CPT

## 2024-06-25 RX ORDER — NALOXONE HYDROCHLORIDE 0.4 MG/ML
0.2 INJECTION, SOLUTION INTRAMUSCULAR; INTRAVENOUS; SUBCUTANEOUS
Status: DISCONTINUED | OUTPATIENT
Start: 2024-06-25 | End: 2024-06-25 | Stop reason: HOSPADM

## 2024-06-25 RX ORDER — NALOXONE HYDROCHLORIDE 0.4 MG/ML
0.4 INJECTION, SOLUTION INTRAMUSCULAR; INTRAVENOUS; SUBCUTANEOUS
Status: DISCONTINUED | OUTPATIENT
Start: 2024-06-25 | End: 2024-06-25 | Stop reason: HOSPADM

## 2024-06-25 RX ORDER — PROPOFOL 10 MG/ML
INJECTION, EMULSION INTRAVENOUS PRN
Status: DISCONTINUED | OUTPATIENT
Start: 2024-06-25 | End: 2024-06-25

## 2024-06-25 RX ORDER — SODIUM CHLORIDE, SODIUM LACTATE, POTASSIUM CHLORIDE, CALCIUM CHLORIDE 600; 310; 30; 20 MG/100ML; MG/100ML; MG/100ML; MG/100ML
INJECTION, SOLUTION INTRAVENOUS CONTINUOUS
Status: DISCONTINUED | OUTPATIENT
Start: 2024-06-25 | End: 2024-06-25 | Stop reason: HOSPADM

## 2024-06-25 RX ORDER — LIDOCAINE 40 MG/G
CREAM TOPICAL
Status: DISCONTINUED | OUTPATIENT
Start: 2024-06-25 | End: 2024-06-25 | Stop reason: HOSPADM

## 2024-06-25 RX ORDER — FLUMAZENIL 0.1 MG/ML
0.2 INJECTION, SOLUTION INTRAVENOUS
Status: DISCONTINUED | OUTPATIENT
Start: 2024-06-25 | End: 2024-06-25 | Stop reason: HOSPADM

## 2024-06-25 RX ORDER — OMEPRAZOLE 40 MG/1
40 CAPSULE, DELAYED RELEASE ORAL DAILY
Qty: 60 CAPSULE | Refills: 0 | Status: SHIPPED | OUTPATIENT
Start: 2024-06-25 | End: 2024-08-24

## 2024-06-25 RX ORDER — LIDOCAINE HYDROCHLORIDE 20 MG/ML
INJECTION, SOLUTION INFILTRATION; PERINEURAL PRN
Status: DISCONTINUED | OUTPATIENT
Start: 2024-06-25 | End: 2024-06-25

## 2024-06-25 RX ORDER — PROPOFOL 10 MG/ML
INJECTION, EMULSION INTRAVENOUS CONTINUOUS PRN
Status: DISCONTINUED | OUTPATIENT
Start: 2024-06-25 | End: 2024-06-25

## 2024-06-25 RX ADMIN — PROPOFOL 40 MG: 10 INJECTION, EMULSION INTRAVENOUS at 10:10

## 2024-06-25 RX ADMIN — PROPOFOL 150 MCG/KG/MIN: 10 INJECTION, EMULSION INTRAVENOUS at 10:10

## 2024-06-25 RX ADMIN — SODIUM CHLORIDE, POTASSIUM CHLORIDE, SODIUM LACTATE AND CALCIUM CHLORIDE 30 ML/HR: 600; 310; 30; 20 INJECTION, SOLUTION INTRAVENOUS at 09:29

## 2024-06-25 RX ADMIN — LIDOCAINE HYDROCHLORIDE 40 MG: 20 INJECTION, SOLUTION INFILTRATION; PERINEURAL at 10:10

## 2024-06-25 ASSESSMENT — ENCOUNTER SYMPTOMS: DYSRHYTHMIAS: 1

## 2024-06-25 ASSESSMENT — ACTIVITIES OF DAILY LIVING (ADL)
ADLS_ACUITY_SCORE: 33
ADLS_ACUITY_SCORE: 33
ADLS_ACUITY_SCORE: 31

## 2024-06-25 ASSESSMENT — LIFESTYLE VARIABLES: TOBACCO_USE: 1

## 2024-06-25 NOTE — OP NOTE
PROCEDURE NOTE    DATE OF SERVICE: 6/25/2024    SURGEON: KIERAN Jimenez MD     PRE-OP DIAGNOSIS:  dysphagia    POST-OP DIAGNOSIS: Esophagitis, esophageal ulcer, gastric ulcer, hiatal hernia    PROCEDURE:   EGD with biopsy    ASSISTANT:  Circulator: Darwin De Dios RN  Scrub Person: Saud Rodriguez    ANESTHESIA:  MAC                            MACStudent Nurse Anesthetist: Julius Leo CRNA Independent: Davon Borges APRN CRNA    INDICATION FOR THE PROCEDURE: Steven Dennison is a 89 year old male. The patient presents with epigastric pain and dysphagia.     PROCEDURE:The patient was taken to the endoscopy suite. Appropriate monitors were attached. The patient was placed in the left lateral decubitus position. Bite block was positioned.Timeout was performed confirming the patient's identity and procedure to be performed. After appropriate sedation was confirmed, the flexible endoscope was advanced into the oropharynx. The posterior oropharynx appeared grossly normal. The scope was advanced into the proximal esophagus. The esophagus was insufflated with air. The scope was advanced under direct visualization. No acute abnormalities of the esophagus were noted. The scope was advanced into the stomach. The scope was advanced through the pylorus into the duodenal bulb. The  distal duodenum appeared grossly normal.  The duodenal bulb had mild inflammation.  The scope was withdrawn back into the stomach.  There is severe gastritis in the antrum along with a shallow, clean-based ulcer that is nonbleeding.  Biopsies were taken from the gastric antrum and around the ulcer.  The scope was retroflexed and the GE junction inspected.  2.5 cm wide hiatal hernia noted, also noted to be 3 to 4 cm in length.  The scope was returned to a neutral position and the stomach was decompressed. The scope was withdrawn to the GE junction and biopsy obtained. The mucosa of the esophagus was inspected while withdrawing the scope.  The  lower and middle esophagus was noted to be quite inflamed with an ulcer at the GE junction.  The scope was withdrawn from the patient. The bite block was removed. The patient tolerated the procedure with no immediately apparent complication. The patient was taken to recovery instable condition.     ESTIMATED BLOOD LOSS: none    COMPLICATIONS:  None    TISSUE REMOVED:  Yes    RECOMMEND:    Avoid smoking, drinking alcohol, NSAIDs such as ibuprofen and naproxen.  Limit stress.  Start PPI  Hold Eliquis for 2 days after the procedure  Follow-up EGD in 2 months to ensure ulcer healing    KIERAN Jimenez MD

## 2024-06-25 NOTE — ANESTHESIA POSTPROCEDURE EVALUATION
Patient: Steven Dennison    Procedure: Procedure(s):  ESOPHAGOGASTRODUODENOSCOPY, WITH BIOPSY       Anesthesia Type:  MAC    Note:  Disposition: Outpatient   Postop Pain Control: Uneventful            Sign Out: Well controlled pain   PONV: No   Neuro/Psych: Uneventful            Sign Out: Acceptable/Baseline neuro status   Airway/Respiratory: Uneventful            Sign Out: Acceptable/Baseline resp. status   CV/Hemodynamics: Uneventful            Sign Out: Acceptable CV status; No obvious hypovolemia; No obvious fluid overload   Other NRE: NONE   DID A NON-ROUTINE EVENT OCCUR? No           Last vitals:  Vitals Value Taken Time   /95 06/25/24 1100   Temp 96.4  F (35.8  C) 06/25/24 1033   Pulse 51 06/25/24 1100   Resp 12 06/25/24 1033   SpO2 95 % 06/25/24 1109   Vitals shown include unfiled device data.    Electronically Signed By: LIVIA BAJWA CRNA  June 25, 2024  11:44 AM

## 2024-06-25 NOTE — DISCHARGE INSTRUCTIONS
[facility]  Adult Discharge Orders & Instructions    ________________________________________________________________          For 12 hours after surgery  Get plenty of rest.  A responsible adult must stay with you for at least 12 hours after you leave the hospital.   You may feel lightheaded.  IF so, sit for a few minutes before standing.  Have someone help you get up.   You may have a slight fever. Call the doctor if your fever is over 101 F (38.3 C) (taken under the tongue) or lasts longer than 24 hours.  You may have a dry mouth, a sore throat, muscle aches or trouble sleeping.  These should go away after 24 hours.  Do not make important or legal decisions.  6.   Do not drive or use heavy equipment.  If you have weakness or tingling, don't drive or use heavy equipment until this feeling goes away.    To contact a doctor, call   [phone]_______________________

## 2024-06-25 NOTE — ANESTHESIA PREPROCEDURE EVALUATION
Anesthesia Pre-Procedure Evaluation    Patient: Steven Dennison   MRN: [id number] : 1935        Procedure : Procedure(s):  Esophagoscopy, gastroscopy, duodenoscopy (EGD), combined          Past Medical History:   Diagnosis Date    Controlled type 2 diabetes mellitus with diabetic polyneuropathy, without long-term current use of insulin (H) 2017    Eczema 2016    Renal calculus 2018    Overview:  disease      Past Surgical History:   Procedure Laterality Date    APPENDECTOMY OPEN      Appendectomy at age 6 due to appendicitis    ARTHROSCOPY KNEE      left knee    COLONOSCOPY  2000    COLONOSCOPY  2011,adenomatous polyps, one with high grade dysplasia , next due     OTHER SURGICAL HISTORY      ,HERNIA REPAIR,right inguinal    OTHER SURGICAL HISTORY      ,[id number],ARTHROPLASTY,left total knee    OTHER SURGICAL HISTORY      ,[id number],OTHER,left first/rib resection    OTHER SURGICAL HISTORY      ,[id number],OTHER    OTHER SURGICAL HISTORY      [id number],CYSTOSCOPY W/ URETEROSCOPY W/ LITHOTRIPSY,Dr. Chelsi GREEN    OTHER SURGICAL HISTORY      ,HERNIA REPAIR,x 4      Allergies   Allergen Reactions    Metformin Muscle Pain (Myalgia)     Bilateral leg pain    Statins Muscle Pain (Myalgia)    Cephalexin Other (See Comments)     Other reaction(s):Pt Doesn't Remember  Pt does not remember     Clindamycin Other (See Comments)     Other reaction(s): Pt Doesn't Remember  Pt does not remember    Hydromorphone Other (See Comments)     Other reaction(s): Bradycardia    Amoxicillin Rash      Social History     Tobacco Use    Smoking status: Former     Current packs/day: 0.00     Average packs/day: 1 pack/day for 20.0 years (20.0 ttl pk-yrs)     Types: Cigarettes     Start date: 1955     Quit date: 1975     Years since quittin.5     Passive exposure: Past    Smokeless tobacco: Never   Substance Use Topics    Alcohol use: Yes     Alcohol/week: 1.0  standard drink of alcohol     Types: 1 Standard drinks or equivalent per week     Comment: maybe once a month      Wt Readings from Last 1 Encounters:   05/29/24 78.9 kg (174 lb)        Anesthesia Evaluation   Pt has had prior anesthetic.     No history of anesthetic complications       ROS/MED HX  ENT/Pulmonary:     (+)                tobacco use, Past use,                       Neurologic:  - neg neurologic ROS     Cardiovascular:     (+) Dyslipidemia hypertension- -  CAD -  - stent-  Drug Eluting Stent. Taking blood thinners                     dysrhythmias, a-fib,             METS/Exercise Tolerance: >4 METS    Hematologic:       Musculoskeletal:       GI/Hepatic:  - neg GI/hepatic ROS     Renal/Genitourinary:     (+) renal disease, type: CRI,            Endo:     (+)  type II DM,       Diabetic complications: neuropathy.             Psychiatric/Substance Use:  - neg psychiatric ROS     Infectious Disease:  - neg infectious disease ROS     Malignancy:  - neg malignancy ROS     Other:  - neg other ROS          Physical Exam    Airway        Mallampati: II   TM distance: > 3 FB   Neck ROM: full   Mouth opening: > 3 cm    Respiratory Devices and Support         Dental     Comment: Full upper denture in place    (+) Edentulous      Cardiovascular   cardiovascular exam normal       Rhythm and rate: regular and normal     Pulmonary   pulmonary exam normal        breath sounds clear to auscultation           OUTSIDE LABS:  CBC:   Lab Results   Component Value Date    WBC 6.5 05/29/2024    WBC 6.2 02/28/2024    HGB 15.8 05/29/2024    HGB 15.9 02/28/2024    HCT 48.4 05/29/2024    HCT 47.6 02/28/2024     (L) 05/29/2024     (L) 02/28/2024     BMP:   Lab Results   Component Value Date     05/29/2024     02/28/2024    POTASSIUM 4.6 05/29/2024    POTASSIUM 4.2 02/28/2024    CHLORIDE 105 05/29/2024    CHLORIDE 104 02/28/2024    CO2 28 05/29/2024    CO2 26 02/28/2024    BUN 21.8 05/29/2024    BUN 18.7  "02/28/2024    CR 1.17 05/29/2024    CR 1.08 02/28/2024     (H) 05/29/2024     (H) 02/28/2024     COAGS:   Lab Results   Component Value Date    INR 1.1 02/18/2014     POC: No results found for: \"BGM\", \"HCG\", \"HCGS\"  HEPATIC:   Lab Results   Component Value Date    ALBUMIN 4.1 05/29/2024    PROTTOTAL 6.8 05/29/2024    ALT 8 05/29/2024    AST 11 05/29/2024    ALKPHOS 80 05/29/2024    BILITOTAL 0.8 05/29/2024    BILIDIRECT 0.30 (H) 07/27/2014     OTHER:   Lab Results   Component Value Date    A1C 7.1 (H) 05/29/2024    EVELYN 9.3 05/29/2024    MAG 1.8 (L) 07/27/2014    LIPASE 11.0 07/27/2014    TSH 1.96 05/29/2024       Anesthesia Plan    ASA Status:  3    NPO Status:  NPO Appropriate (Small amount of cream <15ml in coffee at 0745 this am.  Proceed with procedure as planned.)    Anesthesia Type: MAC.   Induction: Propofol.   Maintenance: Balanced.        Consents    Anesthesia Plan(s) and associated risks, benefits, and realistic alternatives discussed. Questions answered and patient/representative(s) expressed understanding.     - Discussed: Risks, Benefits and Alternatives for BOTH SEDATION and the PROCEDURE were discussed     - Discussed with:  Patient      - Extended Intubation/Ventilatory Support Discussed: No.      - Patient is DNR/DNI Status: No     Use of blood products discussed: No .     Postoperative Care            Comments:               RENATO HERNANDEZ, APRN CRNA    I have reviewed the pertinent notes and labs in the chart from the past 30 days and (re)examined the patient.  Any updates or changes from those notes are reflected in this note.            # Drug Induced Coagulation Defect: home medication list includes an anticoagulant medication  # Drug Induced Platelet Defect: home medication list includes an antiplatelet medication  # DMII: A1C = 7.1 % (Ref range: 4.0 - 6.2 %) within past 6 months      "

## 2024-06-25 NOTE — OR NURSING
Patient was discharged home with friend via w/c.   Discharge instructions were reviewed with patient and friend. And they verbalized understanding.   Prescriptions: erx to isha

## 2024-06-25 NOTE — INTERVAL H&P NOTE
I saw and examined Steven Dennison.  I have reviewed the history and physical and find no changes to the patient's medical status or condition with the exceptions noted below.     Steven Dennison complains of burning epigastric pain and dysphagia.    The technical details of EGD were discussed with the patient along with the risks and benefits to include bleeding, perforation and aspiration. Steven Dennison demonstrated understanding and is willing to proceed.       Liam Jimenez MD   9:52 AM 6/25/2024

## 2024-06-25 NOTE — ANESTHESIA CARE TRANSFER NOTE
Patient: Steven Dennison    Procedure: Procedure(s):  ESOPHAGOGASTRODUODENOSCOPY, WITH BIOPSY       Diagnosis: Dysphagia, unspecified type [R13.10]  Diagnosis Additional Information: No value filed.    Anesthesia Type:   MAC     Note:    Oropharynx: oropharynx clear of all foreign objects and spontaneously breathing  Level of Consciousness: drowsy  Oxygen Supplementation: room air    Independent Airway: airway patency satisfactory and stable  Dentition: dentition unchanged  Vital Signs Stable: post-procedure vital signs reviewed and stable  Report to RN Given: handoff report given  Patient transferred to: Phase II    Handoff Report: Identifed the Patient, Identified the Reponsible Provider, Reviewed the pertinent medical history, Discussed the surgical course, Reviewed Intra-OP anesthesia mangement and issues during anesthesia, Set expectations for post-procedure period and Allowed opportunity for questions and acknowledgement of understanding  Vitals:  Vitals Value Taken Time   BP     Temp     Pulse     Resp     SpO2 96 % 06/25/24 1035   Vitals shown include unfiled device data.    Electronically Signed By: Julius Leo  June 25, 2024  10:36 AM

## 2024-06-26 RX ORDER — OMEPRAZOLE 40 MG/1
40 CAPSULE, DELAYED RELEASE ORAL DAILY
Qty: 90 CAPSULE | OUTPATIENT
Start: 2024-06-26

## 2024-06-26 NOTE — TELEPHONE ENCOUNTER
Limited script was sent on 6/25/2024 for 60 days per notes in medication. Request denied for 90 day script.     omeprazole (PRILOSEC) 40 MG DR capsule 60 capsule 0 6/25/2024 8/24/2024 No   Sig - Route: Take 1 capsule (40 mg) by mouth daily for 60 days - Oral       Ingris Maldonado RN  ....................  6/26/2024   11:37 AM

## 2024-06-28 LAB
PATH REPORT.COMMENTS IMP SPEC: NORMAL
PATH REPORT.FINAL DX SPEC: NORMAL
PATH REPORT.RELEVANT HX SPEC: NORMAL
PHOTO IMAGE: NORMAL

## 2024-07-02 ENCOUNTER — HOSPITAL ENCOUNTER (OUTPATIENT)
Facility: OTHER | Age: 89
End: 2024-07-02
Attending: SURGERY | Admitting: SURGERY
Payer: COMMERCIAL

## 2024-07-02 ENCOUNTER — TELEPHONE (OUTPATIENT)
Dept: SURGERY | Facility: OTHER | Age: 89
End: 2024-07-02

## 2024-07-02 NOTE — TELEPHONE ENCOUNTER
Screening Questions for the Scheduling of Screening Colonoscopies   (If Colonoscopy is diagnostic, Provider should review the chart before scheduling.)    Are you younger than 45 or older than 80?  YES    Do you take aspirin or fish oil?  NO (if yes, tell patient to stop 1 week prior to Colonoscopy)    Do you take warfarin (Coumadin), clopidogrel (Plavix), apixaban (Eliquis), dabigatram (Pradaxa),   rivaroxaban (Xarelto) or any blood thinner? BRILINTA    Do you take Ozempic? NO    Do you use oxygen or a CPAP at home?  NO    Do you have kidney disease? NO    Are you on dialysis? NO    Have you had a stroke or heart attack in the last year? NSTEMI 03/11/2023    Have you had a stent in your heart or any blood vessel in the last year? YES, 03/11/2023    Have you had a transplant of any organ? NO    Have you had a colonoscopy or upper endoscopy (EGD) before? NO    Date of scheduled EGD. 08/27/2024    Provider [facility]    Pharmacy [facility]

## 2024-07-26 ENCOUNTER — TRANSFERRED RECORDS (OUTPATIENT)
Dept: HEALTH INFORMATION MANAGEMENT | Facility: OTHER | Age: 89
End: 2024-07-26
Payer: COMMERCIAL

## 2024-07-26 LAB — RETINOPATHY: NEGATIVE

## 2024-08-09 ENCOUNTER — TELEPHONE (OUTPATIENT)
Dept: SURGERY | Facility: OTHER | Age: 89
End: 2024-08-09

## 2024-08-09 NOTE — TELEPHONE ENCOUNTER
Pt is scheduled for EGD w/ biopsy on Tuesday aug 27th.  Pt reported taking BRILINTA on screening questionnaire on July 2nd.  His medication list also includes eliquis BID and asa 81mg, as well as jardiance.    Please advise on how long pt to hold BRILINTA medication prior to procedure.

## 2024-08-12 NOTE — TELEPHONE ENCOUNTER
Message has been routed to surgeon, on his desktop so I am removing from ANGELO Burnette LPN...................8/12/2024   8:40 AM

## 2024-08-12 NOTE — TELEPHONE ENCOUNTER
"Per Dr. Jimenez    \"Brilinta is not in the patient's medication list, please update that if that is true, hold Brilinta for 1 week prior to EGD, hold Eliquis for 48 hours prior to EGD, continue 81 mg aspirin.  Hold Jardiance for 1 week prior to procedure.\"     Disp Refills Start End AUGUSTIN   ticagrelor (BRILINTA) 90 MG tablet (Discontinued) 180 tablet 4 11/22/2023 5/29/2024 No   Sig - Route: Take 1 tablet (90 mg) by mouth 2 times daily - blood thinner - Oral   Sent to pharmacy as: Ticagrelor 90 MG Oral Tablet (BRILINTA)   Class: E-Prescribe   Order: [id number]   E-Prescribing Status: Receipt confirmed by pharmacy (11/22/2023  3:24 PM CST)   E-Cancel Status: Request approved by pharmacy (5/29/2024  2:07 PM CDT)       E-Cancel Status Note: Some or all of Rx dispensed; Last fill:03/13/24     [id number] - [place], [place] - [address]     Order Status Reason By On   Discontinued None Nawaf Montanez MD 5/29/24 1406     Per office notes with Dr. Montanez on 5/29/2024. Patient was no longer taking Brilinta, and now back on low-dose aspirin plus Eliquis.    Spoke with the patient and he had stopped the Brilinta. He reports he never started the    empagliflozin (JARDIANCE) 10 MG TABS tablet Start: 5/29/2024.     Will update the patients PCP.   Ingris Maldonado RN  ....................  8/12/2024   9:18 AM    "

## 2024-08-22 DIAGNOSIS — K25.9 GASTRIC ULCER WITHOUT HEMORRHAGE OR PERFORATION, UNSPECIFIED CHRONICITY: ICD-10-CM

## 2024-08-22 RX ORDER — OMEPRAZOLE 40 MG/1
40 CAPSULE, DELAYED RELEASE ORAL DAILY
Qty: 90 CAPSULE | OUTPATIENT
Start: 2024-08-22

## 2024-08-22 NOTE — TELEPHONE ENCOUNTER
Disp Refills Start End AUGUSTIN   omeprazole (PRILOSEC) 40 MG DR capsule 60 capsule 0 6/25/2024 8/24/2024 No   Sig - Route: Take 1 capsule (40 mg) by mouth daily for 60 days - Oral    60 day script for 60 days.    Request denied as this is for 60 days. Ingris Maldonado RN  ....................  8/22/2024   8:33 AM

## 2024-08-26 RX ORDER — LIDOCAINE 40 MG/G
CREAM TOPICAL
Status: CANCELLED | OUTPATIENT
Start: 2024-08-26

## 2024-08-26 RX ORDER — SODIUM CHLORIDE, SODIUM LACTATE, POTASSIUM CHLORIDE, CALCIUM CHLORIDE 600; 310; 30; 20 MG/100ML; MG/100ML; MG/100ML; MG/100ML
INJECTION, SOLUTION INTRAVENOUS CONTINUOUS
Status: CANCELLED | OUTPATIENT
Start: 2024-08-26

## 2024-08-26 NOTE — TELEPHONE ENCOUNTER
"Spoke with Steven regarding EGD scheduled for tomorrow, Aug 27th. He will be rescheduled as he has not held his Eliquis and Jardiance. We had previously left voicemails for him to call back on 8/20 and 8/23, and did not hear from pt. He was very argumentative and stated \"well I'm not on the phone all the time like you young people\" and wanted a \"hard copy\" of his instructions. He also argued regarding the necessity of holding blood thinners, and that staff are \"all on the same computer, so we should be able to see when someone else in the facility calls him\" I explained to him that the PAC nurses need to speak to each pt approx 1 week prior to surgery/procedure to go over history, etc and give instructions on all meds/times and other necessary info. I encouraged him to try to answer if he sees GICH on his phone, and to call back if he misses the call. I also gave him the direct number for the PAC office. I instructed him that when he gets a new date for EGD, that he needs to hiold Eliquis  x 2 days (stay on ASA 81 mg) and his Jardiance for a week. (He informed me that he is not on Brilinta). I spoke with Cheryl who will call pt to get him back on the schedule and reinforce above instructions.   "

## 2024-09-04 ENCOUNTER — OFFICE VISIT (OUTPATIENT)
Dept: INTERNAL MEDICINE | Facility: OTHER | Age: 89
End: 2024-09-04
Attending: INTERNAL MEDICINE
Payer: COMMERCIAL

## 2024-09-04 ENCOUNTER — LAB (OUTPATIENT)
Dept: LAB | Facility: OTHER | Age: 89
End: 2024-09-04
Attending: INTERNAL MEDICINE
Payer: COMMERCIAL

## 2024-09-04 VITALS
OXYGEN SATURATION: 97 % | SYSTOLIC BLOOD PRESSURE: 136 MMHG | WEIGHT: 173 LBS | HEART RATE: 56 BPM | RESPIRATION RATE: 18 BRPM | HEIGHT: 69 IN | BODY MASS INDEX: 25.62 KG/M2 | DIASTOLIC BLOOD PRESSURE: 50 MMHG

## 2024-09-04 DIAGNOSIS — R35.1 NOCTURIA: ICD-10-CM

## 2024-09-04 DIAGNOSIS — E78.2 MIXED HYPERLIPIDEMIA: ICD-10-CM

## 2024-09-04 DIAGNOSIS — E11.42 CONTROLLED TYPE 2 DIABETES MELLITUS WITH DIABETIC POLYNEUROPATHY, WITHOUT LONG-TERM CURRENT USE OF INSULIN (H): Primary | ICD-10-CM

## 2024-09-04 DIAGNOSIS — D69.6 THROMBOCYTOPENIA (H): ICD-10-CM

## 2024-09-04 DIAGNOSIS — Z79.01 CHRONIC ANTICOAGULATION: ICD-10-CM

## 2024-09-04 DIAGNOSIS — R35.0 URINARY FREQUENCY: ICD-10-CM

## 2024-09-04 DIAGNOSIS — E11.65 TYPE 2 DIABETES MELLITUS WITH HYPERGLYCEMIA, WITHOUT LONG-TERM CURRENT USE OF INSULIN (H): ICD-10-CM

## 2024-09-04 DIAGNOSIS — N18.2 CKD (CHRONIC KIDNEY DISEASE) STAGE 2, GFR 60-89 ML/MIN: ICD-10-CM

## 2024-09-04 DIAGNOSIS — I25.10 CORONARY ARTERY DISEASE INVOLVING NATIVE CORONARY ARTERY OF NATIVE HEART WITHOUT ANGINA PECTORIS: ICD-10-CM

## 2024-09-04 DIAGNOSIS — I10 BENIGN ESSENTIAL HYPERTENSION: ICD-10-CM

## 2024-09-04 DIAGNOSIS — R33.9 URINARY RETENTION WITH INCOMPLETE BLADDER EMPTYING: ICD-10-CM

## 2024-09-04 DIAGNOSIS — I48.0 HYPERCOAGULABLE STATE DUE TO PAROXYSMAL ATRIAL FIBRILLATION (H): ICD-10-CM

## 2024-09-04 DIAGNOSIS — Z95.5 S/P DRUG ELUTING CORONARY STENT PLACEMENT: ICD-10-CM

## 2024-09-04 DIAGNOSIS — Z71.85 VACCINE COUNSELING: ICD-10-CM

## 2024-09-04 DIAGNOSIS — D68.69 HYPERCOAGULABLE STATE DUE TO PAROXYSMAL ATRIAL FIBRILLATION (H): ICD-10-CM

## 2024-09-04 LAB
ALBUMIN SERPL BCG-MCNC: 4 G/DL (ref 3.5–5.2)
ALBUMIN UR-MCNC: NEGATIVE MG/DL
ALP SERPL-CCNC: 80 U/L (ref 40–150)
ALT SERPL W P-5'-P-CCNC: 12 U/L (ref 0–70)
ANION GAP SERPL CALCULATED.3IONS-SCNC: 10 MMOL/L (ref 7–15)
APPEARANCE UR: CLEAR
AST SERPL W P-5'-P-CCNC: 18 U/L (ref 0–45)
BILIRUB SERPL-MCNC: 0.7 MG/DL
BILIRUB UR QL STRIP: NEGATIVE
BUN SERPL-MCNC: 24.5 MG/DL (ref 8–23)
CALCIUM SERPL-MCNC: 8.8 MG/DL (ref 8.8–10.4)
CHLORIDE SERPL-SCNC: 105 MMOL/L (ref 98–107)
CHOLEST SERPL-MCNC: 105 MG/DL
COLOR UR AUTO: ABNORMAL
CREAT SERPL-MCNC: 1.18 MG/DL (ref 0.67–1.17)
CREAT UR-MCNC: 92.9 MG/DL
EGFRCR SERPLBLD CKD-EPI 2021: 59 ML/MIN/1.73M2
ERYTHROCYTE [DISTWIDTH] IN BLOOD BY AUTOMATED COUNT: 13.9 % (ref 10–15)
FASTING STATUS PATIENT QL REPORTED: ABNORMAL
FASTING STATUS PATIENT QL REPORTED: ABNORMAL
GLUCOSE SERPL-MCNC: 180 MG/DL (ref 70–99)
GLUCOSE UR STRIP-MCNC: >1000 MG/DL
HBA1C MFR BLD: 7 % (ref 4–6.2)
HCO3 SERPL-SCNC: 23 MMOL/L (ref 22–29)
HCT VFR BLD AUTO: 45.5 % (ref 40–53)
HDLC SERPL-MCNC: 39 MG/DL
HGB BLD-MCNC: 14.9 G/DL (ref 13.3–17.7)
HGB UR QL STRIP: NEGATIVE
KETONES UR STRIP-MCNC: ABNORMAL MG/DL
LDLC SERPL CALC-MCNC: 46 MG/DL
LEUKOCYTE ESTERASE UR QL STRIP: NEGATIVE
MCH RBC QN AUTO: 29.3 PG (ref 26.5–33)
MCHC RBC AUTO-ENTMCNC: 32.7 G/DL (ref 31.5–36.5)
MCV RBC AUTO: 89 FL (ref 78–100)
MICROALBUMIN UR-MCNC: 23.2 MG/L
MICROALBUMIN/CREAT UR: 24.97 MG/G CR (ref 0–17)
NITRATE UR QL: NEGATIVE
NONHDLC SERPL-MCNC: 66 MG/DL
PH UR STRIP: 5.5 [PH] (ref 5–9)
PLATELET # BLD AUTO: 145 10E3/UL (ref 150–450)
POTASSIUM SERPL-SCNC: 4.4 MMOL/L (ref 3.4–5.3)
PROT SERPL-MCNC: 6.6 G/DL (ref 6.4–8.3)
RBC # BLD AUTO: 5.09 10E6/UL (ref 4.4–5.9)
SODIUM SERPL-SCNC: 138 MMOL/L (ref 135–145)
SP GR UR STRIP: 1.03 (ref 1–1.03)
TRIGL SERPL-MCNC: 99 MG/DL
TSH SERPL DL<=0.005 MIU/L-ACNC: 1.77 UIU/ML (ref 0.3–4.2)
UROBILINOGEN UR STRIP-MCNC: NORMAL MG/DL
WBC # BLD AUTO: 6.5 10E3/UL (ref 4–11)

## 2024-09-04 PROCEDURE — 84443 ASSAY THYROID STIM HORMONE: CPT | Mod: ZL

## 2024-09-04 PROCEDURE — G2211 COMPLEX E/M VISIT ADD ON: HCPCS | Performed by: INTERNAL MEDICINE

## 2024-09-04 PROCEDURE — 99214 OFFICE O/P EST MOD 30 MIN: CPT | Performed by: INTERNAL MEDICINE

## 2024-09-04 PROCEDURE — 36415 COLL VENOUS BLD VENIPUNCTURE: CPT | Mod: ZL

## 2024-09-04 PROCEDURE — G0463 HOSPITAL OUTPT CLINIC VISIT: HCPCS | Mod: 25,27

## 2024-09-04 PROCEDURE — 80061 LIPID PANEL: CPT | Mod: ZL

## 2024-09-04 PROCEDURE — 83036 HEMOGLOBIN GLYCOSYLATED A1C: CPT | Mod: ZL

## 2024-09-04 PROCEDURE — 82043 UR ALBUMIN QUANTITATIVE: CPT | Mod: ZL

## 2024-09-04 PROCEDURE — 82040 ASSAY OF SERUM ALBUMIN: CPT | Mod: ZL

## 2024-09-04 PROCEDURE — 81003 URINALYSIS AUTO W/O SCOPE: CPT | Mod: ZL

## 2024-09-04 PROCEDURE — 85027 COMPLETE CBC AUTOMATED: CPT | Mod: ZL

## 2024-09-04 PROCEDURE — G0463 HOSPITAL OUTPT CLINIC VISIT: HCPCS

## 2024-09-04 RX ORDER — GLIPIZIDE 5 MG/1
10 TABLET ORAL 2 TIMES DAILY WITH MEALS
Qty: 360 TABLET | Refills: 4 | Status: SHIPPED | OUTPATIENT
Start: 2024-09-04

## 2024-09-04 RX ORDER — TAMSULOSIN HYDROCHLORIDE 0.4 MG/1
0.8 CAPSULE ORAL EVERY EVENING
Qty: 180 CAPSULE | Refills: 4 | Status: SHIPPED | OUTPATIENT
Start: 2024-09-04

## 2024-09-04 ASSESSMENT — ENCOUNTER SYMPTOMS
BACK PAIN: 1
NERVOUS/ANXIOUS: 0
DIARRHEA: 0
CONSTIPATION: 0
WEAKNESS: 0
EYE PAIN: 0
VOMITING: 0
CHILLS: 0
DIZZINESS: 0
FREQUENCY: 1
SHORTNESS OF BREATH: 1
DYSURIA: 0
SLEEP DISTURBANCE: 1
WHEEZING: 0
SORE THROAT: 0
ABDOMINAL PAIN: 0
HEMATOCHEZIA: 0
MYALGIAS: 0
FATIGUE: 1
ARTHRALGIAS: 0
HEMATURIA: 0
CONFUSION: 0
LIGHT-HEADEDNESS: 0
FEVER: 0
AGITATION: 0
COUGH: 0
NAUSEA: 0
PALPITATIONS: 0
JOINT SWELLING: 0
PARESTHESIAS: 0
BRUISES/BLEEDS EASILY: 0
HEADACHES: 0
HEARTBURN: 0

## 2024-09-04 ASSESSMENT — PAIN SCALES - GENERAL: PAINLEVEL: NO PAIN (0)

## 2024-09-04 NOTE — H&P (VIEW-ONLY)
Assessment & Plan     ICD-10-CM    1. Controlled type 2 diabetes mellitus with diabetic polyneuropathy, without long-term current use of insulin (H)  E11.42 glipiZIDE (GLUCOTROL) 5 MG tablet      2. Benign essential hypertension  I10       3. CKD (chronic kidney disease) stage 2, GFR 60-89 ml/min  N18.2       4. Hypercoagulable state due to paroxysmal atrial fibrillation (H)  D68.69     I48.0       5. Chronic anticoagulation - Eliquis  Z79.01       6. Coronary artery disease involving native coronary artery of native heart without angina pectoris  I25.10       7. S/P drug eluting coronary stent placement - RCA x2 and LAD x1 - 3/14/2023 - [facility]  Z95.5       8. Thrombocytopenia (H24)  D69.6       9. Urinary frequency  R35.0 tamsulosin (FLOMAX) 0.4 MG capsule      10. Nocturia  R35.1 tamsulosin (FLOMAX) 0.4 MG capsule      11. Vaccine counseling  Z71.85       12. Urinary [facility] with incomplete [facility] emptying [facility] -- Stopped Oxybutynin End of August  R33.9          Patient presents for diabetes follow-up, as well as follow-up multiple issues    Diabetic polyneuropathy, more numbness  then cane.  Does find low-dose gabapentin in the morning and 3 mg in the evening somewhat helpful and has been well-tolerated.  Declines need for dose increase.    Recently had medication changes.  Continues with Jardiance.  Restarted glipizide.  Blood sugars have been very high and very low into the 60s and sometimes greater than 200 range.  CGM has been very helpful to monitor these abrupt substantial changes.    Coronary artery disease, appears stable.  Denies exertional angina.  History of stent placement in the past.  No recent issues.  Continue current medications.    Thrombocytopenia, stable.  Does bruise easily.  Currently taking better with aspirin plus Eliquis.  Continue close monitoring.    Urinary frequency with nocturia.  Flomax dose was increased recently.  [facility]  [facility]  [facility].    Has now been off of oxybutynin for about 6 or 7 days.  Bladder scan today shows urine volume of 59 mL.  Appears to have resolved with discontinuation of Oxybutynin.  Continue higher dose of Flomax.    HYPERTENSION - Ongoing. Blood pressure is currently well controlled.  Medication side effects: None. Denies syncope or presyncope.  Continue current medications.   Medication list reviewed/updated. Refills completed as needed.      MIXED HYPERLIPIDEMIA.  Ongoing. LDL is at goal: Yes. Triglycerides are at goal: Yes.    Medication side effects reported: None.   Continue current medications for now. Medication list reviewed/updated. Refills completed as needed.  Recent Labs   Lab Test 09/04/24  1220 05/29/24  1307   CHOL 105 166   HDL 39* 42   LDL 46 95   TRIG 99 147        Chronic Kidney Disease, Stage 2 (GFR 60-89), chronic, ongoing.  Kidney function had been slowly declining.  Encourage NSAID avoidance.       Vaccine counseling completed.  Encourage routine / annual vaccinations.    Type 2 Diabetes Mellitus, with nephropathy, with neuropathy, Reports ongoing/previous: numbness and burning.  Blood sugar control has been good with minimal hyperglycemia. Doing well with diet, oral agents, and exercise.  Medication list reviewed/updated. Refills completed as needed.    Complicating factors include but are not limited to: hypertension, hyperlipidemia, neuropathy, and chronic kidney disease.     Recent Labs   Lab Test 09/04/24  1220 05/29/24  1307 02/28/24  1437   A1C 7.0* 7.1* 7.6*   LDL 46 95 116*   HDL 39* 42 39*   TRIG 99 147 198*   ALT 12 8 9   CR 1.18* 1.17 1.08   GFRESTIMATED 59* 60* 66   POTASSIUM 4.4 4.6 4.2   TSH 1.77 1.96 1.94   WBC 6.5 6.5 6.2   HGB 14.9 15.8 15.9   * 141* 149*   ALBUMIN 4.0 4.1 4.1     Hemoglobin A1c is well-controlled.  LDL and triglycerides are at goal.  HDL is low.  ALT normal.  Creatinine has worsened slightly.  Potassium normal.  TSH normal.  CBC is  "normal with exception of slightly low platelet count.  Albumin normal.     This patient has been using and benefiting from using Dexcom  continuous glucose monitoring system. I therefore recommended Dexcom  continuous glucose monitoring as part of their comprehensive diabetes treatment plan, in order to help them:    lower their HbA1c to target and/or maintain their HbA1c at target    to alert patient prior to acute complications of hyper/hypoglycemia    to detect hypoglycemia, and/or to reduce hypoglycemia    increase their short term and long-term safety    improve quality of life    start/continue/intensify level of physical activity safely    improve insulin sensitivity by reducing hypoglycemia unawareness.         The longitudinal plan of care for the diagnosis(es)/condition(s) as documented were addressed during this visit. Due to the added complexity in care, I will continue to support Steven in the subsequent management and with ongoing continuity of care.               BMI  Estimated body mass index is 25.55 kg/m  as calculated from the following:    Height as of this encounter: 1.753 m (5' 9\").    Weight as of this encounter: 78.5 kg (173 lb).           Return in about 3 months (around 12/4/2024) for Annual Medicare Wellness Visit, + Get Diabetic labs prior to clinic appointment.      Nawaf Montanez MD  [facility] AND [facility]    Review of Systems   Constitutional:  Positive for fatigue (mostly of feet and legs - has been taking b12 supplements again). Negative for chills and fever.   HENT:  Positive for hearing loss. Negative for congestion, ear pain and sore throat.    Eyes:  Positive for visual disturbance. Negative for pain.   Respiratory:  Positive for shortness of breath. Negative for cough and wheezing.    Cardiovascular:  Negative for chest pain, palpitations and peripheral edema.        Raynaud's syndrome, no ulcerations   Gastrointestinal:  Negative for abdominal pain, constipation, " diarrhea, heartburn, hematochezia, nausea and vomiting.        + Difficulty swallowing, food sticks   Endocrine: Negative for cold intolerance and heat intolerance.        + Hypoglycemia episodes with glucose less than 54   Genitourinary:  Positive for frequency, impotence and urgency. Negative for dysuria, genital sores, hematuria and penile discharge.        + Nocturia 1-2x nightly, previously 3-4x.  + nocturia and overactive bladder symptoms.    + Urinary retention - stopped Oxybutynin.    Musculoskeletal:  Positive for back pain (+ intermittent low back pain) and gait problem (+ Balance problems - difficulty walking on uneven ground). Negative for arthralgias, joint swelling and myalgias.   Skin:  Negative for pallor and rash.   Allergic/Immunologic: Negative for immunocompromised state.   Neurological:  Negative for dizziness, weakness, light-headedness, headaches and paresthesias.        + Balance problems   Hematological:  Does not bruise/bleed easily.   Psychiatric/Behavioral:  Positive for sleep disturbance (+ insomnia and will wake due to foot pain and nocturia). Negative for agitation, confusion and mood changes. The patient is not nervous/anxious.          Michael Harris is a 89 year old, presenting for the following health issues:  Diabetes        9/4/2024     1:03 PM   Additional Questions   Roomed by Carmelina diane     HPI       Diabetes Follow-up    How often are you checking your blood sugar? Continuous glucose monitor  What time of day are you checking your blood sugars (select all that apply)?   Continuous   Have you had any blood sugars above 200?  Yes   Have you had any blood sugars below 70?  Yes   What symptoms do you notice when your blood sugar is low?  Weak, Lethargy, and tired and sweating   What concerns do you have today about your diabetes? Other: legs    Do you have any of these symptoms? (Select all that apply)  Excessive thirst      BP Readings from Last 2 Encounters:   09/04/24 136/50  "  06/25/24 (!) 147/95     Hemoglobin A1C (%)   Date Value   09/04/2024 7.0 (H)   05/29/2024 7.1 (H)   07/07/2021 7.1 (H)   03/17/2021 7.4 (H)     LDL Cholesterol Calculated (mg/dL)   Date Value   09/04/2024 46   05/29/2024 95   07/07/2021 86   03/17/2021 85         How many servings of fruits and vegetables do you eat daily?  0-1  On average, how many sweetened beverages do you drink each day (Examples: soda, juice, sweet tea, etc.  Do NOT count diet or artificially sweetened beverages)?   0  How many days per week do you exercise enough to make your heart beat faster? 7  How many minutes a day do you exercise enough to make your heart beat faster? 9 or less  How many days per week do you miss taking your medication? 0            Objective    /50   Pulse 56   Resp 18   Ht 1.753 m (5' 9\")   Wt 78.5 kg (173 lb)   SpO2 97%   BMI 25.55 kg/m    Body mass index is 25.55 kg/m .  Physical Exam  Constitutional:       General: He is not in acute distress.     Appearance: Normal appearance. He is well-developed. He is not diaphoretic.   HENT:      Head: Normocephalic and atraumatic.   Eyes:      General: No scleral icterus.     Conjunctiva/sclera: Conjunctivae normal.   Cardiovascular:      Rate and Rhythm: Normal rate and regular rhythm.      Pulses: Normal pulses.           Dorsalis pedis pulses are 2+ on the right side and 2+ on the left side.        Posterior tibial pulses are 2+ on the right side and 2+ on the left side.   Pulmonary:      Effort: Pulmonary effort is normal.      Breath sounds: Normal breath sounds.   Abdominal:      Palpations: Abdomen is soft.      Tenderness: There is no abdominal tenderness.   Musculoskeletal:         General: No deformity.      Cervical back: Neck supple.      Right lower leg: Edema (trace) present.      Left lower leg: Edema (trace) present.      Right foot: Bunion (1st toe) present.      Left foot: Bunion (5th toe) present.   Feet:      Right foot:      Protective " Sensation: 10 sites tested.  9 sites sensed.      Toenail Condition: Right toenails are abnormally thick. Fungal disease present.     Left foot:      Protective Sensation: 10 sites tested.  9 sites sensed.      Skin integrity: Callus present.      Toenail Condition: Left toenails are abnormally thick. Fungal disease present.  Lymphadenopathy:      Cervical: No cervical adenopathy.   Skin:     General: Skin is warm and dry.      Coloration: Skin is not jaundiced.      Findings: No rash.      Comments: Venous stasis and scattered scabs bilaterally on legs   Neurological:      Mental Status: He is alert. Mental status is at baseline.   Psychiatric:         Mood and Affect: Mood normal.         Behavior: Behavior normal.                  Signed Electronically by: Nawaf Montanez MD

## 2024-09-04 NOTE — NURSING NOTE
Patient is here for 3 month diabetic check, states his sugars have been up and down. Has questions regarding his legs and getting tired.     Previous A1C is at goal of <8  Lab Results   Component Value Date    A1C 7.0 09/04/2024    A1C 7.1 05/29/2024    A1C 7.6 02/28/2024    A1C 8.3 11/22/2023    A1C 7.0 05/24/2023    A1C 7.1 07/07/2021    A1C 7.4 03/17/2021    A1C 7.2 11/03/2020    A1C 6.9 07/09/2020    A1C 6.8 04/07/2020     Urine Microalbumin/Creat:    Albumin Urine mg/L   Date Value Ref Range Status   09/04/2024 23.2 mg/L Final     Comment:     The reference ranges have not been established in urine albumin. The results should be integrated into the clinical context for interpretation.   01/19/2022 33 mg/L Final   07/07/2021 50 mg/L Final     Albumin Urine mg/g Cr   Date Value Ref Range Status   09/04/2024 24.97 (H) 0.00 - 17.00 mg/g Cr Final     Comment:     Microalbuminuria is defined as an albumin:creatinine ratio of 17 to 299 for males and 25 to 299 for females. A ratio of albumin:creatinine of 300 or higher is indicative of overt proteinuria.  Due to biologic variability, positive results should be confirmed by a second, first-morning random or 24-hour timed urine specimen. If there is discrepancy, a third specimen is recommended. When 2 out of 3 results are in the microalbuminuria range, this is evidence for incipient nephropathy and warrants increased efforts at glucose control, blood pressure control, and institution of therapy with an angiotensin-converting-enzyme (ACE) inhibitor (if the patient can tolerate it).     01/19/2022 48.53 (H) 0.00 - 17.00 mg/g Cr Final   07/07/2021 37.06 (H) 0 - 17 mg/g Cr Final     Creatinine Urine   Date Value Ref Range Status   07/07/2021 136 mg/dL Final     Creatinine Urine mg/dL   Date Value Ref Range Status   09/04/2024 92.9 mg/dL Final     Comment:     The reference ranges have not been established in urine creatinine. The results should be integrated into the  clinical context for interpretation.   01/19/2022 68 mg/dL Final     Creatinine Urine for Drug Screen   Date Value Ref Range Status   10/26/2022 62 mg/dL Final     Comment:     The reference range has not been established for creatinine in random urines. The results should be integrated into the clinical context for interpretation.     Foot exam due  Eye exam 7/2024    Tobacco User no  Patient is on a daily aspirin  Patient is on a Statin.  Blood pressure today of:     BP Readings from Last 1 Encounters:   09/04/24 136/50      is at the goal of <139/89 for diabetics.    Carmelina Martinez LPN on 9/4/2024 at 1:15 PM

## 2024-09-04 NOTE — PROGRESS NOTES
Assessment & Plan     ICD-10-CM    1. Controlled type 2 diabetes mellitus with diabetic polyneuropathy, without long-term current use of insulin (H)  E11.42 glipiZIDE (GLUCOTROL) 5 MG tablet      2. Benign essential hypertension  I10       3. CKD (chronic kidney disease) stage 2, GFR 60-89 ml/min  N18.2       4. Hypercoagulable state due to paroxysmal atrial fibrillation (H)  D68.69     I48.0       5. Chronic anticoagulation - Eliquis  Z79.01       6. Coronary artery disease involving native coronary artery of native heart without angina pectoris  I25.10       7. S/P drug eluting coronary stent placement - RCA x2 and LAD x1 - 3/14/2023 - [facility]  Z95.5       8. Thrombocytopenia (H24)  D69.6       9. Urinary frequency  R35.0 tamsulosin (FLOMAX) 0.4 MG capsule      10. Nocturia  R35.1 tamsulosin (FLOMAX) 0.4 MG capsule      11. Vaccine counseling  Z71.85       12. Urinary retention with incomplete bladder emptying [facility] -- Stopped Oxybutynin End of August  R33.9          Patient presents for diabetes follow-up, as well as follow-up multiple issues    Diabetic polyneuropathy, more numbness  then cane.  Does find low-dose gabapentin in the morning and 3 mg in the evening somewhat helpful and has been well-tolerated.  Declines need for dose increase.    Recently had medication changes.  Continues with Jardiance.  Restarted glipizide.  Blood sugars have been very high and very low into the 60s and sometimes greater than 200 range.  CGM has been very helpful to monitor these abrupt substantial changes.    Coronary artery disease, appears stable.  Denies exertional angina.  History of stent placement in the past.  No recent issues.  Continue current medications.    Thrombocytopenia, stable.  Does bruise easily.  Currently taking better with aspirin plus Eliquis.  Continue close monitoring.    Urinary frequency with nocturia.  Flomax dose was increased recently.  [facility]  [facility].    Has now been off of oxybutynin for about 6 or 7 days.  Bladder scan today shows urine volume of 59 mL.  Appears to have resolved with discontinuation of Oxybutynin.  Continue higher dose of Flomax.    HYPERTENSION - Ongoing. Blood pressure is currently well controlled.  Medication side effects: None. Denies syncope or presyncope.  Continue current medications.   Medication list reviewed/updated. Refills completed as needed.      MIXED HYPERLIPIDEMIA.  Ongoing. LDL is at goal: Yes. Triglycerides are at goal: Yes.    Medication side effects reported: None.   Continue current medications for now. Medication list reviewed/updated. Refills completed as needed.  Recent Labs   Lab Test 09/04/24  1220 05/29/24  1307   CHOL 105 166   HDL 39* 42   LDL 46 95   TRIG 99 147        Chronic Kidney Disease, Stage 2 (GFR 60-89), chronic, ongoing.  Kidney function had been slowly declining.  Encourage NSAID avoidance.       Vaccine counseling completed.  Encourage routine / annual vaccinations.    Type 2 Diabetes Mellitus, with nephropathy, with neuropathy, Reports ongoing/previous: numbness and burning.  Blood sugar control has been good with minimal hyperglycemia. Doing well with diet, oral agents, and exercise.  Medication list reviewed/updated. Refills completed as needed.    Complicating factors include but are not limited to: hypertension, hyperlipidemia, neuropathy, and chronic kidney disease.     Recent Labs   Lab Test 09/04/24  1220 05/29/24  1307 02/28/24  1437   A1C 7.0* 7.1* 7.6*   LDL 46 95 116*   HDL 39* 42 39*   TRIG 99 147 198*   ALT 12 8 9   CR 1.18* 1.17 1.08   GFRESTIMATED 59* 60* 66   POTASSIUM 4.4 4.6 4.2   TSH 1.77 1.96 1.94   WBC 6.5 6.5 6.2   HGB 14.9 15.8 15.9   * 141* 149*   ALBUMIN 4.0 4.1 4.1     Hemoglobin A1c is well-controlled.  LDL and triglycerides are at goal.  HDL is low.  ALT normal.  Creatinine has worsened slightly.  Potassium normal.  TSH normal.  CBC is  "normal with exception of slightly low platelet count.  Albumin normal.     This patient has been using and benefiting from using Dexcom  continuous glucose monitoring system. I therefore recommended Dexcom  continuous glucose monitoring as part of their comprehensive diabetes treatment plan, in order to help them:    lower their HbA1c to target and/or maintain their HbA1c at target    to alert patient prior to acute complications of hyper/hypoglycemia    to detect hypoglycemia, and/or to reduce hypoglycemia    increase their short term and long-term safety    improve quality of life    start/continue/intensify level of physical activity safely    improve insulin sensitivity by reducing hypoglycemia unawareness.         The longitudinal plan of care for the diagnosis(es)/condition(s) as documented were addressed during this visit. Due to the added complexity in care, I will continue to support Steven in the subsequent management and with ongoing continuity of care.               BMI  Estimated body mass index is 25.55 kg/m  as calculated from the following:    Height as of this encounter: 1.753 m (5' 9\").    Weight as of this encounter: 78.5 kg (173 lb).           Return in about 3 months (around 12/4/2024) for Annual Medicare Wellness Visit, + Get Diabetic labs prior to clinic appointment.      Nawaf Montanez MD  [facility]    Review of Systems   Constitutional:  Positive for fatigue (mostly of feet and legs - has been taking b12 supplements again). Negative for chills and fever.   HENT:  Positive for hearing loss. Negative for congestion, ear pain and sore throat.    Eyes:  Positive for visual disturbance. Negative for pain.   Respiratory:  Positive for shortness of breath. Negative for cough and wheezing.    Cardiovascular:  Negative for chest pain, palpitations and peripheral edema.        Raynaud's syndrome, no ulcerations   Gastrointestinal:  Negative for abdominal pain, constipation, " diarrhea, heartburn, hematochezia, nausea and vomiting.        + Difficulty swallowing, food sticks   Endocrine: Negative for cold intolerance and heat intolerance.        + Hypoglycemia episodes with glucose less than 54   Genitourinary:  Positive for frequency, impotence and urgency. Negative for dysuria, genital sores, hematuria and penile discharge.        + Nocturia 1-2x nightly, previously 3-4x.  + nocturia and overactive bladder symptoms.    + Urinary retention - stopped Oxybutynin.    Musculoskeletal:  Positive for back pain (+ intermittent low back pain) and gait problem (+ Balance problems - difficulty walking on uneven ground). Negative for arthralgias, joint swelling and myalgias.   Skin:  Negative for pallor and rash.   Allergic/Immunologic: Negative for immunocompromised state.   Neurological:  Negative for dizziness, weakness, light-headedness, headaches and paresthesias.        + Balance problems   Hematological:  Does not bruise/bleed easily.   Psychiatric/Behavioral:  Positive for sleep disturbance (+ insomnia and will wake due to foot pain and nocturia). Negative for agitation, confusion and mood changes. The patient is not nervous/anxious.          Michael Harris is a 89 year old, presenting for the following health issues:  Diabetes        9/4/2024     1:03 PM   Additional Questions   Roomed by Carmelina diane     HPI       Diabetes Follow-up    How often are you checking your blood sugar? Continuous glucose monitor  What time of day are you checking your blood sugars (select all that apply)?   Continuous   Have you had any blood sugars above 200?  Yes   Have you had any blood sugars below 70?  Yes   What symptoms do you notice when your blood sugar is low?  Weak, Lethargy, and tired and sweating   What concerns do you have today about your diabetes? Other: legs    Do you have any of these symptoms? (Select all that apply)  Excessive thirst      BP Readings from Last 2 Encounters:   09/04/24 136/50  "  06/25/24 (!) 147/95     Hemoglobin A1C (%)   Date Value   09/04/2024 7.0 (H)   05/29/2024 7.1 (H)   07/07/2021 7.1 (H)   03/17/2021 7.4 (H)     LDL Cholesterol Calculated (mg/dL)   Date Value   09/04/2024 46   05/29/2024 95   07/07/2021 86   03/17/2021 85         How many servings of fruits and vegetables do you eat daily?  0-1  On average, how many sweetened beverages do you drink each day (Examples: soda, juice, sweet tea, etc.  Do NOT count diet or artificially sweetened beverages)?   0  How many days per week do you exercise enough to make your heart beat faster? 7  How many minutes a day do you exercise enough to make your heart beat faster? 9 or less  How many days per week do you miss taking your medication? 0            Objective    /50   Pulse 56   Resp 18   Ht 1.753 m (5' 9\")   Wt 78.5 kg (173 lb)   SpO2 97%   BMI 25.55 kg/m    Body mass index is 25.55 kg/m .  Physical Exam  Constitutional:       General: He is not in acute distress.     Appearance: Normal appearance. He is well-developed. He is not diaphoretic.   HENT:      Head: Normocephalic and atraumatic.   Eyes:      General: No scleral icterus.     Conjunctiva/sclera: Conjunctivae normal.   Cardiovascular:      Rate and Rhythm: Normal rate and regular rhythm.      Pulses: Normal pulses.           Dorsalis pedis pulses are 2+ on the right side and 2+ on the left side.        Posterior tibial pulses are 2+ on the right side and 2+ on the left side.   Pulmonary:      Effort: Pulmonary effort is normal.      Breath sounds: Normal breath sounds.   Abdominal:      Palpations: Abdomen is soft.      Tenderness: There is no abdominal tenderness.   Musculoskeletal:         General: No deformity.      Cervical back: Neck supple.      Right lower leg: Edema (trace) present.      Left lower leg: Edema (trace) present.      Right foot: Bunion (1st toe) present.      Left foot: Bunion (5th toe) present.   Feet:      Right foot:      Protective " Sensation: 10 sites tested.  9 sites sensed.      Toenail Condition: Right toenails are abnormally thick. Fungal disease present.     Left foot:      Protective Sensation: 10 sites tested.  9 sites sensed.      Skin integrity: Callus present.      Toenail Condition: Left toenails are abnormally thick. Fungal disease present.  Lymphadenopathy:      Cervical: No cervical adenopathy.   Skin:     General: Skin is warm and dry.      Coloration: Skin is not jaundiced.      Findings: No rash.      Comments: Venous stasis and scattered scabs bilaterally on legs   Neurological:      Mental Status: He is alert. Mental status is at baseline.   Psychiatric:         Mood and Affect: Mood normal.         Behavior: Behavior normal.                  Signed Electronically by: Nawaf Montanez MD

## 2024-09-04 NOTE — PATIENT INSTRUCTIONS
Blood pressure is well controlled.   Diabetes is well controlled.     Medications refilled.   Labs are relatively stable.     Drink plenty of water prior to next lab work.     Results for orders placed or performed in visit on 09/04/24   Comprehensive metabolic panel     Status: Abnormal   Result Value Ref Range    Sodium 138 135 - 145 mmol/L    Potassium 4.4 3.4 - 5.3 mmol/L    Carbon Dioxide (CO2) 23 22 - 29 mmol/L    Anion Gap 10 7 - 15 mmol/L    Urea Nitrogen 24.5 (H) 8.0 - 23.0 mg/dL    Creatinine 1.18 (H) 0.67 - 1.17 mg/dL    GFR Estimate 59 (L) >60 mL/min/1.73m2    Calcium 8.8 8.8 - 10.4 mg/dL    Chloride 105 98 - 107 mmol/L    Glucose 180 (H) 70 - 99 mg/dL    Alkaline Phosphatase 80 40 - 150 U/L    AST 18 0 - 45 U/L    ALT 12 0 - 70 U/L    Protein Total 6.6 6.4 - 8.3 g/dL    Albumin 4.0 3.5 - 5.2 g/dL    Bilirubin Total 0.7 <=1.2 mg/dL    Patient Fasting > 8hrs? Unknown    Lipid Profile     Status: Abnormal   Result Value Ref Range    Cholesterol 105 <200 mg/dL    Triglycerides 99 <150 mg/dL    Direct Measure HDL 39 (L) >=40 mg/dL    LDL Cholesterol Calculated 46 <=100 mg/dL    Non HDL Cholesterol 66 <130 mg/dL    Patient Fasting > 8hrs? Unknown     Narrative    Cholesterol  Desirable:  <200 mg/dL    Triglycerides  Normal:  Less than 150 mg/dL  Borderline High:  150-199 mg/dL  High:  200-499 mg/dL  Very High:  Greater than or equal to 500 mg/dL    Direct Measure HDL  Female:  Greater than or equal to 50 mg/dL   Male:  Greater than or equal to 40 mg/dL    LDL Cholesterol  Desirable:  <100mg/dL  Above Desirable:  100-129 mg/dL   Borderline High:  130-159 mg/dL   High:  160-189 mg/dL   Very High:  >= 190 mg/dL    Non HDL Cholesterol  Desirable:  130 mg/dL  Above Desirable:  130-159 mg/dL  Borderline High:  160-189 mg/dL  High:  190-219 mg/dL  Very High:  Greater than or equal to 220 mg/dL   Albumin Random Urine Quantitative with Creat Ratio     Status: Abnormal   Result Value Ref Range    Creatinine Urine mg/dL  92.9 mg/dL    Albumin Urine mg/L 23.2 mg/L    Albumin Urine mg/g Cr 24.97 (H) 0.00 - 17.00 mg/g Cr   CBC with platelets     Status: Abnormal   Result Value Ref Range    WBC Count 6.5 4.0 - 11.0 10e3/uL    RBC Count 5.09 4.40 - 5.90 10e6/uL    Hemoglobin 14.9 13.3 - 17.7 g/dL    Hematocrit 45.5 40.0 - 53.0 %    MCV 89 78 - 100 fL    MCH 29.3 26.5 - 33.0 pg    MCHC 32.7 31.5 - 36.5 g/dL    RDW 13.9 10.0 - 15.0 %    Platelet Count 145 (L) 150 - 450 10e3/uL   Hemoglobin A1c     Status: Abnormal   Result Value Ref Range    Hemoglobin A1C 7.0 (H) 4.0 - 6.2 %   TSH with free T4 reflex     Status: Normal   Result Value Ref Range    TSH 1.77 0.30 - 4.20 uIU/mL   Urine Macroscopic with reflex to Microscopic     Status: Abnormal   Result Value Ref Range    Color Urine Light Yellow Colorless, Straw, Light Yellow, Yellow    Appearance Urine Clear Clear    Glucose Urine >1000 (A) Negative mg/dL    Bilirubin Urine Negative Negative    Ketones Urine Trace (A) Negative mg/dL    Specific Gravity Urine 1.029 1.000 - 1.030    Blood Urine Negative Negative    pH Urine 5.5 5.0 - 9.0    Protein Albumin Urine Negative Negative mg/dL    Urobilinogen Urine Normal Normal, 2.0 mg/dL    Nitrite Urine Negative Negative    Leukocyte Esterase Urine Negative Negative    Narrative    Microscopic not indicated        Aspects of Diabetes:   Recent Labs   Lab Test 09/04/24  1220 05/29/24  1307 02/28/24  1437   A1C 7.0* 7.1* 7.6*   LDL 46 95 116*   HDL 39* 42 39*   TRIG 99 147 198*   ALT 12 8 9   CR 1.18* 1.17 1.08   GFRESTIMATED 59* 60* 66   POTASSIUM 4.4 4.6 4.2   TSH 1.77 1.96 1.94   WBC 6.5 6.5 6.2   HGB 14.9 15.8 15.9   * 141* 149*   ALBUMIN 4.0 4.1 4.1      Hemoglobin A1c  Goal range is under 8%. Best is 6.5 to 7   Blood Pressure 136/50 Goal to keep less than 140/90   Tobacco  reports that he quit smoking about 49 years ago. His smoking use included cigarettes. He started smoking about 69 years ago. He has a 20 pack-year smoking history. He  has been exposed to tobacco smoke. He has never used smokeless tobacco. Goal to abstain from tobacco   Aspirin or Plavix Anti-platelet therapy Aspirin or Plavix reduces risk of heart disease and stroke  -- sometimes used with other blood thinners, depending on bleeding risk and risk factors.    ACE/ARB Specific blood pressure meds These medications can reduce risk of kidney disease   Cholesterol Statins (Lipitor, Crestor, vs others) Statins reduce risk of heart disease and stroke   Eye Exam -- Do Yearly -- Annual diabetic eye exam   Healthy weight Wt Readings from Last 4 Encounters:   09/04/24 78.5 kg (173 lb)   06/25/24 78.9 kg (174 lb)   05/29/24 78.9 kg (174 lb)   02/28/24 79.4 kg (175 lb)      Body mass index is 25.55 kg/m .  Goal BMI under 30, best is under 25.      -- Trying to exercise daily (goal at least 20 min/day) with moderate aerobic activity   -- Eat healthy (resources from ADA at http://www.diabetes.org/)   -- Taking good care of my feet. Consider seeing the Podiatrist   -- Check blood sugars as directed, record in log book and bring to every appointment    Insurance companies are grading you and I on your blood sugar control -- Goal is to get your A1c down to 7.9% or lower and NO Smoking!  -- Medicare and most insurance companies, will only cover Hemoglobin A1c labs to be rechecked every 91+ days.      Return for Diabetes labs and clinic follow-up appointment every 3 to 4 months.    Schedule lab only appointment --- A few days AFTER: 12/3/24   Schedule clinic appointment with Dr. Montanez -- Same day as labs, or 1-2 days later.

## 2024-09-21 DIAGNOSIS — N32.81 OVERACTIVE BLADDER: ICD-10-CM

## 2024-09-21 DIAGNOSIS — R35.1 NOCTURIA: ICD-10-CM

## 2024-09-24 RX ORDER — OXYBUTYNIN CHLORIDE 5 MG/1
TABLET ORAL
Qty: 270 TABLET | Refills: 4 | OUTPATIENT
Start: 2024-09-24

## 2024-09-24 NOTE — TELEPHONE ENCOUNTER
"[facility] sent Rx request for the following:      Requested Prescriptions   Pending Prescriptions Disp Refills    oxyBUTYnin (DITROPAN) 5 MG tablet [Pharmacy Med Name: OXYBUTYNIN 5MG TABLETS] 270 tablet 4     Sig: TAKE 1 TABLET(5 MG) BY MOUTH THREE TIMES DAILY. START 2 TIMES DAILY-. INCREASE TO 3 TIMES DAILY AS NEEDED- FOR URINATION-- USE [employer] COUPON--       Muscarinic Antagonists (Urinary Incontinence Agents) Failed - 9/21/2024  3:55 AM        Failed - Medication is active on med list          Last Prescription Date:   Discontinued 9/4/24  Last Fill Qty/Refills:         270, R-4    Last Office Visit:              9/4/24   Future Office visit:           12/11/24    Per LOV: \"discontinuation of Oxybutynin.\"    Andria Harper RN on 9/24/2024 at 8:59 AM    "

## 2024-10-01 ENCOUNTER — TELEPHONE (OUTPATIENT)
Dept: INTERNAL MEDICINE | Facility: OTHER | Age: 89
End: 2024-10-01
Payer: COMMERCIAL

## 2024-10-01 DIAGNOSIS — R35.1 NOCTURIA: Primary | ICD-10-CM

## 2024-10-01 DIAGNOSIS — N32.81 OVERACTIVE BLADDER: ICD-10-CM

## 2024-10-01 NOTE — TELEPHONE ENCOUNTER
Referral T'ed up. Please sign if appropriate.     Natasha Hare LPN on 10/1/2024 at 10:02 AM   Ext. [phone]

## 2024-10-01 NOTE — TELEPHONE ENCOUNTER
Patient called requesting a referral for Urology.  He would like to be seen here at [facility].  Gemma Marinelli on 10/1/2024 at 9:46 AM

## 2024-10-04 ENCOUNTER — HOSPITAL ENCOUNTER (OUTPATIENT)
Facility: OTHER | Age: 89
Discharge: HOME | End: 2024-10-04
Attending: SURGERY | Admitting: SURGERY
Payer: COMMERCIAL

## 2024-10-04 ENCOUNTER — ANESTHESIA (OUTPATIENT)
Dept: SURGERY | Facility: OTHER | Age: 89
End: 2024-10-04
Payer: COMMERCIAL

## 2024-10-04 ENCOUNTER — ANESTHESIA EVENT (OUTPATIENT)
Dept: SURGERY | Facility: OTHER | Age: 89
End: 2024-10-04
Payer: COMMERCIAL

## 2024-10-04 VITALS
TEMPERATURE: 96.8 F | OXYGEN SATURATION: 100 % | BODY MASS INDEX: 25.1 KG/M2 | SYSTOLIC BLOOD PRESSURE: 157 MMHG | HEART RATE: 51 BPM | WEIGHT: 170 LBS | RESPIRATION RATE: 16 BRPM | DIASTOLIC BLOOD PRESSURE: 87 MMHG

## 2024-10-04 LAB
GLUCOSE BLDC GLUCOMTR-MCNC: 105 MG/DL (ref 70–99)
GLUCOSE BLDC GLUCOMTR-MCNC: 92 MG/DL (ref 70–99)

## 2024-10-04 PROCEDURE — 43239 EGD BIOPSY SINGLE/MULTIPLE: CPT | Performed by: NURSE ANESTHETIST, CERTIFIED REGISTERED

## 2024-10-04 PROCEDURE — 82962 GLUCOSE BLOOD TEST: CPT

## 2024-10-04 PROCEDURE — 88305 TISSUE EXAM BY PATHOLOGIST: CPT

## 2024-10-04 PROCEDURE — 258N000003 HC RX IP 258 OP 636: Performed by: SURGERY

## 2024-10-04 PROCEDURE — 250N000011 HC RX IP 250 OP 636: Performed by: NURSE ANESTHETIST, CERTIFIED REGISTERED

## 2024-10-04 PROCEDURE — 250N000009 HC RX 250: Performed by: NURSE ANESTHETIST, CERTIFIED REGISTERED

## 2024-10-04 PROCEDURE — 999N000010 HC STATISTIC ANES STAT CODE-CRNA PER MINUTE: Performed by: SURGERY

## 2024-10-04 PROCEDURE — 99100 ANES PT EXTEME AGE<1 YR&>70: CPT | Performed by: NURSE ANESTHETIST, CERTIFIED REGISTERED

## 2024-10-04 PROCEDURE — 43239 EGD BIOPSY SINGLE/MULTIPLE: CPT | Performed by: SURGERY

## 2024-10-04 RX ORDER — NALOXONE HYDROCHLORIDE 0.4 MG/ML
0.4 INJECTION, SOLUTION INTRAMUSCULAR; INTRAVENOUS; SUBCUTANEOUS
Status: DISCONTINUED | OUTPATIENT
Start: 2024-10-04 | End: 2024-10-04 | Stop reason: HOSPADM

## 2024-10-04 RX ORDER — NALOXONE HYDROCHLORIDE 0.4 MG/ML
0.2 INJECTION, SOLUTION INTRAMUSCULAR; INTRAVENOUS; SUBCUTANEOUS
Status: DISCONTINUED | OUTPATIENT
Start: 2024-10-04 | End: 2024-10-04 | Stop reason: HOSPADM

## 2024-10-04 RX ORDER — ONDANSETRON 4 MG/1
4 TABLET, ORALLY DISINTEGRATING ORAL EVERY 6 HOURS PRN
Status: DISCONTINUED | OUTPATIENT
Start: 2024-10-04 | End: 2024-10-04 | Stop reason: HOSPADM

## 2024-10-04 RX ORDER — FLUMAZENIL 0.1 MG/ML
0.2 INJECTION, SOLUTION INTRAVENOUS
Status: DISCONTINUED | OUTPATIENT
Start: 2024-10-04 | End: 2024-10-04 | Stop reason: HOSPADM

## 2024-10-04 RX ORDER — LIDOCAINE HYDROCHLORIDE 20 MG/ML
INJECTION, SOLUTION INFILTRATION; PERINEURAL PRN
Status: DISCONTINUED | OUTPATIENT
Start: 2024-10-04 | End: 2024-10-04

## 2024-10-04 RX ORDER — PROPOFOL 10 MG/ML
INJECTION, EMULSION INTRAVENOUS PRN
Status: DISCONTINUED | OUTPATIENT
Start: 2024-10-04 | End: 2024-10-04

## 2024-10-04 RX ORDER — ONDANSETRON 2 MG/ML
4 INJECTION INTRAMUSCULAR; INTRAVENOUS EVERY 6 HOURS PRN
Status: DISCONTINUED | OUTPATIENT
Start: 2024-10-04 | End: 2024-10-04 | Stop reason: HOSPADM

## 2024-10-04 RX ORDER — LIDOCAINE 40 MG/G
CREAM TOPICAL
Status: DISCONTINUED | OUTPATIENT
Start: 2024-10-04 | End: 2024-10-04 | Stop reason: HOSPADM

## 2024-10-04 RX ORDER — SODIUM CHLORIDE, SODIUM LACTATE, POTASSIUM CHLORIDE, CALCIUM CHLORIDE 600; 310; 30; 20 MG/100ML; MG/100ML; MG/100ML; MG/100ML
INJECTION, SOLUTION INTRAVENOUS CONTINUOUS
Status: DISCONTINUED | OUTPATIENT
Start: 2024-10-04 | End: 2024-10-04 | Stop reason: HOSPADM

## 2024-10-04 RX ADMIN — LIDOCAINE HYDROCHLORIDE 40 MG: 20 INJECTION, SOLUTION INFILTRATION; PERINEURAL at 08:05

## 2024-10-04 RX ADMIN — PROPOFOL 50 MG: 10 INJECTION, EMULSION INTRAVENOUS at 08:05

## 2024-10-04 RX ADMIN — PROPOFOL 20 MG: 10 INJECTION, EMULSION INTRAVENOUS at 08:09

## 2024-10-04 RX ADMIN — SODIUM CHLORIDE, POTASSIUM CHLORIDE, SODIUM LACTATE AND CALCIUM CHLORIDE: 600; 310; 30; 20 INJECTION, SOLUTION INTRAVENOUS at 07:57

## 2024-10-04 ASSESSMENT — ACTIVITIES OF DAILY LIVING (ADL)
ADLS_ACUITY_SCORE: 35

## 2024-10-04 ASSESSMENT — LIFESTYLE VARIABLES: TOBACCO_USE: 1

## 2024-10-04 ASSESSMENT — ENCOUNTER SYMPTOMS: DYSRHYTHMIAS: 1

## 2024-10-04 NOTE — LETTER
September 23, 2024      Steven Dennison  [address] [id number]        Lavonne Harris,     Here are your instructions for your EGD (upper scope) that is scheduled for Friday, Oct 4th.     We would like you to arrive at 7:15 AM, coming in through the front main Clinic entrance and checking in at the Day Surgery window.     As with your previous scope, you need someone to drive you home and be with you until you go to bed that night.     No food after midnight. You may have clear liquids up until 5:15 AM that morning, which includes water, black coffee or tea, clear juices like apple juice, and clear sports drinks like Gatorade.    Medication Instructions:    HOLD Eliquis x 2 days- last dose is Tues, Oct 1st  ASA 81 mg- may continue as directed, hold AM of procedure  Cyanocobalamin- may continue  Diltiazem- continue  Jardiance- continue, but hold AM of procedure  Flonase nasal spray- may continue if using  Gabapentin- continue  Glipizide- continue but hold AM of procedure  Lisinopril- continue but hold AM of procedure  Oxycodone- fine to take if needed  Rosuvastatin- continue  Flomax- continue    The only med that you should take the morning of your procedure is your Diltiazem (if you take it in the morning), and your Gabapentin. All others you can take after your procedure- we will give you typed instructions when we discharge you.      We will call you on Thursday, Oct 3rd in the afternoon to confirm your arrival time. If you miss our call, you can call us back at [phone]. Also, feel free to call this number if you have any questions or if you need any further assistance.    Thank you,  Janneth STONER  Pre-admission office  [facility]  [phone]

## 2024-10-04 NOTE — ANESTHESIA PREPROCEDURE EVALUATION
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          Past Medical History:   Diagnosis Date    Controlled type 2 diabetes mellitus with diabetic polyneuropathy, without long-term current use of insulin (H) 2017    Eczema 2016    Renal calculus 2018    Overview:  disease      Past Surgical History:   Procedure Laterality Date    APPENDECTOMY OPEN      Appendectomy at age 6 due to appendicitis    ARTHROSCOPY KNEE      left knee    COLONOSCOPY  2000    COLONOSCOPY  2011,adenomatous polyps, one with high grade dysplasia , next due     ESOPHAGOSCOPY, GASTROSCOPY, DUODENOSCOPY (EGD), COMBINED N/A 2024    Reactive gastropathy with the stomach and reactive chagnes in the esophagus. Gastric ulcer present. Follow up EGD 8 weeks.    OTHER SURGICAL HISTORY      ,HERNIA REPAIR,right inguinal    OTHER SURGICAL HISTORY      ,[id number],ARTHROPLASTY,left total knee    OTHER SURGICAL HISTORY      ,[id number],OTHER,left first/rib resection    OTHER SURGICAL HISTORY      ,[id number],OTHER    OTHER SURGICAL HISTORY      [id number],CYSTOSCOPY W/ URETEROSCOPY W/ LITHOTRIPSY,Dr. Chelsi GREEN    OTHER SURGICAL HISTORY      ,HERNIA REPAIR,x 4      Allergies   Allergen Reactions    Metformin Muscle Pain (Myalgia)     Bilateral leg pain    Statins Muscle Pain (Myalgia)    Cephalexin Other (See Comments)     Other reaction(s):Pt Doesn't Remember  Pt does not remember     Clindamycin Other (See Comments)     Other reaction(s): Pt Doesn't Remember  Pt does not remember    Hydromorphone Other (See Comments)     Other reaction(s): Bradycardia    Amoxicillin Rash      Social History     Tobacco Use    Smoking status: Former     Current packs/day: 0.00     Average packs/day: 1 pack/day for 20.0 years (20.0 ttl  pk-yrs)     Types: Cigarettes     Start date: 1955     Quit date: 1975     Years since quittin.7     Passive exposure: Past    Smokeless tobacco: Never   Substance Use Topics    Alcohol use: Yes     Alcohol/week: 1.0 standard drink of alcohol     Types: 1 Standard drinks or equivalent per week     Comment: maybe once a month      Wt Readings from Last 1 Encounters:   10/04/24 77.1 kg (170 lb)        Anesthesia Evaluation   Pt has had prior anesthetic.     No history of anesthetic complications       ROS/MED HX  ENT/Pulmonary:     (+)                tobacco use, Past use,                       Neurologic: Comment: Bilateral hearing loss    (+)    peripheral neuropathy,                            Cardiovascular: Comment: Abdominal aortic ectasia   Raynauds     (+) Dyslipidemia hypertension- -  CAD -  - stent-.  Drug Eluting Stent. Taking blood thinners Pt has received instructions:                    dysrhythmias, a-fib,             METS/Exercise Tolerance: 3 - Able to walk 1-2 blocks without stopping    Hematologic: Comments: Thrombocytopenia       Musculoskeletal: Comment: Lumbar back pain  (+)  arthritis,             GI/Hepatic: Comment: Dysphagia     (+) GERD, Symptomatic,                  Renal/Genitourinary:     (+) renal disease, type: CRI, Pt does not require dialysis,     BPH,      Endo:     (+)  type II DM,       Diabetic complications: nephropathy neuropathy.             Psychiatric/Substance Use:     (+)    H/O chronic opiod use .     Infectious Disease:  - neg infectious disease ROS     Malignancy:  - neg malignancy ROS     Other:  - neg other ROS    (+)  , H/O Chronic Pain,         Physical Exam    Airway        Mallampati: II   TM distance: > 3 FB   Neck ROM: full   Mouth opening: > 3 cm    Respiratory Devices and Support         Dental     Comment: Upper denture,  Missing lower molar on left  No loose teeth.       B=Bridge, C=Chipped, L=Loose, M=Missing    Cardiovascular         "  Rhythm and rate: irregular and normal     Pulmonary   pulmonary exam normal        breath sounds clear to auscultation           OUTSIDE LABS:  CBC:   Lab Results   Component Value Date    WBC 6.5 09/04/2024    WBC 6.5 05/29/2024    HGB 14.9 09/04/2024    HGB 15.8 05/29/2024    HCT 45.5 09/04/2024    HCT 48.4 05/29/2024     (L) 09/04/2024     (L) 05/29/2024     BMP:   Lab Results   Component Value Date     09/04/2024     05/29/2024    POTASSIUM 4.4 09/04/2024    POTASSIUM 4.6 05/29/2024    CHLORIDE 105 09/04/2024    CHLORIDE 105 05/29/2024    CO2 23 09/04/2024    CO2 28 05/29/2024    BUN 24.5 (H) 09/04/2024    BUN 21.8 05/29/2024    CR 1.18 (H) 09/04/2024    CR 1.17 05/29/2024     (H) 09/04/2024     (H) 06/25/2024     COAGS:   Lab Results   Component Value Date    INR 1.1 02/18/2014     POC: No results found for: \"BGM\", \"HCG\", \"HCGS\"  HEPATIC:   Lab Results   Component Value Date    ALBUMIN 4.0 09/04/2024    PROTTOTAL 6.6 09/04/2024    ALT 12 09/04/2024    AST 18 09/04/2024    ALKPHOS 80 09/04/2024    BILITOTAL 0.7 09/04/2024    BILIDIRECT 0.30 (H) 07/27/2014     OTHER:   Lab Results   Component Value Date    A1C 7.0 (H) 09/04/2024    EVELYN 8.8 09/04/2024    MAG 1.8 (L) 07/27/2014    LIPASE 11.0 07/27/2014    TSH 1.77 09/04/2024       Anesthesia Plan    ASA Status:  3    NPO Status:  NPO Appropriate    Anesthesia Type: MAC.     - Reason for MAC: chronic cardiopulmonary disease (Procedure of head/neck/face, ASA III, age greater than 70)              Consents    Anesthesia Plan(s) and associated risks, benefits, and realistic alternatives discussed. Questions answered and patient/representative(s) expressed understanding.     - Discussed: Risks, Benefits and Alternatives for BOTH SEDATION and the PROCEDURE were discussed     - Discussed with:  Patient      - Extended Intubation/Ventilatory Support Discussed: No.      - Patient is DNR/DNI Status: No     Use of blood products " "discussed: No .     Postoperative Care            Comments:               LIVIA Shine CRNA    I have reviewed the pertinent notes and labs in the chart from the past 30 days and (re)examined the patient.  Any updates or changes from those notes are reflected in this note.           # Drug Induced Coagulation Defect: home medication list includes an anticoagulant medication  # Drug Induced Platelet Defect: home medication list includes an antiplatelet medication   # Hypertension: Noted on problem list        # DMII: A1C = 7.0 % (Ref range: 4.0 - 6.2 %) within past 6 months    # Overweight: Estimated body mass index is 25.1 kg/m  as calculated from the following:    Height as of 9/4/24: 1.753 m (5' 9\").    Weight as of this encounter: 77.1 kg (170 lb).             "

## 2024-10-04 NOTE — OR NURSING
Steven has been discharged to home at 0950 via ambulatory accompanied by Francoise Vanegas RN    Written discharge instructions were provided to patient.  Prescriptions were N/A.  Patient states their pain is zero, and denies any nausea or dizziness upon discharge.    Patient and adult caring for them verbalize understanding of discharge instructions including no driving until tomorrow and no longer taking narcotic pain medications - no operating mechanical equipment and no making any important decisions.They understand reason for discharge, and necessary follow-up appointments.      Romina Roe RN on 10/4/2024 at 10:11 AM

## 2024-10-04 NOTE — ANESTHESIA POSTPROCEDURE EVALUATION
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 status   CV/Hemodynamics: Uneventful            Sign Out: Acceptable CV status; No obvious hypovolemia; No obvious fluid overload   Other NRE: NONE   DID A NON-ROUTINE EVENT OCCUR?            Last vitals:  Vitals Value Taken Time   /76 10/04/24 0845   Temp 96.8  F (36  C) 10/04/24 0823   Pulse 40 10/04/24 0845   Resp 16 10/04/24 0845   SpO2 98 % 10/04/24 0851   Vitals shown include unfiled device data.    Electronically Signed By: LIVIA Shine CRNA  October 4, 2024  8:52 AM

## 2024-10-04 NOTE — ANESTHESIA CARE TRANSFER NOTE
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    Anesthesia Type:   MAC     Note:    Oropharynx: oropharynx clear of all foreign objects and spontaneously breathing  Level of Consciousness: drowsy  Oxygen Supplementation: room air    Independent Airway: airway patency satisfactory and stable  Dentition: dentition unchanged  Vital Signs Stable: post-procedure vital signs reviewed and stable  Report to RN Given: handoff report given  Patient transferred to: Phase II    Handoff Report: Identifed the Patient, Identified the Reponsible Provider, Reviewed the pertinent medical history, Discussed the surgical course, Reviewed Intra-OP anesthesia mangement and issues during anesthesia, Set expectations for post-procedure period and Allowed opportunity for questions and acknowledgement of understanding      Vitals:  Vitals Value Taken Time   BP     Temp     Pulse     Resp     SpO2         Electronically Signed By: LIVIA Shine CRNA  October 4, 2024  8:24 AM

## 2024-10-04 NOTE — OP NOTE
PROCEDURE NOTE    SURGEON:Dk Cross MD    PRE-OP DIAGNOSIS:   History of Quintanilla's esophagus and possible dysplasia, history of gastric ulcer      POST-OP DIAGNOSIS: Same, improved appearance of gastritis/ulceration.  4 cm hiatal hernia with 1.5 cm segment of possible Quintanilla's esophagus    PROCEDURE PLANNED:   Diagnostic EGD, flexible        PROCEDURE PERFORMED:  EGD with biopsy, flexible    SPECIMEN:    ID Type Source Tests Collected by Time Destination   1 : DUODENUM Biopsy Small Intestine, Duodenum SURGICAL PATHOLOGY EXAM Dk Cross MD 10/4/2024  8:08 AM    2 : ANTRUM Biopsy Stomach, Antrum SURGICAL PATHOLOGY EXAM Dk Cross MD 10/4/2024  8:11 AM    3 : GE JUCT Biopsy Gastric Esophageal Junction SURGICAL PATHOLOGY EXAM Dk Cross MD 10/4/2024  8:12 AM           ANESTHESIA: MAC  Coverage requested due to: Age over 70  CRNA Independent: Kenyon Gomez APRN CRNA      ESTIMATED BLOOD LOSS: None    COMPLICATIONS:  None    INDICATION FOR THE PROCEDURE:The patient is a 89 year oldmale. The patient presents with history of bleeding gastric ulcer.  At this time we had esophageal biopsies that were suspicious for dysplasia.  My partner recommended an 8-week follow-up.  He is here for a 8 week follow up.. I explained to the patient the risks, benefits and alternatives to diagnostic EGD for evaluating Quintanilla's esophagus with possible dysplasia as well as ulceration in the antrum to assess healing. We specifically discussed the risks of bleeding, infection, perforation and the risks of sedation. The patient's questions were answered and the patient wished to proceed. Informed consent paperwork was completed.    PROCEDURE: The patient was taken to the endoscopy suite. Appropriate monitors were attached. The patient was placed in the left lateral decubitus position. Bite block was positioned. Timeout was performed confirming the patient's identity and procedure to be performed. After appropriate  sedation was confirmed, the flexible endoscope was advanced into the oropharynx. The posterior oropharynx appeared grossly normal. The scope was advanced into the proximal esophagus. The esophagus was insufflated with air. The scope was advanced under direct visualization. No acute abnormalities of the esophagus were noted. The scope was advanced into the stomach. The scope was advanced through the pylorus into the duodenal bulb. The bulb and distal duodenum appeared grossly normal.  There is questionable previous healed irritation/fibrinous change in the second portion of the duodenum.. The scope was withdrawn back into the stomach. Antral biopsy was obtained and sent to pathology.  There was shallow ulceration in the antrum.  The scope was retroflexed and the GE junction inspected.  Hiatal hernia was noted.  The scope was returned to a neutral position and the stomach was decompressed. The scope was withdrawn to the GE junction and biopsy obtained.  The hiatal hernia measured 4 cm.  There was 1.5 cm length of possible Quintanilla's esophagus.  There was 1 focus of ulcer/gastritis within the hiatal hernia.  The mucosa of the esophagus was inspected while withdrawing the scope. No abnormalities were noted. The scope was withdrawn from the patient. The bite block was removed. The patient tolerated the procedure with no immediately apparent complication. The patient was taken to recovery instable condition.    FOLLOW UP:  RECOMMENDATIONS follow-up pathology with special attention towards possibility of dysplasia and opportunity for intervention with radiofrequency ablation.    Dk Cross MD on 10/4/2024 at 8:17 AM

## 2024-10-04 NOTE — INTERVAL H&P NOTE
"I have reviewed the surgical (or preoperative) H&P that is linked to this encounter, and examined the patient. There are no significant changes    Clinical Conditions Present on Arrival:  Clinically Significant Risk Factors Present on Admission                # Drug Induced Coagulation Defect: home medication list includes an anticoagulant medication  # Drug Induced Platelet Defect: home medication list includes an antiplatelet medication      # DMII: A1C = 7.0 % (Ref range: 4.0 - 6.2 %) within past 6 months  # Overweight: Estimated body mass index is 25.1 kg/m  as calculated from the following:    Height as of 9/4/24: 1.753 m (5' 9\").    Weight as of this encounter: 77.1 kg (170 lb).       "

## 2024-10-04 NOTE — DISCHARGE INSTRUCTIONS
[facility]  Adult Discharge Orders & Instructions      For 24 hours after surgery:  Get plenty of rest.  A responsible adult must stay with you for at least 24 hours after you leave the hospital.   You may feel lightheaded.  IF so, sit for a few minutes before standing.  Have someone help you get up.   You may have a slight fever. Call the doctor if your fever is over 101 F (38.3 C) (taken under the tongue) or lasts longer than 24 hours.  You may have a dry mouth, a sore throat, muscle aches or trouble sleeping.  These should go away after 24 hours.  Do not make important or legal decisions.  6.   Do not drive or use heavy equipment.  If you have weakness or tingling, don't drive or use heavy equipment until this feeling goes away.  To contact a doctor, call    [phone]______________

## 2024-10-08 DIAGNOSIS — K22.710 BARRETT'S ESOPHAGUS WITH LOW GRADE DYSPLASIA: Primary | ICD-10-CM

## 2024-10-08 NOTE — PROGRESS NOTES
Referral for endoscopic ablation for Quintanilla's esophagus    Dk Cross MD on 10/8/2024 at 10:22 AM

## 2024-10-09 ENCOUNTER — TELEPHONE (OUTPATIENT)
Dept: INTERNAL MEDICINE | Facility: OTHER | Age: 89
End: 2024-10-09
Payer: COMMERCIAL

## 2024-10-09 NOTE — TELEPHONE ENCOUNTER
Patient states he had an EGD done last week and he is wondering about the results.  Please contact patient.    Angela Arredondo on 10/9/2024 at 8:51 AM

## 2024-10-09 NOTE — LETTER
October 9, 2024      Steven Dennison  [id number] [address] [id number]        Dear Steven,         This letter is in regards to your Upper endoscopy that was done by Dr. Cross on 10/4/2024.    The Esophagogastroduodenoscopy Biopsies were taken of your esophagus and stomach to check for signs of abnormalities.    Your pathology returned as gastritis. This is inflammation of the stomach.  Common causes include infection with Helicobacter pylori and use of NSAIDs. Your H Pylori was negative. Less common causes include alcohol, smoking, cocaine, severe illness,autoimmune problems, radiation therapy and Crohn's disease.  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  This requires special attention and treatment.  We want you to take a PPI medication. Dr. Cross will get your medication sent to your pharmacy since you have not been taking one per your conversation with Dr. Cross's nurse on the phone.  The treatment options include observation in 6 to 12 months for progression to high-grade dysplasia or treatment with endoscopic radiofrequency balloon ablation.    If you have any questions regarding this report, please call the Surgery department at [phone].  A copy of your report will be placed in your electronic chart for your  primary care provider's review.         Sincerely,        Nawaf Montanez MD

## 2024-10-09 NOTE — TELEPHONE ENCOUNTER
reviewed the results:    Your pathology returned as gastritis. This is inflammation of the stomach.  Common causes include infection with Helicobacter pylori and use of NSAIDs. Your H Pylori was negative. Less common causes include alcohol, smoking, cocaine, severe illness,autoimmune problems, radiation therapy and Crohn's disease.       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  This requires special attention and treatment.  Continue PPI.  The treatment options include observation in 6 to 12 months for progression to high-grade dysplasia or treatment with endoscopic radiofrequency balloon ablation.    Patient reports he is not on a PPI medication and would like something sent to the pharmacy. Follow up would be outside so referral will be sent. Updated Dr. Cross. Ingris Maldonado RN  ....................  10/9/2024   10:50 AM

## 2024-10-10 DIAGNOSIS — K22.710 BARRETT'S ESOPHAGUS WITH LOW GRADE DYSPLASIA: Primary | ICD-10-CM

## 2024-10-10 RX ORDER — OMEPRAZOLE 40 MG/1
40 CAPSULE, DELAYED RELEASE ORAL DAILY
Qty: 90 CAPSULE | Refills: 11 | Status: SHIPPED | OUTPATIENT
Start: 2024-10-10

## 2024-10-28 DIAGNOSIS — E11.40 PAINFUL DIABETIC NEUROPATHY (H): ICD-10-CM

## 2024-10-30 RX ORDER — GABAPENTIN 100 MG/1
CAPSULE ORAL
Qty: 270 CAPSULE | Refills: 0 | Status: SHIPPED | OUTPATIENT
Start: 2024-10-30

## 2024-10-30 NOTE — TELEPHONE ENCOUNTER
[facility] sent Rx request for the following:      Requested Prescriptions   Pending Prescriptions Disp Refills    gabapentin (NEURONTIN) 100 MG capsule [Pharmacy Med Name: GABAPENTIN 100MG CAPSULES] 270 capsule      Sig: TAKE 1 TO 3 CAPSULES(100  MG) BY MOUTH AT BEDTIME       There is no refill protocol information for this order        Last Prescription Date:   11/22/23  Last Fill Qty/Refills:         90, R-4    Painful diabetic neuropathy (H) [E11.40]        Last Office Visit:                9/4/24 11/22/23 (painful diabetic neuropathy discussed)    Future Office visit:           12/11/24    Unable to complete prescription refill per RN Medication Refill Policy. Sandra Ch RN .............. 10/30/2024  3:36 PM

## 2024-11-21 DIAGNOSIS — E11.40 PAINFUL DIABETIC NEUROPATHY (H): ICD-10-CM

## 2024-11-21 RX ORDER — GABAPENTIN 300 MG/1
300 CAPSULE ORAL AT BEDTIME
Qty: 90 CAPSULE | Refills: 4 | Status: SHIPPED | OUTPATIENT
Start: 2024-11-21

## 2024-11-21 NOTE — TELEPHONE ENCOUNTER
[facility] sent Rx request for the following:      Requested Prescriptions   Pending Prescriptions Disp Refills    gabapentin (NEURONTIN) 300 MG capsule [Pharmacy Med Name: GABAPENTIN 300MG CAPSULES] 90 capsule 4     Sig: TAKE 1 CAPSULE(300 MG) BY MOUTH AT BEDTIME       There is no refill protocol information for this order          Last Prescription Date:   11/22/23  Last Fill Qty/Refills:         90, R-4    Last Office Visit:              09/04/24 (Tarik)   Future Office visit:             Next 5 appointments (look out 90 days)      Dec 11, 2024 1:40 PM  (Arrive by 1:25 PM)  Annual Wellness Visit with Nawaf Montanez MD,  WELLNESS NURSE  [facility] ([facility] ) [address]  [place] [id number] [phone]             Unable to complete prescription refill per RN Medication Refill Policy.  Will route to PCP for refill consideration.  Teresa Porras RN on 11/21/2024 at 1:36 PM

## 2024-11-23 DIAGNOSIS — I25.10 CORONARY ARTERY DISEASE INVOLVING NATIVE CORONARY ARTERY OF NATIVE HEART WITHOUT ANGINA PECTORIS: ICD-10-CM

## 2024-11-23 DIAGNOSIS — I10 BENIGN ESSENTIAL HYPERTENSION: ICD-10-CM

## 2024-11-23 DIAGNOSIS — I49.8 VENTRICULAR BIGEMINY: ICD-10-CM

## 2024-11-23 DIAGNOSIS — I73.00 RAYNAUD'S PHENOMENON WITHOUT GANGRENE: ICD-10-CM

## 2024-11-23 DIAGNOSIS — Z95.5 S/P DRUG ELUTING CORONARY STENT PLACEMENT: ICD-10-CM

## 2024-11-23 DIAGNOSIS — I49.1 PREMATURE ATRIAL CONTRACTIONS: ICD-10-CM

## 2024-11-25 NOTE — TELEPHONE ENCOUNTER
[facility] sent Rx request for the following:      Requested Prescriptions   Pending Prescriptions Disp Refills    ELIQUIS ANTICOAGULANT 5 MG tablet [Pharmacy Med Name: ELIQUIS 5MG TABLETS] 180 tablet 4     Sig: TAKE 1 TABLET(5 MG) BY MOUTH TWICE DAILY FOR BLOOD THINNER   Last Prescription Date:   11/22/23  Last Fill Qty/Refills:         180, R-4      Anticoagulant Agents Failed - 11/25/2024  2:25 PM        Failed - Normal Platelets on file in past 12 months     Recent Labs   Lab Test 09/04/24  1220   *           Failed - Patient is 18-79 years of age        Failed - GFR on file in the past 12 months and most recent GFR is normal       DILT- MG 24 hr capsule [Pharmacy Med Name: DILTIAZEM XR 120MG CAPSULES (24 HR)] 90 capsule 4     Sig: TAKE 1 CAPSULE BY MOUTH EVERY EVENING   Last Prescription Date:   11/22/23  Last Fill Qty/Refills:         90, R-4      Calcium Channel Blockers Protocol  Failed - 11/25/2024  2:25 PM        Failed - Most recent blood pressure under 140/90 in past 12 months     BP Readings from Last 3 Encounters:   10/04/24 (!) 157/87   09/04/24 136/50   06/25/24 (!) 147/95   No data recorded        Failed - GFR is on file in the past 12 months and most recent GFR is normal     Last Office Visit:              9/4/24   Future Office visit:           12/11/24    Per LOV note:  Return in about 3 months (around 12/4/2024) for Annual Medicare Wellness Visit, + Get Diabetic labs prior to clinic appointment.     Unable to complete prescription refill per RN Medication Refill Policy. Sandra Ch RN .............. 11/25/2024  2:30 PM

## 2024-11-26 DIAGNOSIS — E11.42 CONTROLLED TYPE 2 DIABETES MELLITUS WITH DIABETIC POLYNEUROPATHY, WITHOUT LONG-TERM CURRENT USE OF INSULIN (H): ICD-10-CM

## 2024-11-26 RX ORDER — APIXABAN 5 MG/1
TABLET, FILM COATED ORAL
Qty: 180 TABLET | Refills: 0 | Status: SHIPPED | OUTPATIENT
Start: 2024-11-26

## 2024-11-26 RX ORDER — DILTIAZEM HYDROCHLORIDE 120 MG/1
120 CAPSULE, EXTENDED RELEASE ORAL EVERY EVENING
Qty: 90 CAPSULE | Refills: 0 | Status: SHIPPED | OUTPATIENT
Start: 2024-11-26

## 2024-11-27 RX ORDER — ACYCLOVIR 400 MG/1
TABLET ORAL
Qty: 3 EACH | Refills: 2 | Status: SHIPPED | OUTPATIENT
Start: 2024-11-27

## 2024-11-27 NOTE — TELEPHONE ENCOUNTER
[facility] sent Rx request for the following:      Requested Prescriptions   Pending Prescriptions Disp Refills    Continuous Glucose Sensor (DEXCOM G7 SENSOR) MISC [Pharmacy Med Name: DEXCOM G7 SENSOR] 3 each 2     Sig: APPLY/REPLACE EVERY 10 DAYS       There is no refill protocol information for this order          Last Prescription Date:   11/22/23  Last Fill Qty/Refills:         3, R-11    Last Office Visit:              9/4/24   Future Office visit:           12/11/24    Routing refill request to provider for review/approval because:  Drug not on the [facility] refill protocol     Andria Harper RN on 11/27/2024 at 2:58 PM

## 2024-12-02 ENCOUNTER — TELEPHONE (OUTPATIENT)
Dept: INTERNAL MEDICINE | Facility: OTHER | Age: 89
End: 2024-12-02
Payer: COMMERCIAL

## 2024-12-02 DIAGNOSIS — E11.42 CONTROLLED TYPE 2 DIABETES MELLITUS WITH DIABETIC POLYNEUROPATHY, WITHOUT LONG-TERM CURRENT USE OF INSULIN (H): ICD-10-CM

## 2024-12-02 RX ORDER — ACYCLOVIR 400 MG/1
TABLET ORAL
Qty: 3 EACH | Refills: 2 | Status: SHIPPED | OUTPATIENT
Start: 2024-11-27

## 2024-12-02 NOTE — TELEPHONE ENCOUNTER
The patient requests a call back regarding how he will keep track of his blood sugar if his G7 has been discontinued by the provider.      Okay to leave detailed message.      Julianna Taveras on 12/2/2024 at 9:41 AM

## 2024-12-02 NOTE — TELEPHONE ENCOUNTER
Reason for call: Medication or medication refill    Have you contacted your pharmacy regarding this refill? yes    If No, direct the patient to contact the Pharmacy and discontinue telephone note using Erroneous Encounter.  If Yes, continue.    Name of medication requested: G7    How many days of medication do you have left? 0    What pharmacy do you use? [facility]    Preferred method for responding to this message: Telephone Call    Phone number patient can be reached at: Cell number on file:    Telephone Information:   Mobile [phone]       If we cannot reach you directly, may we leave a detailed response at the number you provided? Yes    Cely Huynh on 12/2/2024 at 9:13 AM

## 2024-12-03 ENCOUNTER — DOCUMENTATION ONLY (OUTPATIENT)
Dept: INTERNAL MEDICINE | Facility: OTHER | Age: 89
End: 2024-12-03
Payer: COMMERCIAL

## 2024-12-03 DIAGNOSIS — E11.42 CONTROLLED TYPE 2 DIABETES MELLITUS WITH DIABETIC POLYNEUROPATHY, WITHOUT LONG-TERM CURRENT USE OF INSULIN (H): Primary | ICD-10-CM

## 2024-12-03 NOTE — PROGRESS NOTES
Steven Dennison has an upcoming lab appointment and there are no orders available. Please review and place future orders, as appropriate.    Keshia Slade

## 2024-12-04 PROBLEM — H93.19 SUBJECTIVE TINNITUS: Status: ACTIVE | Noted: 2022-10-31

## 2024-12-04 PROBLEM — M12.811 ROTATOR CUFF ARTHROPATHY OF RIGHT SHOULDER: Status: ACTIVE | Noted: 2022-07-13

## 2024-12-04 PROBLEM — I83.90 ASYMPTOMATIC VARICOSE VEINS: Status: ACTIVE | Noted: 2022-10-31

## 2024-12-04 PROBLEM — I21.4 NSTEMI (NON-ST ELEVATED MYOCARDIAL INFARCTION) (H): Status: ACTIVE | Noted: 2023-03-11

## 2024-12-04 RX ORDER — LIDOCAINE HYDROCHLORIDE 20 MG/ML
SOLUTION OROPHARYNGEAL
COMMUNITY
Start: 2024-11-22 | End: 2024-12-11

## 2024-12-04 RX ORDER — NITROFURANTOIN 25; 75 MG/1; MG/1
CAPSULE ORAL
COMMUNITY
Start: 2024-08-26 | End: 2024-12-11

## 2024-12-04 RX ORDER — METHYLPREDNISOLONE 4 MG/1
TABLET ORAL
COMMUNITY
Start: 2024-10-21 | End: 2024-12-11

## 2024-12-04 RX ORDER — LIDOCAINE HYDROCHLORIDE 20 MG/ML
15 SOLUTION OROPHARYNGEAL
COMMUNITY
Start: 2024-11-18 | End: 2024-12-11

## 2024-12-04 RX ORDER — ALUMINUM HYDROXIDE, MAGNESIUM HYDROXIDE, DIMETHICONE 200; 200; 20 MG/5ML; MG/5ML; MG/5ML
LIQUID ORAL
COMMUNITY
Start: 2024-11-18

## 2024-12-04 RX ORDER — EZETIMIBE 10 MG/1
10 TABLET ORAL DAILY
COMMUNITY
End: 2024-12-11

## 2024-12-11 ENCOUNTER — OFFICE VISIT (OUTPATIENT)
Dept: INTERNAL MEDICINE | Facility: OTHER | Age: 89
End: 2024-12-11
Attending: INTERNAL MEDICINE
Payer: COMMERCIAL

## 2024-12-11 ENCOUNTER — LAB (OUTPATIENT)
Dept: LAB | Facility: OTHER | Age: 89
End: 2024-12-11
Attending: INTERNAL MEDICINE
Payer: COMMERCIAL

## 2024-12-11 VITALS
RESPIRATION RATE: 14 BRPM | SYSTOLIC BLOOD PRESSURE: 122 MMHG | OXYGEN SATURATION: 97 % | HEART RATE: 63 BPM | DIASTOLIC BLOOD PRESSURE: 62 MMHG | HEIGHT: 69 IN | BODY MASS INDEX: 25.18 KG/M2 | WEIGHT: 170 LBS

## 2024-12-11 DIAGNOSIS — R35.1 NOCTURIA: ICD-10-CM

## 2024-12-11 DIAGNOSIS — G89.4 CHRONIC PAIN SYNDROME: ICD-10-CM

## 2024-12-11 DIAGNOSIS — E78.2 MIXED HYPERLIPIDEMIA: ICD-10-CM

## 2024-12-11 DIAGNOSIS — M54.50 CHRONIC MIDLINE LOW BACK PAIN WITHOUT SCIATICA: ICD-10-CM

## 2024-12-11 DIAGNOSIS — I73.00 RAYNAUD'S PHENOMENON WITHOUT GANGRENE: ICD-10-CM

## 2024-12-11 DIAGNOSIS — N18.2 CKD (CHRONIC KIDNEY DISEASE) STAGE 2, GFR 60-89 ML/MIN: ICD-10-CM

## 2024-12-11 DIAGNOSIS — J31.0 CHRONIC RHINITIS: ICD-10-CM

## 2024-12-11 DIAGNOSIS — Z79.01 CHRONIC ANTICOAGULATION: ICD-10-CM

## 2024-12-11 DIAGNOSIS — I48.0 HYPERCOAGULABLE STATE DUE TO PAROXYSMAL ATRIAL FIBRILLATION (H): ICD-10-CM

## 2024-12-11 DIAGNOSIS — Z23 ENCOUNTER FOR IMMUNIZATION: ICD-10-CM

## 2024-12-11 DIAGNOSIS — E11.42 CONTROLLED TYPE 2 DIABETES MELLITUS WITH DIABETIC POLYNEUROPATHY, WITHOUT LONG-TERM CURRENT USE OF INSULIN (H): ICD-10-CM

## 2024-12-11 DIAGNOSIS — Z71.85 VACCINE COUNSELING: ICD-10-CM

## 2024-12-11 DIAGNOSIS — Z95.5 S/P DRUG ELUTING CORONARY STENT PLACEMENT: ICD-10-CM

## 2024-12-11 DIAGNOSIS — Z00.00 MEDICARE ANNUAL WELLNESS VISIT, SUBSEQUENT: Primary | ICD-10-CM

## 2024-12-11 DIAGNOSIS — I50.22 CHRONIC SYSTOLIC HEART FAILURE (H): ICD-10-CM

## 2024-12-11 DIAGNOSIS — I25.10 CORONARY ARTERY DISEASE INVOLVING NATIVE CORONARY ARTERY OF NATIVE HEART WITHOUT ANGINA PECTORIS: ICD-10-CM

## 2024-12-11 DIAGNOSIS — I49.8 VENTRICULAR BIGEMINY: ICD-10-CM

## 2024-12-11 DIAGNOSIS — I10 BENIGN ESSENTIAL HYPERTENSION: ICD-10-CM

## 2024-12-11 DIAGNOSIS — D68.69 HYPERCOAGULABLE STATE DUE TO PAROXYSMAL ATRIAL FIBRILLATION (H): ICD-10-CM

## 2024-12-11 DIAGNOSIS — R35.0 URINARY FREQUENCY: ICD-10-CM

## 2024-12-11 DIAGNOSIS — I49.1 PREMATURE ATRIAL CONTRACTIONS: ICD-10-CM

## 2024-12-11 DIAGNOSIS — E53.8 VITAMIN B12 DEFICIENCY: ICD-10-CM

## 2024-12-11 DIAGNOSIS — G89.29 CHRONIC MIDLINE LOW BACK PAIN WITHOUT SCIATICA: ICD-10-CM

## 2024-12-11 LAB
ALBUMIN SERPL BCG-MCNC: 4 G/DL (ref 3.5–5.2)
ALBUMIN UR-MCNC: NEGATIVE MG/DL
ALP SERPL-CCNC: 88 U/L (ref 40–150)
ALT SERPL W P-5'-P-CCNC: 15 U/L (ref 0–70)
ANION GAP SERPL CALCULATED.3IONS-SCNC: 6 MMOL/L (ref 7–15)
APPEARANCE UR: CLEAR
AST SERPL W P-5'-P-CCNC: 17 U/L (ref 0–45)
BILIRUB SERPL-MCNC: 0.6 MG/DL
BILIRUB UR QL STRIP: NEGATIVE
BUN SERPL-MCNC: 28.5 MG/DL (ref 8–23)
CALCIUM SERPL-MCNC: 8.9 MG/DL (ref 8.8–10.4)
CHLORIDE SERPL-SCNC: 104 MMOL/L (ref 98–107)
CHOLEST SERPL-MCNC: 109 MG/DL
COLOR UR AUTO: ABNORMAL
CREAT SERPL-MCNC: 1.07 MG/DL (ref 0.67–1.17)
CREAT UR-MCNC: 60.4 MG/DL
EGFRCR SERPLBLD CKD-EPI 2021: 67 ML/MIN/1.73M2
ERYTHROCYTE [DISTWIDTH] IN BLOOD BY AUTOMATED COUNT: 14.6 % (ref 10–15)
EST. AVERAGE GLUCOSE BLD GHB EST-MCNC: 154 MG/DL
FASTING STATUS PATIENT QL REPORTED: NO
FASTING STATUS PATIENT QL REPORTED: NO
GLUCOSE SERPL-MCNC: 249 MG/DL (ref 70–99)
GLUCOSE UR STRIP-MCNC: >1000 MG/DL
HBA1C MFR BLD: 7 %
HCO3 SERPL-SCNC: 25 MMOL/L (ref 22–29)
HCT VFR BLD AUTO: 47.9 % (ref 40–53)
HDLC SERPL-MCNC: 44 MG/DL
HGB BLD-MCNC: 15.5 G/DL (ref 13.3–17.7)
HGB UR QL STRIP: NEGATIVE
KETONES UR STRIP-MCNC: NEGATIVE MG/DL
LDLC SERPL CALC-MCNC: 44 MG/DL
LEUKOCYTE ESTERASE UR QL STRIP: NEGATIVE
MCH RBC QN AUTO: 28.8 PG (ref 26.5–33)
MCHC RBC AUTO-ENTMCNC: 32.4 G/DL (ref 31.5–36.5)
MCV RBC AUTO: 89 FL (ref 78–100)
MICROALBUMIN UR-MCNC: 37.5 MG/L
MICROALBUMIN/CREAT UR: 62.09 MG/G CR (ref 0–17)
NITRATE UR QL: NEGATIVE
NONHDLC SERPL-MCNC: 65 MG/DL
PH UR STRIP: 5 [PH] (ref 5–9)
PLATELET # BLD AUTO: 129 10E3/UL (ref 150–450)
POTASSIUM SERPL-SCNC: 4.2 MMOL/L (ref 3.4–5.3)
PROT SERPL-MCNC: 6.5 G/DL (ref 6.4–8.3)
RBC # BLD AUTO: 5.38 10E6/UL (ref 4.4–5.9)
SODIUM SERPL-SCNC: 135 MMOL/L (ref 135–145)
SP GR UR STRIP: 1.03 (ref 1–1.03)
TRIGL SERPL-MCNC: 103 MG/DL
TSH SERPL DL<=0.005 MIU/L-ACNC: 1.96 UIU/ML (ref 0.3–4.2)
UROBILINOGEN UR STRIP-MCNC: NORMAL MG/DL
WBC # BLD AUTO: 6.1 10E3/UL (ref 4–11)

## 2024-12-11 PROCEDURE — G0463 HOSPITAL OUTPT CLINIC VISIT: HCPCS | Performed by: INTERNAL MEDICINE

## 2024-12-11 PROCEDURE — 82465 ASSAY BLD/SERUM CHOLESTEROL: CPT | Mod: ZL

## 2024-12-11 PROCEDURE — 82570 ASSAY OF URINE CREATININE: CPT | Mod: ZL

## 2024-12-11 PROCEDURE — 83036 HEMOGLOBIN GLYCOSYLATED A1C: CPT | Mod: ZL

## 2024-12-11 PROCEDURE — 84443 ASSAY THYROID STIM HORMONE: CPT | Mod: ZL

## 2024-12-11 PROCEDURE — 85018 HEMOGLOBIN: CPT | Mod: ZL

## 2024-12-11 PROCEDURE — 84155 ASSAY OF PROTEIN SERUM: CPT | Mod: ZL

## 2024-12-11 PROCEDURE — 84460 ALANINE AMINO (ALT) (SGPT): CPT | Mod: ZL

## 2024-12-11 PROCEDURE — 36415 COLL VENOUS BLD VENIPUNCTURE: CPT | Mod: ZL

## 2024-12-11 PROCEDURE — 82043 UR ALBUMIN QUANTITATIVE: CPT | Mod: ZL

## 2024-12-11 PROCEDURE — 81003 URINALYSIS AUTO W/O SCOPE: CPT | Mod: ZL

## 2024-12-11 RX ORDER — ROSUVASTATIN CALCIUM 10 MG/1
10 TABLET, COATED ORAL DAILY
Qty: 90 TABLET | Refills: 4 | Status: SHIPPED | OUTPATIENT
Start: 2024-12-11

## 2024-12-11 RX ORDER — OXYCODONE HYDROCHLORIDE 5 MG/1
5 TABLET ORAL DAILY PRN
Qty: 30 TABLET | Refills: 0 | Status: SHIPPED | OUTPATIENT
Start: 2024-12-11

## 2024-12-11 RX ORDER — DILTIAZEM HYDROCHLORIDE 120 MG/1
120 CAPSULE, EXTENDED RELEASE ORAL EVERY EVENING
Qty: 90 CAPSULE | Refills: 4 | Status: SHIPPED | OUTPATIENT
Start: 2024-12-11

## 2024-12-11 RX ORDER — FLUTICASONE PROPIONATE 50 MCG
2 SPRAY, SUSPENSION (ML) NASAL PRN
Qty: 9.9 ML | Refills: 4 | Status: SHIPPED | OUTPATIENT
Start: 2024-12-11 | End: 2024-12-11

## 2024-12-11 RX ORDER — GABAPENTIN 100 MG/1
100 CAPSULE ORAL EVERY MORNING
Qty: 90 CAPSULE | Refills: 1 | Status: SHIPPED | OUTPATIENT
Start: 2024-12-11

## 2024-12-11 RX ORDER — FLUTICASONE PROPIONATE 50 MCG
SPRAY, SUSPENSION (ML) NASAL
Qty: 48 G | Refills: 4 | Status: SHIPPED | OUTPATIENT
Start: 2024-12-11

## 2024-12-11 RX ORDER — EZETIMIBE 10 MG/1
10 TABLET ORAL DAILY
Qty: 90 TABLET | Refills: 4 | Status: SHIPPED | OUTPATIENT
Start: 2024-12-11

## 2024-12-11 RX ORDER — GLIPIZIDE 5 MG/1
10 TABLET ORAL 2 TIMES DAILY WITH MEALS
Qty: 360 TABLET | Refills: 1 | Status: SHIPPED | OUTPATIENT
Start: 2024-12-11

## 2024-12-11 RX ORDER — LISINOPRIL 5 MG/1
5 TABLET ORAL DAILY
Qty: 90 TABLET | Refills: 4 | Status: SHIPPED | OUTPATIENT
Start: 2024-12-11

## 2024-12-11 RX ORDER — ACYCLOVIR 400 MG/1
TABLET ORAL
Qty: 3 EACH | Refills: 4 | Status: SHIPPED | OUTPATIENT
Start: 2024-12-11

## 2024-12-11 RX ORDER — TAMSULOSIN HYDROCHLORIDE 0.4 MG/1
0.8 CAPSULE ORAL EVERY EVENING
Qty: 180 CAPSULE | Refills: 4 | Status: SHIPPED | OUTPATIENT
Start: 2024-12-11

## 2024-12-11 RX ORDER — GABAPENTIN 300 MG/1
300 CAPSULE ORAL AT BEDTIME
Qty: 90 CAPSULE | Refills: 1 | Status: SHIPPED | OUTPATIENT
Start: 2024-12-11

## 2024-12-11 SDOH — HEALTH STABILITY: PHYSICAL HEALTH: ON AVERAGE, HOW MANY DAYS PER WEEK DO YOU ENGAGE IN MODERATE TO STRENUOUS EXERCISE (LIKE A BRISK WALK)?: 4 DAYS

## 2024-12-11 ASSESSMENT — ENCOUNTER SYMPTOMS
SHORTNESS OF BREATH: 1
BACK PAIN: 1
DYSURIA: 0
VOMITING: 0
BRUISES/BLEEDS EASILY: 0
PALPITATIONS: 0
JOINT SWELLING: 0
HEMATURIA: 0
HEADACHES: 0
WEAKNESS: 0
ABDOMINAL PAIN: 0
COUGH: 0
DIARRHEA: 0
FATIGUE: 1
EYE PAIN: 0
AGITATION: 0
FEVER: 0
CONFUSION: 0
SORE THROAT: 0
SLEEP DISTURBANCE: 1
CONSTIPATION: 0
CHILLS: 0
LIGHT-HEADEDNESS: 0
MYALGIAS: 0
HEARTBURN: 0
DIZZINESS: 0
ARTHRALGIAS: 0
NAUSEA: 0
HEMATOCHEZIA: 0
NERVOUS/ANXIOUS: 0
FREQUENCY: 1
PARESTHESIAS: 0
WHEEZING: 0

## 2024-12-11 ASSESSMENT — SOCIAL DETERMINANTS OF HEALTH (SDOH): HOW OFTEN DO YOU GET TOGETHER WITH FRIENDS OR RELATIVES?: TWICE A WEEK

## 2024-12-11 ASSESSMENT — PAIN SCALES - GENERAL: PAINLEVEL_OUTOF10: MODERATE PAIN (5)

## 2024-12-11 NOTE — PROGRESS NOTES
"Preventive Care Visit  [facility]  Nawaf Montanez MD, Internal Medicine  Dec 11, 2024        Encounter for Medicare annual wellness exam    -Colon cancer screening done via colonoscopy on 1/1/11 (impression: adenomatous polyps).     -PSA lab work performed 9/7/21 (value of 3.05 ng/mL).      -Immunizations: Patient is due for the following: RSV and Shingrix. Patient declines vaccinations.     -Derm: Does patient regularly see dermatologist? No.    -Refills pended for requested medications.  -Labs done.    -Due: DM foot exam- Declines, seen by Podiatry on 11/11/24.      BMI  Estimated body mass index is 25.1 kg/m  as calculated from the following:    Height as of this encounter: 1.753 m (5' 9\").    Weight as of this encounter: 77.1 kg (170 lb).       Counseling  Appropriate preventive services were addressed with this patient via screening, questionnaire, or discussion as appropriate for fall prevention, nutrition, physical activity, Tobacco-use cessation, social engagement, weight loss and cognition.  Checklist reviewing preventive services available has been given to the patient.  Reviewed patient's diet, addressing concerns and/or questions.   The patient was instructed to see the dentist every 6 months.   He is at risk for psychosocial distress and has been provided with information to reduce risk.   Discussed possible causes of fatigue. Updated plan of care.  Patient reported difficulty with activities of daily living were addressed today.The patient was provided with written information regarding signs of hearing loss.   Information on urinary incontinence and treatment options given to patient.   I have reviewed Opioid Use Disorder and Substance Use Disorder risk factors and made any needed referrals.     Return in about 3 months (around 3/11/2025) for - Labs every 91+ days, with DM Follow-up, Same Day or 1-2 days later with Dr. Montanez.      Michael Harris is a 89 year old, presenting " for the following:  Medicare Visit        12/11/2024     1:16 PM   Additional Questions   Roomed by GEORGIANA Berry             Health Care Directive  Patient has a Health Care Directive on file        12/11/2024   General Health   How would you rate your overall physical health? Good   Feel stress (tense, anxious, or unable to sleep) To some extent      (!) STRESS CONCERN      12/11/2024   Nutrition   Diet: Diabetic            12/11/2024   Exercise   Days per week of moderate/strenous exercise 4 days            12/11/2024   Social Factors   Frequency of gathering with friends or relatives Twice a week   Worry food won't last until get money to buy more No   Food not last or not have enough money for food? No   Do you have housing? (Housing is defined as stable permanent housing and does not include staying ouside in a car, in a tent, in an abandoned building, in an overnight shelter, or couch-surfing.) Yes   Are you worried about losing your housing? No   Lack of transportation? No   Unable to get utilities (heat,electricity)? No            12/11/2024   Fall Risk   Fallen 2 or more times in the past year? No    Trouble with walking or balance? Yes    Gait Speed Test Interpretation Less than or equal to 5.00 seconds - PASS       Patient-reported           12/11/2024   Activities of Daily Living- Home Safety   Needs help with the following daily activites Housework    Medication administration    Dressing   Safety concerns in the home None of the above       Multiple values from one day are sorted in reverse-chronological order         12/11/2024   Dental   Dentist two times every year? (!) NO            12/11/2024   Hearing Screening   Hearing concerns? (!) IT'S HARDER TO UNDERSTAND WOMEN'S VOICES THAN MEN'S VOICES.    (!) IT'S HARD TO FOLLOW A CONVERSATION IN A NOISY RESTAURANT OR CROWDED ROOM.       Multiple values from one day are sorted in reverse-chronological order         12/11/2024   Driving Risk Screening    Patient/family members have concerns about driving No            2024   General Alertness/Fatigue Screening   Have you been more tired than usual lately? (!) YES            2024   Urinary Incontinence Screening   Bothered by leaking urine in past 6 months Yes            2024   TB Screening   Were you born outside of the [place]? No      Today's PHQ-2 Score:       2024     1:05 PM   PHQ-2 (  [employer])   Q1: Little interest or pleasure in doing things 0    Q2: Feeling down, depressed or hopeless 0    PHQ-2 Score 0    Q1: Little interest or pleasure in doing things Not at all   Q2: Feeling down, depressed or hopeless Not at all   PHQ-2 Score 0       Patient-reported           2024   Substance Use   Alcohol more than 3/day or more than 7/wk No   Do you have a current opioid prescription? (!) YES   How severe/bad is pain from 1 to 10? 6/10   Do you use any other substances recreationally? No             No data to display              Low Risk (0-3)  Moderate Risk (4-7)  High Risk (>8)  Social History     Tobacco Use    Smoking status: Former     Current packs/day: 0.00     Average packs/day: 1 pack/day for 20.0 years (20.0 ttl pk-yrs)     Types: Cigarettes     Start date: 1955     Quit date: 1975     Years since quittin.9     Passive exposure: Past    Smokeless tobacco: Never   Vaping Use    Vaping status: Never Used   Substance Use Topics    Alcohol use: Yes     Alcohol/week: 1.0 standard drink of alcohol     Types: 1 Standard drinks or equivalent per week     Comment: maybe once a month    Drug use: No              Reviewed and updated as needed this visit by Provider   Tobacco  Allergies  Meds  Problems  Med Hx  Surg Hx  Fam Hx     Sexual Activity                      Current providers sharing in care for this patient include:  Patient Care Team:  Nawaf Montanez MD as PCP - General (Internal Medicine)  Nawaf Montanez MD as Assigned PCP  Keri Sal RN as  "Diabetes Educator (Diabetes Education)    The following health maintenance items are reviewed in Epic and correct as of today:  Health Maintenance   Topic Date Due    ZOSTER IMMUNIZATION (2 of 3) 02/16/2010    A1C  06/11/2025    BMP  06/11/2025    EYE EXAM  07/26/2025    MEDICARE ANNUAL WELLNESS VISIT  12/11/2025    ALT  12/11/2025    LIPID  12/11/2025    MICROALBUMIN  12/11/2025    FALL RISK ASSESSMENT  12/11/2025    CBC  12/11/2025    DIABETIC FOOT EXAM  12/23/2025    HF ACTION PLAN  12/11/2027    ADVANCE CARE PLANNING  11/25/2028    DTAP/TDAP/TD IMMUNIZATION (4 - Td or Tdap) 02/12/2030    TSH W/FREE T4 REFLEX  Completed    PHQ-2 (once per calendar year)  Completed    Pneumococcal Vaccine: 65+ Years  Completed    URINALYSIS  Completed    HPV IMMUNIZATION  Aged Out    MENINGITIS IMMUNIZATION  Aged Out    RSV MONOCLONAL ANTIBODY  Aged Out    INFLUENZA VACCINE  Discontinued    RSV VACCINE  Discontinued    COVID-19 Vaccine  Discontinued          Objective      Exam  /62   Pulse 63   Resp 14   Ht 1.753 m (5' 9\")   Wt 77.1 kg (170 lb)   SpO2 97%   BMI 25.10 kg/m               12/11/2024   Mini Cog   Clock Draw Score 2 Normal   3 Item Recall 1 object recalled   Mini Cog Total Score 3                 Signed Electronically by: Nawaf Montanez MD    "

## 2024-12-11 NOTE — TELEPHONE ENCOUNTER
[facility] sent Rx request for the following:      Pt is requesting a 90 day supply.    fluticasone (FLONASE) 50 MCG/ACT nasal spray 9.9 mL 4 12/11/2024 -- No   Sig - Route: Spray 2 sprays into both nostrils as needed for rhinitis. INSTILL 2 SPRAYS IN EACH     Sandra Ch RN .............. 12/11/2024  2:47 PM

## 2024-12-11 NOTE — PROGRESS NOTES
Assessment & Plan     ICD-10-CM    1. Medicare annual wellness visit, subsequent  Z00.00       2. Vaccine counseling  Z71.85       3. Controlled type 2 diabetes mellitus with diabetic polyneuropathy, without long-term current use of insulin (H)  E11.42 Continuous Glucose Sensor (DEXCOM G7 SENSOR) MISC     gabapentin (NEURONTIN) 100 MG capsule     gabapentin (NEURONTIN) 300 MG capsule     glipiZIDE (GLUCOTROL) 5 MG tablet     TX FOOT EXAM NO CHARGE     empagliflozin (JARDIANCE) 10 MG TABS tablet      4. Vitamin B12 deficiency  E53.8       5. Benign essential hypertension  I10 diltiazem ER (DILT-XR) 120 MG 24 hr capsule     lisinopril (ZESTRIL) 5 MG tablet      6. Raynaud's phenomenon without gangrene  I73.00 diltiazem ER (DILT-XR) 120 MG 24 hr capsule      7. Premature atrial contractions  I49.1 diltiazem ER (DILT-XR) 120 MG 24 hr capsule      8. Ventricular bigeminy  I49.8 diltiazem ER (DILT-XR) 120 MG 24 hr capsule      9. Coronary artery disease involving native coronary artery of native heart without angina pectoris  I25.10 apixaban ANTICOAGULANT (ELIQUIS ANTICOAGULANT) 5 MG tablet     lisinopril (ZESTRIL) 5 MG tablet     rosuvastatin (CRESTOR) 10 MG tablet     empagliflozin (JARDIANCE) 10 MG TABS tablet      10. S/P drug eluting coronary stent placement - RCA x2 and LAD x1 - 3/14/2023 - [facility]  Z95.5 apixaban ANTICOAGULANT (ELIQUIS ANTICOAGULANT) 5 MG tablet      11. Mixed hyperlipidemia  E78.2 rosuvastatin (CRESTOR) 10 MG tablet     ezetimibe (ZETIA) 10 MG tablet      12. Urinary frequency  R35.0 tamsulosin (FLOMAX) 0.4 MG capsule      13. Nocturia  R35.1 tamsulosin (FLOMAX) 0.4 MG capsule      14. Encounter for immunization  Z23 zoster vaccine recombinant adjuvanted (SHINGRIX) injection      15. CKD (chronic kidney disease) stage 2, GFR 60-89 ml/min  N18.2       16. Hypercoagulable state due to paroxysmal atrial fibrillation (H)  D68.69 diltiazem ER (DILT-XR) 120 MG 24 hr capsule    I48.0 apixaban  ANTICOAGULANT (ELIQUIS ANTICOAGULANT) 5 MG tablet      17. Chronic anticoagulation - Eliquis  Z79.01 apixaban ANTICOAGULANT (ELIQUIS ANTICOAGULANT) 5 MG tablet      18. Chronic pain syndrome  G89.4 oxyCODONE (ROXICODONE) 5 MG tablet      19. Chronic rhinitis  J31.0 DISCONTINUED: fluticasone (FLONASE) 50 MCG/ACT nasal spray      20. Chronic midline low back pain without sciatica  M54.50 oxyCODONE (ROXICODONE) 5 MG tablet    G89.29       21. Chronic systolic heart failure (H)  I50.22          Patient presents for Medicare annual wellness visit as well as follow-up multiple issues.    Patient has history of chronic systolic heart failure, coronary artery disease, history of multiple coronary artery drug-eluting stents placed in the past.  Most recent stenting completed in March 2023.  Overall seems to be doing quite well.  Continues on lisinopril, rosuvastatin, Jardiance, apixaban.  Heart rates have been controlled with diltiazem.  Doing well with current medications.  Denies medication side effects.  Needs refills.    Premature atrial contractions, ventricular bigeminy, paroxysmal atrial fibrillation.  Heart rates have been controlled.  Doing well with diltiazem current dosing.  Needs refills.    Patient has painful diabetic polyneuropathy, takes 100 mg gabapentin in the morning and 300 at bedtime.  This has been reasonably helpful and effective.  Refill sent to pharmacy.    Continues with Dexcom G7 due to hypoglycemia associated with oral diabetic medications.  See below.  Refill sent to pharmacy.    Nocturia and urinary frequency, continues with Flomax.  Tolerating well.  Denies medication side effects.  Refill sent to pharmacy.    Chronic pain, has chronic midline low back pain without sciatica.  He typically will take a tablet or 2 of oxycodone before or after bowling or working out in the yard.  Many days during the week he does not use any oxycodone.  Doing well with current dosing.  Needs  refills.    HYPERTENSION - Ongoing. Blood pressure is currently well controlled.  Medication side effects: None. Denies syncope or presyncope.  Continue current medications.   Medication list reviewed/updated. Refills completed as needed.      MIXED HYPERLIPIDEMIA.  Ongoing. LDL is at goal: Yes. Triglycerides are at goal: Yes.    Medication side effects reported: None.   Continue current medications for now. Medication list reviewed/updated. Refills completed as needed.  Recent Labs   Lab Test 12/11/24  1247 09/04/24  1220   CHOL 109 105   HDL 44 39*   LDL 44 46   TRIG 103 99        Atrial Fibrillation - Type: Paroxysmal Atrial Fibrillation, chronic, ongoing.  + Hypercoagulable state due to atrial fibrillation.  Continues with apixaban (ELIQUIS) for oral anticoagulation.  Heart rates are controlled with diltiazem.  Tolerating well.  Denies excessive bleeding issues.  Easy bruising. Medication list reviewed/updated. Refills completed as needed.     Chronic Kidney Disease, Stage 2 (GFR 60-89), chronic, ongoing.  Kidney function had been slowly declining.  Encourage NSAID avoidance.    Recent Labs   Lab Test 12/11/24  1247 09/04/24  1220   CR 1.07 1.18*   GFRESTIMATED 67 59*        Chronic pain syndrome.  Chronic continuous opiate use.  Currently utilizing oxycodone (Oxycontin, Oxyir)   for chronic pain management.  Seems to be doing well with current medication regimen.   website reviewed, No abnormal findings noted.  No benzodiazepine use  Proper medication use and misuse reviewed, patient has been using medications appropriately.  Urine drug screen is  up-to-date.  Controlled substance agreement completed.  Prescriptions refilled as noted.  Patient has chronic low back pain.     Vaccine counseling completed.  Encourage routine / annual vaccinations.    Type 2 Diabetes Mellitus, with nephropathy, with neuropathy, Reports ongoing/previous: numbness and burning.  Blood sugar control has been good with mild  hyperglycemia. Doing well with diet, oral agents, and exercise.  Medication list reviewed/updated. Refills completed as needed.      + Still with intermittent hypoglycemia, less than glucose of 54. Due to oral DM agents + wood cutting / exercise.   -- usually gets some juice and then sits / rests for a while... before going back to his chores.     Complicating factors include but are not limited to: hypertension, hyperlipidemia, neuropathy, chronic kidney disease, and CAD/PVD.     Recent Labs   Lab Test 12/11/24  1247 09/04/24  1220 05/29/24  1307   A1C 7.0* 7.0* 7.1*   LDL 44 46 95   HDL 44 39* 42   TRIG 103 99 147   ALT 15 12 8   CR 1.07 1.18* 1.17   GFRESTIMATED 67 59* 60*   POTASSIUM 4.2 4.4 4.6   TSH 1.96 1.77 1.96   WBC 6.1 6.5 6.5   HGB 15.5 14.9 15.8   * 145* 141*   ALBUMIN 4.0 4.0 4.1     Hemoglobin A1c is well-controlled.  LDL HDL and triglycerides are at goal.  ALT normal.  Creatinine is at baseline.  Potassium normal.  TSH normal.  Platelet count is slightly low.  White blood cell count and hemoglobin levels are normal.  Albumin normal.     This patient has been using and benefiting from using Dexcom  continuous glucose monitoring system. I therefore recommended Dexcom  continuous glucose monitoring as part of their comprehensive diabetes treatment plan, in order to help them:    lower their HbA1c to target and/or maintain their HbA1c at target    to alert patient prior to acute complications of hyper/hypoglycemia    to detect hypoglycemia, and/or to reduce hypoglycemia    increase their short term and long-term safety    improve quality of life    start/continue/intensify level of physical activity safely    improve insulin sensitivity by reducing hypoglycemia unawareness.                Counseling  Appropriate preventive services were addressed with this patient via screening, questionnaire, or discussion as appropriate for fall prevention, nutrition, physical activity, Tobacco-use cessation,  social engagement, weight loss and cognition.  Checklist reviewing preventive services available has been given to the patient.  Reviewed patient's diet, addressing concerns and/or questions.   The patient was instructed to see the dentist every 6 months.   He is at risk for psychosocial distress and has been provided with information to reduce risk.   Discussed possible causes of fatigue. Updated plan of care.  Patient reported difficulty with activities of daily living were addressed today.The patient was provided with written information regarding signs of hearing loss.   Information on urinary incontinence and treatment options given to patient.   I have reviewed Opioid Use Disorder and Substance Use Disorder risk factors and made any needed referrals.         Return in about 3 months (around 3/11/2025) for - Labs every 91+ days, with DM Follow-up, Same Day or 1-2 days later with Dr. Montanez.      Nawaf Montanez MD  [facility]    Review of Systems   Constitutional:  Positive for fatigue (mostly of feet and legs - stopped the b12 supplements again). Negative for chills and fever.   HENT:  Positive for hearing loss. Negative for congestion, ear pain and sore throat.    Eyes:  Positive for visual disturbance. Negative for pain.   Respiratory:  Positive for shortness of breath. Negative for cough and wheezing.    Cardiovascular:  Negative for chest pain, palpitations and peripheral edema.        Raynaud's syndrome, no ulcerations   Gastrointestinal:  Negative for abdominal pain, constipation, diarrhea, heartburn, hematochezia, nausea and vomiting.        + Difficulty swallowing, food sticks   Endocrine: Negative for cold intolerance and heat intolerance.        + Hypoglycemia episodes with glucose less than 54   Genitourinary:  Positive for frequency, impotence and urgency. Negative for dysuria, genital sores, hematuria and penile discharge.        + Nocturia 1-2x nightly, previously 3-4x.  +  "nocturia and overactive bladder symptoms.-- stopped oxybutynin, due to urinary retention.   Musculoskeletal:  Positive for back pain (+ more frequent low back pain) and gait problem (+ Balance problems - difficulty walking on uneven ground). Negative for arthralgias, joint swelling and myalgias.   Skin:  Negative for pallor and rash.   Allergic/Immunologic: Negative for immunocompromised state.   Neurological:  Negative for dizziness, weakness, light-headedness, headaches and paresthesias.        + Balance problems   Hematological:  Does not bruise/bleed easily.   Psychiatric/Behavioral:  Positive for sleep disturbance (+ insomnia and will wake due to foot pain and nocturia). Negative for agitation, confusion and mood changes. The patient is not nervous/anxious.          Michael Harris is a 89 year old, presenting for the following health issues:  Medicare Visit        12/11/2024     1:16 PM   Additional Questions   Roomed by GEORGIANA Berry     HPI                   Objective    /62   Pulse 63   Resp 14   Ht 1.753 m (5' 9\")   Wt 77.1 kg (170 lb)   SpO2 97%   BMI 25.10 kg/m    Body mass index is 25.1 kg/m .  Physical Exam  Constitutional:       General: He is not in acute distress.     Appearance: Normal appearance. He is well-developed. He is not diaphoretic.   Eyes:      General: No scleral icterus.     Conjunctiva/sclera: Conjunctivae normal.   Neck:      Vascular: No carotid bruit.   Cardiovascular:      Rate and Rhythm: Normal rate and regular rhythm.      Pulses: Normal pulses.   Pulmonary:      Effort: Pulmonary effort is normal.      Breath sounds: Normal breath sounds.   Abdominal:      Palpations: Abdomen is soft.      Tenderness: There is no abdominal tenderness.   Musculoskeletal:         General: No deformity.      Cervical back: Neck supple.      Right lower leg: No edema.      Left lower leg: No edema.   Skin:     General: Skin is warm and dry.      Coloration: Skin is not jaundiced.      " Findings: No rash.      Comments: Venous stasis and scattered scabs bilaterally on legs   Neurological:      Mental Status: He is alert. Mental status is at baseline.   Psychiatric:         Mood and Affect: Mood normal.         Behavior: Behavior normal.                    Signed Electronically by: Nawaf Montanez MD

## 2024-12-11 NOTE — PATIENT INSTRUCTIONS
Blood pressure is well controlled.   Diabetes is well controlled.     Medications refilled.   Labs are stable.       Results for orders placed or performed in visit on 12/11/24   Urine Macroscopic with reflex to Microscopic     Status: Abnormal   Result Value Ref Range    Color Urine Light Yellow Colorless, Straw, Light Yellow, Yellow    Appearance Urine Clear Clear    Glucose Urine >1000 (A) Negative mg/dL    Bilirubin Urine Negative Negative    Ketones Urine Negative Negative mg/dL    Specific Gravity Urine 1.032 (H) 1.000 - 1.030    Blood Urine Negative Negative    pH Urine 5.0 5.0 - 9.0    Protein Albumin Urine Negative Negative mg/dL    Urobilinogen Urine Normal Normal, 2.0 mg/dL    Nitrite Urine Negative Negative    Leukocyte Esterase Urine Negative Negative    Narrative    Microscopic not indicated   TSH with free T4 reflex     Status: Normal   Result Value Ref Range    TSH 1.96 0.30 - 4.20 uIU/mL   Hemoglobin A1c     Status: Abnormal   Result Value Ref Range    Estimated Average Glucose 154 (H) <117 mg/dL    Hemoglobin A1C 7.0 (H) <5.7 %   CBC with platelets     Status: Abnormal   Result Value Ref Range    WBC Count 6.1 4.0 - 11.0 10e3/uL    RBC Count 5.38 4.40 - 5.90 10e6/uL    Hemoglobin 15.5 13.3 - 17.7 g/dL    Hematocrit 47.9 40.0 - 53.0 %    MCV 89 78 - 100 fL    MCH 28.8 26.5 - 33.0 pg    MCHC 32.4 31.5 - 36.5 g/dL    RDW 14.6 10.0 - 15.0 %    Platelet Count 129 (L) 150 - 450 10e3/uL   Albumin Random Urine Quantitative with Creat Ratio     Status: Abnormal   Result Value Ref Range    Creatinine Urine mg/dL 60.4 mg/dL    Albumin Urine mg/L 37.5 mg/L    Albumin Urine mg/g Cr 62.09 (H) 0.00 - 17.00 mg/g Cr   Lipid Profile     Status: None   Result Value Ref Range    Cholesterol 109 <200 mg/dL    Triglycerides 103 <150 mg/dL    Direct Measure HDL 44 >=40 mg/dL    LDL Cholesterol Calculated 44 <100 mg/dL    Non HDL Cholesterol 65 <130 mg/dL    Patient Fasting > 8hrs? No     Narrative     Cholesterol  Desirable: < 200 mg/dL  Borderline High: 200 - 239 mg/dL  High: >= 240 mg/dL    Triglycerides  Normal: < 150 mg/dL  Borderline High: 150 - 199 mg/dL  High: 200-499 mg/dL  Very High: >= 500 mg/dL    Direct Measure HDL  Female: >= 50 mg/dL   Male: >= 40 mg/dL    LDL Cholesterol  Desirable: < 100 mg/dL  Above Desirable: 100 - 129 mg/dL   Borderline High: 130 - 159 mg/dL   High:  160 - 189 mg/dL   Very High: >= 190 mg/dL    Non HDL Cholesterol  Desirable: < 130 mg/dL  Above Desirable: 130 - 159 mg/dL  Borderline High: 160 - 189 mg/dL  High: 190 - 219 mg/dL  Very High: >= 220 mg/dL   Comprehensive metabolic panel     Status: Abnormal   Result Value Ref Range    Sodium 135 135 - 145 mmol/L    Potassium 4.2 3.4 - 5.3 mmol/L    Carbon Dioxide (CO2) 25 22 - 29 mmol/L    Anion Gap 6 (L) 7 - 15 mmol/L    Urea Nitrogen 28.5 (H) 8.0 - 23.0 mg/dL    Creatinine 1.07 0.67 - 1.17 mg/dL    GFR Estimate 67 >60 mL/min/1.73m2    Calcium 8.9 8.8 - 10.4 mg/dL    Chloride 104 98 - 107 mmol/L    Glucose 249 (H) 70 - 99 mg/dL    Alkaline Phosphatase 88 40 - 150 U/L    AST 17 0 - 45 U/L    ALT 15 0 - 70 U/L    Protein Total 6.5 6.4 - 8.3 g/dL    Albumin 4.0 3.5 - 5.2 g/dL    Bilirubin Total 0.6 <=1.2 mg/dL    Patient Fasting > 8hrs? No         Aspects of Diabetes:   Recent Labs   Lab Test 12/11/24  1247 09/04/24  1220 05/29/24  1307   A1C 7.0* 7.0* 7.1*   LDL 44 46 95   HDL 44 39* 42   TRIG 103 99 147   ALT 15 12 8   CR 1.07 1.18* 1.17   GFRESTIMATED 67 59* 60*   POTASSIUM 4.2 4.4 4.6   TSH 1.96 1.77 1.96   WBC 6.1 6.5 6.5   HGB 15.5 14.9 15.8   * 145* 141*   ALBUMIN 4.0 4.0 4.1      Hemoglobin A1c  Goal range is under 8%. Best is 6.5 to 7   Blood Pressure 122/62 Goal to keep less than 140/90   Tobacco  reports that he quit smoking about 49 years ago. His smoking use included cigarettes. He started smoking about 69 years ago. He has a 20 pack-year smoking history. He has been exposed to tobacco smoke. He has never used  smokeless tobacco. Goal to abstain from tobacco   Aspirin or Plavix Anti-platelet therapy Aspirin or Plavix reduces risk of heart disease and stroke  -- sometimes used with other blood thinners, depending on bleeding risk and risk factors.    ACE/ARB Specific blood pressure meds These medications can reduce risk of kidney disease   Cholesterol Statins (Lipitor, Crestor, vs others) Statins reduce risk of heart disease and stroke   Eye Exam -- Do Yearly -- Annual diabetic eye exam   Healthy weight Wt Readings from Last 4 Encounters:   12/11/24 77.1 kg (170 lb)   10/04/24 77.1 kg (170 lb)   09/04/24 78.5 kg (173 lb)   06/25/24 78.9 kg (174 lb)      Body mass index is 25.1 kg/m .  Goal BMI under 30, best is under 25.      -- Trying to exercise daily (goal at least 20 min/day) with moderate aerobic activity   -- Eat healthy (resources from ADA at http://www.diabetes.org/)   -- Taking good care of my feet. Consider seeing the Podiatrist   -- Check blood sugars as directed, record in log book and bring to every appointment    Insurance companies are grading you and I on your blood sugar control -- Goal is to get your A1c down to 7.9% or lower and NO Smoking!  -- Medicare and most insurance companies, will only cover Hemoglobin A1c labs to be rechecked every 91+ days.      Return for Diabetes labs and clinic follow-up appointment every 3 to 4 months.    Schedule lab only appointment --- A few days AFTER: 3/11/25   Schedule clinic appointment with Dr. Montanez -- Same day as labs, or 1-2 days later.        Patient Education   Preventive Care Advice   This is general advice given by our system to help you stay healthy. However, your care team may have specific advice just for you. Please talk to your care team about your preventive care needs.  Nutrition  Eat 5 or more servings of fruits and vegetables each day.  Try wheat bread, brown rice and whole grain pasta (instead of white bread, rice, and pasta).  Get enough calcium  and vitamin D. Check the label on foods and aim for 100% of the RDA (recommended daily allowance).  Lifestyle  Exercise at least 150 minutes each week  (30 minutes a day, 5 days a week).  Do muscle strengthening activities 2 days a week. These help control your weight and prevent disease.  No smoking.  Wear sunscreen to prevent skin cancer.  Have a dental exam and cleaning every 6 months.  Yearly exams  See your health care team every year to talk about:  Any changes in your health.  Any medicines your care team has prescribed.  Preventive care, family planning, and ways to prevent chronic diseases.  Shots (vaccines)   HPV shots (up to age 26), if you've never had them before.  Hepatitis B shots (up to age 59), if you've never had them before.  COVID-19 shot: Get this shot when it's due.  Flu shot: Get a flu shot every year.  Tetanus shot: Get a tetanus shot every 10 years.  Pneumococcal, hepatitis A, and RSV shots: Ask your care team if you need these based on your risk.  Shingles shot (for age 50 and up)  General health tests  Diabetes screening:  Starting at age 35, Get screened for diabetes at least every 3 years.  If you are younger than age 35, ask your care team if you should be screened for diabetes.  Cholesterol test: At age 39, start having a cholesterol test every 5 years, or more often if advised.  Bone density scan (DEXA): At age 50, ask your care team if you should have this scan for osteoporosis (brittle bones).  Hepatitis C: Get tested at least once in your life.  STIs (sexually transmitted infections)  Before age 24: Ask your care team if you should be screened for STIs.  After age 24: Get screened for STIs if you're at risk. You are at risk for STIs (including HIV) if:  You are sexually active with more than one person.  You don't use condoms every time.  You or a partner was diagnosed with a sexually transmitted infection.  If you are at risk for HIV, ask about PrEP medicine to prevent HIV.  Get  tested for HIV at least once in your life, whether you are at risk for HIV or not.  Cancer screening tests  Cervical cancer screening: If you have a cervix, begin getting regular cervical cancer screening tests starting at age 21.  Breast cancer scan (mammogram): If you've ever had breasts, begin having regular mammograms starting at age 40. This is a scan to check for breast cancer.  Colon cancer screening: It is important to start screening for colon cancer at age 45.  Have a colonoscopy test every 10 years (or more often if you're at risk) Or, ask your provider about stool tests like a FIT test every year or Cologuard test every 3 years.  To learn more about your testing options, visit:   .  For help making a decision, visit:   https://bit.ly/hq79880.  Prostate cancer screening test: If you have a prostate, ask your care team if a prostate cancer screening test (PSA) at age 55 is right for you.  Lung cancer screening: If you are a current or former smoker ages 50 to 80, ask your care team if ongoing lung cancer screenings are right for you.  For informational purposes only. Not to replace the advice of your health care provider. Copyright   2023 [facility]. All rights reserved. Clinically reviewed by the [facility] Transitions Program. [employer] [id number] - REV 01/24.  Learning About Activities of Daily Living  What are activities of daily living?     Activities of daily living (ADLs) are the basic self-care tasks you do every day. These include eating, bathing, dressing, and moving around.  As you age, and if you have health problems, you may find that it's harder to do some of these tasks. If so, your doctor can suggest ideas that may help.  To measure what kind of help you may need, your doctor will ask how well you are able to do ADLs. Let your doctor know if there are any tasks that you are having trouble doing. This is an important first step to getting help. And when you have the help  you need, you can stay as independent as possible.  How will a doctor assess your ADLs?  Asking about ADLs is part of a routine health checkup your doctor will likely do as you age. Your health check might be done in a doctor's office, in your home, or at a hospital. The goal is to find out if you are having any problems that could make it hard to care for yourself or that make it unsafe for you to be on your own.  To measure your ADLs, your doctor will ask how hard it is for you to do routine tasks. Your doctor may also want to know if you have changed the way you do a task because of a health problem. Your doctor may watch how you:  Walk back and forth.  Keep your balance while you stand or walk.  Move from sitting to standing or from a bed to a chair.  Button or unbutton a shirt or sweater.  Remove and put on your shoes.  It's common to feel a little worried or anxious if you find you can't do all the things you used to be able to do. Talking with your doctor about ADLs is a way to make sure you're as safe as possible and able to care for yourself as well as you can. You may want to bring a caregiver, friend, or family member to your checkup. They can help you talk to your doctor.  Follow-up care is a key part of your treatment and safety. Be sure to make and go to all appointments, and call your doctor if you are having problems. It's also a good idea to know your test results and keep a list of the medicines you take.  Current as of: October 24, 2023  Content Version: 14.2 2024 [facility] [employer].   Care instructions adapted under license by your healthcare professional. If you have questions about a medical condition or this instruction, always ask your healthcare professional. [employer] disclaims any warranty or liability for your use of this information.    Preventing Falls: Care Instructions  Injuries and health problems such as trouble walking or poor eyesight can increase your risk  of falling. So can some medicines. But there are things you can do to help prevent falls. You can exercise to get stronger. You can also arrange your home to make it safer.    Talk to your doctor about the medicines you take. Ask if any of them increase the risk of falls and whether they can be changed or stopped.   Try to exercise regularly. It can help improve your strength and balance. This can help lower your risk of falling.         Practice fall safety and prevention.   Wear low-heeled shoes that fit well and give your feet good support. Talk to your doctor if you have foot problems that make this hard.  Carry a cellphone or wear a medical alert device that you can use to call for help.  Use stepladders instead of chairs to reach high objects. Don't climb if you're at risk for falls. Ask for help, if needed.  Wear the correct eyeglasses, if you need them.        Make your home safer.   Remove rugs, cords, clutter, and furniture from walkways.  Keep your house well lit. Use night-lights in hallways and bathrooms.  Install and use sturdy handrails on stairways.  Wear nonskid footwear, even inside. Don't walk barefoot or in socks without shoes.        Be safe outside.   Use handrails, curb cuts, and ramps whenever possible.  Keep your hands free by using a shoulder bag or backpack.  Try to walk in well-lit areas. Watch out for uneven ground, changes in pavement, and debris.  Be careful in the winter. Walk on the grass or gravel when sidewalks are slippery. Use de-icer on steps and walkways. Add non-slip devices to shoes.    Put grab bars and nonskid mats in your shower or tub and near the toilet. Try to use a shower chair or bath bench when bathing.   Get into a tub or shower by putting in your weaker leg first. Get out with your strong side first. Have a phone or medical alert device in the bathroom with you.   Where can you learn more?  Go to https://www.[employer].net/patiented  Enter G117 in the search box to  "learn more about \"Preventing Falls: Care Instructions.\"  Current as of: July 17, 2023  Content Version: 14.2 2024 [facility] [employer].   Care instructions adapted under license by your healthcare professional. If you have questions about a medical condition or this instruction, always ask your healthcare professional. Healthwise[employer] Incorporated disclaims any warranty or liability for your use of this information.    Hearing Loss: Care Instructions  Overview     Hearing loss is a sudden or slow decrease in how well you hear. It can range from slight to profound. Permanent hearing loss can occur with aging. It also can happen when you are exposed long-term to loud noise. Examples include listening to loud music, riding motorcycles, or being around other loud machines.  Hearing loss can affect your work and home life. It can make you feel lonely or depressed. You may feel that you have lost your independence. But hearing aids and other devices can help you hear better and feel connected to others.  Follow-up care is a key part of your treatment and safety. Be sure to make and go to all appointments, and call your doctor if you are having problems. It's also a good idea to know your test results and keep a list of the medicines you take.  How can you care for yourself at home?  Avoid loud noises whenever possible. This helps keep your hearing from getting worse.  Always wear hearing protection around loud noises.  Wear a hearing aid as directed.  A professional can help you pick a hearing aid that will work best for you.  You can also get hearing aids over the counter for mild to moderate hearing loss.  Have hearing tests as your doctor suggests. They can show whether your hearing has changed. Your hearing aid may need to be adjusted.  Use other devices as needed. These may include:  Telephone amplifiers and hearing aids that can connect to a television, stereo, radio, or microphone.  Devices that use lights or " "vibrations. These alert you to the doorbell, a ringing telephone, or a baby monitor.  Television closed-captioning. This shows the words at the bottom of the screen. Most new TVs can do this.  TTY (text telephone). This lets you type messages back and forth on the telephone instead of talking or listening. These devices are also called TDD. When messages are typed on the keyboard, they are sent over the phone line to a receiving TTY. The message is shown on a monitor.  Use text messaging, social media, and email if it is hard for you to communicate by telephone.  Try to learn a listening technique called speechreading. It is not lipreading. You pay attention to people's gestures, expressions, posture, and tone of voice. These clues can help you understand what a person is saying. Face the person you are talking to, and have them face you. Make sure the lighting is good. You need to see the other person's face clearly.  Think about counseling if you need help to adjust to your hearing loss.  When should you call for help?  Watch closely for changes in your health, and be sure to contact your doctor if:    You think your hearing is getting worse.     You have new symptoms, such as dizziness or nausea.   Where can you learn more?  Go to https://www.[employer].net/patiented  Enter R798 in the search box to learn more about \"Hearing Loss: Care Instructions.\"  Current as of: September 27, 2023  Content Version: 14.2 2024 [facility] [employer].   Care instructions adapted under license by your healthcare professional. If you have questions about a medical condition or this instruction, always ask your healthcare professional. Healthwise, Incorporated disclaims any warranty or liability for your use of this information.    Learning About Stress  What is stress?     Stress is your body's response to a hard situation. Your body can have a physical, emotional, or mental response. Stress is a fact of life for most people, " and it affects everyone differently. What causes stress for you may not be stressful for someone else.  A lot of things can cause stress. You may feel stress when you go on a job interview, take a test, or run a race. This kind of short-term stress is normal and even useful. It can help you if you need to work hard or react quickly. For example, stress can help you finish an important job on time.  Long-term stress is caused by ongoing stressful situations or events. Examples of long-term stress include long-term health problems, ongoing problems at work, or conflicts in your family. Long-term stress can harm your health.  How does stress affect your health?  When you are stressed, your body responds as though you are in danger. It makes hormones that speed up your heart, make you breathe faster, and give you a burst of energy. This is called the fight-or-flight stress response. If the stress is over quickly, your body goes back to normal and no harm is done.  But if stress happens too often or lasts too long, it can have bad effects. Long-term stress can make you more likely to get sick, and it can make symptoms of some diseases worse. If you tense up when you are stressed, you may develop neck, shoulder, or low back pain. Stress is linked to high blood pressure and heart disease.  Stress also harms your emotional health. It can make you duval, tense, or depressed. Your relationships may suffer, and you may not do well at work or school.  What can you do to manage stress?  You can try these things to help manage stress:   Do something active. Exercise or activity can help reduce stress. Walking is a great way to get started. Even everyday activities such as housecleaning or yard work can help.  Try yoga or erica chi. These techniques combine exercise and meditation. You may need some training at first to learn them.  Do something you enjoy. For example, listen to music or go to a movie. Practice your hobby or do  "volunteer work.  Meditate. This can help you relax, because you are not worrying about what happened before or what may happen in the future.  Do guided imagery. Imagine yourself in any setting that helps you feel calm. You can use online videos, books, or a teacher to guide you.  Do breathing exercises. For example:  From a standing position, bend forward from the waist with your knees slightly bent. Let your arms dangle close to the floor.  Breathe in slowly and deeply as you return to a standing position. Roll up slowly and lift your head last.  Hold your breath for just a few seconds in the standing position.  Breathe out slowly and bend forward from the waist.  Let your feelings out. Talk, laugh, cry, and express anger when you need to. Talking with supportive friends or family, a counselor, or a genesis leader about your feelings is a healthy way to relieve stress. Avoid discussing your feelings with people who make you feel worse.  Write. It may help to write about things that are bothering you. This helps you find out how much stress you feel and what is causing it. When you know this, you can find better ways to cope.  What can you do to prevent stress?  You might try some of these things to help prevent stress:  Manage your time. This helps you find time to do the things you want and need to do.  Get enough sleep. Your body recovers from the stresses of the day while you are sleeping.  Get support. Your family, friends, and community can make a difference in how you experience stress.  Limit your news feed. Avoid or limit time on social media or news that may make you feel stressed.  Do something active. Exercise or activity can help reduce stress. Walking is a great way to get started.  Where can you learn more?  Go to https://www.healthwise.net/patiented  Enter N032 in the search box to learn more about \"Learning About Stress.\"  Current as of: October 24, 2023  Content Version: 14.2 2024 [employer].   Care instructions adapted under license by your healthcare professional. If you have questions about a medical condition or this instruction, always ask your healthcare professional. [employer] disclaims any warranty or liability for your use of this information.    Learning About Sleeping Well  What does sleeping well mean?     Sleeping well means getting enough sleep to feel good and stay healthy. How much sleep is enough varies among people.  The number of hours you sleep and how you feel when you wake up are both important. If you do not feel refreshed, you probably need more sleep. Another sign of not getting enough sleep is feeling tired during the day.  Experts recommend that adults get at least 7 or more hours of sleep per day. Children and older adults need more sleep.  Why is getting enough sleep important?  Getting enough quality sleep is a basic part of good health. When your sleep suffers, your physical health, mood, and your thoughts can suffer too. You may find yourself feeling more grumpy or stressed. Not getting enough sleep also can lead to serious problems, including injury, accidents, anxiety, and depression.  What might cause poor sleeping?  Many things can cause sleep problems, including:  Changes to your sleep schedule.  Stress. Stress can be caused by fear about a single event, such as giving a speech. Or you may have ongoing stress, such as worry about work or school.  Depression, anxiety, and other mental or emotional conditions.  Changes in your sleep habits or surroundings. This includes changes that happen where you sleep, such as noise, light, or sleeping in a different bed. It also includes changes in your sleep pattern, such as having jet lag or working a late shift.  Health problems, such as pain, breathing problems, and restless legs syndrome.  Lack of regular exercise.  Using alcohol, nicotine, or caffeine before bed.  How can you help  "yourself?  Here are some tips that may help you sleep more soundly and wake up feeling more refreshed.  Your sleeping area   Use your bedroom only for sleeping and sex. A bit of light reading may help you fall asleep. But if it doesn't, do your reading elsewhere in the house. Try not to use your TV, computer, smartphone, or tablet while you are in bed.  Be sure your bed is big enough to stretch out comfortably, especially if you have a sleep partner.  Keep your bedroom quiet, dark, and cool. Use curtains, blinds, or a sleep mask to block out light. To block out noise, use earplugs, soothing music, or a \"white noise\" machine.  Your evening and bedtime routine   Create a relaxing bedtime routine. You might want to take a warm shower or bath, or listen to soothing music.  Go to bed at the same time every night. And get up at the same time every morning, even if you feel tired.  What to avoid   Limit caffeine (coffee, tea, caffeinated sodas) during the day, and don't have any for at least 6 hours before bedtime.  Avoid drinking alcohol before bedtime. Alcohol can cause you to wake up more often during the night.  Try not to smoke or use tobacco, especially in the evening. Nicotine can keep you awake.  Limit naps during the day, especially close to bedtime.  Avoid lying in bed awake for too long. If you can't fall asleep or if you wake up in the middle of the night and can't get back to sleep within about 20 minutes, get out of bed and go to another room until you feel sleepy.  Avoid taking medicine right before bed that may keep you awake or make you feel hyper or energized. Your doctor can tell you if your medicine may do this and if you can take it earlier in the day.  If you can't sleep   Imagine yourself in a peaceful, pleasant scene. Focus on the details and feelings of being in a place that is relaxing.  Get up and do a quiet or boring activity until you feel sleepy.  Avoid drinking any liquids before going to bed " "to help prevent waking up often to use the bathroom.  Where can you learn more?  Go to https://www.Netzoptiker.net/patiented  Enter J942 in the search box to learn more about \"Learning About Sleeping Well.\"  Current as of: July 10, 2023  Content Version: 14.2 2024 [facility].   Care instructions adapted under license by your healthcare professional. If you have questions about a medical condition or this instruction, always ask your healthcare professional. [employer] disclaims any warranty or liability for your use of this information.    Bladder Training: Care Instructions  Your Care Instructions     Bladder training is used to treat urge incontinence and stress incontinence. Urge incontinence means that the need to urinate comes on so fast that you can't get to a toilet in time. Stress incontinence means that you leak urine because of pressure on your bladder. For example, it may happen when you laugh, cough, or lift something heavy.  Bladder training can increase how long you can wait before you have to urinate. It can also help your bladder hold more urine. And it can give you better control over the urge to urinate.  It is important to remember that bladder training takes a few weeks to a few months to make a difference. You may not see results right away, but don't give up.  Follow-up care is a key part of your treatment and safety. Be sure to make and go to all appointments, and call your doctor if you are having problems. It's also a good idea to know your test results and keep a list of the medicines you take.  How can you care for yourself at home?  Work with your doctor to come up with a bladder training program that is right for you. You may use one or more of the following methods.  Delayed urination  In the beginning, try to keep from urinating for 5 minutes after you first feel the need to go.  While you wait, take deep, slow breaths to relax. Kegel exercises can also help " "you delay the need to go to the bathroom.  After some practice, when you can easily wait 5 minutes to urinate, try to wait 10 minutes before you urinate.  Slowly increase the waiting period until you are able to control when you have to urinate.  Scheduled urination  Empty your bladder when you first wake up in the morning.  Schedule times throughout the day when you will urinate.  Start by going to the bathroom every hour, even if you don't need to go.  Slowly increase the time between trips to the bathroom.  When you have found a schedule that works well for you, keep doing it.  If you wake up during the night and have to urinate, do it. Apply your schedule to waking hours only.  Kegel exercises  These tighten and strengthen pelvic muscles, which can help you control the flow of urine. (If doing these exercises causes pain, stop doing them and talk with your doctor.) To do Kegel exercises:  Squeeze your muscles as if you were trying not to pass gas. Or squeeze your muscles as if you were stopping the flow of urine. Your belly, legs, and buttocks shouldn't move.  Hold the squeeze for 3 seconds, then relax for 5 to 10 seconds.  Start with 3 seconds, then add 1 second each week until you are able to squeeze for 10 seconds.  Repeat the exercise 10 times a session. Do 3 to 8 sessions a day.  When should you call for help?  Watch closely for changes in your health, and be sure to contact your doctor if:    Your incontinence is getting worse.     You do not get better as expected.   Where can you learn more?  Go to https://www.[employer]matic.net/patiented  Enter V684 in the search box to learn more about \"Bladder Training: Care Instructions.\"  Current as of: November 15, 2023  Content Version: 14.2 2024 [facility].   Care instructions adapted under license by your healthcare professional. If you have questions about a medical condition or this instruction, always ask your healthcare professional. [employer] Inc[employer] disclaims any warranty or liability for your use of this information.    Chronic Pain: Care Instructions  Your Care Instructions     Chronic pain is pain that lasts a long time (months or even years) and may or may not have a clear cause. It is different from acute pain, which usually does have a clear cause--like an injury or illness--and gets better over time. Chronic pain:  Lasts over time but may vary from day to day.  Does not go away despite efforts to end it.  May disrupt your sleep and lead to fatigue.  May cause depression or anxiety.  May make your muscles tense, causing more pain.  Can disrupt your work, hobbies, home life, and relationships with friends and family.  Chronic pain is a very real condition. It is not just in your head. Treatment can help and usually includes several methods used together, such as medicines, physical therapy, exercise, and other treatments. Learning how to relax and changing negative thought patterns can also help you cope.  Chronic pain is complex. Taking an active role in your treatment will help you better manage your pain. Tell your doctor if you have trouble dealing with your pain. You may have to try several things before you find what works best for you.  Follow-up care is a key part of your treatment and safety. Be sure to make and go to all appointments, and call your doctor if you are having problems. It's also a good idea to know your test results and keep a list of the medicines you take.  How can you care for yourself at home?  Pace yourself. Break up large jobs into smaller tasks. Save harder tasks for days when you have less pain, or go back and forth between hard tasks and easier ones. Take rest breaks.  Relax, and reduce stress. Relaxation techniques such as deep breathing or meditation can help.  Keep moving. Gentle, daily exercise can help reduce pain over the long run. Try low- or no-impact exercises such as walking, swimming, and stationary  biking. Do stretches to stay flexible.  Try heat, cold packs, and massage.  Get enough sleep. Chronic pain can make you tired and drain your energy. Talk with your doctor if you have trouble sleeping because of pain.  Think positive. Your thoughts can affect your pain level. Do things that you enjoy to distract yourself when you have pain instead of focusing on the pain. See a movie, read a book, listen to music, or spend time with a friend.  If you think you are depressed, talk to your doctor about treatment.  Keep a daily pain diary. Record how your moods, thoughts, sleep patterns, activities, and medicine affect your pain. You may find that your pain is worse during or after certain activities or when you are feeling a certain emotion. Having a record of your pain can help you and your doctor find the best ways to treat your pain.  Take pain medicines exactly as directed.  If the doctor gave you a prescription medicine for pain, take it as prescribed.  If you are not taking a prescription pain medicine, ask your doctor if you can take an over-the-counter medicine.  Reducing constipation caused by pain medicine  Talk to your doctor about a laxative. If a laxative doesn't work, your doctor may suggest a prescription medicine.  Include fruits, vegetables, beans, and whole grains in your diet each day. These foods are high in fiber.  If your doctor recommends it, get more exercise. Walking is a good choice. Bit by bit, increase the amount you walk every day. Try for at least 30 minutes on most days of the week.  Schedule time each day for a bowel movement. A daily routine may help. Take your time and do not strain when having a bowel movement.  When should you call for help?   Call your doctor now or seek immediate medical care if:    Your pain gets worse or is out of control.     You feel down or blue, or you do not enjoy things like you once did. You may be depressed, which is common in people with chronic pain.  "Depression can be treated.     You have vomiting or cramps for more than 2 hours.   Watch closely for changes in your health, and be sure to contact your doctor if:    You cannot sleep because of pain.     You are very worried or anxious about your pain.     You have trouble taking your pain medicine.     You have any concerns about your pain medicine.     You have trouble with bowel movements, such as:  No bowel movement in 3 days.  Blood in the anal area, in your stool, or on the toilet paper.  Diarrhea for more than 24 hours.   Where can you learn more?  Go to https://www.IvyDate.net/patiented  Enter N004 in the search box to learn more about \"Chronic Pain: Care Instructions.\"  Current as of: July 10, 2023  Content Version: 14.2 2024 [facility] [employer] [facility].   Care instructions adapted under license by your healthcare professional. If you have questions about a medical condition or this instruction, always ask your healthcare professional. [employer] disclaims any warranty or liability for your use of this information.       "

## 2025-02-11 ENCOUNTER — OFFICE VISIT (OUTPATIENT)
Dept: FAMILY MEDICINE | Facility: OTHER | Age: 89
End: 2025-02-11
Payer: COMMERCIAL

## 2025-02-11 VITALS
BODY MASS INDEX: 24.73 KG/M2 | WEIGHT: 167 LBS | SYSTOLIC BLOOD PRESSURE: 132 MMHG | HEART RATE: 68 BPM | OXYGEN SATURATION: 97 % | HEIGHT: 69 IN | DIASTOLIC BLOOD PRESSURE: 88 MMHG | RESPIRATION RATE: 16 BRPM

## 2025-02-11 DIAGNOSIS — Z01.818 PREOP GENERAL PHYSICAL EXAM: Primary | ICD-10-CM

## 2025-02-11 DIAGNOSIS — I49.3 PVC (PREMATURE VENTRICULAR CONTRACTION): ICD-10-CM

## 2025-02-11 DIAGNOSIS — I73.00 RAYNAUD'S PHENOMENON WITHOUT GANGRENE: ICD-10-CM

## 2025-02-11 DIAGNOSIS — K21.9 GASTROESOPHAGEAL REFLUX DISEASE WITHOUT ESOPHAGITIS: ICD-10-CM

## 2025-02-11 DIAGNOSIS — N18.2 CKD (CHRONIC KIDNEY DISEASE) STAGE 2, GFR 60-89 ML/MIN: ICD-10-CM

## 2025-02-11 DIAGNOSIS — Z95.5 S/P DRUG ELUTING CORONARY STENT PLACEMENT: ICD-10-CM

## 2025-02-11 DIAGNOSIS — I49.1 PREMATURE ATRIAL CONTRACTIONS: ICD-10-CM

## 2025-02-11 DIAGNOSIS — I50.22 CHRONIC SYSTOLIC HEART FAILURE (H): ICD-10-CM

## 2025-02-11 DIAGNOSIS — I10 PRIMARY HYPERTENSION: ICD-10-CM

## 2025-02-11 DIAGNOSIS — M54.50 INTERMITTENT LOW BACK PAIN: ICD-10-CM

## 2025-02-11 DIAGNOSIS — I77.811 ABDOMINAL AORTIC ECTASIA: ICD-10-CM

## 2025-02-11 DIAGNOSIS — E55.9 VITAMIN D DEFICIENCY: ICD-10-CM

## 2025-02-11 DIAGNOSIS — I83.90 ASYMPTOMATIC VARICOSE VEINS: ICD-10-CM

## 2025-02-11 DIAGNOSIS — N40.0 BENIGN PROSTATIC HYPERPLASIA, UNSPECIFIED WHETHER LOWER URINARY TRACT SYMPTOMS PRESENT: ICD-10-CM

## 2025-02-11 DIAGNOSIS — I21.4 NSTEMI (NON-ST ELEVATED MYOCARDIAL INFARCTION) (H): ICD-10-CM

## 2025-02-11 DIAGNOSIS — D69.6 THROMBOCYTOPENIA: ICD-10-CM

## 2025-02-11 DIAGNOSIS — I48.0 HYPERCOAGULABLE STATE DUE TO PAROXYSMAL ATRIAL FIBRILLATION (H): ICD-10-CM

## 2025-02-11 DIAGNOSIS — E11.42 CONTROLLED TYPE 2 DIABETES MELLITUS WITH DIABETIC POLYNEUROPATHY, WITHOUT LONG-TERM CURRENT USE OF INSULIN (H): ICD-10-CM

## 2025-02-11 DIAGNOSIS — M51.379 DEGENERATION OF INTERVERTEBRAL DISC OF LUMBOSACRAL REGION, UNSPECIFIED WHETHER PAIN PRESENT: ICD-10-CM

## 2025-02-11 DIAGNOSIS — D68.69 HYPERCOAGULABLE STATE DUE TO PAROXYSMAL ATRIAL FIBRILLATION (H): ICD-10-CM

## 2025-02-11 DIAGNOSIS — E53.8 VITAMIN B12 DEFICIENCY: ICD-10-CM

## 2025-02-11 DIAGNOSIS — I25.10 CORONARY ARTERY DISEASE INVOLVING NATIVE CORONARY ARTERY OF NATIVE HEART WITHOUT ANGINA PECTORIS: ICD-10-CM

## 2025-02-11 DIAGNOSIS — I48.0 PAROXYSMAL ATRIAL FIBRILLATION (H): ICD-10-CM

## 2025-02-11 DIAGNOSIS — E78.2 MIXED HYPERLIPIDEMIA: ICD-10-CM

## 2025-02-11 LAB
ANION GAP SERPL CALCULATED.3IONS-SCNC: 11 MMOL/L (ref 7–15)
ATRIAL RATE - MUSE: 55 BPM
BUN SERPL-MCNC: 24.2 MG/DL (ref 8–23)
CALCIUM SERPL-MCNC: 9.1 MG/DL (ref 8.8–10.4)
CHLORIDE SERPL-SCNC: 107 MMOL/L (ref 98–107)
CREAT SERPL-MCNC: 1.03 MG/DL (ref 0.67–1.17)
DIASTOLIC BLOOD PRESSURE - MUSE: NORMAL MMHG
EGFRCR SERPLBLD CKD-EPI 2021: 70 ML/MIN/1.73M2
ERYTHROCYTE [DISTWIDTH] IN BLOOD BY AUTOMATED COUNT: 14.6 % (ref 10–15)
GLUCOSE SERPL-MCNC: 149 MG/DL (ref 70–99)
HCO3 SERPL-SCNC: 24 MMOL/L (ref 22–29)
HCT VFR BLD AUTO: 49.5 % (ref 40–53)
HGB BLD-MCNC: 16.3 G/DL (ref 13.3–17.7)
INTERPRETATION ECG - MUSE: NORMAL
MCH RBC QN AUTO: 28.8 PG (ref 26.5–33)
MCHC RBC AUTO-ENTMCNC: 32.9 G/DL (ref 31.5–36.5)
MCV RBC AUTO: 88 FL (ref 78–100)
P AXIS - MUSE: 23 DEGREES
PLATELET # BLD AUTO: 138 10E3/UL (ref 150–450)
POTASSIUM SERPL-SCNC: 4.3 MMOL/L (ref 3.4–5.3)
PR INTERVAL - MUSE: 166 MS
QRS DURATION - MUSE: 128 MS
QT - MUSE: 450 MS
QTC - MUSE: 430 MS
R AXIS - MUSE: -26 DEGREES
RBC # BLD AUTO: 5.66 10E6/UL (ref 4.4–5.9)
SODIUM SERPL-SCNC: 142 MMOL/L (ref 135–145)
SYSTOLIC BLOOD PRESSURE - MUSE: NORMAL MMHG
T AXIS - MUSE: 146 DEGREES
VENTRICULAR RATE- MUSE: 55 BPM
WBC # BLD AUTO: 6.6 10E3/UL (ref 4–11)

## 2025-02-11 PROCEDURE — 93005 ELECTROCARDIOGRAM TRACING: CPT

## 2025-02-11 PROCEDURE — G0463 HOSPITAL OUTPT CLINIC VISIT: HCPCS

## 2025-02-11 PROCEDURE — 85014 HEMATOCRIT: CPT | Mod: ZL

## 2025-02-11 PROCEDURE — 80048 BASIC METABOLIC PNL TOTAL CA: CPT | Mod: ZL

## 2025-02-11 PROCEDURE — 36415 COLL VENOUS BLD VENIPUNCTURE: CPT | Mod: ZL

## 2025-02-11 PROCEDURE — 85041 AUTOMATED RBC COUNT: CPT | Mod: ZL

## 2025-02-11 PROCEDURE — 82310 ASSAY OF CALCIUM: CPT | Mod: ZL

## 2025-02-11 RX ORDER — ACYCLOVIR 400 MG/1
TABLET ORAL
Qty: 3 EACH | Refills: 4 | Status: SHIPPED | OUTPATIENT
Start: 2025-02-11

## 2025-02-11 NOTE — PROGRESS NOTES
Preoperative Evaluation  [facility] AND [facility]  [address]  [place] [address]  Phone: [phone]  Fax: [phone]  Primary Provider: Nawaf Montanez MD  Pre-op Performing Provider: LIVIA Jang CNP  Feb 11, 2025 2/11/2025   Surgical Information   What procedure is being done? Upper endoscopy   Facility or Hospital where procedure/surgery will be performed: [facility]   Who is doing the procedure / surgery? Dr. Salinas   Date of surgery / procedure: feb 14   Time of surgery / procedure: Unknown   Where do you plan to recover after surgery? at home alone     Fax number for surgical facility: [phone]    Assessment & Plan     The proposed surgical procedure is considered LOW risk.    ICD-10-CM    1. Preop general physical exam  Z01.818 EKG 12-lead complete w/read - Clinics     CBC W PLT No Diff     Basic Metabolic Panel     CBC W PLT No Diff     Basic Metabolic Panel      2. Controlled type 2 diabetes mellitus with diabetic polyneuropathy, without long-term current use of insulin (H)  E11.42 Continuous Glucose Sensor (DEXCOM G7 SENSOR) MISC      3. Intermittent low back pain  M54.50       4. Vitamin B12 deficiency  E53.8       5. Vitamin D deficiency  E55.9       6. Gastroesophageal reflux disease without esophagitis  K21.9       7. Mixed hyperlipidemia  E78.2       8. Abdominal aortic ectasia  I77.811       9. Premature atrial contractions  I49.1       10. Raynaud's phenomenon without gangrene  I73.00       11. PVC (premature ventricular contraction)  I49.3       12. Primary hypertension  I10       13. Coronary artery disease involving native coronary artery of native heart without angina pectoris  I25.10       14. Paroxysmal atrial fibrillation (H)  I48.0       15. Hypercoagulable state due to paroxysmal atrial fibrillation (H)  D68.69     I48.0       16. Asymptomatic varicose veins  I83.90       17. NSTEMI (non-ST elevated myocardial infarction) (H)  I21.4       18.  Chronic systolic heart failure (H)  I50.22       19. Degeneration of intervertebral disc of lumbosacral region, unspecified whether pain present  M51.379       20. Benign prostatic hyperplasia, unspecified whether lower urinary tract symptoms present  N40.0       21. CKD (chronic kidney disease) stage 2, GFR 60-89 ml/min  N18.2       22. Thrombocytopenia  D69.6       23. S/P drug eluting coronary stent placement - RCA x2 and LAD x1 - 3/14/2023 -   Z95.5          - No identified additional risk factors other than previously addressed    Antiplatelet or Anticoagulation Medication Instructions   - apixaban (Eliquis), edoxaban (Savaysa), rivaroxaban (Xarelto): Bleeding risk is moderate or high for this procedure AND CrCl  (>=) 50 mL/min. DO NOT TAKE 2 days before surgery.     Additional Medication Instructions   - Herbal medications and vitamins: DO NOT TAKE 14 days prior to surgery.   - ACE/ARB/ARNI (lisinopril, enalapril, losartan, valsartan, olmesartan, sacubritril/valsartan) : DO NOT TAKE on day of surgery (minimum 11 hours for general anesthesia).   - Statins (atorvastatin, simvastatin, pravastatin) : Continue taking on the day of surgery.    - sulfonylurea (e.g. glyburide, glipizide): DO NOT TAKE day of surgery   - SGLT2 Inhibitor (canagliflozin, dapagliflozin, or empagliflozin): DO NOT TAKE 3 days before surgery.     Recommendation  Approval given to proceed with proposed procedure, without further diagnostic evaluation.    Michael Harris is a 89 year old, presenting for the following:  Pre-Op Exam        2/11/2025     9:01 AM   Additional Questions   Roomed by TAYLOR Brito related to upcoming procedure: Steven presents to the clinic today for preoperative evaluation for upper endoscopy with Dr. Salinas at [facility] on 2/4/25. He had an upper endoscopy in June of 2024 and October of 2024. 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        2/11/2025   Pre-Op Questionnaire   Have you ever had a heart attack or stroke? No   Have you ever had surgery on your heart or blood vessels, such as a stent placement, a coronary artery bypass, or surgery on an artery in your head, neck, heart, or legs? (!) YES - cardiac stents   Do you have chest pain with activity? No   Do you have a history of heart failure? No   Do you currently have a cold, bronchitis or symptoms of other infection? No   Do you have a cough, shortness of breath, or wheezing? No   Do you or anyone in your family have previous history of blood clots? (!) UNKNOWN    Do you or does anyone in your family have a serious bleeding problem such as prolonged bleeding following surgeries or cuts? (!) UNKNOWN    Have you ever had problems with anemia or been told to take iron pills? No   Have you had any abnormal blood loss such as black, tarry or bloody stools? (!) UNKNOWN    Have you ever had a blood transfusion? No   Are you willing to have a blood transfusion if it is medically needed before, during, or after your surgery? Yes   Have you or any of your relatives ever had problems with anesthesia? No   Do you have sleep apnea, excessive snoring or daytime drowsiness? No   Do you have any artifical heart valves or other implanted medical devices like a pacemaker, defibrillator, or continuous glucose monitor? No   Do you have artificial joints? No   Are you allergic to latex? No     Health Care Directive  Patient has a Health Care Directive on file      Preoperative Review of    reviewed - controlled substances reflected in medication list.    Status of Chronic Conditions:  A-FIB - Patient has a longstanding history of chronic A-fib currently on rate control. Current treatment regimen includes Apixaban for stroke prevention and denies significant symptoms of lightheadedness, palpitations or dyspnea.     DIABETES - Patient has a longstanding history of DiabetesType Type  II . Patient is being treated with diet and oral agents and denies significant side effects. Control has been good. Complicating factors include but are not limited to: hypertension and hyperlipidemia.     HYPERLIPIDEMIA - Patient has a long history of significant Hyperlipidemia requiring medication for treatment with recent good control. Patient reports no problems or side effects with the medication.     HYPERTENSION - Patient has longstanding history of HTN , currently denies any symptoms referable to elevated blood pressure. Specifically denies chest pain, palpitations, dyspnea, orthopnea, PND or peripheral edema. Blood pressure readings have been in normal range. Current medication regimen is as listed below. Patient denies any side effects of medication.     Patient Active Problem List    Diagnosis Date Noted    Chronic systolic heart failure (H) 11/20/2024     Priority: Medium    Hypercoagulable state due to paroxysmal atrial fibrillation (H) 05/29/2024     Priority: Medium    Gastroesophageal reflux disease 02/28/2024     Priority: Medium    Food sticks on swallowing - if trying to swallow too much food - better with smaller bites 02/28/2024     Priority: Medium    Bilateral hearing loss due to cerumen impaction 03/28/2023     Priority: Medium    Paroxysmal atrial fibrillation (H) 03/28/2023     Priority: Medium    Chronic anticoagulation - Eliquis 03/28/2023     Priority: Medium    Controlled type 2 diabetes mellitus with diabetic polyneuropathy, without long-term current use of insulin (H) 03/18/2023     Priority: Medium    Coronary artery disease involving native coronary artery of native heart without angina pectoris 03/17/2023     Priority: Medium    S/P drug eluting coronary stent placement - RCA x2 and LAD x1 - 3/14/2023 - [facility] 03/17/2023     Priority: Medium    NSTEMI (non-ST elevated myocardial infarction) (H) 03/11/2023     Priority: Medium    Thrombocytopenia 02/23/2023     Priority:  Medium    Painful diabetic neuropathy (H) 02/22/2023     Priority: Medium    Overactive bladder 01/12/2023     Priority: Medium    Asymptomatic varicose veins 10/31/2022     Priority: Medium     No comments entered for problem.      Subjective tinnitus 10/31/2022     Priority: Medium     No comments entered for problem.      Rotator cuff arthropathy of right shoulder 07/13/2022     Priority: Medium    CKD (chronic kidney disease) stage 2, GFR 60-89 ml/min 11/10/2021     Priority: Medium    Pain medication agreement - 10/26/2022 07/07/2021     Priority: Medium    Primary hypertension 11/03/2020     Priority: Medium    History of tobacco use 07/09/2020     Priority: Medium    History of bilateral cataract extraction 07/17/2018     Priority: Medium    Vitamin B12 deficiency 04/13/2018     Priority: Medium    Vitamin D deficiency 04/13/2018     Priority: Medium    Osteoarthrosis involving lower leg 01/24/2018     Priority: Medium     Overview:   IMO Update  Updated per 10/1/17 IMO import  Replacing diagnoses that were inactivated after the 10/1/2021 regulatory import.      Degeneration of lumbar or lumbosacral intervertebral disc 01/24/2018     Priority: Medium     Overview:   recurring      Osteopenia 09/27/2017     Priority: Medium    Acquired deformity of right foot 09/08/2017     Priority: Medium    Pre-ulcerative corn or callous 09/08/2017     Priority: Medium    Benign prostatic hyperplasia 07/19/2016     Priority: Medium     Overview:   Updated per 10/1/17 IMO import      Status post total replacement of left shoulder 07/19/2016     Priority: Medium    PVC (premature ventricular contraction) 07/19/2016     Priority: Medium    Raynaud's disease without gangrene 07/19/2016     Priority: Medium    Bunion, right 03/31/2016     Priority: Medium    Sleep difficulties 03/31/2016     Priority: Medium    Nocturia 05/01/2015     Priority: Medium    Intermittent low back pain 01/08/2015     Priority: Medium    Abdominal  aortic ectasia 06/18/2014     Priority: Medium    Erectile dysfunction 05/27/2014     Priority: Medium    Premature atrial contractions 01/30/2014     Priority: Medium    Raynaud's phenomenon without gangrene 01/30/2014     Priority: Medium    Mixed hyperlipidemia 06/03/2013     Priority: Medium      Past Medical History:   Diagnosis Date    Controlled type 2 diabetes mellitus with diabetic polyneuropathy, without long-term current use of insulin (H) 12/8/2017    Eczema 12/14/2016    Renal calculus 1/24/2018    Overview:  disease     Past Surgical History:   Procedure Laterality Date    APPENDECTOMY OPEN      Appendectomy at age 6 due to appendicitis    ARTHROSCOPY KNEE      left knee    COLONOSCOPY  01/01/2000 2000    COLONOSCOPY  01/01/2011 2011,adenomatous polyps, one with high grade dysplasia , next due 2014    EGD  11/18/2024    With ablation- [facility]    ESOPHAGOSCOPY, GASTROSCOPY, DUODENOSCOPY (EGD), COMBINED N/A 06/25/2024    Reactive gastropathy with the stomach and reactive chagnes in the esophagus. Gastric ulcer present. Follow up EGD 8 weeks.    ESOPHAGOSCOPY, GASTROSCOPY, DUODENOSCOPY (EGD), COMBINED N/A 10/04/2024    Gastritis   Distal esophagus shows low-grade dysplasia.  Take a PPI. Follow up per recommendations    OTHER SURGICAL HISTORY      ,HERNIA REPAIR,right inguinal    OTHER SURGICAL HISTORY      2003,[id number],ARTHROPLASTY,left total knee    OTHER SURGICAL HISTORY      1984,[id number],OTHER,left first/rib resection    OTHER SURGICAL HISTORY      1983,[id number],OTHER    OTHER SURGICAL HISTORY      02/21/14,[id number],CYSTOSCOPY W/ URETEROSCOPY W/ LITHOTRIPSY,Dr. Gagnon - [facility]    OTHER SURGICAL HISTORY      ,HERNIA REPAIR,x 4     Current Outpatient Medications   Medication Sig Dispense Refill    apixaban ANTICOAGULANT (ELIQUIS ANTICOAGULANT) 5 MG tablet Take 1 tablet (5 mg) by mouth 2 times daily. 180 tablet 4    aspirin 81 MG EC tablet Take 1 tablet (81 mg) by mouth daily 100 tablet 4     Continuous Blood Gluc  (DEXCOM G7 ) MARTI 1 each daily - use as directed with sensors 1 each 0    Continuous Glucose Sensor (DEXCOM G7 SENSOR) MISC Change every 10 days. 3 each 4    diltiazem ER (DILT-XR) 120 MG 24 hr capsule Take 1 capsule (120 mg) by mouth every evening. 90 capsule 4    empagliflozin (JARDIANCE) 10 MG TABS tablet Take 1 tablet (10 mg) by mouth daily. -For diabetes/kidneys/heart 90 tablet 4    ezetimibe (ZETIA) 10 MG tablet Take 1 tablet (10 mg) by mouth daily. 90 tablet 4    fluticasone (FLONASE) 50 MCG/ACT nasal spray SPRAY 2 SPRAYS INTO BOTH NOSTRILS AS NEEDED FOR RHINITIS. 48 g 4    gabapentin (NEURONTIN) 100 MG capsule Take 1 capsule (100 mg) by mouth every morning. 90 capsule 1    gabapentin (NEURONTIN) 300 MG capsule Take 1 capsule (300 mg) by mouth at bedtime. 90 capsule 1    CAPRICE-LANTA 200-200-20 MG/5ML SUSP suspension Mix 15mL of viscous lidocaine with 15mL of Maalox. Take mixture every 4 hours scheduled for 4 doses then every 4 hours as needed for pain.      glipiZIDE (GLUCOTROL) 5 MG tablet Take 2 tablets (10 mg) by mouth 2 times daily (with meals). 360 tablet 1    lisinopril (ZESTRIL) 5 MG tablet Take 1 tablet (5 mg) by mouth daily. 90 tablet 4    LUMIGAN 0.01 % SOLN ophthalmic solution Place 1 drop into both eyes At Bedtime      nitroGLYcerin (NITROSTAT) 0.4 MG sublingual tablet Place 0.4 mg under the tongue      omeprazole (PRILOSEC) 40 MG DR capsule Take 1 capsule (40 mg) by mouth daily. 90 capsule 11    oxyCODONE (ROXICODONE) 5 MG tablet Take 1 tablet (5 mg) by mouth daily as needed for severe pain. 30 tablet 0    rosuvastatin (CRESTOR) 10 MG tablet Take 1 tablet (10 mg) by mouth daily. 90 tablet 4    sodium chloride, PF, (NORMAL SALINE FLUSH) 0.9% PF flush Inject 10 mLs into the vein.      tamsulosin (FLOMAX) 0.4 MG capsule Take 2 capsules (0.8 mg) by mouth every evening. 180 capsule 4     Allergies   Allergen Reactions    Metformin Muscle Pain (Myalgia)      "Bilateral leg pain    Statins Muscle Pain (Myalgia)    Cephalexin Other (See Comments)     Other reaction(s):Pt Doesn't Remember  Pt does not remember     Clindamycin Other (See Comments)     Other reaction(s): Pt Doesn't Remember  Pt does not remember    Hydromorphone Other (See Comments)     Other reaction(s): Bradycardia    Amoxicillin Rash      Social History     Tobacco Use    Smoking status: Former     Current packs/day: 0.00     Average packs/day: 1 pack/day for 20.0 years (20.0 ttl pk-yrs)     Types: Cigarettes     Start date: 1955     Quit date: 1975     Years since quittin.1     Passive exposure: Past    Smokeless tobacco: Never   Substance Use Topics    Alcohol use: Yes     Alcohol/week: 1.0 standard drink of alcohol     Types: 1 Standard drinks or equivalent per week     Comment: maybe once a month     History   Drug Use No     Review of Systems  CONSTITUTIONAL: NEGATIVE for fever, chills, change in weight  INTEGUMENTARY/SKIN: NEGATIVE for worrisome rashes, moles or lesions  EYES: NEGATIVE for vision changes or irritation  ENT/MOUTH: NEGATIVE for ear, mouth and throat problems  RESP: NEGATIVE for significant cough or SOB  CV: NEGATIVE for chest pain, palpitations or peripheral edema  GI: NEGATIVE for nausea, abdominal pain, heartburn, or change in bowel habits  : NEGATIVE for frequency, dysuria, or hematuria  MUSCULOSKELETAL: NEGATIVE for significant arthralgias or myalgia  NEURO: NEGATIVE for weakness, dizziness or paresthesias  ENDOCRINE: NEGATIVE for temperature intolerance, skin/hair changes  HEME: NEGATIVE for bleeding problems  PSYCHIATRIC: NEGATIVE for changes in mood or affect    Objective    /88   Pulse 68   Resp 16   Ht 1.753 m (5' 9\")   Wt 75.8 kg (167 lb)   SpO2 97%   BMI 24.66 kg/m     Estimated body mass index is 24.66 kg/m  as calculated from the following:    Height as of this encounter: 1.753 m (5' 9\").    Weight as of this encounter: 75.8 kg (167 " lb).  Physical Exam  Constitutional:       General: He is not in acute distress.     Appearance: Normal appearance. He is normal weight. He is not ill-appearing.   HENT:      Head: Normocephalic.      Right Ear: Tympanic membrane, ear canal and external ear normal.      Left Ear: Tympanic membrane, ear canal and external ear normal.      Nose: Nose normal. No congestion.   Eyes:      Conjunctiva/sclera: Conjunctivae normal.   Cardiovascular:      Rate and Rhythm: Normal rate. Rhythm irregular.      Pulses: Normal pulses.      Heart sounds: Normal heart sounds.   Pulmonary:      Effort: Pulmonary effort is normal.      Breath sounds: Normal breath sounds.   Musculoskeletal:      Right lower leg: No edema.      Left lower leg: No edema.   Skin:     General: Skin is warm and dry.      Findings: No lesion or rash.   Neurological:      Mental Status: He is alert and oriented to person, place, and time.   Psychiatric:         Behavior: Behavior normal.       Diagnostics  Recent Results (from the past 24 hours)   CBC W PLT No Diff    Collection Time: 02/11/25  9:46 AM   Result Value Ref Range    WBC Count 6.6 4.0 - 11.0 10e3/uL    RBC Count 5.66 4.40 - 5.90 10e6/uL    Hemoglobin 16.3 13.3 - 17.7 g/dL    Hematocrit 49.5 40.0 - 53.0 %    MCV 88 78 - 100 fL    MCH 28.8 26.5 - 33.0 pg    MCHC 32.9 31.5 - 36.5 g/dL    RDW 14.6 10.0 - 15.0 %    Platelet Count 138 (L) 150 - 450 10e3/uL   Basic Metabolic Panel    Collection Time: 02/11/25  9:46 AM   Result Value Ref Range    Sodium 142 135 - 145 mmol/L    Potassium 4.3 3.4 - 5.3 mmol/L    Chloride 107 98 - 107 mmol/L    Carbon Dioxide (CO2) 24 22 - 29 mmol/L    Anion Gap 11 7 - 15 mmol/L    Urea Nitrogen 24.2 (H) 8.0 - 23.0 mg/dL    Creatinine 1.03 0.67 - 1.17 mg/dL    GFR Estimate 70 >60 mL/min/1.73m2    Calcium 9.1 8.8 - 10.4 mg/dL    Glucose 149 (H) 70 - 99 mg/dL   EKG 12-lead complete w/read - Clinics    Collection Time: 02/11/25  9:46 AM   Result Value Ref Range    Systolic  Blood Pressure  mmHg    Diastolic Blood Pressure  mmHg    Ventricular Rate 55 BPM    Atrial Rate 55 BPM    OR Interval 166 ms    QRS Duration 128 ms     ms    QTc 430 ms    P Axis 23 degrees    R AXIS -26 degrees    T Axis 146 degrees    Interpretation ECG       Sinus bradycardia  Non-specific intra-ventricular conduction block  Inferior infarct , age undetermined  T wave abnormality, consider anterolateral ischemia  Abnormal ECG  When compared with ECG of 27-Jun-2022 14:30,  OR interval has decreased  T wave inversion now evident in Anterolateral leads        EKG: sinus bradycardia, nonspecific intraventricular block, inferior infarct, age undetermined, T-wave abnormality    Revised Cardiac Risk Index (RCRI)  The patient has the following serious cardiovascular risks for perioperative complications:   - No serious cardiac risks = 0 points     RCRI Interpretation: 0 points: Class I (very low risk - 0.4% complication rate)  Able to achieve greater than 4 METS without cardiovascular compromise.       Signed Electronically by: LIVIA Jang CNP  A copy of this evaluation report is provided to the requesting physician.

## 2025-02-11 NOTE — PATIENT INSTRUCTIONS
How to Take Your Medication Before Surgery  Preoperative Medication Instructions   Antiplatelet or Anticoagulation Medication Instructions   - apixaban (Eliquis), edoxaban (Savaysa), rivaroxaban (Xarelto): Bleeding risk is moderate or high for this procedure AND CrCl  (>=) 50 mL/min. DO NOT TAKE 2 days before surgery.     Additional Medication Instructions   - Herbal medications and vitamins: DO NOT TAKE 14 days prior to surgery.   - ACE/ARB/ARNI (lisinopril, enalapril, losartan, valsartan, olmesartan, sacubritril/valsartan) : DO NOT TAKE on day of surgery (minimum 11 hours for general anesthesia).   - Statins (atorvastatin, simvastatin, pravastatin) : Continue taking on the day of surgery.    - sulfonylurea (e.g. glyburide, glipizide): DO NOT TAKE day of surgery   - SGLT2 Inhibitor (canagliflozin, dapagliflozin, or empagliflozin): DO NOT TAKE 3 days before surgery.        Patient Education   Preparing for Your Surgery  For Adults  Getting started  In most cases, a nurse will call to review your health history and instructions. They will give you an arrival time based on your scheduled surgery time. Please be ready to share:  Your doctor's clinic name and phone number  Your medical, surgical, and anesthesia history  A list of allergies and sensitivities  A list of medicines, including herbal treatments and over-the-counter drugs  Whether the patient has a legal guardian (ask how to send us the papers in advance)  Note: You may not receive a call if you were seen at our PAC (Preoperative Assessment Center).  Please tell us if you're pregnant--or if there's any chance you might be pregnant. Some surgeries may injure a fetus (unborn baby), so they require a pregnancy test. Surgeries that are safe for a fetus don't always need a test, and you can choose whether to have one.   Preparing for surgery  Within 10 to 30 days of surgery: Have a pre-op exam (sometimes called an H&P, or History and Physical). This can be done at  a clinic or pre-operative center.  If you're having a , you may not need this exam. Talk to your care team.  At your pre-op exam, talk to your care team about all medicines you take. (This includes CBD oil and any drugs, such as THC, marijuana, and other forms of cannabis.) If you need to stop any medicine before surgery, ask when to start taking it again.  This is for your safety. Many medicines and drugs can make you bleed too much during surgery. Some change how well surgery (anesthesia) drugs work.  Call your insurance company to let them know you're having surgery. (If you don't have insurance, call [phone].)  Call your clinic if there's any change in your health. This includes a scrape or scratch near the surgery site, or any signs of a cold (sore throat, runny nose, cough, rash, fever).  Eating and drinking guidelines  For your safety: Unless your surgeon tells you otherwise, follow the guidelines below.  Eat and drink as normal until 8 hours before you arrive for surgery. After that, no food or milk. You can spit out gum when you arrive.  Drink clear liquids until 2 hours before you arrive. These are liquids you can see through, like water, Gatorade, and Propel Water. They also include plain black coffee and tea (no cream or milk).  No alcohol for 24 hours before you arrive. The night before surgery, stop any drinks that contain THC.  If your care team tells you to take medicine on the morning of surgery, it's okay to take it with a sip of water. No other medicines or drugs are allowed (including CBD oil)--follow your care team's instructions.  If you have questions the day of surgery, call your hospital or surgery center.   Preventing infection  Shower or bathe the night before and the morning of surgery. Follow the instructions your clinic gave you. (If no instructions, use regular soap.)  Don't shave or clip hair near your surgery site. We'll remove the hair if needed.  Don't smoke or vape  the morning of surgery. No chewing tobacco for 6 hours before you arrive. A nicotine patch is okay. You may spit out nicotine gum when you arrive.  For some surgeries, the surgeon will tell you to fully quit smoking and nicotine.  We will make every effort to keep you safe from infection. We will:  Clean our hands often with soap and water (or an alcohol-based hand rub).  Clean the skin at your surgery site with a special soap that kills germs.  Give you a special gown to keep you warm. (Cold raises the risk of infection.)  Wear hair covers, masks, gowns, and gloves during surgery.  Give antibiotic medicine, if prescribed. Not all surgeries need this medicine.  What to bring on the day of surgery  Photo ID and insurance card  Copy of your health care directive, if you have one  Glasses and hearing aids (bring cases)  You can't wear contacts during surgery  Inhaler and eye drops, if you use them (tell us about these when you arrive)  CPAP machine or breathing device, if you use them  A few personal items, if spending the night  If you have . . .  A pacemaker, ICD (cardiac defibrillator), or other implant: Bring the ID card.  An implanted stimulator: Bring the remote control.  A legal guardian: Bring a copy of the certified (court-stamped) guardianship papers.  Please remove any jewelry, including body piercings. Leave jewelry and other valuables at home.  If you're going home the day of surgery  You must have a responsible adult drive you home. They should stay with you overnight as well.  If you don't have someone to stay with you, and you aren't safe to go home alone, we may keep you overnight. Insurance often won't pay for this.  After surgery  If it's hard to control your pain or you need more pain medicine, please call your surgeon's office.  Questions?   If you have any questions for your care team, list them here:    ____________________________________________________________________________________________________________________________________________________________________________________________________________________________________________________________  For informational purposes only. Not to replace the advice of your health care provider. Copyright   2003, 2019 [facility]. All rights reserved. Clinically reviewed by Mychal Irizarry MD. [employer] [id number] - REV 08/24.

## 2025-02-11 NOTE — NURSING NOTE
"Chief Complaint   Patient presents with    Pre-Op Exam       Initial /88   Pulse 68   Resp 16   Ht 1.753 m (5' 9\")   Wt 75.8 kg (167 lb)   SpO2 97%   BMI 24.66 kg/m   Estimated body mass index is 24.66 kg/m  as calculated from the following:    Height as of this encounter: 1.753 m (5' 9\").    Weight as of this encounter: 75.8 kg (167 lb).  Medication Review: complete    The next two questions are to help us understand your food security.  If you are feeling you need any assistance in this area, we have resources available to support you today.          12/11/2024   SDOH- Food Insecurity   Within the past 12 months, did you worry that your food would run out before you got money to buy more? N   Within the past 12 months, did the food you bought just not last and you didn t have money to get more? N         Health Care Directive:  Patient has a Health Care Directive on file      Cami Jimenez LPN      "

## 2025-02-14 DIAGNOSIS — E11.42 CONTROLLED TYPE 2 DIABETES MELLITUS WITH DIABETIC POLYNEUROPATHY, WITHOUT LONG-TERM CURRENT USE OF INSULIN (H): Primary | ICD-10-CM

## 2025-02-14 NOTE — TELEPHONE ENCOUNTER
Note from pharmacy: Dexcom G7 is out of stock and on backorder until the end of March. Can you send over Rx for G6 sensors, transmitter, and  so patient can have something until the G7 comes back into stock? Thanks!    Christine Bazan RN on 2/14/2025 at 4:19 PM

## 2025-02-19 RX ORDER — PROCHLORPERAZINE 25 MG/1
SUPPOSITORY RECTAL
Qty: 1 EACH | Refills: 0 | Status: SHIPPED | OUTPATIENT
Start: 2025-02-19

## 2025-02-19 RX ORDER — PROCHLORPERAZINE 25 MG/1
SUPPOSITORY RECTAL
Qty: 1 EACH | Refills: 1 | Status: SHIPPED | OUTPATIENT
Start: 2025-02-19

## 2025-02-19 RX ORDER — PROCHLORPERAZINE 25 MG/1
SUPPOSITORY RECTAL
Qty: 3 EACH | Refills: 5 | Status: SHIPPED | OUTPATIENT
Start: 2025-02-19

## 2025-04-02 ENCOUNTER — OFFICE VISIT (OUTPATIENT)
Dept: INTERNAL MEDICINE | Facility: OTHER | Age: 89
End: 2025-04-02
Attending: INTERNAL MEDICINE
Payer: COMMERCIAL

## 2025-04-02 VITALS
HEIGHT: 69 IN | RESPIRATION RATE: 16 BRPM | BODY MASS INDEX: 25.24 KG/M2 | SYSTOLIC BLOOD PRESSURE: 132 MMHG | DIASTOLIC BLOOD PRESSURE: 66 MMHG | HEART RATE: 61 BPM | OXYGEN SATURATION: 96 % | WEIGHT: 170.4 LBS

## 2025-04-02 DIAGNOSIS — Z79.01 CHRONIC ANTICOAGULATION: ICD-10-CM

## 2025-04-02 DIAGNOSIS — D68.69 HYPERCOAGULABLE STATE DUE TO PAROXYSMAL ATRIAL FIBRILLATION (H): ICD-10-CM

## 2025-04-02 DIAGNOSIS — I50.22 CHRONIC SYSTOLIC HEART FAILURE (H): ICD-10-CM

## 2025-04-02 DIAGNOSIS — N18.2 CKD (CHRONIC KIDNEY DISEASE) STAGE 2, GFR 60-89 ML/MIN: ICD-10-CM

## 2025-04-02 DIAGNOSIS — I48.0 HYPERCOAGULABLE STATE DUE TO PAROXYSMAL ATRIAL FIBRILLATION (H): ICD-10-CM

## 2025-04-02 DIAGNOSIS — I25.10 CORONARY ARTERY DISEASE INVOLVING NATIVE CORONARY ARTERY OF NATIVE HEART WITHOUT ANGINA PECTORIS: ICD-10-CM

## 2025-04-02 DIAGNOSIS — D69.6 THROMBOCYTOPENIA: ICD-10-CM

## 2025-04-02 DIAGNOSIS — E16.2 HYPOGLYCEMIA: ICD-10-CM

## 2025-04-02 DIAGNOSIS — E78.2 MIXED HYPERLIPIDEMIA: ICD-10-CM

## 2025-04-02 DIAGNOSIS — E11.42 CONTROLLED TYPE 2 DIABETES MELLITUS WITH DIABETIC POLYNEUROPATHY, WITHOUT LONG-TERM CURRENT USE OF INSULIN (H): Primary | ICD-10-CM

## 2025-04-02 DIAGNOSIS — I10 BENIGN ESSENTIAL HYPERTENSION: ICD-10-CM

## 2025-04-02 LAB
ALBUMIN SERPL BCG-MCNC: 4.1 G/DL (ref 3.5–5.2)
ALBUMIN UR-MCNC: NEGATIVE MG/DL
ALP SERPL-CCNC: 92 U/L (ref 40–150)
ALT SERPL W P-5'-P-CCNC: 11 U/L (ref 0–70)
ANION GAP SERPL CALCULATED.3IONS-SCNC: 9 MMOL/L (ref 7–15)
APPEARANCE UR: CLEAR
AST SERPL W P-5'-P-CCNC: 18 U/L (ref 0–45)
BILIRUB SERPL-MCNC: 0.7 MG/DL
BILIRUB UR QL STRIP: NEGATIVE
BUN SERPL-MCNC: 26.6 MG/DL (ref 8–23)
CALCIUM SERPL-MCNC: 9.5 MG/DL (ref 8.8–10.4)
CHLORIDE SERPL-SCNC: 108 MMOL/L (ref 98–107)
CHOLEST SERPL-MCNC: 125 MG/DL
COLOR UR AUTO: ABNORMAL
CREAT SERPL-MCNC: 1.12 MG/DL (ref 0.67–1.17)
CREAT UR-MCNC: 94.9 MG/DL
EGFRCR SERPLBLD CKD-EPI 2021: 63 ML/MIN/1.73M2
ERYTHROCYTE [DISTWIDTH] IN BLOOD BY AUTOMATED COUNT: 14.7 % (ref 10–15)
EST. AVERAGE GLUCOSE BLD GHB EST-MCNC: 160 MG/DL
FASTING STATUS PATIENT QL REPORTED: NO
FASTING STATUS PATIENT QL REPORTED: NO
GLUCOSE SERPL-MCNC: 144 MG/DL (ref 70–99)
GLUCOSE UR STRIP-MCNC: >1000 MG/DL
HBA1C MFR BLD: 7.2 %
HCO3 SERPL-SCNC: 25 MMOL/L (ref 22–29)
HCT VFR BLD AUTO: 46.9 % (ref 40–53)
HDLC SERPL-MCNC: 49 MG/DL
HGB BLD-MCNC: 15.6 G/DL (ref 13.3–17.7)
HGB UR QL STRIP: NEGATIVE
KETONES UR STRIP-MCNC: NEGATIVE MG/DL
LDLC SERPL CALC-MCNC: 62 MG/DL
LEUKOCYTE ESTERASE UR QL STRIP: NEGATIVE
MCH RBC QN AUTO: 28.8 PG (ref 26.5–33)
MCHC RBC AUTO-ENTMCNC: 33.3 G/DL (ref 31.5–36.5)
MCV RBC AUTO: 87 FL (ref 78–100)
MICROALBUMIN UR-MCNC: 49 MG/L
MICROALBUMIN/CREAT UR: 51.63 MG/G CR (ref 0–17)
NITRATE UR QL: NEGATIVE
NONHDLC SERPL-MCNC: 76 MG/DL
PH UR STRIP: 5 [PH] (ref 5–9)
PLATELET # BLD AUTO: 128 10E3/UL (ref 150–450)
POTASSIUM SERPL-SCNC: 4.2 MMOL/L (ref 3.4–5.3)
PROT SERPL-MCNC: 6.8 G/DL (ref 6.4–8.3)
RBC # BLD AUTO: 5.42 10E6/UL (ref 4.4–5.9)
SODIUM SERPL-SCNC: 142 MMOL/L (ref 135–145)
SP GR UR STRIP: 1.03 (ref 1–1.03)
TRIGL SERPL-MCNC: 71 MG/DL
TSH SERPL DL<=0.005 MIU/L-ACNC: 1.81 UIU/ML (ref 0.3–4.2)
UROBILINOGEN UR STRIP-MCNC: NORMAL MG/DL
WBC # BLD AUTO: 7.2 10E3/UL (ref 4–11)

## 2025-04-02 PROCEDURE — G0463 HOSPITAL OUTPT CLINIC VISIT: HCPCS

## 2025-04-02 PROCEDURE — 80061 LIPID PANEL: CPT | Mod: ZL | Performed by: INTERNAL MEDICINE

## 2025-04-02 PROCEDURE — 80053 COMPREHEN METABOLIC PANEL: CPT | Mod: ZL | Performed by: INTERNAL MEDICINE

## 2025-04-02 PROCEDURE — 36415 COLL VENOUS BLD VENIPUNCTURE: CPT | Mod: ZL | Performed by: INTERNAL MEDICINE

## 2025-04-02 PROCEDURE — 83036 HEMOGLOBIN GLYCOSYLATED A1C: CPT | Mod: ZL | Performed by: INTERNAL MEDICINE

## 2025-04-02 PROCEDURE — 82043 UR ALBUMIN QUANTITATIVE: CPT | Mod: ZL | Performed by: INTERNAL MEDICINE

## 2025-04-02 PROCEDURE — 84443 ASSAY THYROID STIM HORMONE: CPT | Mod: ZL | Performed by: INTERNAL MEDICINE

## 2025-04-02 PROCEDURE — 81003 URINALYSIS AUTO W/O SCOPE: CPT | Mod: ZL | Performed by: INTERNAL MEDICINE

## 2025-04-02 PROCEDURE — 85018 HEMOGLOBIN: CPT | Mod: ZL | Performed by: INTERNAL MEDICINE

## 2025-04-02 RX ORDER — GABAPENTIN 300 MG/1
300 CAPSULE ORAL AT BEDTIME
Qty: 90 CAPSULE | Refills: 1 | Status: SHIPPED | OUTPATIENT
Start: 2025-04-02

## 2025-04-02 RX ORDER — GABAPENTIN 100 MG/1
100 CAPSULE ORAL EVERY MORNING
Qty: 90 CAPSULE | Refills: 1 | Status: SHIPPED | OUTPATIENT
Start: 2025-04-02 | End: 2025-04-02

## 2025-04-02 RX ORDER — GABAPENTIN 100 MG/1
100-300 CAPSULE ORAL EVERY MORNING
Qty: 270 CAPSULE | Refills: 1 | Status: SHIPPED | OUTPATIENT
Start: 2025-04-02

## 2025-04-02 RX ORDER — GLIPIZIDE 5 MG/1
10 TABLET ORAL 2 TIMES DAILY WITH MEALS
Qty: 360 TABLET | Refills: 1 | Status: SHIPPED | OUTPATIENT
Start: 2025-04-02

## 2025-04-02 RX ORDER — ACYCLOVIR 400 MG/1
TABLET ORAL
Qty: 3 EACH | Refills: 4 | Status: SHIPPED | OUTPATIENT
Start: 2025-04-02

## 2025-04-02 ASSESSMENT — ENCOUNTER SYMPTOMS
CONFUSION: 0
FATIGUE: 1
CONSTIPATION: 0
BRUISES/BLEEDS EASILY: 0
ARTHRALGIAS: 0
SLEEP DISTURBANCE: 1
EYE PAIN: 0
FREQUENCY: 1
MYALGIAS: 0
SHORTNESS OF BREATH: 1
AGITATION: 0
HEMATURIA: 0
HEARTBURN: 0
DIARRHEA: 0
HEMATOCHEZIA: 0
CHILLS: 0
PARESTHESIAS: 0
HEADACHES: 0
LIGHT-HEADEDNESS: 0
NAUSEA: 0
VOMITING: 0
PALPITATIONS: 0
JOINT SWELLING: 0
COUGH: 0
WHEEZING: 0
WEAKNESS: 0
SORE THROAT: 0
DYSURIA: 0
ABDOMINAL PAIN: 0
FEVER: 0
DIZZINESS: 0
NERVOUS/ANXIOUS: 0
BACK PAIN: 1

## 2025-04-02 ASSESSMENT — PAIN SCALES - GENERAL: PAINLEVEL_OUTOF10: NO PAIN (0)

## 2025-04-02 NOTE — PATIENT INSTRUCTIONS
Blood pressure is well controlled.     Diabetes has been well controlled.     Medications refilled.   Labs are pending.       To help with blood sugar control....      -- Try to avoid Carbohydrates as much as possible -- breads, pasta, baked goods, cakes, oatmeal, cold cereal, potatoes.   -- Eat more lean meats, proteins, eggs, nuts, vegetables.    -- Start or Continue regular daily exercise.     Get out and exercise, bike ride, walk for 10 to 15 minutes after each meal -- this can significantly lowers the spike in blood sugar after eating.

## 2025-04-02 NOTE — NURSING NOTE
"Chief Complaint   Patient presents with    Diabetes       Initial /66   Pulse 61   Resp 16   Ht 1.753 m (5' 9\")   Wt 77.3 kg (170 lb 6.4 oz)   SpO2 96%   BMI 25.16 kg/m   Estimated body mass index is 25.16 kg/m  as calculated from the following:    Height as of this encounter: 1.753 m (5' 9\").    Weight as of this encounter: 77.3 kg (170 lb 6.4 oz).  Medication Review: complete    The next two questions are to help us understand your food security.  If you are feeling you need any assistance in this area, we have resources available to support you today.          12/11/2024   SDOH- Food Insecurity   Within the past 12 months, did you worry that your food would run out before you got money to buy more? N   Within the past 12 months, did the food you bought just not last and you didn t have money to get more? N         Health Care Directive:  Patient has a Health Care Directive on file      Celina Breen [facility]      "

## 2025-04-02 NOTE — PROGRESS NOTES
Assessment & Plan     ICD-10-CM    1. Controlled type 2 diabetes mellitus with diabetic polyneuropathy, without long-term current use of insulin (H)  E11.42 glipiZIDE (GLUCOTROL) 5 MG tablet     gabapentin (NEURONTIN) 300 MG capsule     Continuous Glucose Sensor (DEXCOM G7 SENSOR) MISC     gabapentin (NEURONTIN) 100 MG capsule     Urine Macroscopic with reflex to Microscopic     TSH with free T4 reflex     Hemoglobin A1c     CBC with platelets     Albumin Random Urine Quantitative with Creat Ratio     Lipid Profile     Comprehensive metabolic panel     DISCONTINUED: gabapentin (NEURONTIN) 100 MG capsule     CANCELED: RBC and Platelet Morphology      2. Chronic systolic heart failure (H)  I50.22       3. CKD (chronic kidney disease) stage 2, GFR 60-89 ml/min  N18.2       4. Coronary artery disease involving native coronary artery of native heart without angina pectoris  I25.10       5. Chronic anticoagulation - Eliquis  Z79.01       6. Hypercoagulable state due to paroxysmal atrial fibrillation (H)  D68.69     I48.0       7. Mixed hyperlipidemia  E78.2       8. Benign essential hypertension  I10       9. Hypoglycemia  E16.2       10. Thrombocytopenia  D69.6          Patient presents for diabetes follow-up, as well as follow-up multiple issues.    Overall has been doing reasonably well with his glipizide.  A couple of times has had hypoglycemic episodes below 54, has really only noted this to happen if he has too large of a meal with too much sugar or carbohydrate type foods.  Tends to crash a few hours after eating this.  Advised that he try to limit the amount of carbohydrates he has not had a typical meal should help, also try to eat a little bit more protein.  This should hopefully help reduce the glucose spike and subsequent insulin spike and then having the sugar crash sometime after eating.  Otherwise seems to be doing well.  Continues with gabapentin.  Needs refills dosing adjusted today.  Dexcom G7 has been  very helpful.  It does wake him up when he gets these hypoglycemic events.  Needs updated prescriptions today.    Has chronic systolic heart failure with coronary disease.  Denies exertional angina.  Heart rates have been controlled.  Has atrial fibrillation.  Oral anticoagulation with Eliquis.  Denies exertional angina.  No excessive fluid retention issues.   continue current cardiac medications.    Hypoglycemic episodes.  See above and below.  Continue Dexcom.    HYPERTENSION - Ongoing. Blood pressure is currently well controlled.  Medication side effects: None. Denies syncope or presyncope.  Continue current medications.   Medication list reviewed/updated. Refills completed as needed.      MIXED HYPERLIPIDEMIA.  Ongoing. LDL is at goal: Yes. Triglycerides are at goal: Yes.    Medication side effects reported: None.   Continue current medications for now. Medication list reviewed/updated. Refills completed as needed.  Recent Labs   Lab Test 12/11/24  1247 09/04/24  1220   CHOL 109 105   HDL 44 39*   LDL 44 46   TRIG 103 99        Atrial Fibrillation - Type: Paroxysmal Atrial Fibrillation, chronic, ongoing.  + Hypercoagulable state due to atrial fibrillation.  Continues with apixaban (ELIQUIS) for oral anticoagulation.  Heart rates are controlled with diltiazem.  Tolerating well.  Denies excessive bleeding issues.  Easy bruising. Medication list reviewed/updated. Refills completed as needed.     Chronic Kidney Disease, Stage 2 (GFR 60-89), chronic, ongoing.  Kidney function had been slowly declining.  Encourage NSAID avoidance.    Recent Labs   Lab Test 02/11/25  0946 12/11/24  1247   CR 1.03 1.07   GFRESTIMATED 70 67        Vaccine counseling completed.  Encourage routine / annual vaccinations.    Patient needs to monitor blood sugar more closely, having had multiple episodes of hypoglycemic events, BLOOD GLUCOSE RESULTS LESS THAN 54 MG/DL, with oral medications.  -Recommend Dexcom for continuous glucose  monitoring.    I have evaluated this patient in-person today and I am prescribing continuous glucose monitor for the following reasons:  -The patient has a diagnosis of diabetes mellitus.  -The patient's medication regimen requires frequent adjustments due to hypoglycemia / Low blood sugars.   -Multiple episodes of hypoglycemic events, BLOOD GLUCOSE RESULTS LESS THAN 54 MG/DL     Type 2 Diabetes Mellitus, with nephropathy, with neuropathy, Reports ongoing/previous: numbness and burning.  Blood sugar control has been good with mild hyperglycemia. Doing well with diet, oral agents, exercise, But -- Intermittent HYPOGLYCEMIA - xNO Insulin injections per day.  Medication list reviewed/updated. Refills completed as needed.    Complicating factors include but are not limited to: hypertension, hyperlipidemia, neuropathy, chronic kidney disease, and CAD/PVD.     Recent Labs   Lab Test 04/02/25  1344 02/11/25  0946 12/11/24  1247 09/04/24  1220 05/29/24  1307   A1C 7.2*  --  7.0* 7.0* 7.1*   LDL  --   --  44 46 95   HDL  --   --  44 39* 42   TRIG  --   --  103 99 147   ALT  --   --  15 12 8   CR  --  1.03 1.07 1.18* 1.17   GFRESTIMATED  --  70 67 59* 60*   POTASSIUM  --  4.3 4.2 4.4 4.6   TSH  --   --  1.96 1.77 1.96   WBC 7.2 6.6 6.1 6.5 6.5   HGB 15.6 16.3 15.5 14.9 15.8   * 138* 129* 145* 141*   ALBUMIN  --   --  4.0 4.0 4.1     Results for orders placed or performed in visit on 04/02/25   Urine Macroscopic with reflex to Microscopic     Status: Abnormal   Result Value Ref Range    Color Urine Light Yellow Colorless, Straw, Light Yellow, Yellow    Appearance Urine Clear Clear    Glucose Urine >1000 (A) Negative mg/dL    Bilirubin Urine Negative Negative    Ketones Urine Negative Negative mg/dL    Specific Gravity Urine 1.029 1.000 - 1.030    Blood Urine Negative Negative    pH Urine 5.0 5.0 - 9.0    Protein Albumin Urine Negative Negative mg/dL    Urobilinogen Urine Normal Normal mg/dL    Nitrite Urine Negative  Negative    Leukocyte Esterase Urine Negative Negative    Narrative    Microscopic not indicated   Hemoglobin A1c     Status: Abnormal   Result Value Ref Range    Estimated Average Glucose 160 (H) <117 mg/dL    Hemoglobin A1C 7.2 (H) <5.7 %   CBC with platelets     Status: Abnormal   Result Value Ref Range    WBC Count 7.2 4.0 - 11.0 10e3/uL    RBC Count 5.42 4.40 - 5.90 10e6/uL    Hemoglobin 15.6 13.3 - 17.7 g/dL    Hematocrit 46.9 40.0 - 53.0 %    MCV 87 78 - 100 fL    MCH 28.8 26.5 - 33.0 pg    MCHC 33.3 31.5 - 36.5 g/dL    RDW 14.7 10.0 - 15.0 %    Platelet Count 128 (L) 150 - 450 10e3/uL   Albumin Random Urine Quantitative with Creat Ratio     Status: Abnormal   Result Value Ref Range    Creatinine Urine mg/dL 94.9 mg/dL    Albumin Urine mg/L 49.0 mg/L    Albumin Urine mg/g Cr 51.63 (H) 0.00 - 17.00 mg/g Cr      Urine protein levels are elevated.  Platelet count is slightly low at 128,000.  CBC otherwise normal.  Hemoglobin A1c 7.2%.  Glucose elevated in urine sample, expected given his Jardiance use.    Thrombocytopenia has been noted previously, continue close monitoring.        The longitudinal plan of care for the diagnosis(es)/condition(s) as documented were addressed during this visit. Due to the added complexity in care, I will continue to support Steven in the subsequent management and with ongoing continuity of care.       This patient has been using and benefiting from using CGM continuous glucose monitoring system. I therefore recommended CGM continuous glucose monitoring as part of their comprehensive diabetes treatment plan, in order to help them:    lower their HbA1c to target and/or maintain their HbA1c at target    to alert patient prior to acute complications of hyper/hypoglycemia    to detect hypoglycemia, and/or to reduce hypoglycemia    increase their short term and long-term safety    improve quality of life    start/continue/intensify level of physical activity safely    improve insulin  sensitivity by reducing hypoglycemia unawareness.                  Return in about 3 months (around 7/2/2025) for - Labs every 91+ days, with DM Follow-up, Same Day or 1-2 days later with Dr. Montanez.      Nawaf Montanez MD  [facility]    Review of Systems   Constitutional:  Positive for fatigue (mostly of feet and legs - stopped the b12 supplements again). Negative for chills and fever.   HENT:  Positive for hearing loss. Negative for congestion, ear pain and sore throat.    Eyes:  Positive for visual disturbance. Negative for pain.   Respiratory:  Positive for shortness of breath. Negative for cough and wheezing.    Cardiovascular:  Negative for chest pain, palpitations and peripheral edema.        Raynaud's syndrome, no ulcerations   Gastrointestinal:  Negative for abdominal pain, constipation, diarrhea, heartburn, hematochezia, nausea and vomiting.        + Difficulty swallowing, food sticks   Endocrine: Negative for cold intolerance and heat intolerance.        + Hypoglycemia episodes with glucose less than 54 again recently (was after having large carbohydrate supper)   Genitourinary:  Positive for frequency, impotence and urgency. Negative for dysuria, genital sores, hematuria and penile discharge.        + Nocturia 1-2x nightly, previously 3-4x.  + nocturia and overactive bladder symptoms.-- stopped oxybutynin, due to urinary retention.   Musculoskeletal:  Positive for back pain (+ more frequent low back pain) and gait problem (+ Balance problems - difficulty walking on uneven ground). Negative for arthralgias, joint swelling and myalgias.   Skin:  Negative for pallor and rash.   Allergic/Immunologic: Negative for immunocompromised state.   Neurological:  Negative for dizziness, weakness, light-headedness, headaches and paresthesias.        + Balance problems   Hematological:  Does not bruise/bleed easily.   Psychiatric/Behavioral:  Positive for sleep disturbance (+ insomnia and will  "wake due to foot pain and nocturia). Negative for agitation, confusion and mood changes. The patient is not nervous/anxious.          Michael Harris is a 89 year old, presenting for the following health issues:  Diabetes        4/2/2025    12:59 PM   Additional Questions   Roomed by NAVIN Patrick   Accompanied by Self     History of Present Illness       He eats 0-1 servings of fruits and vegetables daily.He consumes 0 sweetened beverage(s) daily.He exercises with enough effort to increase his heart rate 9 or less minutes per day.  He exercises with enough effort to increase his heart rate 3 or less days per week. He is missing 1 dose(s) of medications per week.  He is not taking prescribed medications regularly due to remembering to take.          Diabetes Follow-up    How often are you checking your blood sugar? Continuous glucose monitor  What time of day are you checking your blood sugars (select all that apply)?  Before meals; and whenever I feel like it   Have you had any blood sugars above 200?  Yes   Have you had any blood sugars below 70?  Yes   What symptoms do you notice when your blood sugar is low?  Weak and Other: sweating and hard time standing   What concerns do you have today about your diabetes? None   Do you have any of these symptoms? (Select all that apply)  No numbness or tingling in feet.  No redness, sores or blisters on feet.  No complaints of excessive thirst.  No reports of blurry vision.  No significant changes to weight. Leg fatigue but no foot concerns       BP Readings from Last 2 Encounters:   04/02/25 132/66   02/11/25 132/88     Hemoglobin A1C (%)   Date Value   04/02/2025 7.2 (H)   12/11/2024 7.0 (H)   07/07/2021 7.1 (H)   03/17/2021 7.4 (H)     LDL Cholesterol Calculated (mg/dL)   Date Value   12/11/2024 44   09/04/2024 46   07/07/2021 86   03/17/2021 85                   Objective    /66   Pulse 61   Resp 16   Ht 1.753 m (5' 9\")   Wt 77.3 kg (170 lb 6.4 oz)   SpO2 96% "   BMI 25.16 kg/m    Body mass index is 25.16 kg/m .  Physical Exam  Constitutional:       General: He is not in acute distress.     Appearance: Normal appearance. He is well-developed. He is not diaphoretic.   Eyes:      General: No scleral icterus.     Conjunctiva/sclera: Conjunctivae normal.   Neck:      Vascular: No carotid bruit.   Cardiovascular:      Rate and Rhythm: Normal rate and regular rhythm.      Pulses: Normal pulses.   Pulmonary:      Effort: Pulmonary effort is normal.      Breath sounds: Normal breath sounds.   Abdominal:      Palpations: Abdomen is soft.      Tenderness: There is no abdominal tenderness.   Musculoskeletal:         General: No deformity.      Cervical back: Neck supple.      Right lower leg: No edema.      Left lower leg: No edema.   Skin:     General: Skin is warm and dry.      Coloration: Skin is not jaundiced.      Findings: No rash.      Comments: Venous stasis and scattered scabs bilaterally on legs   Neurological:      Mental Status: He is alert. Mental status is at baseline.   Psychiatric:         Mood and Affect: Mood normal.         Behavior: Behavior normal.                    Signed Electronically by: Nawaf Montanez MD

## 2025-04-21 ENCOUNTER — OFFICE VISIT (OUTPATIENT)
Dept: FAMILY MEDICINE | Facility: OTHER | Age: 89
End: 2025-04-21
Payer: COMMERCIAL

## 2025-04-21 VITALS
WEIGHT: 168 LBS | RESPIRATION RATE: 18 BRPM | OXYGEN SATURATION: 96 % | HEART RATE: 54 BPM | DIASTOLIC BLOOD PRESSURE: 72 MMHG | HEIGHT: 69 IN | SYSTOLIC BLOOD PRESSURE: 128 MMHG | BODY MASS INDEX: 24.88 KG/M2

## 2025-04-21 DIAGNOSIS — R22.0 SWELLING, MASS, OR LUMP ON FACE: Primary | ICD-10-CM

## 2025-04-21 PROCEDURE — G0463 HOSPITAL OUTPT CLINIC VISIT: HCPCS

## 2025-04-21 ASSESSMENT — ENCOUNTER SYMPTOMS
CARDIOVASCULAR NEGATIVE: 1
RESPIRATORY NEGATIVE: 1
CONSTITUTIONAL NEGATIVE: 1

## 2025-04-21 ASSESSMENT — PAIN SCALES - GENERAL: PAINLEVEL_OUTOF10: NO PAIN (0)

## 2025-04-21 NOTE — PROGRESS NOTES
"Chief Complaint   Patient presents with    Ear Problem     Swelling under right ear   Patient is here to be seen for spot under right ear.    FOOD SECURITY SCREENING QUESTIONS  Hunger Vital Signs:  Within the past 12 months we worried whether our food would run out before we got money to buy more. Never  Within the past 12 months the food we bought just didn't last and we didn't have money to get more. Never  Brigitte Cody 4/21/2025 12:22 PM      Initial /72 (BP Location: Right arm, Patient Position: Sitting, Cuff Size: Adult Regular)   Pulse 54   Resp 18   Ht 1.753 m (5' 9\")   Wt 76.2 kg (168 lb)   SpO2 96%   BMI 24.81 kg/m   Estimated body mass index is 24.81 kg/m  as calculated from the following:    Height as of this encounter: 1.753 m (5' 9\").    Weight as of this encounter: 76.2 kg (168 lb).  Medication Reconciliation: complete    Brigitte Cody   "

## 2025-04-21 NOTE — PROGRESS NOTES
Steven Dennison  1935    ASSESSMENT/PLAN    1. Swelling, mass, or lump on face (Primary)  - US Head Neck Soft Tissue; Future    - Outpatient ultrasound of head/neck/soft tissue ordered for evaluation of swelling and mass of right cheek extending below mandible.   - Recommend patient follow up with PCP if symptoms worsen prior to ultrasound evaluation.  - May use over-the-counter Tylenol or ibuprofen PRN  - Follow up as needed for new or worsening symptoms          *Explanation of diagnosis, treatment options and risk and benefits of medications reviewed with patient. Patient agrees with plan of care.  *All questions were answered.    *Red flags symptoms were discussed and patient was advised when they should return for reevaluation or for prompt emergency evaluation.   *Patient was given verbal and written instructions on plan of care. Instructions were printed or are available on [employer] on electronic AVS.   *We discussed potential side effects of any prescribed or recommended therapies, as well as expectations for response to treatments.  *Patient discharged in stable condition    Steffen Escalona, HERNANDEZ, APRN, FNP-C  [facility]    SUBJECTIVE  CHIEF COMPLAINT/ REASON FOR VISIT  Patient presents with:  Ear Problem: Swelling under right ear     HISTORY OF PRESENT ILLNESS  Steven Dennison is a pleasant 89 year old male presents to [facility] today with swelling.  Patient reports his friend noted swelling to the right side of his face below the right ear.  Patient states he has not noticed the swelling and has no pain.  Patient reports no increase in fatigue, fever, or tobacco product use.  He denies any unintentional weight loss or night sweats.  Patient wants the spot evaluated due to the concern it caused his friend.    History provided by patient    I have reviewed the nursing notes.  I have reviewed allergies, medication list, problem list, and past medical history.    REVIEW OF  "SYSTEMS  Review of Systems   Constitutional: Negative.    HENT: Negative.     Respiratory: Negative.     Cardiovascular: Negative.    Skin: Negative.         VITAL SIGNS  Vitals:    04/21/25 1221   BP: 128/72   BP Location: Right arm   Patient Position: Sitting   Cuff Size: Adult Regular   Pulse: 54   Resp: 18   SpO2: 96%   Weight: 76.2 kg (168 lb)   Height: 1.753 m (5' 9\")      Body mass index is 24.81 kg/m .      OBJECTIVE  PHYSICAL EXAM  Physical Exam  Vitals and nursing note reviewed.   Constitutional:       General: He is not in acute distress.     Appearance: Normal appearance. He is normal weight. He is not ill-appearing, toxic-appearing or diaphoretic.   HENT:      Head:      Jaw: Swelling present. No tenderness.      Salivary Glands: Right salivary gland is diffusely enlarged. Right salivary gland is not tender.        Comments: Localized swelling and mass felt to right side of face and below ear which extends below mandible.  Cardiovascular:      Rate and Rhythm: Normal rate and regular rhythm.      Pulses: Normal pulses.      Heart sounds: Normal heart sounds.   Pulmonary:      Effort: Pulmonary effort is normal. No respiratory distress.      Breath sounds: Normal breath sounds. No wheezing or rhonchi.   Neurological:      Mental Status: He is alert.          DIAGNOSTICS  No results found for any visits on 04/21/25.   "

## 2025-04-28 ENCOUNTER — TELEPHONE (OUTPATIENT)
Dept: INTERNAL MEDICINE | Facility: OTHER | Age: 89
End: 2025-04-28
Payer: COMMERCIAL

## 2025-04-28 ENCOUNTER — OFFICE VISIT (OUTPATIENT)
Dept: FAMILY MEDICINE | Facility: OTHER | Age: 89
End: 2025-04-28
Payer: COMMERCIAL

## 2025-04-28 VITALS
OXYGEN SATURATION: 98 % | SYSTOLIC BLOOD PRESSURE: 132 MMHG | WEIGHT: 169 LBS | DIASTOLIC BLOOD PRESSURE: 58 MMHG | HEART RATE: 60 BPM | RESPIRATION RATE: 16 BRPM | HEIGHT: 68 IN | BODY MASS INDEX: 25.61 KG/M2

## 2025-04-28 DIAGNOSIS — S01.112A LACERATION OF LEFT EYEBROW, INITIAL ENCOUNTER: Primary | ICD-10-CM

## 2025-04-28 DIAGNOSIS — G89.4 CHRONIC PAIN SYNDROME: ICD-10-CM

## 2025-04-28 DIAGNOSIS — G89.29 CHRONIC MIDLINE LOW BACK PAIN WITHOUT SCIATICA: ICD-10-CM

## 2025-04-28 DIAGNOSIS — M54.50 CHRONIC MIDLINE LOW BACK PAIN WITHOUT SCIATICA: ICD-10-CM

## 2025-04-28 PROCEDURE — 250N000009 HC RX 250: Performed by: STUDENT IN AN ORGANIZED HEALTH CARE EDUCATION/TRAINING PROGRAM

## 2025-04-28 PROCEDURE — 1126F AMNT PAIN NOTED NONE PRSNT: CPT | Performed by: STUDENT IN AN ORGANIZED HEALTH CARE EDUCATION/TRAINING PROGRAM

## 2025-04-28 PROCEDURE — 3075F SYST BP GE 130 - 139MM HG: CPT | Performed by: STUDENT IN AN ORGANIZED HEALTH CARE EDUCATION/TRAINING PROGRAM

## 2025-04-28 PROCEDURE — G0463 HOSPITAL OUTPT CLINIC VISIT: HCPCS

## 2025-04-28 PROCEDURE — 3078F DIAST BP <80 MM HG: CPT | Performed by: STUDENT IN AN ORGANIZED HEALTH CARE EDUCATION/TRAINING PROGRAM

## 2025-04-28 PROCEDURE — 99213 OFFICE O/P EST LOW 20 MIN: CPT | Performed by: STUDENT IN AN ORGANIZED HEALTH CARE EDUCATION/TRAINING PROGRAM

## 2025-04-28 RX ORDER — GINSENG 100 MG
1 CAPSULE ORAL ONCE
Status: COMPLETED | OUTPATIENT
Start: 2025-04-28 | End: 2025-04-28

## 2025-04-28 RX ORDER — OXYCODONE HYDROCHLORIDE 5 MG/1
5 TABLET ORAL DAILY PRN
Qty: 30 TABLET | Refills: 0 | Status: SHIPPED | OUTPATIENT
Start: 2025-04-28

## 2025-04-28 RX ADMIN — BACITRACIN 1 G: 500 OINTMENT TOPICAL at 12:40

## 2025-04-28 ASSESSMENT — LIFESTYLE VARIABLES
HOW OFTEN DO YOU HAVE A DRINK CONTAINING ALCOHOL: NEVER
SKIP TO QUESTIONS 9-10: 1
HOW OFTEN DO YOU HAVE SIX OR MORE DRINKS ON ONE OCCASION: NEVER
AUDIT-C TOTAL SCORE: 0
HOW MANY STANDARD DRINKS CONTAINING ALCOHOL DO YOU HAVE ON A TYPICAL DAY: PATIENT DOES NOT DRINK

## 2025-04-28 ASSESSMENT — PAIN SCALES - GENERAL: PAINLEVEL_OUTOF10: NO PAIN (0)

## 2025-04-28 NOTE — NURSING NOTE
"Chief Complaint   Patient presents with    Laceration     Over left eye     Patient tripped over curb today while arms were full -       Initial /58 (BP Location: Right arm, Patient Position: Sitting, Cuff Size: Adult Regular)   Pulse 60   Resp 16   Ht 1.727 m (5' 8\")   Wt 76.7 kg (169 lb)   SpO2 98%   BMI 25.70 kg/m   Estimated body mass index is 25.7 kg/m  as calculated from the following:    Height as of this encounter: 1.727 m (5' 8\").    Weight as of this encounter: 76.7 kg (169 lb).     Advance Care Directive on file? y    FOOD SECURITY SCREENING QUESTIONS:    The next two questions are to help us understand your food security.  If you are feeling you need any assistance in this area, we have resources available to support you today.    Hunger Vital Signs:  Within the past 12 months we worried whether our food would run out before we got money to buy more. Never  Within the past 12 months the food we bought just didn't last and we didn't have money to get more. Never  Belle Sales LPN,LPN on 4/28/2025 at 12:05 PM      Belle Sales LPN     "

## 2025-04-28 NOTE — PATIENT INSTRUCTIONS
Left Eyelid Laceration    Steri-strips applied today. These should stay on for the next 3-5 days. Keep area clean and dry. No no use water on the area. The strips should fall off on their own.    The band-aid will come off later today. Continue topical antibiotic ointment as needed for the abrasion, avoid using on steri strips.     Dry ice packs to help with swelling/pain.    If you develop a headache, pain around the eye, or worsening of symptoms, you should go right to the emergency room.    Otherwise, follow up as needed.

## 2025-04-28 NOTE — PROGRESS NOTES
Assessment & Plan     (S01.112A) Laceration of left eyebrow, initial encounter  (primary encounter diagnosis)    Comment: Laceration above the left eyebrow and lateral to the left eye.  No evidence of ocular involvement.  No evidence of bony involvement.  Laceration was cleaned today with sterile water.  No noted foreign bodies.  Area was then reapproximated with Steri-Strips, 1/4 inch Steri-Strip near the middle of the laceration with two 1/8 inch Steri-Strips on either side.  The secondary smaller laceration was closed with two 1/4 inch Steri-Strips reapproximating the area well.  There was very scant bleeding that was dabbed away after repair.  The remaining portion of the abrasion was covered with bacitracin, Band-Aid.  Discussed that with the fall and head injury there could be concern for deeper injury under the skin, patient declined any further evaluation at this time.  Recommended close precautions to go to the emergency room.  Last tetanus was completed on 2/12/2020. He does take Eliquis.    Plan: bacitracin ointment 1 g     Continue wound care.  Discussed to keep the Steri-Strips clean and dry.  Recommended to allow them to fall off spontaneously.  Follow-up with primary care if symptoms do continue to persist.  Close return precautions to go to the ER if symptoms were to worsen or change.  He is comfortable and agreeable with this plan.        Michael Harris is a 89 year old, presenting for the following health issues:  Laceration (Over left eye - about 1115a today)    HPI    Patient presents today with concerns of laceration on his left eyebrow. He notes he tripped over a curb and landed on his glasses on his left eyebrow area. He notes he did have some bleeding, put a band-aid on the area after it happened. He denies any vision changes, headaches, trouble with walking or balance. He does take blood thinners.       Review of Systems  Constitutional, HEENT, cardiovascular, pulmonary, gi and gu  "systems are negative, except as otherwise noted.        Objective    /58 (BP Location: Right arm, Patient Position: Sitting, Cuff Size: Adult Regular)   Pulse 60   Resp 16   Ht 1.727 m (5' 8\")   Wt 76.7 kg (169 lb)   SpO2 98%   BMI 25.70 kg/m    Body mass index is 25.7 kg/m .      Physical Exam  HENT:      Head:        GENERAL: alert and no distress  INT: small skin abrasions on the superior aspect of the lateral side of left eyebrow that is in about a 1 inch area, there is approximately 1 cm by 2 mm deep laceration along the lateral side of the eye with a 4 mm by 1/2 mm deep laceration running horizontal to the first laceration, slight oozing of blood, no large amounts of bleeding, no areas of edema, firmness, or changes in the bony structure under the laceration. No foreign body seen   EYES: Eyes grossly normal to inspection, PERRL and conjunctivae and sclerae normal  HENT: nose and mouth without ulcers or lesions  RESP: no increased work of breathing  MS: no gross musculoskeletal defects noted, no edema        Signed Electronically by: Krista Quiroz PA-C    "

## 2025-04-28 NOTE — TELEPHONE ENCOUNTER
Patient requesting refill of pain meds - oxycodone.  Patient has recently seen Dr. Montanez and forgot to ask for refill.  Please advise and notify patient.    Belle Sales LPN LPN....................  4/28/2025   1:15 PM

## 2025-04-29 ENCOUNTER — HOSPITAL ENCOUNTER (OUTPATIENT)
Dept: ULTRASOUND IMAGING | Facility: OTHER | Age: 89
Discharge: HOME | End: 2025-04-29
Admitting: RADIOLOGY
Payer: COMMERCIAL

## 2025-04-29 DIAGNOSIS — R22.0 SWELLING, MASS, OR LUMP ON FACE: ICD-10-CM

## 2025-04-29 PROCEDURE — 76536 US EXAM OF HEAD AND NECK: CPT | Mod: 26 | Performed by: RADIOLOGY

## 2025-04-29 PROCEDURE — 76536 US EXAM OF HEAD AND NECK: CPT

## 2025-05-19 DIAGNOSIS — J31.0 CHRONIC RHINITIS: ICD-10-CM

## 2025-05-22 RX ORDER — FLUTICASONE PROPIONATE 50 MCG
SPRAY, SUSPENSION (ML) NASAL
Qty: 48 G | Refills: 0 | Status: SHIPPED | OUTPATIENT
Start: 2025-05-22

## 2025-05-22 NOTE — TELEPHONE ENCOUNTER
[facility] sent Rx request for the following:      Requested Prescriptions   Pending Prescriptions Disp Refills    fluticasone (FLONASE) 50 MCG/ACT nasal spray [Pharmacy Med Name: FLUTICASONE 50MCG NASAL SP (120) RX] 48 g 4     Sig: SPRAY 2 SPRAYS INTO BOTH NOSTRILS AS NEEDED FOR RHINITIS.       Nasal Allergy Protocol Passed - 5/22/2025  3:59 PM      Last Prescription Date:   12/11/2024  Last Fill Qty/Refills:         48g, R-4    Last Office Visit:              2/11/2024   Future Office visit:           7/15/2025      Unable to complete prescription refill per RN Medication Refill Policy.   Will route to pcp to review and approve. And will route to schedule for Establish care appt.  Luis Ellis RN on 5/22/2025 at 4:01 PM'

## 2025-06-01 DIAGNOSIS — J31.0 CHRONIC RHINITIS: ICD-10-CM

## 2025-06-04 RX ORDER — FLUTICASONE PROPIONATE 50 MCG
SPRAY, SUSPENSION (ML) NASAL
Qty: 48 G | Refills: 0 | OUTPATIENT
Start: 2025-06-04

## 2025-06-04 NOTE — TELEPHONE ENCOUNTER
[facility] sent Rx request for the following:      Pt is requesting a 90 day supply.    fluticasone (FLONASE) 50 MCG/ACT nasal spray 48 g 0 2025 -- No   Sig: SPRAY 2 SPRAYS INTO BOTH NOSTRILS AS NEEDED FOR RHINITIS.     Called [facility] and spoke pharmacist Conor, after verifying Pt's last name and . He denied receipt of the above noted prescription. TORB given per original order. He states #48 g is a 90 day supply. Sandra Ch Refsallie STONER .............. 2025  11:29 AM

## 2025-06-16 ENCOUNTER — NURSE TRIAGE (OUTPATIENT)
Dept: INTERNAL MEDICINE | Facility: OTHER | Age: 89
End: 2025-06-16

## 2025-07-15 ENCOUNTER — LAB (OUTPATIENT)
Dept: LAB | Facility: OTHER | Age: 89
End: 2025-07-15
Attending: INTERNAL MEDICINE
Payer: COMMERCIAL

## 2025-07-15 ENCOUNTER — OFFICE VISIT (OUTPATIENT)
Dept: INTERNAL MEDICINE | Facility: OTHER | Age: 89
End: 2025-07-15
Attending: INTERNAL MEDICINE
Payer: COMMERCIAL

## 2025-07-15 VITALS
BODY MASS INDEX: 25.7 KG/M2 | DIASTOLIC BLOOD PRESSURE: 79 MMHG | WEIGHT: 169 LBS | RESPIRATION RATE: 18 BRPM | OXYGEN SATURATION: 95 % | SYSTOLIC BLOOD PRESSURE: 139 MMHG | HEART RATE: 57 BPM

## 2025-07-15 DIAGNOSIS — I25.10 CORONARY ARTERY DISEASE INVOLVING NATIVE CORONARY ARTERY OF NATIVE HEART WITHOUT ANGINA PECTORIS: ICD-10-CM

## 2025-07-15 DIAGNOSIS — E11.42 CONTROLLED TYPE 2 DIABETES MELLITUS WITH DIABETIC POLYNEUROPATHY, WITHOUT LONG-TERM CURRENT USE OF INSULIN (H): Primary | ICD-10-CM

## 2025-07-15 DIAGNOSIS — I48.0 HYPERCOAGULABLE STATE DUE TO PAROXYSMAL ATRIAL FIBRILLATION (H): ICD-10-CM

## 2025-07-15 DIAGNOSIS — N18.2 CKD (CHRONIC KIDNEY DISEASE) STAGE 2, GFR 60-89 ML/MIN: ICD-10-CM

## 2025-07-15 DIAGNOSIS — E78.2 MIXED HYPERLIPIDEMIA: ICD-10-CM

## 2025-07-15 DIAGNOSIS — I10 BENIGN ESSENTIAL HYPERTENSION: ICD-10-CM

## 2025-07-15 DIAGNOSIS — H61.21 IMPACTED CERUMEN OF RIGHT EAR: ICD-10-CM

## 2025-07-15 DIAGNOSIS — E11.42 CONTROLLED TYPE 2 DIABETES MELLITUS WITH DIABETIC POLYNEUROPATHY, WITHOUT LONG-TERM CURRENT USE OF INSULIN (H): ICD-10-CM

## 2025-07-15 DIAGNOSIS — N28.89 RIGHT RENAL MASS: ICD-10-CM

## 2025-07-15 DIAGNOSIS — R22.1 MASS OF RIGHT SIDE OF NECK: ICD-10-CM

## 2025-07-15 DIAGNOSIS — Z79.01 CHRONIC ANTICOAGULATION: ICD-10-CM

## 2025-07-15 DIAGNOSIS — D68.69 HYPERCOAGULABLE STATE DUE TO PAROXYSMAL ATRIAL FIBRILLATION (H): ICD-10-CM

## 2025-07-15 DIAGNOSIS — I50.22 CHRONIC SYSTOLIC HEART FAILURE (H): ICD-10-CM

## 2025-07-15 LAB
ALBUMIN SERPL BCG-MCNC: 3.9 G/DL (ref 3.5–5.2)
ALBUMIN UR-MCNC: NEGATIVE MG/DL
ALP SERPL-CCNC: 97 U/L (ref 40–150)
ALT SERPL W P-5'-P-CCNC: 15 U/L (ref 0–70)
ANION GAP SERPL CALCULATED.3IONS-SCNC: 11 MMOL/L (ref 7–15)
APPEARANCE UR: CLEAR
AST SERPL W P-5'-P-CCNC: 17 U/L (ref 0–45)
BILIRUB SERPL-MCNC: 0.5 MG/DL
BILIRUB UR QL STRIP: NEGATIVE
BUN SERPL-MCNC: 23.3 MG/DL (ref 8–23)
CALCIUM SERPL-MCNC: 9 MG/DL (ref 8.8–10.4)
CHLORIDE SERPL-SCNC: 109 MMOL/L (ref 98–107)
CHOLEST SERPL-MCNC: 171 MG/DL
COLOR UR AUTO: ABNORMAL
CREAT SERPL-MCNC: 1.14 MG/DL (ref 0.67–1.17)
CREAT UR-MCNC: 74.5 MG/DL
EGFRCR SERPLBLD CKD-EPI 2021: 61 ML/MIN/1.73M2
ERYTHROCYTE [DISTWIDTH] IN BLOOD BY AUTOMATED COUNT: 15.2 % (ref 10–15)
EST. AVERAGE GLUCOSE BLD GHB EST-MCNC: 166 MG/DL
FASTING STATUS PATIENT QL REPORTED: ABNORMAL
FASTING STATUS PATIENT QL REPORTED: ABNORMAL
GLUCOSE SERPL-MCNC: 176 MG/DL (ref 70–99)
GLUCOSE UR STRIP-MCNC: >1000 MG/DL
HBA1C MFR BLD: 7.4 %
HCO3 SERPL-SCNC: 22 MMOL/L (ref 22–29)
HCT VFR BLD AUTO: 46.2 % (ref 40–53)
HDLC SERPL-MCNC: 44 MG/DL
HGB BLD-MCNC: 15 G/DL (ref 13.3–17.7)
HGB UR QL STRIP: NEGATIVE
KETONES UR STRIP-MCNC: NEGATIVE MG/DL
LDLC SERPL CALC-MCNC: 93 MG/DL
LEUKOCYTE ESTERASE UR QL STRIP: NEGATIVE
MCH RBC QN AUTO: 28.7 PG (ref 26.5–33)
MCHC RBC AUTO-ENTMCNC: 32.5 G/DL (ref 31.5–36.5)
MCV RBC AUTO: 88 FL (ref 78–100)
MICROALBUMIN UR-MCNC: <12 MG/L
MICROALBUMIN/CREAT UR: NORMAL MG/G{CREAT}
NITRATE UR QL: NEGATIVE
NONHDLC SERPL-MCNC: 127 MG/DL
PH UR STRIP: 5 [PH] (ref 5–9)
PLATELET # BLD AUTO: 143 10E3/UL (ref 150–450)
POTASSIUM SERPL-SCNC: 4.9 MMOL/L (ref 3.4–5.3)
PROT SERPL-MCNC: 6.4 G/DL (ref 6.4–8.3)
RBC # BLD AUTO: 5.23 10E6/UL (ref 4.4–5.9)
SODIUM SERPL-SCNC: 142 MMOL/L (ref 135–145)
SP GR UR STRIP: 1.03 (ref 1–1.03)
TRIGL SERPL-MCNC: 171 MG/DL
TSH SERPL DL<=0.005 MIU/L-ACNC: 1.24 UIU/ML (ref 0.3–4.2)
UROBILINOGEN UR STRIP-MCNC: NORMAL MG/DL
WBC # BLD AUTO: 6.6 10E3/UL (ref 4–11)

## 2025-07-15 PROCEDURE — 82043 UR ALBUMIN QUANTITATIVE: CPT | Mod: ZL

## 2025-07-15 PROCEDURE — 69210 REMOVE IMPACTED EAR WAX UNI: CPT | Performed by: INTERNAL MEDICINE

## 2025-07-15 PROCEDURE — G0463 HOSPITAL OUTPT CLINIC VISIT: HCPCS

## 2025-07-15 PROCEDURE — 82310 ASSAY OF CALCIUM: CPT | Mod: ZL

## 2025-07-15 PROCEDURE — 80061 LIPID PANEL: CPT | Mod: ZL

## 2025-07-15 PROCEDURE — 83036 HEMOGLOBIN GLYCOSYLATED A1C: CPT | Mod: ZL

## 2025-07-15 PROCEDURE — 36415 COLL VENOUS BLD VENIPUNCTURE: CPT | Mod: ZL

## 2025-07-15 PROCEDURE — 81003 URINALYSIS AUTO W/O SCOPE: CPT | Mod: ZL

## 2025-07-15 PROCEDURE — 84443 ASSAY THYROID STIM HORMONE: CPT | Mod: ZL

## 2025-07-15 PROCEDURE — 85014 HEMATOCRIT: CPT | Mod: ZL

## 2025-07-15 RX ORDER — ACYCLOVIR 400 MG/1
TABLET ORAL
Qty: 3 EACH | Refills: 4 | Status: SHIPPED | OUTPATIENT
Start: 2025-07-15

## 2025-07-15 RX ORDER — GABAPENTIN 100 MG/1
100-300 CAPSULE ORAL EVERY MORNING
Qty: 270 CAPSULE | Refills: 1 | Status: SHIPPED | OUTPATIENT
Start: 2025-07-15

## 2025-07-15 RX ORDER — GABAPENTIN 300 MG/1
300 CAPSULE ORAL AT BEDTIME
Qty: 90 CAPSULE | Refills: 1 | Status: SHIPPED | OUTPATIENT
Start: 2025-07-15

## 2025-07-15 RX ORDER — ACYCLOVIR 400 MG/1
1 TABLET ORAL DAILY
Qty: 1 EACH | Refills: 0 | Status: SHIPPED | OUTPATIENT
Start: 2025-07-15

## 2025-07-15 RX ORDER — GLIPIZIDE 5 MG/1
10 TABLET ORAL 2 TIMES DAILY WITH MEALS
Qty: 360 TABLET | Refills: 1 | Status: SHIPPED | OUTPATIENT
Start: 2025-07-15

## 2025-07-15 ASSESSMENT — ENCOUNTER SYMPTOMS
FATIGUE: 1
SORE THROAT: 0
HEARTBURN: 0
JOINT SWELLING: 0
HEADACHES: 0
DIZZINESS: 0
VOMITING: 0
HEMATURIA: 0
BACK PAIN: 1
WEAKNESS: 0
FREQUENCY: 1
NERVOUS/ANXIOUS: 0
COUGH: 0
WHEEZING: 0
CONFUSION: 0
SHORTNESS OF BREATH: 1
AGITATION: 0
DIARRHEA: 0
CHILLS: 0
ABDOMINAL PAIN: 0
DYSURIA: 0
PALPITATIONS: 0
NAUSEA: 0
SLEEP DISTURBANCE: 1
MYALGIAS: 0
ARTHRALGIAS: 0
EYE PAIN: 0
LIGHT-HEADEDNESS: 0
PARESTHESIAS: 0
BRUISES/BLEEDS EASILY: 0
HEMATOCHEZIA: 0
FEVER: 0
CONSTIPATION: 0

## 2025-07-15 ASSESSMENT — PAIN SCALES - GENERAL: PAINLEVEL_OUTOF10: NO PAIN (0)

## 2025-07-15 NOTE — PATIENT INSTRUCTIONS
Blood pressure is well controlled.   Diabetes is well controlled.     Medications refilled.   Labs are stable.     Results for orders placed or performed in visit on 07/15/25   Urine Macroscopic with reflex to Microscopic     Status: Abnormal   Result Value Ref Range    Color Urine Light Yellow Colorless, Straw, Light Yellow, Yellow    Appearance Urine Clear Clear    Glucose Urine >1000 (A) Negative mg/dL    Bilirubin Urine Negative Negative    Ketones Urine Negative Negative mg/dL    Specific Gravity Urine 1.030 1.000 - 1.030    Blood Urine Negative Negative    pH Urine 5.0 5.0 - 9.0    Protein Albumin Urine Negative Negative mg/dL    Urobilinogen Urine Normal Normal mg/dL    Nitrite Urine Negative Negative    Leukocyte Esterase Urine Negative Negative    Narrative    Microscopic not indicated   TSH with free T4 reflex     Status: Normal   Result Value Ref Range    TSH 1.24 0.30 - 4.20 uIU/mL   Hemoglobin A1c     Status: Abnormal   Result Value Ref Range    Estimated Average Glucose 166 (H) <117 mg/dL    Hemoglobin A1C 7.4 (H) <5.7 %   CBC with platelets     Status: Abnormal   Result Value Ref Range    WBC Count 6.6 4.0 - 11.0 10e3/uL    RBC Count 5.23 4.40 - 5.90 10e6/uL    Hemoglobin 15.0 13.3 - 17.7 g/dL    Hematocrit 46.2 40.0 - 53.0 %    MCV 88 78 - 100 fL    MCH 28.7 26.5 - 33.0 pg    MCHC 32.5 31.5 - 36.5 g/dL    RDW 15.2 (H) 10.0 - 15.0 %    Platelet Count 143 (L) 150 - 450 10e3/uL   Albumin Random Urine Quantitative with Creat Ratio     Status: None   Result Value Ref Range    Creatinine Urine mg/dL 74.5 mg/dL    Albumin Urine mg/L <12.0 mg/L    Albumin Urine mg/g Cr     Lipid Profile     Status: Abnormal   Result Value Ref Range    Cholesterol 171 <200 mg/dL    Triglycerides 171 (H) <150 mg/dL    Direct Measure HDL 44 >=40 mg/dL    LDL Cholesterol Calculated 93 <100 mg/dL    Non HDL Cholesterol 127 <130 mg/dL    Patient Fasting > 8hrs? Unknown     Narrative    Cholesterol  Desirable: < 200  mg/dL  Borderline High: 200 - 239 mg/dL  High: >= 240 mg/dL    Triglycerides  Normal: < 150 mg/dL  Borderline High: 150 - 199 mg/dL  High: 200-499 mg/dL  Very High: >= 500 mg/dL    Direct Measure HDL  Female: >= 50 mg/dL   Male: >= 40 mg/dL    LDL Cholesterol  Desirable: < 100 mg/dL  Above Desirable: 100 - 129 mg/dL   Borderline High: 130 - 159 mg/dL   High:  160 - 189 mg/dL   Very High: >= 190 mg/dL    Non HDL Cholesterol  Desirable: < 130 mg/dL  Above Desirable: 130 - 159 mg/dL  Borderline High: 160 - 189 mg/dL  High: 190 - 219 mg/dL  Very High: >= 220 mg/dL   Comprehensive metabolic panel     Status: Abnormal   Result Value Ref Range    Sodium 142 135 - 145 mmol/L    Potassium 4.9 3.4 - 5.3 mmol/L    Carbon Dioxide (CO2) 22 22 - 29 mmol/L    Anion Gap 11 7 - 15 mmol/L    Urea Nitrogen 23.3 (H) 8.0 - 23.0 mg/dL    Creatinine 1.14 0.67 - 1.17 mg/dL    GFR Estimate 61 >60 mL/min/1.73m2    Calcium 9.0 8.8 - 10.4 mg/dL    Chloride 109 (H) 98 - 107 mmol/L    Glucose 176 (H) 70 - 99 mg/dL    Alkaline Phosphatase 97 40 - 150 U/L    AST 17 0 - 45 U/L    ALT 15 0 - 70 U/L    Protein Total 6.4 6.4 - 8.3 g/dL    Albumin 3.9 3.5 - 5.2 g/dL    Bilirubin Total 0.5 <=1.2 mg/dL    Patient Fasting > 8hrs? Unknown         CT Scan of neck / right neck mass ordered  - they will call with date/time of appointment.      Get your final Shingles vaccine shots at the pharmacy.         Use Peroxide in the left ear.... can gently rinse ear out with warm water / peroxide as needed.   If needed -- you can make nurse only appointment for ear flushing.         Aspects of Diabetes:   Recent Labs   Lab Test 07/15/25  1442 04/02/25  1344 02/11/25  0946 12/11/24  1247   A1C 7.4* 7.2*  --  7.0*   LDL 93 62  --  44   HDL 44 49  --  44   TRIG 171* 71  --  103   ALT 15 11  --  15   CR 1.14 1.12   < > 1.07   GFRESTIMATED 61 63   < > 67   POTASSIUM 4.9 4.2   < > 4.2   TSH 1.24 1.81  --  1.96   WBC 6.6 7.2   < > 6.1   HGB 15.0 15.6   < > 15.5   *  128*   < > 129*   ALBUMIN 3.9 4.1  --  4.0    < > = values in this interval not displayed.      Hemoglobin A1c  Goal range is under 8%. Best is 6.5 to 7   Blood Pressure 139/79 Goal to keep less than 140/90   Tobacco  reports that he quit smoking about 50 years ago. His smoking use included cigarettes. He started smoking about 70 years ago. He has a 20 pack-year smoking history. He has been exposed to tobacco smoke. He has never used smokeless tobacco. Goal to abstain from tobacco   Aspirin or Plavix Anti-platelet therapy Aspirin or Plavix reduces risk of heart disease and stroke  -- sometimes used with other blood thinners, depending on bleeding risk and risk factors.    ACE/ARB Specific blood pressure meds These medications can reduce risk of kidney disease   Cholesterol Statins (Lipitor, Crestor, vs others) Statins reduce risk of heart disease and stroke   Eye Exam -- Do Yearly -- Annual diabetic eye exam   Healthy weight Wt Readings from Last 4 Encounters:   07/15/25 76.7 kg (169 lb)   04/28/25 76.7 kg (169 lb)   04/21/25 76.2 kg (168 lb)   04/02/25 77.3 kg (170 lb 6.4 oz)      Body mass index is 25.7 kg/m .  Goal BMI under 30, best is under 25.      -- Trying to exercise daily (goal at least 20 min/day) with moderate aerobic activity   -- Eat healthy (resources from ADA at http://www.diabetes.org/)   -- Taking good care of my feet. Consider seeing the Podiatrist   -- Check blood sugars as directed, record in log book and bring to every appointment    Insurance companies are grading you and I on your blood sugar control -- Goal is to get your A1c down to 7.9% or lower and NO Smoking!  -- Medicare and most insurance companies, will only cover Hemoglobin A1c labs to be rechecked every 91+ days.      Return for Diabetes labs and clinic follow-up appointment every 3 to 4 months.    Schedule lab only appointment --- A few days AFTER: 10/13/25   Schedule clinic appointment with Dr. Montanez -- Same day as labs, or 1-2  days later.

## 2025-07-15 NOTE — NURSING NOTE
"Chief Complaint   Patient presents with    Diabetes       Initial BP (!) 149/79 (BP Location: Left arm, Patient Position: Sitting, Cuff Size: Adult Regular)   Pulse 57   Resp 18   Wt 76.7 kg (169 lb)   SpO2 95%   BMI 25.70 kg/m   Estimated body mass index is 25.7 kg/m  as calculated from the following:    Height as of 4/28/25: 1.727 m (5' 8\").    Weight as of this encounter: 76.7 kg (169 lb).  Medication Review: complete    The next two questions are to help us understand your food security.  If you are feeling you need any assistance in this area, we have resources available to support you today.          12/11/2024   SDOH- Food Insecurity   Within the past 12 months, did you worry that your food would run out before you got money to buy more? N   Within the past 12 months, did the food you bought just not last and you didn t have money to get more? N        Data saved with a previous flowsheet row definition         Health Care Directive:  Patient has a Health Care Directive on file      Vidya Bailey      "

## 2025-07-15 NOTE — PROGRESS NOTES
Assessment & Plan     ICD-10-CM    1. Controlled type 2 diabetes mellitus with diabetic polyneuropathy, without long-term current use of insulin (H)  E11.42 glipiZIDE (GLUCOTROL) 5 MG tablet     Continuous Glucose  (DEXCOM G7 ) MARTI     Continuous Glucose Sensor (DEXCOM G7 SENSOR) MISC     gabapentin (NEURONTIN) 100 MG capsule     gabapentin (NEURONTIN) 300 MG capsule      2. Coronary artery disease involving native coronary artery of native heart without angina pectoris  I25.10       3. Benign essential hypertension  I10       4. Chronic systolic heart failure (H)  I50.22       5. Chronic anticoagulation - Eliquis  Z79.01       6. Hypercoagulable state due to paroxysmal atrial fibrillation (H)  D68.69     I48.0       7. Mixed hyperlipidemia  E78.2       8. Mass of right side of neck - SubAuricular  R22.1 CT Soft Tissue Neck w Contrast      9. CKD (chronic kidney disease) stage 2, GFR 60-89 ml/min  N18.2       10. Right renal mass - 15 mm - 11/2024  N28.89 US Renal Limited      11. Impacted cerumen of right ear  H61.21 REMOVE IMPACTED CERUMEN         Patient presents for diabetes follow-up, as well as follow-up multiple issues.    Patient has been doing well from a blood sugar standpoint.  Hemoglobin A1c is well-controlled.  Doing well with glipizide, finds gabapentin helpful for his polyneuropathy.  Continues with Dexcom.  See below.  Gabapentin refilled.    Coronary disease, currently stable.  Has chronic systolic heart failure.  Paroxysmal atrial fibrillation with hypercoagulable state, taking Eliquis.  Seems to be doing well.  No recent issues.    Right facial mass, subauricular area.  Relatively firm.  Minimally mobile.  Mostly just posterior to the mandibular angle.  Did have ultrasound completed which did not really show the lesion of concern.  Recommend to proceed with CT scan soft tissue neck with contrast.  Likely will need to see ENT, consider fine-needle biopsy versus open biopsy, orders  will be placed pending imaging results.    Right renal mass, noted in November 2024.  Renal ultrasound ordered today.    Cerumen impaction of the right ear, manually removed.  See below.    Mass of right subauricular area. Noted for past couple months. No pain. Reports normal appetite. Rare night sweats/cold sweats. No abnormal weight loss.   Recent soft tissue US didn't see mass - CT Soft-tissue neck ordered 7/15/2025  - they will call with date/time of appointment.      Wt Readings from Last 5 Encounters:   07/15/25 76.7 kg (169 lb)   04/28/25 76.7 kg (169 lb)   04/21/25 76.2 kg (168 lb)   04/02/25 77.3 kg (170 lb 6.4 oz)   02/11/25 75.8 kg (167 lb)        HYPERTENSION - Ongoing. Blood pressure is currently well controlled.  Medication side effects: None. Denies syncope or presyncope.  Continue current medications.   Medication list reviewed/updated. Refills completed as needed.      MIXED HYPERLIPIDEMIA.  Ongoing. LDL is at goal: No. Triglycerides are at goal: No.  Hopefully lifestyle modifications will improve cholesterol levels, otherwise will consider additional medication dose adjustments or medication changes.  Medication side effects reported: None.   Continue current medications for now. Medication list reviewed/updated. Refills completed as needed.  Recent Labs   Lab Test 07/15/25  1442 04/02/25  1344   CHOL 171 125   HDL 44 49   LDL 93 62   TRIG 171* 71        Atrial Fibrillation - Type: Paroxysmal Atrial Fibrillation, chronic, ongoing.  + Hypercoagulable state due to atrial fibrillation.  Continues with apixaban (ELIQUIS) for oral anticoagulation.  Heart rates are controlled with diltiazem.  Tolerating well.  Denies excessive bleeding issues.  Easy bruising. Medication list reviewed/updated. Refills completed as needed.     Chronic Kidney Disease, Stage 2 (GFR 60-89), chronic, ongoing.  Kidney function had been slowly declining.  Encourage NSAID avoidance.    Recent Labs   Lab Test 07/15/25  1442  04/02/25  1344   CR 1.14 1.12   GFRESTIMATED 61 63        Vaccine counseling completed.  Encourage routine / annual vaccinations.    Type 2 Diabetes Mellitus, with nephropathy, with neuropathy, Reports ongoing/previous: numbness and burning.  Blood sugar control has been good with minimal hyperglycemia. Doing well with diet, oral agents, and exercise - xNO Insulin injections per day.  Medication list reviewed/updated. Refills completed as needed.    Complicating factors include but are not limited to: hypertension, hyperlipidemia, neuropathy, chronic kidney disease, and CAD/PVD.     Recent Labs   Lab Test 07/15/25  1442 04/02/25  1344 02/11/25  0946 12/11/24  1247   A1C 7.4* 7.2*  --  7.0*   LDL 93 62  --  44   HDL 44 49  --  44   TRIG 171* 71  --  103   ALT 15 11  --  15   CR 1.14 1.12   < > 1.07   GFRESTIMATED 61 63   < > 67   POTASSIUM 4.9 4.2   < > 4.2   TSH 1.24 1.81  --  1.96   WBC 6.6 7.2   < > 6.1   HGB 15.0 15.6   < > 15.5   * 128*   < > 129*   ALBUMIN 3.9 4.1  --  4.0    < > = values in this interval not displayed.        The longitudinal plan of care for the diagnosis(es)/condition(s) as documented were addressed during this visit. Due to the added complexity in care, I will continue to support Steven in the subsequent management and with ongoing continuity of care.     This patient has been using and benefiting from using CGM continuous glucose monitoring system. I therefore recommended CGM continuous glucose monitoring as part of their comprehensive diabetes treatment plan, in order to help them:    lower their HbA1c to target and/or maintain their HbA1c at target    to alert patient prior to acute complications of hyper/hypoglycemia    to detect hypoglycemia, and/or to reduce hypoglycemia    increase their short term and long-term safety    improve quality of life    start/continue/intensify level of physical activity safely    improve insulin sensitivity by reducing hypoglycemia unawareness.    "             BMI  Estimated body mass index is 25.7 kg/m  as calculated from the following:    Height as of 4/28/25: 1.727 m (5' 8\").    Weight as of this encounter: 76.7 kg (169 lb).         Return in about 3 months (around 10/15/2025) for - Labs every 91+ days, with DM Follow-up, Same Day or 1-2 days later with Dr. Montanez.      Nawaf Montanez MD  [facility] AND \[facility] [place] [facility]\""    Review of Systems   Constitutional:  Positive for fatigue (mostly of feet and legs - stopped the b12 supplements again). Negative for chills and fever.   HENT:  Positive for hearing loss. Negative for congestion, ear pain and sore throat.         Right neck mass - subauricular   Eyes:  Positive for visual disturbance. Negative for pain.   Respiratory:  Positive for shortness of breath. Negative for cough and wheezing.    Cardiovascular:  Negative for chest pain, palpitations and peripheral edema.        Raynaud's syndrome, no ulcerations   Gastrointestinal:  Negative for abdominal pain, constipation, diarrhea, heartburn, hematochezia, nausea and vomiting.        + Difficulty swallowing, food sticks   Endocrine: Negative for cold intolerance and heat intolerance.        + Hypoglycemia episodes with glucose less than 54 again recently (was after having large carbohydrate supper)   Genitourinary:  Positive for frequency, impotence and urgency. Negative for dysuria, genital sores, hematuria and penile discharge.        + Nocturia 1-2x nightly, previously 3-4x.  + nocturia and overactive bladder symptoms.-- stopped oxybutynin, due to urinary retention.   Musculoskeletal:  Positive for back pain (+ more frequent low back pain) and gait problem (+ Balance problems - difficulty walking on uneven ground). Negative for arthralgias, joint swelling and myalgias.   Skin:  Negative for pallor and rash.   Allergic/Immunologic: Negative for immunocompromised state.   Neurological:  Negative for dizziness, weakness, light-headedness, headaches and " paresthesias.        + Balance problems   Hematological:  Does not bruise/bleed easily.   Psychiatric/Behavioral:  Positive for sleep disturbance (+ insomnia and will wake due to foot pain and nocturia). Negative for agitation, confusion and mood changes. The patient is not nervous/anxious.          Michael Harris is a 89 year old, presenting for the following health issues:  Diabetes        7/15/2025     3:03 PM   Additional Questions   Roomed by Vidya NOLASCO   Accompanied by Francoise     History of Present Illness       He eats 0-1 servings of fruits and vegetables daily.He exercises with enough effort to increase his heart rate 9 or less minutes per day.  He exercises with enough effort to increase his heart rate 3 or less days per week.   He is taking medications regularly.                      Objective    /79 (BP Location: Left arm, Patient Position: Sitting, Cuff Size: Adult Regular)   Pulse 57   Resp 18   Wt 76.7 kg (169 lb)   SpO2 95%   BMI 25.70 kg/m    Body mass index is 25.7 kg/m .  Physical Exam  Constitutional:       General: He is not in acute distress.     Appearance: Normal appearance. He is well-developed. He is not diaphoretic.   HENT:      Right Ear: There is impacted cerumen.      Left Ear: There is impacted cerumen.      Ears:      Comments: Right -- abnormal external canal exam - cerumenosis impacting canal, cerumen removed with manual debridement (lighted loop and Alligator forceps), Mild erythema of canal and normal TM noted after wax removal.   Eyes:      General: No scleral icterus.     Conjunctiva/sclera: Conjunctivae normal.   Neck:      Vascular: No carotid bruit.      Comments: Right neck - SubAuricular, slightly firm mass. Approximately 2.5 to 3 cm in diameter.   Cardiovascular:      Rate and Rhythm: Normal rate and regular rhythm.      Pulses: Normal pulses.   Pulmonary:      Effort: Pulmonary effort is normal.      Breath sounds: Normal breath sounds.   Abdominal:       Palpations: Abdomen is soft.      Tenderness: There is no abdominal tenderness.   Musculoskeletal:         General: No deformity.      Cervical back: Neck supple.      Right lower leg: No edema.      Left lower leg: No edema.   Skin:     General: Skin is warm and dry.      Coloration: Skin is not jaundiced.      Findings: No rash.      Comments: Venous stasis and scattered scabs bilaterally on legs   Neurological:      Mental Status: He is alert. Mental status is at baseline.   Psychiatric:         Mood and Affect: Mood normal.         Behavior: Behavior normal.                    Signed Electronically by: Nawaf Montanez MD

## 2025-07-18 ENCOUNTER — HOSPITAL ENCOUNTER (OUTPATIENT)
Dept: CT IMAGING | Facility: OTHER | Age: 89
Discharge: HOME | End: 2025-07-18
Attending: INTERNAL MEDICINE | Admitting: INTERNAL MEDICINE
Payer: COMMERCIAL

## 2025-07-18 DIAGNOSIS — R22.1 MASS OF RIGHT SIDE OF NECK: ICD-10-CM

## 2025-07-18 PROCEDURE — 250N000011 HC RX IP 250 OP 636: Performed by: INTERNAL MEDICINE

## 2025-07-18 PROCEDURE — 70491 CT SOFT TISSUE NECK W/DYE: CPT

## 2025-07-18 PROCEDURE — 70491 CT SOFT TISSUE NECK W/DYE: CPT | Mod: 26 | Performed by: RADIOLOGY

## 2025-07-18 PROCEDURE — 250N000009 HC RX 250: Performed by: INTERNAL MEDICINE

## 2025-07-18 RX ORDER — IOPAMIDOL 755 MG/ML
100 INJECTION, SOLUTION INTRAVASCULAR ONCE
Status: COMPLETED | OUTPATIENT
Start: 2025-07-18 | End: 2025-07-18

## 2025-07-18 RX ADMIN — IOPAMIDOL 90 ML: 755 INJECTION, SOLUTION INTRAVENOUS at 13:43

## 2025-07-18 RX ADMIN — SODIUM CHLORIDE 60 ML: 9 INJECTION, SOLUTION INTRAVENOUS at 13:44

## 2025-07-29 ENCOUNTER — OFFICE VISIT (OUTPATIENT)
Dept: OTOLARYNGOLOGY | Facility: OTHER | Age: 89
End: 2025-07-29
Attending: OTOLARYNGOLOGY
Payer: COMMERCIAL

## 2025-07-29 DIAGNOSIS — R22.1 NECK MASS: Primary | ICD-10-CM

## 2025-07-29 PROCEDURE — G0463 HOSPITAL OUTPT CLINIC VISIT: HCPCS

## 2025-08-07 ENCOUNTER — HOSPITAL ENCOUNTER (OUTPATIENT)
Dept: ULTRASOUND IMAGING | Facility: OTHER | Age: 89
End: 2025-08-07
Attending: INTERNAL MEDICINE
Payer: COMMERCIAL

## 2025-08-07 DIAGNOSIS — N28.89 RIGHT RENAL MASS: ICD-10-CM

## 2025-08-07 PROCEDURE — 76770 US EXAM ABDO BACK WALL COMP: CPT

## (undated) DEVICE — ENDO BITE BLOCK 60 MAXI LF 00712804

## (undated) DEVICE — SYR 50ML LL W/O NDL 309653

## (undated) DEVICE — Device

## (undated) DEVICE — SUCTION MANIFOLD NEPTUNE 2 SYS 4 PORT 0702-020-000

## (undated) DEVICE — ENDO FORCEP ENDOJAW BIOPSY 2.8MMX230CM FB-220U

## (undated) DEVICE — SOL WATER 1500ML

## (undated) DEVICE — ENDO KIT COMPLIANCE DYKENDOCMPLY

## (undated) DEVICE — TUBING SUCTION 10'X3/16" N510

## (undated) RX ORDER — CYANOCOBALAMIN 1000 UG/ML
INJECTION, SOLUTION INTRAMUSCULAR; SUBCUTANEOUS
Status: DISPENSED
Start: 2022-11-17

## (undated) RX ORDER — GINSENG 100 MG
CAPSULE ORAL
Status: DISPENSED
Start: 2025-04-28

## (undated) RX ORDER — PROPOFOL 10 MG/ML
INJECTION, EMULSION INTRAVENOUS
Status: DISPENSED
Start: 2024-10-04

## (undated) RX ORDER — CYANOCOBALAMIN 1000 UG/ML
INJECTION, SOLUTION INTRAMUSCULAR; SUBCUTANEOUS
Status: DISPENSED
Start: 2021-07-07

## (undated) RX ORDER — LIDOCAINE HYDROCHLORIDE 10 MG/ML
INJECTION, SOLUTION EPIDURAL; INFILTRATION; INTRACAUDAL; PERINEURAL
Status: DISPENSED
Start: 2024-02-28

## (undated) RX ORDER — CEFTRIAXONE SODIUM 1 G
VIAL (EA) INJECTION
Status: DISPENSED
Start: 2024-02-28

## (undated) RX ORDER — CYANOCOBALAMIN 1000 UG/ML
INJECTION, SOLUTION INTRAMUSCULAR; SUBCUTANEOUS
Status: DISPENSED
Start: 2022-07-21

## (undated) RX ORDER — LIDOCAINE HYDROCHLORIDE 10 MG/ML
INJECTION, SOLUTION INFILTRATION; PERINEURAL
Status: DISPENSED
Start: 2022-06-02

## (undated) RX ORDER — CYANOCOBALAMIN 1000 UG/ML
INJECTION, SOLUTION INTRAMUSCULAR; SUBCUTANEOUS
Status: DISPENSED
Start: 2018-04-12

## (undated) RX ORDER — PROPOFOL 10 MG/ML
INJECTION, EMULSION INTRAVENOUS
Status: DISPENSED
Start: 2024-06-25

## (undated) RX ORDER — KETOROLAC TROMETHAMINE 30 MG/ML
INJECTION, SOLUTION INTRAMUSCULAR; INTRAVENOUS
Status: DISPENSED
Start: 2019-05-04